# Patient Record
Sex: MALE | Race: WHITE | NOT HISPANIC OR LATINO | Employment: OTHER | URBAN - NONMETROPOLITAN AREA
[De-identification: names, ages, dates, MRNs, and addresses within clinical notes are randomized per-mention and may not be internally consistent; named-entity substitution may affect disease eponyms.]

---

## 2017-01-05 ENCOUNTER — OFFICE VISIT (OUTPATIENT)
Dept: CARDIOLOGY | Facility: CLINIC | Age: 72
End: 2017-01-05

## 2017-01-05 VITALS
DIASTOLIC BLOOD PRESSURE: 75 MMHG | BODY MASS INDEX: 27.85 KG/M2 | RESPIRATION RATE: 16 BRPM | WEIGHT: 188 LBS | SYSTOLIC BLOOD PRESSURE: 145 MMHG | HEIGHT: 69 IN | HEART RATE: 48 BPM

## 2017-01-05 DIAGNOSIS — I25.10 ASCVD (ARTERIOSCLEROTIC CARDIOVASCULAR DISEASE): Primary | ICD-10-CM

## 2017-01-05 DIAGNOSIS — Z95.1 S/P CABG X 5: ICD-10-CM

## 2017-01-05 DIAGNOSIS — I10 ESSENTIAL HYPERTENSION: ICD-10-CM

## 2017-01-05 DIAGNOSIS — I49.3 PVC'S (PREMATURE VENTRICULAR CONTRACTIONS): ICD-10-CM

## 2017-01-05 PROCEDURE — 99213 OFFICE O/P EST LOW 20 MIN: CPT | Performed by: INTERNAL MEDICINE

## 2017-01-05 PROCEDURE — 93000 ELECTROCARDIOGRAM COMPLETE: CPT | Performed by: INTERNAL MEDICINE

## 2017-01-05 RX ORDER — ATORVASTATIN CALCIUM 80 MG/1
80 TABLET, FILM COATED ORAL DAILY
Qty: 30 TABLET | Refills: 11 | Status: SHIPPED | OUTPATIENT
Start: 2017-01-05 | End: 2017-11-28 | Stop reason: SDUPTHER

## 2017-01-05 RX ORDER — LISINOPRIL 20 MG/1
20 TABLET ORAL DAILY
Qty: 30 TABLET | Refills: 11 | Status: SHIPPED | OUTPATIENT
Start: 2017-01-05 | End: 2017-11-28 | Stop reason: SDUPTHER

## 2017-01-05 NOTE — LETTER
January 5, 2017     ANEUDY Carrizales  [address]  [address]  [place] [address]    Patient: Shai Mata   YOB: 1945   Date of Visit: 1/5/2017       Dear ANEUDY Andres:    Thank you for referring Shai Mata to me for evaluation. Below are the relevant portions of my assessment and plan of care.    If you have questions, please do not hesitate to call me. I look forward to following Shai along with you.         Sincerely,        Dave Perla MD        CC: No Recipients  Dave Perla MD  1/5/2017  9:13 AM  Signed  ANEUDY Carrizales  Shai Mata  1945 01/05/2017    Patient Active Problem List   Diagnosis   • ASCVD (arteriosclerotic cardiovascular disease)   • S/P CABG x 5   • HTN (hypertension)   • PVC's (premature ventricular contractions)       Dear ANEUDY Carrizales:    Subjective     Shai Mata is a 71 y.o. male with the above medical problems who is here today for follow-up.  Return to the patient has been doing well since last visit.  He denies chest pain, shortness breath, palpitations, orthopnea, PND or lower extremity edema.  He was noted to be bradycardic into the 50s and upper 40s.  She is not having any dizziness or symptoms with this.  Since he has been bradycardic for long time and has had no problems with his we'll continue current regimen.  He is also noted to be mildly hypertensive.      Current Outpatient Prescriptions:   •  aspirin 81 MG EC tablet, Take 81 mg by mouth daily., Disp: , Rfl:   •  carvedilol (COREG) 3.125 MG tablet, Take 1 tablet by mouth 2 (Two) Times a Day With Meals., Disp: 60 tablet, Rfl: 5  •  furosemide (LASIX) 20 MG tablet, Take 1 tablet by mouth 2 (Two) Times a Day., Disp: 60 tablet, Rfl: 5  •  insulin NPH-insulin regular (Novolin 70/30) (70-30) 100 UNIT/ML injection, Inject 24 Units under the skin 2 (two) times a day with meals., Disp: , Rfl:   •  sotalol (BETAPACE) 80 MG tablet, Take 0.5 tablets by mouth 2 (Two) Times a  "Day., Disp: 60 tablet, Rfl: 5    The following portions of the patient's history were reviewed and updated as appropriate: allergies, current medications, past family history, past medical history, past social history, past surgical history and problem list.    Review of Systems   Constitution: Negative for chills, diaphoresis and fever.   HENT: Negative for congestion, headaches, nosebleeds and sore throat.    Eyes: Negative for blurred vision, pain and redness.   Cardiovascular: Negative for chest pain, leg swelling, orthopnea, palpitations, paroxysmal nocturnal dyspnea and syncope.   Respiratory: Negative for cough, hemoptysis, shortness of breath and wheezing.    Endocrine: Negative for cold intolerance and heat intolerance.   Hematologic/Lymphatic: Does not bruise/bleed easily.   Skin: Negative for rash.   Musculoskeletal: Negative for myalgias.   Gastrointestinal: Negative for abdominal pain, constipation, diarrhea, hematemesis, hematochezia, melena, nausea and vomiting.   Genitourinary: Negative for dysuria and hematuria.   Neurological: Negative for dizziness, focal weakness and numbness.   Psychiatric/Behavioral: Negative for depression. The patient is not nervous/anxious.        Objective   Blood pressure 145/75, pulse (!) 48, resp. rate 16, height 69\" (175.3 cm), weight 188 lb (85.3 kg).    Physical Exam   Constitutional: He is oriented to person, place, and time. He appears well-developed and well-nourished.   White male sitting comfortably in chair.   HENT:   Mouth/Throat: Oropharynx is clear and moist.   Eyes: EOM are normal. Pupils are equal, round, and reactive to light.   Neck: Neck supple. No JVD present. No tracheal deviation present. No thyromegaly present.   Cardiovascular: Normal rate, regular rhythm, S1 normal and S2 normal.  Exam reveals no gallop and no friction rub.    No murmur heard.  Pulmonary/Chest: Effort normal and breath sounds normal. No respiratory distress. He has no wheezes. He " has no rales.   Abdominal: Soft. Bowel sounds are normal. He exhibits no mass. There is no tenderness.   Musculoskeletal: Normal range of motion. He exhibits no edema.   Lymphadenopathy:     He has no cervical adenopathy.   Neurological: He is alert and oriented to person, place, and time.   Skin: Skin is warm and dry. No rash noted.   Psychiatric: He has a normal mood and affect.         ECG 12 Lead  Date/Time: 1/5/2017 8:03 AM  Performed by: ESTER MARQUEZ  Authorized by: ESTER MARQUEZ   Comparison: compared with previous ECG from 4/7/2017  Similar to previous ECG  Rhythm: sinus rhythm  Ectopy: unifocal PVCs  Rate: normal  BPM: 64  Conduction: complete RBBB  ST Segments: ST segments normal  T Waves: T waves normal  QRS axis: right  Other: no other findings  Clinical impression: non-specific ECG            Assessment/Plan     1.  ASCVD: Patient with history of ASCVD currently on aspirin, statin, beta blocker and ACE inhibitor.  He reports no chest pain or shortness of breath at this point.  He is LDL done in November of last year was noted to be elevated at 109.  At this point will increase atorvastatin to 80 mg by mouth daily and monitor for improvement.    2.  Hypertension: Patient with a history of hypertension who is mildly hypertensive on this visit.  At this point we'll increase lisinopril to 20 mg by mouth daily and monitor for improvement.    3.  PVCs: Patient with history of frequent PVCs started on sotalol for this.  He states he is currently doing well.  He remains mildly bradycardic although he is asymptomatic.  At this point we'll continue current regimen and monitor.  If he were to become symptomatically we'll discontinue current.     Diagnosis Plan   1. ASCVD (arteriosclerotic cardiovascular disease)  ECG 12 Lead   2. S/P CABG x 5     3. Essential hypertension     4. PVC's (premature ventricular contractions)              Return in about 6 months (around  7/5/2017).    I appreciate the opportunity to participate in this patient's cardiovascular care.    Best Regards    Dave Funes

## 2017-01-05 NOTE — PROGRESS NOTES
ANEUDY Carrizales  Shai Mata  1945 01/05/2017    Patient Active Problem List   Diagnosis   • ASCVD (arteriosclerotic cardiovascular disease)   • S/P CABG x 5   • HTN (hypertension)   • PVC's (premature ventricular contractions)       Dear ANEUDY Carrizales:    Subjective     Shai Mata is a 71 y.o. male with the above medical problems who is here today for follow-up.  Return to the patient has been doing well since last visit.  He denies chest pain, shortness breath, palpitations, orthopnea, PND or lower extremity edema.  He was noted to be bradycardic into the 50s and upper 40s.  She is not having any dizziness or symptoms with this.  Since he has been bradycardic for long time and has had no problems with his we'll continue current regimen.  He is also noted to be mildly hypertensive.      Current Outpatient Prescriptions:   •  aspirin 81 MG EC tablet, Take 81 mg by mouth daily., Disp: , Rfl:   •  carvedilol (COREG) 3.125 MG tablet, Take 1 tablet by mouth 2 (Two) Times a Day With Meals., Disp: 60 tablet, Rfl: 5  •  furosemide (LASIX) 20 MG tablet, Take 1 tablet by mouth 2 (Two) Times a Day., Disp: 60 tablet, Rfl: 5  •  insulin NPH-insulin regular (Novolin 70/30) (70-30) 100 UNIT/ML injection, Inject 24 Units under the skin 2 (two) times a day with meals., Disp: , Rfl:   •  sotalol (BETAPACE) 80 MG tablet, Take 0.5 tablets by mouth 2 (Two) Times a Day., Disp: 60 tablet, Rfl: 5    The following portions of the patient's history were reviewed and updated as appropriate: allergies, current medications, past family history, past medical history, past social history, past surgical history and problem list.    Review of Systems   Constitution: Negative for chills, diaphoresis and fever.   HENT: Negative for congestion, headaches, nosebleeds and sore throat.    Eyes: Negative for blurred vision, pain and redness.   Cardiovascular: Negative for chest pain, leg swelling, orthopnea, palpitations, paroxysmal nocturnal  "dyspnea and syncope.   Respiratory: Negative for cough, hemoptysis, shortness of breath and wheezing.    Endocrine: Negative for cold intolerance and heat intolerance.   Hematologic/Lymphatic: Does not bruise/bleed easily.   Skin: Negative for rash.   Musculoskeletal: Negative for myalgias.   Gastrointestinal: Negative for abdominal pain, constipation, diarrhea, hematemesis, hematochezia, melena, nausea and vomiting.   Genitourinary: Negative for dysuria and hematuria.   Neurological: Negative for dizziness, focal weakness and numbness.   Psychiatric/Behavioral: Negative for depression. The patient is not nervous/anxious.        Objective   Blood pressure 145/75, pulse (!) 48, resp. rate 16, height 69\" (175.3 cm), weight 188 lb (85.3 kg).    Physical Exam   Constitutional: He is oriented to person, place, and time. He appears well-developed and well-nourished.   White male sitting comfortably in chair.   HENT:   Mouth/Throat: Oropharynx is clear and moist.   Eyes: EOM are normal. Pupils are equal, round, and reactive to light.   Neck: Neck supple. No JVD present. No tracheal deviation present. No thyromegaly present.   Cardiovascular: Normal rate, regular rhythm, S1 normal and S2 normal.  Exam reveals no gallop and no friction rub.    No murmur heard.  Pulmonary/Chest: Effort normal and breath sounds normal. No respiratory distress. He has no wheezes. He has no rales.   Abdominal: Soft. Bowel sounds are normal. He exhibits no mass. There is no tenderness.   Musculoskeletal: Normal range of motion. He exhibits no edema.   Lymphadenopathy:     He has no cervical adenopathy.   Neurological: He is alert and oriented to person, place, and time.   Skin: Skin is warm and dry. No rash noted.   Psychiatric: He has a normal mood and affect.         ECG 12 Lead  Date/Time: 1/5/2017 8:03 AM  Performed by: ESTER MARQUEZ  Authorized by: ESTER MARQUEZ   Comparison: compared with previous ECG from " 4/7/2017  Similar to previous ECG  Rhythm: sinus rhythm  Ectopy: unifocal PVCs  Rate: normal  BPM: 64  Conduction: complete RBBB  ST Segments: ST segments normal  T Waves: T waves normal  QRS axis: right  Other: no other findings  Clinical impression: non-specific ECG            Assessment/Plan     1.  ASCVD: Patient with history of ASCVD currently on aspirin, statin, beta blocker and ACE inhibitor.  He reports no chest pain or shortness of breath at this point.  He is LDL done in November of last year was noted to be elevated at 109.  At this point will increase atorvastatin to 80 mg by mouth daily and monitor for improvement.    2.  Hypertension: Patient with a history of hypertension who is mildly hypertensive on this visit.  At this point we'll increase lisinopril to 20 mg by mouth daily and monitor for improvement.    3.  PVCs: Patient with history of frequent PVCs started on sotalol for this.  He states he is currently doing well.  He remains mildly bradycardic although he is asymptomatic.  At this point we'll continue current regimen and monitor.  If he were to become symptomatically we'll discontinue current.     Diagnosis Plan   1. ASCVD (arteriosclerotic cardiovascular disease)  ECG 12 Lead   2. S/P CABG x 5     3. Essential hypertension     4. PVC's (premature ventricular contractions)              Return in about 6 months (around 7/5/2017).    I appreciate the opportunity to participate in this patient's cardiovascular care.    Best Regards    Dave Funes

## 2017-01-05 NOTE — MR AVS SNAPSHOT
Shai Mata   1/5/2017 8:00 AM   Office Visit    Dept Phone:  [phone]   Encounter #:  [id number]    Provider:  Dave Perla MD   Department:  [facility] CARDIOLOGY                Your Full Care Plan              Today's Medication Changes          These changes are accurate as of: 1/5/17  9:15 AM.  If you have any questions, ask your nurse or doctor.               Medication(s)that have changed:     atorvastatin 80 MG tablet   Commonly known as:  LIPITOR   Take 1 tablet by mouth Daily.   What changed:    - medication strength  - how much to take   Changed by:  Dave Perla MD       lisinopril 20 MG tablet   Commonly known as:  PRINIVIL,ZESTRIL   Take 1 tablet by mouth Daily.   What changed:    - medication strength  - how much to take   Changed by:  Dave Perla MD            Where to Get Your Medications      These medications were sent to [address] [phone] [place] [id number] [place] [address]     Phone:  [phone]     atorvastatin 80 MG tablet    lisinopril 20 MG tablet                  Your Updated Medication List          This list is accurate as of: 1/5/17  9:15 AM.  Always use your most recent med list.                aspirin 81 MG EC tablet       atorvastatin 80 MG tablet   Commonly known as:  LIPITOR   Take 1 tablet by mouth Daily.       carvedilol 3.125 MG tablet   Commonly known as:  COREG   Take 1 tablet by mouth 2 (Two) Times a Day With Meals.       furosemide 20 MG tablet   Commonly known as:  LASIX   Take 1 tablet by mouth 2 (Two) Times a Day.       insulin NPH-insulin regular (70-30) 100 UNIT/ML injection   Commonly known as:  novoLIN 70/30       lisinopril 20 MG tablet   Commonly known as:  PRINIVIL,ZESTRIL   Take 1 tablet by mouth Daily.       sotalol 80 MG tablet   Commonly known as:  BETAPACE   Take 0.5 tablets by mouth 2  (Two) Times a Day.               We Performed the Following     ECG 12 Lead       You Were Diagnosed With        Codes Comments    ASCVD (arteriosclerotic cardiovascular disease)    -  Primary ICD-10-CM: I25.10  ICD-9-CM: 429.2, 440.9     S/P CABG x 5     ICD-10-CM: Z95.1  ICD-9-CM: V45.81     Essential hypertension     ICD-10-CM: I10  ICD-9-CM: 401.9     PVC's (premature ventricular contractions)     ICD-10-CM: I49.3  ICD-9-CM: 427.69       Instructions     None    Patient Instructions History      Upcoming Appointments     Visit Type Date Time Department    FOLLOW UP 2017  8:00 AM [facility]    FOLLOW UP 2017  9:20 AM [facility]      MyChart Signup     [facility] allows you to send messages to your doctor, view your test results, renew your prescriptions, schedule appointments, and more. To sign up, go to [employer] and click on the Sign Up Now link in the New User? box. Enter your [employer] Activation Code exactly as it appears below along with the last four digits of your Social Security Number and your Date of Birth () to complete the sign-up process. If you do not sign up before the expiration date, you must request a new code.    [employer] Activation Code: [email or web address]  Expires: 2017  5:36 AM    If you have questions, you can email [email or web address] or call [phone] to talk to our [employer] staff. Remember, [employer] is NOT to be used for urgent needs. For medical emergencies, dial 911.               Other Info from Your Visit           Your Appointments     2017  9:20 AM EST   Follow Up with ANEUDY Carrizales   [facility] (--)    [address]  [address]  [place] [id number] [phone]           Arrive 15 minutes prior to appointment.              Allergies     No Known Allergies      Reason for Visit     Follow-up           Vital Signs     Blood Pressure Pulse Respirations Height Weight Body Mass  "Index    145/75 (BP Location: Left arm, Patient Position: Sitting, Cuff Size: Adult) 48 16 69\" (175.3 cm) 188 lb (85.3 kg) 27.76 kg/m2    Smoking Status                   Former Smoker           Problems and Diagnoses Noted     ASCVD (arteriosclerotic cardiovascular disease)    High blood pressure    Irregular heartbeat    Status post five vessel coronary artery bypass      Results     ECG 12 Lead               "

## 2017-02-14 ENCOUNTER — OFFICE VISIT (OUTPATIENT)
Dept: FAMILY MEDICINE CLINIC | Facility: CLINIC | Age: 72
End: 2017-02-14

## 2017-02-14 VITALS
WEIGHT: 189 LBS | HEIGHT: 69 IN | HEART RATE: 52 BPM | BODY MASS INDEX: 27.99 KG/M2 | SYSTOLIC BLOOD PRESSURE: 144 MMHG | DIASTOLIC BLOOD PRESSURE: 75 MMHG | OXYGEN SATURATION: 92 %

## 2017-02-14 DIAGNOSIS — Z79.4 TYPE 2 DIABETES MELLITUS WITH HYPERGLYCEMIA, WITH LONG-TERM CURRENT USE OF INSULIN (HCC): Primary | ICD-10-CM

## 2017-02-14 DIAGNOSIS — I10 ESSENTIAL HYPERTENSION: ICD-10-CM

## 2017-02-14 DIAGNOSIS — E78.2 HYPERLIPEMIA, MIXED: ICD-10-CM

## 2017-02-14 DIAGNOSIS — E11.65 TYPE 2 DIABETES MELLITUS WITH HYPERGLYCEMIA, WITH LONG-TERM CURRENT USE OF INSULIN (HCC): Primary | ICD-10-CM

## 2017-02-14 LAB
ALBUMIN SERPL-MCNC: 4.3 G/DL (ref 3.4–4.8)
ALBUMIN/GLOB SERPL: 1.5 G/DL (ref 1.5–2.5)
ALP SERPL-CCNC: 67 U/L (ref 40–129)
ALT SERPL W P-5'-P-CCNC: 21 U/L (ref 10–44)
ANION GAP SERPL CALCULATED.3IONS-SCNC: 3.7 MMOL/L (ref 3.6–11.2)
AST SERPL-CCNC: 22 U/L (ref 10–34)
BILIRUB SERPL-MCNC: 0.8 MG/DL (ref 0.2–1.8)
BUN BLD-MCNC: 18 MG/DL (ref 7–21)
BUN/CREAT SERPL: 14.8 (ref 7–25)
CALCIUM SPEC-SCNC: 9.9 MG/DL (ref 7.7–10)
CHLORIDE SERPL-SCNC: 105 MMOL/L (ref 99–112)
CHOLEST SERPL-MCNC: 139 MG/DL (ref 0–200)
CO2 SERPL-SCNC: 30.3 MMOL/L (ref 24.3–31.9)
CREAT BLD-MCNC: 1.22 MG/DL (ref 0.43–1.29)
GFR SERPL CREATININE-BSD FRML MDRD: 58 ML/MIN/1.73
GLOBULIN UR ELPH-MCNC: 2.9 GM/DL
GLUCOSE BLD-MCNC: 175 MG/DL (ref 70–110)
HBA1C MFR BLD: 11.1 % (ref 4.5–5.7)
HDLC SERPL-MCNC: 41 MG/DL (ref 60–100)
LDLC SERPL CALC-MCNC: 87 MG/DL (ref 0–100)
LDLC/HDLC SERPL: 2.13 {RATIO}
OSMOLALITY SERPL CALC.SUM OF ELEC: 283.7 MOSM/KG (ref 273–305)
POTASSIUM BLD-SCNC: 4.2 MMOL/L (ref 3.5–5.3)
PROT SERPL-MCNC: 7.2 G/DL (ref 6–8)
SODIUM BLD-SCNC: 139 MMOL/L (ref 135–153)
TRIGL SERPL-MCNC: 53 MG/DL (ref 0–150)
VLDLC SERPL-MCNC: 10.6 MG/DL

## 2017-02-14 PROCEDURE — 99213 OFFICE O/P EST LOW 20 MIN: CPT | Performed by: NURSE PRACTITIONER

## 2017-02-14 PROCEDURE — 80053 COMPREHEN METABOLIC PANEL: CPT | Performed by: NURSE PRACTITIONER

## 2017-02-14 PROCEDURE — 80061 LIPID PANEL: CPT | Performed by: NURSE PRACTITIONER

## 2017-02-14 PROCEDURE — 83036 HEMOGLOBIN GLYCOSYLATED A1C: CPT | Performed by: NURSE PRACTITIONER

## 2017-02-14 PROCEDURE — 36415 COLL VENOUS BLD VENIPUNCTURE: CPT | Performed by: NURSE PRACTITIONER

## 2017-02-14 RX ORDER — SOTALOL HYDROCHLORIDE 80 MG/1
40 TABLET ORAL 2 TIMES DAILY
Qty: 60 TABLET | Refills: 5 | Status: SHIPPED | OUTPATIENT
Start: 2017-02-14 | End: 2017-10-06

## 2017-02-15 ENCOUNTER — TELEPHONE (OUTPATIENT)
Dept: FAMILY MEDICINE CLINIC | Facility: CLINIC | Age: 72
End: 2017-02-15

## 2017-02-15 NOTE — TELEPHONE ENCOUNTER
----- Message from ANEUDY Carrizales sent at 2/15/2017 11:44 AM EST -----  Can you call Mr Mata and ask how and when he is taking his insulin.  Sugar is up more than in th past we have to make some changes and before i make changes i want to be sure.      Left a message for him to return call.    Patient returned call reports he takes 23 units of 70/30 bid

## 2017-02-16 RX ORDER — INSULIN GLARGINE 100 [IU]/ML
20 INJECTION, SOLUTION SUBCUTANEOUS NIGHTLY
Qty: 10 ML | Refills: 5 | Status: SHIPPED | OUTPATIENT
Start: 2017-02-16 | End: 2017-02-21

## 2017-02-16 NOTE — TELEPHONE ENCOUNTER
I would like to add a nighttime dose of lantus 20 units at bedtime and follow him with   in the next 3-4 weeks or sooner if he has any problems or concerns.

## 2017-02-16 NOTE — PROGRESS NOTES
Subjective   Shai Mata is a 71 y.o. male.     HPI Comments: Returns today follow up states he is feeling pretty good without any new concerns.  Sugars are up and down admits he sometimes forgets his medications and forgets to eat.  Gets busy in his garage just piddling and trying to stay busy.      Diabetes   He presents for his follow-up diabetic visit. He has type 2 diabetes mellitus. His disease course has been fluctuating. There are no hypoglycemic associated symptoms. Pertinent negatives for hypoglycemia include no headaches or sweats. There are no diabetic associated symptoms. Pertinent negatives for diabetes include no blurred vision and no chest pain. There are no hypoglycemic complications. Symptoms are stable. There are no diabetic complications. Risk factors for coronary artery disease include sedentary lifestyle, male sex, hypertension, family history and dyslipidemia. Current diabetic treatment includes insulin injections. He is compliant with treatment most of the time. His weight is stable. He is following a generally healthy diet. Meal planning includes avoidance of concentrated sweets. He participates in exercise intermittently. His home blood glucose trend is fluctuating minimally. His breakfast blood glucose range is generally 140-180 mg/dl. His overall blood glucose range is >200 mg/dl. An ACE inhibitor/angiotensin II receptor blocker is being taken. He does not see a podiatrist.  Hypertension   This is a chronic problem. The current episode started more than 1 year ago. The problem is controlled. Pertinent negatives include no anxiety, blurred vision, chest pain, headaches, malaise/fatigue, neck pain, orthopnea, palpitations, peripheral edema, PND, shortness of breath or sweats. Risk factors for coronary artery disease include dyslipidemia, male gender and sedentary lifestyle. Past treatments include ACE inhibitors and beta blockers. The current treatment provides moderate improvement.  Compliance problems include diet and exercise.    Hyperlipidemia   This is a chronic problem. Recent lipid tests were reviewed and are variable. Exacerbating diseases include diabetes. He has no history of obesity. Pertinent negatives include no chest pain, myalgias or shortness of breath. Current antihyperlipidemic treatment includes statins. The current treatment provides moderate improvement of lipids. Compliance problems include adherence to exercise and adherence to diet.  Risk factors for coronary artery disease include hypertension, obesity and a sedentary lifestyle.      The following portions of the patient's history were reviewed and updated as appropriate: allergies, current medications, past family history, past medical history, past social history, past surgical history and problem list.      Review of Systems   Constitutional: Negative.  Negative for malaise/fatigue.   HENT: Negative.    Eyes: Negative for blurred vision.   Respiratory: Negative.  Negative for shortness of breath.    Cardiovascular: Negative.  Negative for chest pain, palpitations, orthopnea and PND.   Endocrine: Negative.    Musculoskeletal: Negative.  Negative for myalgias and neck pain.   Skin: Negative.    Neurological: Negative for headaches.   Psychiatric/Behavioral: Negative.    All other systems reviewed and are negative.      Procedures    Objective   Physical Exam   Constitutional: He is oriented to person, place, and time. He appears well-developed and well-nourished. No distress.   HENT:   Head: Normocephalic.   Eyes: Conjunctivae are normal. Right eye exhibits no discharge. Left eye exhibits no discharge.   Cardiovascular: Normal rate, regular rhythm, normal heart sounds and intact distal pulses.    No murmur heard.  Pulmonary/Chest: Effort normal and breath sounds normal. No respiratory distress.   Musculoskeletal: He exhibits no edema.   Neurological: He is alert and oriented to person, place, and time.   Skin: Skin is  warm and dry. He is not diaphoretic.   Psychiatric: He has a normal mood and affect. His behavior is normal.   Nursing note and vitals reviewed.      Assessment/Plan   Discussed with patient impression and plan, patient verbalizes understanding.  Continue with routine medications.  Discussed eating on a regular basis and monitoring his glucose more often and keeping record.   Shai was seen today for follow-up, diabetes, hypertension and hyperlipidemia.    Diagnoses and all orders for this visit:    Type 2 diabetes mellitus with hyperglycemia, with long-term current use of insulin  -     Comprehensive Metabolic Panel  -     Lipid Panel  -     Hemoglobin A1c  -     Osmolality, Calculated; Future  -     Osmolality, Calculated    Essential hypertension  -     Comprehensive Metabolic Panel  -     Lipid Panel  -     Hemoglobin A1c  -     Osmolality, Calculated; Future  -     Osmolality, Calculated    Hyperlipemia, mixed    Other orders  -     sotalol (BETAPACE) 80 MG tablet; Take 0.5 tablets by mouth 2 (Two) Times a Day.

## 2017-02-16 NOTE — TELEPHONE ENCOUNTER
I would like to add a nighttime dose of lantus 20 units at bedtime and follow him with   in the next 3-4 weeks or sooner if he has any problems or concerns.       Spoke with patient & he verbalized understanding.

## 2017-02-21 ENCOUNTER — TELEPHONE (OUTPATIENT)
Dept: FAMILY MEDICINE CLINIC | Facility: CLINIC | Age: 72
End: 2017-02-21

## 2017-02-21 NOTE — TELEPHONE ENCOUNTER
Patient called stated he went to  the Lantus you wanted him to start on but it was over $200.00 & he couldn't afford it wants to know if you could make other adjustments.

## 2017-02-21 NOTE — TELEPHONE ENCOUNTER
See if the levimir is any different price    Called the pharmacy pens=[phone],vials=[phone] for a months supply!!          Spoke with patient & informed him of Metformin reviewed dietary restrictions & importance of dietary changes & he verbalized understanding.

## 2017-03-16 ENCOUNTER — TELEPHONE (OUTPATIENT)
Dept: FAMILY MEDICINE CLINIC | Facility: CLINIC | Age: 72
End: 2017-03-16

## 2017-03-16 ENCOUNTER — OFFICE VISIT (OUTPATIENT)
Dept: FAMILY MEDICINE CLINIC | Facility: CLINIC | Age: 72
End: 2017-03-16

## 2017-03-16 VITALS
SYSTOLIC BLOOD PRESSURE: 154 MMHG | BODY MASS INDEX: 28.05 KG/M2 | WEIGHT: 189.4 LBS | OXYGEN SATURATION: 98 % | DIASTOLIC BLOOD PRESSURE: 77 MMHG | HEART RATE: 89 BPM | HEIGHT: 69 IN

## 2017-03-16 DIAGNOSIS — Z79.4 TYPE 2 DIABETES MELLITUS WITH HYPERGLYCEMIA, WITH LONG-TERM CURRENT USE OF INSULIN (HCC): Primary | ICD-10-CM

## 2017-03-16 DIAGNOSIS — E11.65 TYPE 2 DIABETES MELLITUS WITH HYPERGLYCEMIA, WITH LONG-TERM CURRENT USE OF INSULIN (HCC): Primary | ICD-10-CM

## 2017-03-16 LAB
ANION GAP SERPL CALCULATED.3IONS-SCNC: 4.9 MMOL/L (ref 3.6–11.2)
BUN BLD-MCNC: 18 MG/DL (ref 7–21)
BUN/CREAT SERPL: 16.7 (ref 7–25)
CALCIUM SPEC-SCNC: 9.6 MG/DL (ref 7.7–10)
CHLORIDE SERPL-SCNC: 105 MMOL/L (ref 99–112)
CO2 SERPL-SCNC: 30.1 MMOL/L (ref 24.3–31.9)
CREAT BLD-MCNC: 1.08 MG/DL (ref 0.43–1.29)
GFR SERPL CREATININE-BSD FRML MDRD: 67 ML/MIN/1.73
GLUCOSE BLD-MCNC: 165 MG/DL (ref 70–110)
HBA1C MFR BLD: 9.2 % (ref 4.5–5.7)
OSMOLALITY SERPL CALC.SUM OF ELEC: 285 MOSM/KG (ref 273–305)
POTASSIUM BLD-SCNC: 4.5 MMOL/L (ref 3.5–5.3)
SODIUM BLD-SCNC: 140 MMOL/L (ref 135–153)

## 2017-03-16 PROCEDURE — 80048 BASIC METABOLIC PNL TOTAL CA: CPT | Performed by: NURSE PRACTITIONER

## 2017-03-16 PROCEDURE — 99213 OFFICE O/P EST LOW 20 MIN: CPT | Performed by: NURSE PRACTITIONER

## 2017-03-16 PROCEDURE — 36415 COLL VENOUS BLD VENIPUNCTURE: CPT | Performed by: NURSE PRACTITIONER

## 2017-03-16 PROCEDURE — 83036 HEMOGLOBIN GLYCOSYLATED A1C: CPT | Performed by: NURSE PRACTITIONER

## 2017-03-16 NOTE — PROGRESS NOTES
Subjective   Shai Mata is a 71 y.o. male.     HPI Comments: Returns today follow up after starting new medication for his diabetes.  Over the past month numbers improving.  Tolerating medication without any side effects.  Feels he took this in the past and not sure why he was taken off or if he just quit taking the medication.  States he is feeling pretty good without any new concerns.      Diabetes   He presents for his follow-up diabetic visit. He has type 2 diabetes mellitus. His disease course has been improving. There are no hypoglycemic associated symptoms. Pertinent negatives for hypoglycemia include no confusion, dizziness, hunger, mood changes, nervousness/anxiousness, pallor, seizures, sleepiness, speech difficulty or tremors. There are no diabetic associated symptoms. Pertinent negatives for diabetes include no fatigue, no foot paresthesias, no foot ulcerations, no polydipsia, no polyphagia, no polyuria, no visual change, no weakness and no weight loss. There are no hypoglycemic complications. Symptoms are stable. There are no diabetic complications. Risk factors for coronary artery disease include sedentary lifestyle, male sex, hypertension, family history and dyslipidemia. Current diabetic treatment includes insulin injections. He is compliant with treatment most of the time. His weight is stable. He is following a generally healthy diet. Meal planning includes avoidance of concentrated sweets and carbohydrate counting. He participates in exercise intermittently. His home blood glucose trend is decreasing steadily. His breakfast blood glucose range is generally  mg/dl. His overall blood glucose range is 140-180 mg/dl. An ACE inhibitor/angiotensin II receptor blocker is being taken. He does not see a podiatrist.     The following portions of the patient's history were reviewed and updated as appropriate: allergies, current medications, past family history, past medical history, past social  history, past surgical history and problem list.      Review of Systems   Constitutional: Negative.  Negative for fatigue and weight loss.   HENT: Negative.    Respiratory: Negative.    Cardiovascular: Negative.    Endocrine: Negative.  Negative for polydipsia, polyphagia and polyuria.   Musculoskeletal: Negative.    Skin: Negative.  Negative for pallor.   Neurological: Negative for dizziness, tremors, seizures, speech difficulty and weakness.   Psychiatric/Behavioral: Negative.  Negative for confusion. The patient is not nervous/anxious.    All other systems reviewed and are negative.      Procedures    Objective   Physical Exam   Constitutional: He is oriented to person, place, and time. He appears well-developed and well-nourished. No distress.   HENT:   Head: Normocephalic.   Eyes: Conjunctivae are normal. Right eye exhibits no discharge. Left eye exhibits no discharge.   Cardiovascular: Normal rate, regular rhythm, normal heart sounds and intact distal pulses.    No murmur heard.  Pulmonary/Chest: Effort normal and breath sounds normal. No respiratory distress.   Musculoskeletal: He exhibits no edema.   Neurological: He is alert and oriented to person, place, and time.   Skin: Skin is warm and dry. He is not diaphoretic.   Psychiatric: He has a normal mood and affect. His behavior is normal. Judgment and thought content normal.   Nursing note and vitals reviewed.      Assessment/Plan   Discussed with patient impression and plan, patient verbalizes understanding.  Continue with routine medications.  Discussed eating on a regular basis and monitoring his glucose more often and keeping record.   Shai was seen today for diabetes and follow-up.    Diagnoses and all orders for this visit:    Type 2 diabetes mellitus with hyperglycemia, with long-term current use of insulin  -     Hemoglobin A1c  -     Basic Metabolic Panel

## 2017-03-16 NOTE — TELEPHONE ENCOUNTER
----- Message from ANEUDY Carrizales sent at 3/16/2017  4:04 PM EDT -----  Let Mr Mata know that controlling his sugar is working his HGB A1C is down to 9 much better than one month ago.  Keep up the hard work.  He will be able to exercise as the weather improves this will help also      Patient notified & verbalized understanding.

## 2017-05-23 ENCOUNTER — OFFICE VISIT (OUTPATIENT)
Dept: FAMILY MEDICINE CLINIC | Facility: CLINIC | Age: 72
End: 2017-05-23

## 2017-05-23 VITALS
BODY MASS INDEX: 26.98 KG/M2 | WEIGHT: 182.2 LBS | HEART RATE: 60 BPM | DIASTOLIC BLOOD PRESSURE: 69 MMHG | SYSTOLIC BLOOD PRESSURE: 129 MMHG | HEIGHT: 69 IN | OXYGEN SATURATION: 99 %

## 2017-05-23 DIAGNOSIS — E11.9 TYPE 2 DIABETES MELLITUS WITHOUT COMPLICATION, WITH LONG-TERM CURRENT USE OF INSULIN (HCC): ICD-10-CM

## 2017-05-23 DIAGNOSIS — Z79.4 TYPE 2 DIABETES MELLITUS WITHOUT COMPLICATION, WITH LONG-TERM CURRENT USE OF INSULIN (HCC): ICD-10-CM

## 2017-05-23 DIAGNOSIS — Z00.00 ENCOUNTER FOR MEDICARE ANNUAL WELLNESS EXAM: Primary | ICD-10-CM

## 2017-05-23 LAB
ALBUMIN SERPL-MCNC: 4.3 G/DL (ref 3.4–4.8)
ALBUMIN/GLOB SERPL: 1.5 G/DL (ref 1.5–2.5)
ALP SERPL-CCNC: 66 U/L (ref 40–129)
ALT SERPL W P-5'-P-CCNC: 20 U/L (ref 10–44)
ANION GAP SERPL CALCULATED.3IONS-SCNC: 5.4 MMOL/L (ref 3.6–11.2)
AST SERPL-CCNC: 21 U/L (ref 10–34)
BILIRUB SERPL-MCNC: 0.8 MG/DL (ref 0.2–1.8)
BUN BLD-MCNC: 16 MG/DL (ref 7–21)
BUN/CREAT SERPL: 16.5 (ref 7–25)
CALCIUM SPEC-SCNC: 9.8 MG/DL (ref 7.7–10)
CHLORIDE SERPL-SCNC: 106 MMOL/L (ref 99–112)
CO2 SERPL-SCNC: 32.6 MMOL/L (ref 24.3–31.9)
CREAT BLD-MCNC: 0.97 MG/DL (ref 0.43–1.29)
GFR SERPL CREATININE-BSD FRML MDRD: 76 ML/MIN/1.73
GLOBULIN UR ELPH-MCNC: 2.9 GM/DL
GLUCOSE BLD-MCNC: 128 MG/DL (ref 70–110)
HBA1C MFR BLD: 7.7 % (ref 4.5–5.7)
OSMOLALITY SERPL CALC.SUM OF ELEC: 289.7 MOSM/KG (ref 273–305)
POTASSIUM BLD-SCNC: 4.7 MMOL/L (ref 3.5–5.3)
PROT SERPL-MCNC: 7.2 G/DL (ref 6–8)
PSA SERPL-MCNC: 1.14 NG/ML (ref 0–4)
SODIUM BLD-SCNC: 144 MMOL/L (ref 135–153)

## 2017-05-23 PROCEDURE — 36415 COLL VENOUS BLD VENIPUNCTURE: CPT | Performed by: NURSE PRACTITIONER

## 2017-05-23 PROCEDURE — 80053 COMPREHEN METABOLIC PANEL: CPT | Performed by: NURSE PRACTITIONER

## 2017-05-23 PROCEDURE — 84153 ASSAY OF PSA TOTAL: CPT | Performed by: NURSE PRACTITIONER

## 2017-05-23 PROCEDURE — 80061 LIPID PANEL: CPT | Performed by: NURSE PRACTITIONER

## 2017-05-23 PROCEDURE — 83036 HEMOGLOBIN GLYCOSYLATED A1C: CPT | Performed by: NURSE PRACTITIONER

## 2017-05-23 PROCEDURE — 83704 LIPOPROTEIN BLD QUAN PART: CPT | Performed by: NURSE PRACTITIONER

## 2017-05-23 PROCEDURE — G0439 PPPS, SUBSEQ VISIT: HCPCS | Performed by: NURSE PRACTITIONER

## 2017-05-23 RX ORDER — FUROSEMIDE 20 MG/1
20 TABLET ORAL 2 TIMES DAILY
Qty: 60 TABLET | Refills: 5 | Status: SHIPPED | OUTPATIENT
Start: 2017-05-23 | End: 2017-11-19 | Stop reason: DRUGHIGH

## 2017-05-23 RX ORDER — CARVEDILOL 3.12 MG/1
3.12 TABLET ORAL 2 TIMES DAILY WITH MEALS
Qty: 60 TABLET | Refills: 5 | Status: SHIPPED | OUTPATIENT
Start: 2017-05-23 | End: 2017-11-21 | Stop reason: HOSPADM

## 2017-05-24 ENCOUNTER — TELEPHONE (OUTPATIENT)
Dept: FAMILY MEDICINE CLINIC | Facility: CLINIC | Age: 72
End: 2017-05-24

## 2017-05-24 LAB
CHOLEST SERPL-MCNC: 119 MG/DL (ref 100–199)
HDL SERPL-SCNC: 24 UMOL/L
HDLC SERPL-MCNC: 34 MG/DL
LDL-P: 1165 NMOL/L
LDLC REAL SIZE PAT SERPL: 20.2 NM
LDLC SERPL CALC-MCNC: 74 MG/DL (ref 0–99)
SMALL LDL-P: 695 NMOL/L
TRIGL SERPL-MCNC: 54 MG/DL (ref 0–149)

## 2017-08-10 ENCOUNTER — HOSPITAL ENCOUNTER (OUTPATIENT)
Dept: RESPIRATORY THERAPY | Facility: HOSPITAL | Age: 72
Discharge: HOME | End: 2017-08-10
Admitting: PHYSICIAN ASSISTANT

## 2017-08-10 ENCOUNTER — OFFICE VISIT (OUTPATIENT)
Dept: CARDIOLOGY | Facility: CLINIC | Age: 72
End: 2017-08-10

## 2017-08-10 VITALS
DIASTOLIC BLOOD PRESSURE: 55 MMHG | SYSTOLIC BLOOD PRESSURE: 124 MMHG | HEIGHT: 69 IN | BODY MASS INDEX: 26.51 KG/M2 | WEIGHT: 179 LBS | HEART RATE: 42 BPM

## 2017-08-10 DIAGNOSIS — I49.3 PVC'S (PREMATURE VENTRICULAR CONTRACTIONS): ICD-10-CM

## 2017-08-10 DIAGNOSIS — I10 ESSENTIAL HYPERTENSION: ICD-10-CM

## 2017-08-10 DIAGNOSIS — E78.5 DYSLIPIDEMIA: ICD-10-CM

## 2017-08-10 DIAGNOSIS — I25.10 ASCVD (ARTERIOSCLEROTIC CARDIOVASCULAR DISEASE): ICD-10-CM

## 2017-08-10 DIAGNOSIS — R00.1 SINUS BRADYCARDIA: ICD-10-CM

## 2017-08-10 DIAGNOSIS — R00.1 SINUS BRADYCARDIA: Primary | ICD-10-CM

## 2017-08-10 DIAGNOSIS — Z95.1 S/P CABG X 5: ICD-10-CM

## 2017-08-10 PROCEDURE — 93226 XTRNL ECG REC<48 HR SCAN A/R: CPT

## 2017-08-10 PROCEDURE — 99213 OFFICE O/P EST LOW 20 MIN: CPT | Performed by: PHYSICIAN ASSISTANT

## 2017-08-10 PROCEDURE — 93000 ELECTROCARDIOGRAM COMPLETE: CPT | Performed by: PHYSICIAN ASSISTANT

## 2017-08-10 PROCEDURE — 93225 XTRNL ECG REC<48 HRS REC: CPT

## 2017-08-10 PROCEDURE — 93227 XTRNL ECG REC<48 HR R&I: CPT | Performed by: INTERNAL MEDICINE

## 2017-08-10 NOTE — PROGRESS NOTES
Ekateirna Cox, ANEUDY  Shai Mata  1945  08/10/2017    Patient Active Problem List   Diagnosis   • ASCVD (arteriosclerotic cardiovascular disease)   • S/P CABG x 5   • HTN (hypertension)   • PVC's (premature ventricular contractions)   • Type 2 diabetes mellitus with hyperglycemia, with long-term current use of insulin   • Hyperlipemia, mixed       Dear Ekaterina Cox, ANEUDY:    Chief Complaint   Patient presents with   • ASCVD     follow up       Subjective     Shai Mata is a 72 y.o. male with a past medical history significant for Atherosclerotic cardiovascular disease status post coronary artery bypass grafting which was 5 vessel.  He also has a history of hypertension and frequent PVCs for which she was initiated on sotalol.  He presents the office today for follow-up visit. He has been doing well without any complaints of chest pains, shortness of breath, weight gain, edema of the lower extremities, dizziness, or near syncopal episodes.  He has been taking his medications regularly.      Current Outpatient Prescriptions:   •  aspirin 81 MG EC tablet, Take 81 mg by mouth daily., Disp: , Rfl:   •  atorvastatin (LIPITOR) 80 MG tablet, Take 1 tablet by mouth Daily., Disp: 30 tablet, Rfl: 11  •  carvedilol (COREG) 3.125 MG tablet, Take 1 tablet by mouth 2 (Two) Times a Day With Meals., Disp: 60 tablet, Rfl: 5  •  furosemide (LASIX) 20 MG tablet, Take 1 tablet by mouth 2 (Two) Times a Day., Disp: 60 tablet, Rfl: 5  •  insulin NPH-insulin regular (Novolin 70/30) (70-30) 100 UNIT/ML injection, Inject 24 Units under the skin 2 (two) times a day with meals., Disp: , Rfl:   •  lisinopril (PRINIVIL,ZESTRIL) 20 MG tablet, Take 1 tablet by mouth Daily., Disp: 30 tablet, Rfl: 11  •  metFORMIN (GLUCOPHAGE) 500 MG tablet, Take 1 tablet by mouth 2 (Two) Times a Day With Meals., Disp: 60 tablet, Rfl: 5  •  sotalol (BETAPACE) 80 MG tablet, Take 0.5 tablets by mouth 2 (Two) Times a Day., Disp: 60 tablet, Rfl: 5    The following portions  "of the patient's history were reviewed and updated as appropriate: allergies, current medications, past family history, past medical history, past social history, past surgical history and problem list.    Social History     Social History   • Marital status:      Spouse name: N/A   • Number of children: N/A   • Years of education: N/A     Occupational History   • Not on file.     Social History Main Topics   • Smoking status: Former Smoker     Packs/day: 2.00     Types: Cigarettes     Quit date: 1984   • Smokeless tobacco: Former User     Types: Chew     Quit date: 1990   • Alcohol use No   • Drug use: No   • Sexual activity: Defer     Other Topics Concern   • Not on file     Social History Narrative     Review of Systems   Constitution: Negative for weakness and malaise/fatigue.   Cardiovascular: Negative for chest pain, leg swelling and palpitations.   Respiratory: Negative for shortness of breath.    Neurological: Negative for dizziness and light-headedness.     Objective   Blood pressure 124/55, pulse (!) 42, height 69\" (175.3 cm), weight 179 lb (81.2 kg).    Body mass index is 26.43 kg/(m^2).    Physical Exam   Constitutional: He is oriented to person, place, and time. He appears well-developed and well-nourished. No distress.   HENT:   Head: Normocephalic and atraumatic.   Eyes: Conjunctivae are normal. Right eye exhibits no discharge. Left eye exhibits no discharge.   Neck: Normal range of motion. Neck supple. Carotid bruit is not present.   Cardiovascular: Regular rhythm and normal heart sounds.  Exam reveals no gallop and no friction rub.    No murmur heard.  Bradycardic   Pulmonary/Chest: Effort normal and breath sounds normal. No respiratory distress. He has no wheezes. He has no rales. He exhibits no tenderness.   Musculoskeletal: Normal range of motion. He exhibits no edema.   Neurological: He is alert and oriented to person, place, and time.   Skin: Skin is warm and dry. No rash noted. He is " not diaphoretic. No erythema. No pallor.   Psychiatric: He has a normal mood and affect. His behavior is normal.   Nursing note and vitals reviewed.      ECG 12 Lead  Date/Time: 8/10/2017 11:28 AM  Performed by: ALON PICKARD  Authorized by: ALON PICKARD   Comparison: compared with previous ECG   Similar to previous ECG  Rhythm: sinus bradycardia  Ectopy: PVCs and atrial premature contractions  Rate: bradycardic  BPM: 50  Conduction: right bundle branch block  ST Segments: ST segments normal  T Waves: T waves normal  Clinical impression: abnormal ECG  Comments: Sinus bradycardia at a rate of 50 bpm with 1 PAC, one PVC, right bundle branch block and a QTC of 471.          Transthoracic echocardiogram: 01/11/16    Assessment:          Diagnosis Plan   1. Sinus bradycardia  Holter Monitor - 24 Hour    Adult Transthoracic Echo Complete   2. ASCVD (arteriosclerotic cardiovascular disease)  Holter Monitor - 24 Hour    Adult Transthoracic Echo Complete   3. PVC's (premature ventricular contractions)  Holter Monitor - 24 Hour    Adult Transthoracic Echo Complete   4. S/P CABG x 5  Holter Monitor - 24 Hour    Adult Transthoracic Echo Complete   5. Essential hypertension     6. Dyslipidemia          Plan:       1. We will evaluate further with a 24-hour Holter monitor to rule out significant bradycardia as well as to see how the sotalol is controlling his PVCs.  We'll consider adjusting either his sotalol or carvedilol based on these results as he is currently on the lowest dose of both.  2. Reevaluate cardiac wall motion and left ventricular systolic function with a transthoracic echocardiogram and adjust therapy as needed.  3. I have discussed about low-salt diet which he states he follows already.  He is also been working on diet and has lost about 10 pounds since March.  I have encouraged him to keep this up.  4. Follow-up in 3-4 weeks or sooner if needed.    No Follow-up on file.    I appreciate the  opportunity to participate in this patient's cardiovascular care.    Best Regards,    Kenia Anton PA-C

## 2017-08-15 ENCOUNTER — TELEPHONE (OUTPATIENT)
Dept: CARDIOLOGY | Facility: CLINIC | Age: 72
End: 2017-08-15

## 2017-08-15 NOTE — TELEPHONE ENCOUNTER
----- Message from BRYON Nice sent at 8/14/2017  4:55 PM EDT -----  Discussed with [facility]. Discontinue sotatol due to continued PVCs despite sotalol therapy.      Called pt; left vm for him to return my call.  ROSIE SINGH      Pt advised per [place] to d/c Sotalol due to continued PVCs despite sotalol tx.  Pt was agreeable and expressed understanding.  ROSIE SINGH

## 2017-08-17 ENCOUNTER — HOSPITAL ENCOUNTER (OUTPATIENT)
Dept: CARDIOLOGY | Facility: HOSPITAL | Age: 72
Discharge: HOME | End: 2017-08-17
Admitting: PHYSICIAN ASSISTANT

## 2017-08-17 DIAGNOSIS — Z95.1 S/P CABG X 5: ICD-10-CM

## 2017-08-17 DIAGNOSIS — I25.10 ASCVD (ARTERIOSCLEROTIC CARDIOVASCULAR DISEASE): ICD-10-CM

## 2017-08-17 DIAGNOSIS — R00.1 SINUS BRADYCARDIA: ICD-10-CM

## 2017-08-17 DIAGNOSIS — I49.3 PVC'S (PREMATURE VENTRICULAR CONTRACTIONS): ICD-10-CM

## 2017-08-17 PROCEDURE — 93306 TTE W/DOPPLER COMPLETE: CPT

## 2017-08-17 PROCEDURE — 93306 TTE W/DOPPLER COMPLETE: CPT | Performed by: INTERNAL MEDICINE

## 2017-08-19 LAB
BH CV ECHO MEAS - ACS: 1.3 CM
BH CV ECHO MEAS - AI DEC SLOPE: 79.5 CM/SEC^2
BH CV ECHO MEAS - AI MAX PG: 42.1 MMHG
BH CV ECHO MEAS - AI MAX VEL: 324.6 CM/SEC
BH CV ECHO MEAS - AI P1/2T: 1195 MSEC
BH CV ECHO MEAS - AO MAX PG (FULL): 13 MMHG
BH CV ECHO MEAS - AO MAX PG: 14.3 MMHG
BH CV ECHO MEAS - AO MEAN PG (FULL): 6 MMHG
BH CV ECHO MEAS - AO MEAN PG: 6.8 MMHG
BH CV ECHO MEAS - AO ROOT AREA (BSA CORRECTED): 1.6
BH CV ECHO MEAS - AO ROOT AREA: 7.5 CM^2
BH CV ECHO MEAS - AO ROOT DIAM: 3.1 CM
BH CV ECHO MEAS - AO V2 MAX: 188.5 CM/SEC
BH CV ECHO MEAS - AO V2 MEAN: 122.6 CM/SEC
BH CV ECHO MEAS - AO V2 VTI: 40 CM
BH CV ECHO MEAS - AVA(I,A): 1.5 CM^2
BH CV ECHO MEAS - AVA(I,D): 1.5 CM^2
BH CV ECHO MEAS - AVA(V,A): 1.1 CM^2
BH CV ECHO MEAS - AVA(V,D): 1.1 CM^2
BH CV ECHO MEAS - BSA(HAYCOCK): 2 M^2
BH CV ECHO MEAS - BSA: 2 M^2
BH CV ECHO MEAS - BZI_BMI: 26.4 KILOGRAMS/M^2
BH CV ECHO MEAS - BZI_METRIC_HEIGHT: 175.3 CM
BH CV ECHO MEAS - BZI_METRIC_WEIGHT: 81.2 KG
BH CV ECHO MEAS - CONTRAST EF 4CH: 61.5 ML/M^2
BH CV ECHO MEAS - EDV(CUBED): 199.1 ML
BH CV ECHO MEAS - EDV(MOD-SP4): 143 ML
BH CV ECHO MEAS - EDV(TEICH): 169.2 ML
BH CV ECHO MEAS - EF(CUBED): 50.2 %
BH CV ECHO MEAS - EF(TEICH): 41.6 %
BH CV ECHO MEAS - ESV(CUBED): 99.1 ML
BH CV ECHO MEAS - ESV(MOD-SP4): 55 ML
BH CV ECHO MEAS - ESV(TEICH): 98.7 ML
BH CV ECHO MEAS - FS: 20.7 %
BH CV ECHO MEAS - IVS/LVPW: 1.8
BH CV ECHO MEAS - IVSD: 1.3 CM
BH CV ECHO MEAS - LA DIMENSION: 4.1 CM
BH CV ECHO MEAS - LA/AO: 1.3
BH CV ECHO MEAS - LV DIASTOLIC VOL/BSA (35-75): 72.6 ML/M^2
BH CV ECHO MEAS - LV MASS(C)D: 244 GRAMS
BH CV ECHO MEAS - LV MASS(C)DI: 123.8 GRAMS/M^2
BH CV ECHO MEAS - LV MAX PG: 1.3 MMHG
BH CV ECHO MEAS - LV MEAN PG: 0.76 MMHG
BH CV ECHO MEAS - LV SYSTOLIC VOL/BSA (12-30): 27.9 ML/M^2
BH CV ECHO MEAS - LV V1 MAX: 57.8 CM/SEC
BH CV ECHO MEAS - LV V1 MEAN: 41.6 CM/SEC
BH CV ECHO MEAS - LV V1 VTI: 16.1 CM
BH CV ECHO MEAS - LVIDD: 5.8 CM
BH CV ECHO MEAS - LVIDS: 4.6 CM
BH CV ECHO MEAS - LVLD AP4: 8.1 CM
BH CV ECHO MEAS - LVLS AP4: 6.5 CM
BH CV ECHO MEAS - LVOT AREA (M): 3.8 CM^2
BH CV ECHO MEAS - LVOT AREA: 3.6 CM^2
BH CV ECHO MEAS - LVOT DIAM: 2.2 CM
BH CV ECHO MEAS - LVPWD: 0.73 CM
BH CV ECHO MEAS - MR MAX PG: 89.7 MMHG
BH CV ECHO MEAS - MR MAX VEL: 473.6 CM/SEC
BH CV ECHO MEAS - MV A MAX VEL: 92.3 CM/SEC
BH CV ECHO MEAS - MV DEC TIME: 0.2 SEC
BH CV ECHO MEAS - MV E MAX VEL: 73.1 CM/SEC
BH CV ECHO MEAS - MV E/A: 0.79
BH CV ECHO MEAS - MV MAX PG: 3.9 MMHG
BH CV ECHO MEAS - MV MEAN PG: 0.87 MMHG
BH CV ECHO MEAS - MV V2 MAX: 98.7 CM/SEC
BH CV ECHO MEAS - MV V2 MEAN: 41.5 CM/SEC
BH CV ECHO MEAS - MV V2 VTI: 26 CM
BH CV ECHO MEAS - MVA(VTI): 2.3 CM^2
BH CV ECHO MEAS - PA MAX PG: 5.6 MMHG
BH CV ECHO MEAS - PA MEAN PG: 2.8 MMHG
BH CV ECHO MEAS - PA V2 MAX: 118.1 CM/SEC
BH CV ECHO MEAS - PA V2 MEAN: 78.7 CM/SEC
BH CV ECHO MEAS - PA V2 VTI: 26.1 CM
BH CV ECHO MEAS - PI END-D VEL: 133.7 CM/SEC
BH CV ECHO MEAS - RAP SYSTOLE: 10 MMHG
BH CV ECHO MEAS - RVDD: 3.4 CM
BH CV ECHO MEAS - RVSP: 26.6 MMHG
BH CV ECHO MEAS - SI(AO): 151.5 ML/M^2
BH CV ECHO MEAS - SI(CUBED): 50.7 ML/M^2
BH CV ECHO MEAS - SI(LVOT): 29.8 ML/M^2
BH CV ECHO MEAS - SI(MOD-SP4): 44.7 ML/M^2
BH CV ECHO MEAS - SI(TEICH): 35.7 ML/M^2
BH CV ECHO MEAS - SV(AO): 298.6 ML
BH CV ECHO MEAS - SV(CUBED): 100 ML
BH CV ECHO MEAS - SV(LVOT): 58.8 ML
BH CV ECHO MEAS - SV(MOD-SP4): 88 ML
BH CV ECHO MEAS - SV(TEICH): 70.4 ML
BH CV ECHO MEAS - TR MAX VEL: 203.6 CM/SEC

## 2017-08-23 ENCOUNTER — OFFICE VISIT (OUTPATIENT)
Dept: FAMILY MEDICINE CLINIC | Facility: CLINIC | Age: 72
End: 2017-08-23

## 2017-08-23 VITALS
HEART RATE: 56 BPM | BODY MASS INDEX: 25.95 KG/M2 | WEIGHT: 175.2 LBS | HEIGHT: 69 IN | SYSTOLIC BLOOD PRESSURE: 137 MMHG | DIASTOLIC BLOOD PRESSURE: 65 MMHG | OXYGEN SATURATION: 98 %

## 2017-08-23 DIAGNOSIS — E11.65 TYPE 2 DIABETES MELLITUS WITH HYPERGLYCEMIA, WITH LONG-TERM CURRENT USE OF INSULIN (HCC): Primary | ICD-10-CM

## 2017-08-23 DIAGNOSIS — Z79.4 TYPE 2 DIABETES MELLITUS WITH HYPERGLYCEMIA, WITH LONG-TERM CURRENT USE OF INSULIN (HCC): Primary | ICD-10-CM

## 2017-08-23 LAB
ALBUMIN SERPL-MCNC: 4.4 G/DL (ref 3.4–4.8)
ALBUMIN/GLOB SERPL: 1.7 G/DL (ref 1.5–2.5)
ALP SERPL-CCNC: 64 U/L (ref 40–129)
ALT SERPL W P-5'-P-CCNC: 19 U/L (ref 10–44)
ANION GAP SERPL CALCULATED.3IONS-SCNC: 6.4 MMOL/L (ref 3.6–11.2)
AST SERPL-CCNC: 19 U/L (ref 10–34)
BILIRUB SERPL-MCNC: 0.6 MG/DL (ref 0.2–1.8)
BUN BLD-MCNC: 18 MG/DL (ref 7–21)
BUN/CREAT SERPL: 17 (ref 7–25)
CALCIUM SPEC-SCNC: 9.7 MG/DL (ref 7.7–10)
CHLORIDE SERPL-SCNC: 107 MMOL/L (ref 99–112)
CHOLEST SERPL-MCNC: 120 MG/DL (ref 0–200)
CO2 SERPL-SCNC: 28.6 MMOL/L (ref 24.3–31.9)
CREAT BLD-MCNC: 1.06 MG/DL (ref 0.43–1.29)
GFR SERPL CREATININE-BSD FRML MDRD: 68 ML/MIN/1.73
GLOBULIN UR ELPH-MCNC: 2.6 GM/DL
GLUCOSE BLD-MCNC: 119 MG/DL (ref 70–110)
HBA1C MFR BLD: 7.3 % (ref 4.5–5.7)
HDLC SERPL-MCNC: 38 MG/DL (ref 60–100)
LDLC SERPL CALC-MCNC: 73 MG/DL (ref 0–100)
LDLC/HDLC SERPL: 1.92 {RATIO}
OSMOLALITY SERPL CALC.SUM OF ELEC: 286.2 MOSM/KG (ref 273–305)
POTASSIUM BLD-SCNC: 4.8 MMOL/L (ref 3.5–5.3)
PROT SERPL-MCNC: 7 G/DL (ref 6–8)
SODIUM BLD-SCNC: 142 MMOL/L (ref 135–153)
TRIGL SERPL-MCNC: 45 MG/DL (ref 0–150)
VLDLC SERPL-MCNC: 9 MG/DL

## 2017-08-23 PROCEDURE — 36415 COLL VENOUS BLD VENIPUNCTURE: CPT | Performed by: NURSE PRACTITIONER

## 2017-08-23 PROCEDURE — 83036 HEMOGLOBIN GLYCOSYLATED A1C: CPT | Performed by: NURSE PRACTITIONER

## 2017-08-23 PROCEDURE — 99213 OFFICE O/P EST LOW 20 MIN: CPT | Performed by: NURSE PRACTITIONER

## 2017-08-23 PROCEDURE — 80053 COMPREHEN METABOLIC PANEL: CPT | Performed by: NURSE PRACTITIONER

## 2017-08-23 PROCEDURE — 80061 LIPID PANEL: CPT | Performed by: NURSE PRACTITIONER

## 2017-08-23 NOTE — PROGRESS NOTES
Subjective   Shai Mata is a 72 y.o. male.     HPI Comments: Returns following on his diabetes states he is feeling better and more stable with his sugars. Denies any further episodes of low sugar.  States he is feeling pretty good without any new concerns.  Did have recent studies with cardiology follows up within the next two weeks    Diabetes   He presents for his follow-up diabetic visit. He has type 2 diabetes mellitus. His disease course has been improving. There are no hypoglycemic associated symptoms. Pertinent negatives for hypoglycemia include no confusion, dizziness, hunger, mood changes, nervousness/anxiousness, pallor, seizures, sleepiness, speech difficulty or tremors. There are no diabetic associated symptoms. Pertinent negatives for diabetes include no blurred vision, no chest pain, no fatigue, no foot paresthesias, no foot ulcerations, no polydipsia, no polyphagia, no polyuria, no visual change, no weakness and no weight loss. There are no hypoglycemic complications. Symptoms are stable. There are no diabetic complications. Risk factors for coronary artery disease include sedentary lifestyle, male sex, hypertension, family history and dyslipidemia. Current diabetic treatment includes insulin injections and oral agent (dual therapy). He is compliant with treatment all of the time. His weight is stable. He is following a generally healthy diet. Meal planning includes avoidance of concentrated sweets and carbohydrate counting. He participates in exercise intermittently. His home blood glucose trend is fluctuating minimally. His breakfast blood glucose range is generally 110-130 mg/dl. His overall blood glucose range is 140-180 mg/dl. An ACE inhibitor/angiotensin II receptor blocker is being taken. He does not see a podiatrist.     The following portions of the patient's history were reviewed and updated as appropriate: allergies, current medications, past family history, past medical history, past  social history, past surgical history and problem list.      Review of Systems   Constitutional: Negative.  Negative for fatigue and weight loss.   HENT: Negative.    Eyes: Negative for blurred vision.   Respiratory: Negative.    Cardiovascular: Negative.  Negative for chest pain.   Gastrointestinal: Negative.    Endocrine: Negative.  Negative for polydipsia, polyphagia and polyuria.   Musculoskeletal: Negative.    Skin: Negative.  Negative for pallor.   Neurological: Negative for dizziness, tremors, seizures, speech difficulty and weakness.   Psychiatric/Behavioral: Negative.  Negative for confusion. The patient is not nervous/anxious.    All other systems reviewed and are negative.      Procedures    Objective   Physical Exam   Constitutional: He is oriented to person, place, and time. He appears well-developed and well-nourished. No distress.   HENT:   Head: Normocephalic.   Eyes: Conjunctivae are normal. Right eye exhibits no discharge. Left eye exhibits no discharge.   Neck: Neck supple.   Cardiovascular: Normal rate, regular rhythm, normal heart sounds and intact distal pulses.    No murmur heard.  Pulmonary/Chest: Effort normal and breath sounds normal. No respiratory distress. He has no wheezes.   Musculoskeletal: He exhibits no edema.   Neurological: He is alert and oriented to person, place, and time.   Skin: Skin is warm and dry. He is not diaphoretic.   Psychiatric: He has a normal mood and affect. His behavior is normal.   Nursing note and vitals reviewed.      Assessment/Plan   Discussed with patient impression and plan, patient verbalizes understanding.  Continue with routine medications.  Continue with eating  Small frequent meals.  Fasting labs drawn today .   Shai was seen today for follow-up and diabetes.    Diagnoses and all orders for this visit:    Type 2 diabetes mellitus with hyperglycemia, with long-term current use of insulin  -     Comprehensive Metabolic Panel  -     Hemoglobin A1c  -      Lipid Panel

## 2017-09-01 ENCOUNTER — OFFICE VISIT (OUTPATIENT)
Dept: CARDIOLOGY | Facility: CLINIC | Age: 72
End: 2017-09-01

## 2017-09-01 VITALS
SYSTOLIC BLOOD PRESSURE: 124 MMHG | DIASTOLIC BLOOD PRESSURE: 64 MMHG | WEIGHT: 176 LBS | HEIGHT: 69 IN | HEART RATE: 62 BPM | BODY MASS INDEX: 26.07 KG/M2 | RESPIRATION RATE: 16 BRPM

## 2017-09-01 DIAGNOSIS — I25.10 ASCVD (ARTERIOSCLEROTIC CARDIOVASCULAR DISEASE): Primary | ICD-10-CM

## 2017-09-01 DIAGNOSIS — I10 ESSENTIAL HYPERTENSION: ICD-10-CM

## 2017-09-01 DIAGNOSIS — Z79.4 TYPE 2 DIABETES MELLITUS WITH HYPERGLYCEMIA, WITH LONG-TERM CURRENT USE OF INSULIN (HCC): ICD-10-CM

## 2017-09-01 DIAGNOSIS — I49.3 PVC'S (PREMATURE VENTRICULAR CONTRACTIONS): ICD-10-CM

## 2017-09-01 DIAGNOSIS — E78.2 HYPERLIPEMIA, MIXED: ICD-10-CM

## 2017-09-01 DIAGNOSIS — E11.65 TYPE 2 DIABETES MELLITUS WITH HYPERGLYCEMIA, WITH LONG-TERM CURRENT USE OF INSULIN (HCC): ICD-10-CM

## 2017-09-01 PROCEDURE — 99213 OFFICE O/P EST LOW 20 MIN: CPT | Performed by: INTERNAL MEDICINE

## 2017-09-01 NOTE — PROGRESS NOTES
Shai Mata  1945 09/01/2017   Ref:No referring provider defined for this encounter.  Pcp: Ekaterina Cox, APRN  [address]  [facility] [address]    Follow up for:  Chief Complaint   Patient presents with   • Follow-up     echo and holter findings   • meds     presented        Patient Active Problem List   Diagnosis   • ASCVD (arteriosclerotic cardiovascular disease)   • S/P CABG x 5   • HTN (hypertension)   • PVC's (premature ventricular contractions)   • Type 2 diabetes mellitus with hyperglycemia, with long-term current use of insulin   • Hyperlipemia, mixed       HPI     Shai Mata is a 71 yo male who presents to our office today for a follow-up of test results. Patient denied chest pain, shortness of breath, edema, palpitations, dizziness or syncope.     Review of Systems   Constitution: Negative for chills and fever.   HENT: Negative for headaches, nosebleeds and sore throat.    Cardiovascular: Negative for chest pain, leg swelling, palpitations and syncope.   Respiratory: Negative for cough, hemoptysis, shortness of breath and wheezing.    Gastrointestinal: Negative for abdominal pain, hematemesis, hematochezia, melena, nausea and vomiting.   Genitourinary: Negative for dysuria and hematuria.   Neurological: Negative for dizziness.         Current Outpatient Prescriptions:   •  aspirin 81 MG EC tablet, Take 81 mg by mouth daily., Disp: , Rfl:   •  atorvastatin (LIPITOR) 80 MG tablet, Take 1 tablet by mouth Daily., Disp: 30 tablet, Rfl: 11  •  carvedilol (COREG) 3.125 MG tablet, Take 1 tablet by mouth 2 (Two) Times a Day With Meals., Disp: 60 tablet, Rfl: 5  •  furosemide (LASIX) 20 MG tablet, Take 1 tablet by mouth 2 (Two) Times a Day., Disp: 60 tablet, Rfl: 5  •  insulin NPH-insulin regular (Novolin 70/30) (70-30) 100 UNIT/ML injection, Inject 24 Units under the skin 2 (two) times a day with meals., Disp: , Rfl:   •  lisinopril (PRINIVIL,ZESTRIL) 20 MG tablet, Take 1 tablet by mouth Daily., Disp:  "30 tablet, Rfl: 11  •  metFORMIN (GLUCOPHAGE) 500 MG tablet, TAKE ONE TABLET BY MOUTH TWICE DAILY WITH MEALS, Disp: 60 tablet, Rfl: 5  •  sotalol (BETAPACE) 80 MG tablet, Take 0.5 tablets by mouth 2 (Two) Times a Day., Disp: 60 tablet, Rfl: 5    No Known Allergies    /64  Pulse 62  Resp 16  Ht 69\" (175.3 cm)  Wt 176 lb (79.8 kg)  BMI 25.99 kg/m2       Physical Exam   Constitutional: He is oriented to person, place, and time. He appears well-developed and well-nourished. No distress.   Eyes: No scleral icterus.   Neck: Normal range of motion. Neck supple. No JVD present. No tracheal deviation present. No thyromegaly present.   Cardiovascular: Normal heart sounds and intact distal pulses.  Exam reveals no gallop and no friction rub.    No murmur heard.  Pulses:       Carotid pulses are 2+ on the right side, and 2+ on the left side.       Dorsalis pedis pulses are 2+ on the right side, and 2+ on the left side.        Posterior tibial pulses are 2+ on the right side, and 2+ on the left side.   Occasional extra beats   Pulmonary/Chest: Effort normal and breath sounds normal. No stridor. No respiratory distress. He has no wheezes. He has no rales. He exhibits no tenderness.   Abdominal: Soft. Bowel sounds are normal. He exhibits no distension and no mass. There is no tenderness. There is no rebound and no guarding.   Musculoskeletal: Normal range of motion. He exhibits no edema, tenderness or deformity.   Lymphadenopathy:     He has no cervical adenopathy.   Neurological: He is alert and oriented to person, place, and time. No cranial nerve deficit. He exhibits normal muscle tone. Coordination normal.   Skin: No rash noted. He is not diaphoretic. No erythema. No pallor.   Psychiatric: He has a normal mood and affect. His behavior is normal. Judgment and thought content normal.   :    Lab Review:    Procedures    Lab Results   Component Value Date     08/23/2017    K 4.8 08/23/2017     08/23/2017    " CO2 28.6 08/23/2017    BUN 18 08/23/2017    CREATININE 1.06 08/23/2017    GLUCOSE 119 (H) 08/23/2017    CALCIUM 9.7 08/23/2017    AST 19 08/23/2017    ALT 19 08/23/2017    ALKPHOS 64 08/23/2017    LABIL2 1.7 08/23/2017     Lab Results   Component Value Date    CKTOTAL 144 01/12/2016     Lab Results   Component Value Date    WBC 6.64 11/15/2016    HGB 14.8 11/15/2016    HCT 45.2 11/15/2016     11/15/2016     Lab Results   Component Value Date    INR 1.00 01/11/2016     Lab Results   Component Value Date    MG 2.3 01/16/2016     Lab Results   Component Value Date    TSH 4.530 (H) 06/15/2016    PSA 1.140 05/23/2017    CHLPL 119 05/23/2017    TRIG 45 08/23/2017    HDL 38 (L) 08/23/2017    LDL 92 08/25/2016      Lab Results   Component Value Date     (H) 01/16/2016       Chemistry        Component Value Date/Time     08/23/2017 0916    K 4.8 08/23/2017 0916     08/23/2017 0916    CO2 28.6 08/23/2017 0916    BUN 18 08/23/2017 0916    CREATININE 1.06 08/23/2017 0916     08/25/2016 1428        Component Value Date/Time    CALCIUM 9.7 08/23/2017 0916    ALKPHOS 64 08/23/2017 0916    AST 19 08/23/2017 0916    ALT 19 08/23/2017 0916    BILITOT 0.6 08/23/2017 0916          Echocardiogram Results 8/19/17:    · The left ventricular cavity is mildly dilated.  · Left ventricular systolic function is moderately decreased.  · Estimated EF appears to be in the range of 36 - 40%.  · The aortic valve is abnormal in structure. The valve exhibits sclerosis. There is mild calcification of the aortic valve mainly affecting the non and right coronary cusp(s).Mild aortic valve regurgitation is present. Mild aortic valve stenosis is present.  · The mitral valve is abnormal in structure. There are myxomatous changes of the mitral valve apparatus present. There is mild bileaflet thickening present. Mild-to-moderate mitral valve regurgitation is present. No significant mitral valve stenosis is present.  · The  tricuspid valve is grossly normal. Estimated right ventricular systolic pressure from tricuspid regurgitation is normal (<35 mmHg).  · There is no evidence of pericardial effusion.      Holter Monitor Results 8/13/17:    Study Description  Monitor hooked-up on 8/10/2017 at 14:03 EDT. The monitor was scanned on 8/11/2017. The patient was monitored for 23 hours and 59 minutes. Indications for this exam include ASCVD, S/P CABG, PVC'S. Sinus bradycardia. Total beats: 77811. Average HR: 62. Min HR: 42. Max HR: 83.   Study Findings  Patient diary was not submitted.No complications noted. The predominant rhythm noted during the testing period was sinus rhythm. Premature atrial contractions occured occasionally. There was no evidence of atrial arrhythmias. There were no episodes of supraventricular tachycardia. Premature ventricular contractions occured frequently. Ventricular bigeminy. There were no episodes of ventricular tachycardia. Sinoatrial node conduction was normal. No atrioventricular block noted.   Study Impressions  An abnormal monitor study.           Impression:   Diagnosis Plan   1. ASCVD (arteriosclerotic cardiovascular disease) s/p CABG X5V s/p MI with decreased EF from 45-50% to 36-40%     2. Essential hypertension     3. PVC's (premature ventricular contractions) frequent > 10,000 a day with 16 triplets  Ambulatory Referral to Cardiac Electrophysiology   4. Hyperlipemia, mixed     5. Type 2 diabetes mellitus with hyperglycemia, with long-term current use of insulin         Plan:  Orders Placed This Encounter   Procedures   • Ambulatory Referral to Cardiac Electrophysiology     Referral Priority:   Routine     Referral Type:   Consultation     Referral Reason:   Specialty Services Required     Referred to Provider:   Jose Maldonado MD     Requested Specialty:   Cardiac Electrophysiology     Number of Visits Requested:   1     1. Restart Sotalol 40 mg BID. He does not tolerate 80 mg BID.  2. Refer to  Electrophysiology for PVC's as there is suspicion that the frequent PVC's may be contributing to his drop in EF. May need to be considered for PVC ablation.  3. Also discussed with Dr. Villegas.    Return in about 6 weeks (around 10/13/2017) for or sooner if needed.. After EP consult.    This document signed by Ubaldo Amaya MD September 1, 2017 12:16 PM     I, Ubaldo Amaya MD, personally performed the services described in this documentation as scribed by the above named individual in my presence, and it is both accurate and complete.  9/1/2017  12:16 PM    Scribed for Ubaldo Amaya MD by Isabella Dennis CMA. 9/1/2017  12:16 PM

## 2017-10-06 ENCOUNTER — OFFICE VISIT (OUTPATIENT)
Dept: CARDIOLOGY | Facility: CLINIC | Age: 72
End: 2017-10-06

## 2017-10-06 VITALS
DIASTOLIC BLOOD PRESSURE: 60 MMHG | HEART RATE: 44 BPM | BODY MASS INDEX: 26.48 KG/M2 | WEIGHT: 178.8 LBS | HEIGHT: 69 IN | SYSTOLIC BLOOD PRESSURE: 151 MMHG

## 2017-10-06 DIAGNOSIS — I25.10 ASCVD (ARTERIOSCLEROTIC CARDIOVASCULAR DISEASE): ICD-10-CM

## 2017-10-06 DIAGNOSIS — I49.3 PVC'S (PREMATURE VENTRICULAR CONTRACTIONS): Primary | ICD-10-CM

## 2017-10-06 DIAGNOSIS — I25.5 ISCHEMIC CARDIOMYOPATHY: ICD-10-CM

## 2017-10-06 PROCEDURE — 93000 ELECTROCARDIOGRAM COMPLETE: CPT | Performed by: INTERNAL MEDICINE

## 2017-10-06 PROCEDURE — 99204 OFFICE O/P NEW MOD 45 MIN: CPT | Performed by: INTERNAL MEDICINE

## 2017-10-06 NOTE — PROGRESS NOTES
Electrophysiology Consult     Shai Mata  1945  [phone]      10/06/17    DATE OF ADMISSION: (Not on file)  [facility] CARDIOLOGY    Ekaterina Cox, APRN  [address] / [facility] [address]    Chief Complaint   Patient presents with   • Palpitations     Problem List:  1. PVCs   A. 24 Hour Holter 4/13/16: 47-99 bpm, Average 71 bpm, frequent PVCs 32%, several runs 4 beat runs NSVT   B. 24 Hour Holter 5/10/16: 48-81 bpm, Average 65 bpm, frequent PVCs 26% burden   B. 24 Hour Holter Monitor 8/10/17: HR 42-83, Average 62 bpm, frequent PVCs with bigeminy, couplets, and triplets (11.8% burden)    C. Echocardiogram 8/17/17: EF 36-40%, myxomatous changes of MV with mild to moderate MR  2. CAD   A. CABG x 5 2006   B. Echocardiogram 1/11/16: EF 45-50%, mild to moderate MR   C. Stress Test 1/13/16: small reversible defect inferolateral and inferior segments, EF 35%  3. HTN  4. DM2  5. HLP  6. Surgical History   A. CABG    B. cataracts    History of Present Illness:   72 year old WM referred by Dr. Amaya for frequent PVCs. He has a history of CAD with previous CABG in 2006. In April of 2016, he was hospitalized for CHF and was found to have frequent PVCs. Since then, he has worn 3 monitors showing frequent PVCs as high at 32% burden. He has been treated with Sotalol, but cannot tolerate higher doses due to bradycardia. He also has been found to have a reduced EF of 36-40%. He was sent here today for evaluation of PVC ablation. He cannot feel palpitations except at night when lying on his left side. He denies SOB, CP, syncope, dizziness. Overall, he is active. No tobacco. No ETOH. He drinks green tea about 3-4 cups per day. He had his thyroid checked last year and his TSH was only slightly elevated, no further work up was done.     No Known Allergies     Cannot display prior to admission medications because the patient has not been admitted in this contact.            Current  Outpatient Prescriptions:   •  aspirin 81 MG EC tablet, Take 81 mg by mouth daily., Disp: , Rfl:   •  atorvastatin (LIPITOR) 80 MG tablet, Take 1 tablet by mouth Daily., Disp: 30 tablet, Rfl: 11  •  carvedilol (COREG) 3.125 MG tablet, Take 1 tablet by mouth 2 (Two) Times a Day With Meals., Disp: 60 tablet, Rfl: 5  •  furosemide (LASIX) 20 MG tablet, Take 1 tablet by mouth 2 (Two) Times a Day., Disp: 60 tablet, Rfl: 5  •  insulin NPH-insulin regular (Novolin 70/30) (70-30) 100 UNIT/ML injection, Inject 24 Units under the skin 2 (two) times a day with meals., Disp: , Rfl:   •  lisinopril (PRINIVIL,ZESTRIL) 20 MG tablet, Take 1 tablet by mouth Daily., Disp: 30 tablet, Rfl: 11  •  metFORMIN (GLUCOPHAGE) 500 MG tablet, TAKE ONE TABLET BY MOUTH TWICE DAILY WITH MEALS, Disp: 60 tablet, Rfl: 5  •  sotalol (BETAPACE) 80 MG tablet, Take 0.5 tablets by mouth 2 (Two) Times a Day., Disp: 60 tablet, Rfl: 5    Social History     Social History   • Marital status:      Spouse name: N/A   • Number of children: N/A   • Years of education: N/A     Social History Main Topics   • Smoking status: Former Smoker     Packs/day: 2.00     Types: Cigarettes     Quit date: 1984   • Smokeless tobacco: Former User     Types: Chew     Quit date: 1990   • Alcohol use No   • Drug use: No   • Sexual activity: Defer     Other Topics Concern   • None     Social History Narrative       Family History   Problem Relation Age of Onset   • Heart disease Mother    • Heart disease Father    • Cancer Sister    • Cancer Sister    • Cancer Sister        REVIEW OF SYSTEMS:   CONST:  No weight loss, fever, chills, weakness or fatigue.   HEENT:  No visual loss, blurred vision, double vision, yellow sclerae.                   No hearing loss, congestion, sore throat.   SKIN:      No rashes, urticaria, ulcers, sores.     RESP:     No shortness of breath, hemoptysis, cough, sputum.   GI:           No anorexia, nausea, vomiting, diarrhea. No abdominal pain,  "melena.   :         No burning on urination, hematuria or increased frequency.  ENDO:    No diaphoresis, cold or heat intolerance. No polyuria or polydipsia.   NEURO:  No headache, dizziness, syncope, paralysis, ataxia, or parasthesias.                  No change in bowel or bladder control. No history of CVA/TIA  MUSC:    No muscle, back pain, joint pain or stiffness.   HEME:    No anemia, bleeding, bruising. No history of DVT/PE.  PSYCH:  No history of depression, anxiety    Vitals:    10/06/17 1155   BP: 151/60   BP Location: Right arm   Patient Position: Sitting   Pulse: (!) 44   Weight: 178 lb 12.8 oz (81.1 kg)   Height: 69\" (175.3 cm)                 Physical Exam:  GEN: Well nourished, Well- developed  No acute distress  HEENT: Normocephalic, Atraumatic, PERRLA, moist mucous membranes  NECK: supple, NO JVD, no thyromegaly, no lymphadenopathy  CARD: S1S2  RRR no murmur, gallop, rub  LUNGS: Clear to auscultataion, normal respiratory effort  ABDOMEN: Soft, nontender, normal bowel sounds  EXTREMITIES:No gross deformities,  No clubbing, cyanosis, or edema  SKIN: Warm, dry  NEURO: No focal deficits  PSYCHIATRIC: Normal affect and mood        ECG 12 Lead  Date/Time: 10/6/2017 12:51 PM  Performed by: WALTER TENA  Authorized by: WALTER TENA   Rhythm: sinus bradycardia  BPM: 42  Conduction: right bundle branch block              ICD-10-CM ICD-9-CM   1. PVC's (premature ventricular contractions) I49.3 427.69   2. Ischemic cardiomyopathy I25.5 414.8   3. ASCVD (arteriosclerotic cardiovascular disease) I25.10 429.2     440.9       Assessment and Plan:   1. Frequent PVCs - concerning situation as he has had decreased EF. He is unable to tolerate Sotalol, even at lowest dose, due to bradycardia. By looking at his EKGs and Holter monitors, he probably has several sources for his PVCs. He needs EPS +/- RFA of PVCs +/- ICD implant. The risks, benefits, and alternatives of the procedure have been reviewed and the " patient wishes to proceed. He will stop sotalol now   2. Cardiomyopathy - etiology unknown at this time of PVC induced vs ischemic from previous CAD  3. CAD - no anginal type symptoms at this time.         Scribed for Jose Maldonado MD by Demi Amaya PA-C. 10/6/2017  12:52 PM     IJose MD, personally performed the services described in this documentation as scribed by the above named individual in my presence, and it is both accurate and complete.  10/6/2017  12:52 PM

## 2017-10-12 ENCOUNTER — TELEPHONE (OUTPATIENT)
Dept: CARDIOLOGY | Facility: CLINIC | Age: 72
End: 2017-10-12

## 2017-11-15 ENCOUNTER — PREP FOR SURGERY (OUTPATIENT)
Dept: OTHER | Facility: HOSPITAL | Age: 72
End: 2017-11-15

## 2017-11-15 DIAGNOSIS — I49.3 PVCS (PREMATURE VENTRICULAR CONTRACTIONS): Primary | ICD-10-CM

## 2017-11-15 RX ORDER — SODIUM CHLORIDE 0.9 % (FLUSH) 0.9 %
1-10 SYRINGE (ML) INJECTION AS NEEDED
Status: CANCELLED | OUTPATIENT
Start: 2017-11-15

## 2017-11-15 RX ORDER — PROMETHAZINE HYDROCHLORIDE 25 MG/ML
12.5 INJECTION, SOLUTION INTRAMUSCULAR; INTRAVENOUS EVERY 4 HOURS PRN
Status: CANCELLED | OUTPATIENT
Start: 2017-11-15

## 2017-11-15 RX ORDER — CEFAZOLIN SODIUM 2 G/100ML
2 INJECTION, SOLUTION INTRAVENOUS
Status: CANCELLED | OUTPATIENT
Start: 2017-11-16 | End: 2017-11-17

## 2017-11-15 RX ORDER — ACETAMINOPHEN 325 MG/1
650 TABLET ORAL EVERY 4 HOURS PRN
Status: CANCELLED | OUTPATIENT
Start: 2017-11-15

## 2017-11-15 RX ORDER — NITROGLYCERIN 0.4 MG/1
0.4 TABLET SUBLINGUAL
Status: CANCELLED | OUTPATIENT
Start: 2017-11-15

## 2017-11-19 ENCOUNTER — APPOINTMENT (OUTPATIENT)
Dept: PREADMISSION TESTING | Facility: HOSPITAL | Age: 72
End: 2017-11-19

## 2017-11-19 DIAGNOSIS — I49.3 PVCS (PREMATURE VENTRICULAR CONTRACTIONS): ICD-10-CM

## 2017-11-19 LAB
ANION GAP SERPL CALCULATED.3IONS-SCNC: 9 MMOL/L (ref 3–11)
BUN BLD-MCNC: 19 MG/DL (ref 9–23)
BUN/CREAT SERPL: 19 (ref 7–25)
CALCIUM SPEC-SCNC: 9.7 MG/DL (ref 8.7–10.4)
CHLORIDE SERPL-SCNC: 103 MMOL/L (ref 99–109)
CO2 SERPL-SCNC: 29 MMOL/L (ref 20–31)
CREAT BLD-MCNC: 1 MG/DL (ref 0.6–1.3)
DEPRECATED RDW RBC AUTO: 43.2 FL (ref 37–54)
ERYTHROCYTE [DISTWIDTH] IN BLOOD BY AUTOMATED COUNT: 13.2 % (ref 11.3–14.5)
GFR SERPL CREATININE-BSD FRML MDRD: 73 ML/MIN/1.73
GLUCOSE BLD-MCNC: 137 MG/DL (ref 70–100)
HBA1C MFR BLD: 7.2 % (ref 4.8–5.6)
HCT VFR BLD AUTO: 41.8 % (ref 38.9–50.9)
HGB BLD-MCNC: 13.5 G/DL (ref 13.1–17.5)
INR PPP: 1.06
MCH RBC QN AUTO: 29.2 PG (ref 27–31)
MCHC RBC AUTO-ENTMCNC: 32.3 G/DL (ref 32–36)
MCV RBC AUTO: 90.3 FL (ref 80–99)
PLATELET # BLD AUTO: 236 10*3/MM3 (ref 150–450)
PMV BLD AUTO: 10.2 FL (ref 6–12)
POTASSIUM BLD-SCNC: 4.7 MMOL/L (ref 3.5–5.5)
PROTHROMBIN TIME: 11.6 SECONDS (ref 9.6–11.5)
RBC # BLD AUTO: 4.63 10*6/MM3 (ref 4.2–5.76)
SODIUM BLD-SCNC: 141 MMOL/L (ref 132–146)
WBC NRBC COR # BLD: 9.47 10*3/MM3 (ref 3.5–10.8)

## 2017-11-19 PROCEDURE — 85027 COMPLETE CBC AUTOMATED: CPT | Performed by: PHYSICIAN ASSISTANT

## 2017-11-19 PROCEDURE — 80048 BASIC METABOLIC PNL TOTAL CA: CPT | Performed by: PHYSICIAN ASSISTANT

## 2017-11-19 PROCEDURE — 85610 PROTHROMBIN TIME: CPT | Performed by: PHYSICIAN ASSISTANT

## 2017-11-19 PROCEDURE — 83036 HEMOGLOBIN GLYCOSYLATED A1C: CPT | Performed by: PHYSICIAN ASSISTANT

## 2017-11-19 PROCEDURE — 36415 COLL VENOUS BLD VENIPUNCTURE: CPT

## 2017-11-19 RX ORDER — FUROSEMIDE 40 MG/1
20 TABLET ORAL 2 TIMES DAILY
COMMUNITY
End: 2017-11-28 | Stop reason: SDUPTHER

## 2017-11-19 NOTE — DISCHARGE INSTRUCTIONS
The following instructions were given to the patient related to their angiogram in [place]:    Your scheduled procedure is performed as an outpatient, but plan to spend most of the day at the hospital.  Your procedure usually takes one hour and you will have a two to four hour recovery period before you are discharged.  Routinely, you will go home the same day, but if you have a balloon dilation of an artery or be stent placed, you will need to stay overnight in the hospital; therefore, you may want to make arrangements and prepare for an overnight stay.    Inform your physician if you are allergic to x-ray dye or if you had a previous reaction to dye.    Bring all recent x-rays and CDs that are pertinent to your procedure such as other Angiograms, CT scans, Carotid Doppler Ultrasound or MRI.    Obey arrangement to have a responsible adult drive you home.  You cannot drive yourself or go home by taxi after your procedure.  You will need to be able to stretch your right leg straight out for the ride home.    Drink plenty of liquids the day before your exam (4-5 glasses).  DO NOT eat any solid food after midnight before your procedure.  You may have clear liquids including coffee the morning of your procedure.    Medications:   Bring all of your current medications in their original bottles (DO NOT BRING A LIST)   If you are a diabetic and take insulin, bring your insulin with you.  If you take Glucophage, you may continue to take it, but do not take the day of your procedure.  You will be given special instructions when to resume taking Glucophage before you are discharged from the hospital.   If you take Coumadin (Warfarin) you must stop taking it 5 days before your procedure.   If you take aspirin, you may continue to take it unless otherwise instructed.   Take all your routine medications as usual on the day of your procedure unless otherwise instructed above or by your physician.    General Instructions:  Patient  given instructions for parking and instructed to report to registration on the morning of surgery.       -Please leave any valuables at home or with a family member.   -Bring a photo ID and insurance cards to registration.    Handout related to angiogram given to patient by Pre-Admission Testing Department it not given by physician's office.

## 2017-11-20 ENCOUNTER — APPOINTMENT (OUTPATIENT)
Dept: CARDIOLOGY | Facility: HOSPITAL | Age: 72
End: 2017-11-20
Attending: INTERNAL MEDICINE

## 2017-11-20 ENCOUNTER — HOSPITAL ENCOUNTER (OUTPATIENT)
Facility: HOSPITAL | Age: 72
Discharge: HOME | End: 2017-11-21
Attending: INTERNAL MEDICINE | Admitting: INTERNAL MEDICINE

## 2017-11-20 DIAGNOSIS — I49.3 PVC'S (PREMATURE VENTRICULAR CONTRACTIONS): ICD-10-CM

## 2017-11-20 DIAGNOSIS — I25.5 ISCHEMIC CARDIOMYOPATHY: ICD-10-CM

## 2017-11-20 LAB
ACT BLD: 103 SECONDS (ref 82–152)
ACT BLD: 164 SECONDS (ref 82–152)
ACT BLD: 257 SECONDS (ref 82–152)
ACT BLD: 301 SECONDS (ref 82–152)
ACT BLD: 351 SECONDS (ref 82–152)
BH CV ECHO MEAS - AO ROOT AREA: 6.2 CM^2
BH CV ECHO MEAS - AO ROOT DIAM: 2.8 CM
BH CV ECHO MEAS - EDV(CUBED): 182.3 ML
BH CV ECHO MEAS - EDV(MOD-SP4): 134 ML
BH CV ECHO MEAS - EDV(TEICH): 158.1 ML
BH CV ECHO MEAS - EF(CUBED): 35.1 %
BH CV ECHO MEAS - EF(TEICH): 28.3 %
BH CV ECHO MEAS - ESV(CUBED): 118.4 ML
BH CV ECHO MEAS - ESV(TEICH): 113.4 ML
BH CV ECHO MEAS - FS: 13.4 %
BH CV ECHO MEAS - IVS/LVPW: 1.2
BH CV ECHO MEAS - IVSD: 1.1 CM
BH CV ECHO MEAS - LA DIMENSION: 4.5 CM
BH CV ECHO MEAS - LA/AO: 1.6
BH CV ECHO MEAS - LV MASS(C)D: 242.7 GRAMS
BH CV ECHO MEAS - LVIDD: 5.7 CM
BH CV ECHO MEAS - LVIDS: 4.9 CM
BH CV ECHO MEAS - LVLD AP4: 8.9 CM
BH CV ECHO MEAS - LVPWD: 0.98 CM
BH CV ECHO MEAS - SV(CUBED): 63.9 ML
BH CV ECHO MEAS - SV(TEICH): 44.8 ML
GLUCOSE BLDC GLUCOMTR-MCNC: 139 MG/DL (ref 70–130)
GLUCOSE BLDC GLUCOMTR-MCNC: 160 MG/DL (ref 70–130)
GLUCOSE BLDC GLUCOMTR-MCNC: 190 MG/DL (ref 70–130)
LV EF 2D ECHO EST: 35 %
MAXIMAL PREDICTED HEART RATE: 147 BPM
STRESS TARGET HR: 125 BPM

## 2017-11-20 PROCEDURE — C1893 INTRO/SHEATH, FIXED,NON-PEEL: HCPCS

## 2017-11-20 PROCEDURE — G0378 HOSPITAL OBSERVATION PER HR: HCPCS

## 2017-11-20 PROCEDURE — 25010000002 MIDAZOLAM PER 1 MG: Performed by: INTERNAL MEDICINE

## 2017-11-20 PROCEDURE — C1900 LEAD, CORONARY VENOUS: HCPCS | Performed by: INTERNAL MEDICINE

## 2017-11-20 PROCEDURE — 93654 COMPRE EP EVAL TX VT: CPT | Performed by: INTERNAL MEDICINE

## 2017-11-20 PROCEDURE — C1730 CATH, EP, 19 OR FEW ELECT: HCPCS | Performed by: INTERNAL MEDICINE

## 2017-11-20 PROCEDURE — 63710000001 INSULIN LISPRO (HUMAN) PER 5 UNITS: Performed by: PHYSICIAN ASSISTANT

## 2017-11-20 PROCEDURE — C1893 INTRO/SHEATH, FIXED,NON-PEEL: HCPCS | Performed by: INTERNAL MEDICINE

## 2017-11-20 PROCEDURE — 33225 L VENTRIC PACING LEAD ADD-ON: CPT | Performed by: INTERNAL MEDICINE

## 2017-11-20 PROCEDURE — 33249 INSJ/RPLCMT DEFIB W/LEAD(S): CPT | Performed by: INTERNAL MEDICINE

## 2017-11-20 PROCEDURE — 25010000002 ONDANSETRON PER 1 MG: Performed by: INTERNAL MEDICINE

## 2017-11-20 PROCEDURE — 93308 TTE F-UP OR LMTD: CPT | Performed by: INTERNAL MEDICINE

## 2017-11-20 PROCEDURE — 85347 COAGULATION TIME ACTIVATED: CPT

## 2017-11-20 PROCEDURE — C1769 GUIDE WIRE: HCPCS | Performed by: INTERNAL MEDICINE

## 2017-11-20 PROCEDURE — 25010000003 CEFAZOLIN IN DEXTROSE 2-4 GM/100ML-% SOLUTION: Performed by: PHYSICIAN ASSISTANT

## 2017-11-20 PROCEDURE — 93308 TTE F-UP OR LMTD: CPT

## 2017-11-20 PROCEDURE — 93641 EP EVL 1/2CHMB PAC CVDFB TST: CPT | Performed by: INTERNAL MEDICINE

## 2017-11-20 PROCEDURE — 25010000002 FENTANYL CITRATE (PF) 100 MCG/2ML SOLUTION: Performed by: INTERNAL MEDICINE

## 2017-11-20 PROCEDURE — C1887 CATHETER, GUIDING: HCPCS | Performed by: INTERNAL MEDICINE

## 2017-11-20 PROCEDURE — C1892 INTRO/SHEATH,FIXED,PEEL-AWAY: HCPCS | Performed by: INTERNAL MEDICINE

## 2017-11-20 PROCEDURE — C1882 AICD, OTHER THAN SING/DUAL: HCPCS | Performed by: INTERNAL MEDICINE

## 2017-11-20 PROCEDURE — 93005 ELECTROCARDIOGRAM TRACING: CPT | Performed by: INTERNAL MEDICINE

## 2017-11-20 PROCEDURE — C1894 INTRO/SHEATH, NON-LASER: HCPCS | Performed by: INTERNAL MEDICINE

## 2017-11-20 PROCEDURE — 25010000002 PROTAMINE SULFATE PER 10 MG: Performed by: INTERNAL MEDICINE

## 2017-11-20 PROCEDURE — 25010000002 HEPARIN (PORCINE) PER 1000 UNITS: Performed by: INTERNAL MEDICINE

## 2017-11-20 PROCEDURE — C1777 LEAD, AICD, ENDO SINGLE COIL: HCPCS | Performed by: INTERNAL MEDICINE

## 2017-11-20 PROCEDURE — 82962 GLUCOSE BLOOD TEST: CPT

## 2017-11-20 PROCEDURE — 93462 L HRT CATH TRNSPTL PUNCTURE: CPT | Performed by: INTERNAL MEDICINE

## 2017-11-20 PROCEDURE — 93662 INTRACARDIAC ECG (ICE): CPT | Performed by: INTERNAL MEDICINE

## 2017-11-20 PROCEDURE — 99152 MOD SED SAME PHYS/QHP 5/>YRS: CPT | Performed by: INTERNAL MEDICINE

## 2017-11-20 PROCEDURE — C1732 CATH, EP, DIAG/ABL, 3D/VECT: HCPCS | Performed by: INTERNAL MEDICINE

## 2017-11-20 PROCEDURE — C1759 CATH, INTRA ECHOCARDIOGRAPHY: HCPCS | Performed by: INTERNAL MEDICINE

## 2017-11-20 PROCEDURE — C1898 LEAD, PMKR, OTHER THAN TRANS: HCPCS | Performed by: INTERNAL MEDICINE

## 2017-11-20 DEVICE — LD QUARTET LV S/CRV BIPOL 1458Q/86: Type: IMPLANTABLE DEVICE | Site: HEART | Status: FUNCTIONAL

## 2017-11-20 DEVICE — LD PM TENDRIL STS 6F52CM 2088TC52: Type: IMPLANTABLE DEVICE | Site: HEART | Status: FUNCTIONAL

## 2017-11-20 DEVICE — LD DEFIB DURATA SJ4 65CM 7122Q65: Type: IMPLANTABLE DEVICE | Site: HEART | Status: FUNCTIONAL

## 2017-11-20 DEVICE — ICD QUADRA ASSURA NXGN MP CRTD40 MERLIN: Type: IMPLANTABLE DEVICE | Status: FUNCTIONAL

## 2017-11-20 RX ORDER — PROMETHAZINE HYDROCHLORIDE 25 MG/ML
12.5 INJECTION, SOLUTION INTRAMUSCULAR; INTRAVENOUS EVERY 4 HOURS PRN
Status: DISCONTINUED | OUTPATIENT
Start: 2017-11-20 | End: 2017-11-20 | Stop reason: HOSPADM

## 2017-11-20 RX ORDER — ATORVASTATIN CALCIUM 40 MG/1
80 TABLET, FILM COATED ORAL DAILY
Status: DISCONTINUED | OUTPATIENT
Start: 2017-11-21 | End: 2017-11-21 | Stop reason: HOSPADM

## 2017-11-20 RX ORDER — MIDAZOLAM HYDROCHLORIDE 1 MG/ML
INJECTION INTRAMUSCULAR; INTRAVENOUS AS NEEDED
Status: DISCONTINUED | OUTPATIENT
Start: 2017-11-20 | End: 2017-11-20 | Stop reason: HOSPADM

## 2017-11-20 RX ORDER — PROTAMINE SULFATE 10 MG/ML
INJECTION, SOLUTION INTRAVENOUS AS NEEDED
Status: DISCONTINUED | OUTPATIENT
Start: 2017-11-20 | End: 2017-11-20 | Stop reason: HOSPADM

## 2017-11-20 RX ORDER — SODIUM CHLORIDE 0.9 % (FLUSH) 0.9 %
1-10 SYRINGE (ML) INJECTION AS NEEDED
Status: DISCONTINUED | OUTPATIENT
Start: 2017-11-20 | End: 2017-11-20 | Stop reason: HOSPADM

## 2017-11-20 RX ORDER — FUROSEMIDE 20 MG/1
20 TABLET ORAL 2 TIMES DAILY
Status: DISCONTINUED | OUTPATIENT
Start: 2017-11-20 | End: 2017-11-21 | Stop reason: HOSPADM

## 2017-11-20 RX ORDER — NICOTINE POLACRILEX 4 MG
15 LOZENGE BUCCAL
Status: DISCONTINUED | OUTPATIENT
Start: 2017-11-20 | End: 2017-11-21 | Stop reason: HOSPADM

## 2017-11-20 RX ORDER — ASPIRIN 81 MG/1
81 TABLET ORAL DAILY
Status: DISCONTINUED | OUTPATIENT
Start: 2017-11-21 | End: 2017-11-21 | Stop reason: HOSPADM

## 2017-11-20 RX ORDER — SODIUM CHLORIDE 9 MG/ML
INJECTION, SOLUTION INTRAVENOUS CONTINUOUS PRN
Status: DISCONTINUED | OUTPATIENT
Start: 2017-11-20 | End: 2017-11-20 | Stop reason: HOSPADM

## 2017-11-20 RX ORDER — AMIODARONE HYDROCHLORIDE 200 MG/1
200 TABLET ORAL
Status: DISCONTINUED | OUTPATIENT
Start: 2017-11-20 | End: 2017-11-21 | Stop reason: HOSPADM

## 2017-11-20 RX ORDER — BUPIVACAINE HYDROCHLORIDE 5 MG/ML
INJECTION, SOLUTION PERINEURAL AS NEEDED
Status: DISCONTINUED | OUTPATIENT
Start: 2017-11-20 | End: 2017-11-20 | Stop reason: HOSPADM

## 2017-11-20 RX ORDER — PHENYLEPHRINE HCL IN 0.9% NACL 0.5 MG/5ML
SYRINGE (ML) INTRAVENOUS AS NEEDED
Status: DISCONTINUED | OUTPATIENT
Start: 2017-11-20 | End: 2017-11-20 | Stop reason: HOSPADM

## 2017-11-20 RX ORDER — NITROGLYCERIN 0.4 MG/1
0.4 TABLET SUBLINGUAL
Status: DISCONTINUED | OUTPATIENT
Start: 2017-11-20 | End: 2017-11-20 | Stop reason: HOSPADM

## 2017-11-20 RX ORDER — LISINOPRIL 20 MG/1
20 TABLET ORAL DAILY
Status: DISCONTINUED | OUTPATIENT
Start: 2017-11-21 | End: 2017-11-21 | Stop reason: HOSPADM

## 2017-11-20 RX ORDER — ONDANSETRON 2 MG/ML
4 INJECTION INTRAMUSCULAR; INTRAVENOUS EVERY 6 HOURS PRN
Status: DISCONTINUED | OUTPATIENT
Start: 2017-11-20 | End: 2017-11-21 | Stop reason: HOSPADM

## 2017-11-20 RX ORDER — CEFAZOLIN SODIUM 2 G/100ML
2 INJECTION, SOLUTION INTRAVENOUS EVERY 8 HOURS
Status: COMPLETED | OUTPATIENT
Start: 2017-11-21 | End: 2017-11-21

## 2017-11-20 RX ORDER — HYDROCODONE BITARTRATE AND ACETAMINOPHEN 5; 325 MG/1; MG/1
1 TABLET ORAL EVERY 4 HOURS PRN
Status: DISCONTINUED | OUTPATIENT
Start: 2017-11-20 | End: 2017-11-21 | Stop reason: HOSPADM

## 2017-11-20 RX ORDER — ONDANSETRON 2 MG/ML
INJECTION INTRAMUSCULAR; INTRAVENOUS AS NEEDED
Status: DISCONTINUED | OUTPATIENT
Start: 2017-11-20 | End: 2017-11-20 | Stop reason: HOSPADM

## 2017-11-20 RX ORDER — HEPARIN SODIUM 1000 [USP'U]/ML
INJECTION, SOLUTION INTRAVENOUS; SUBCUTANEOUS AS NEEDED
Status: DISCONTINUED | OUTPATIENT
Start: 2017-11-20 | End: 2017-11-20 | Stop reason: HOSPADM

## 2017-11-20 RX ORDER — LIDOCAINE HYDROCHLORIDE 10 MG/ML
INJECTION, SOLUTION INFILTRATION; PERINEURAL AS NEEDED
Status: DISCONTINUED | OUTPATIENT
Start: 2017-11-20 | End: 2017-11-20 | Stop reason: HOSPADM

## 2017-11-20 RX ORDER — DEXTROSE MONOHYDRATE 25 G/50ML
25 INJECTION, SOLUTION INTRAVENOUS
Status: DISCONTINUED | OUTPATIENT
Start: 2017-11-20 | End: 2017-11-21 | Stop reason: HOSPADM

## 2017-11-20 RX ORDER — ONDANSETRON 2 MG/ML
4 INJECTION INTRAMUSCULAR; INTRAVENOUS EVERY 6 HOURS PRN
Status: DISCONTINUED | OUTPATIENT
Start: 2017-11-20 | End: 2017-11-20 | Stop reason: SDUPTHER

## 2017-11-20 RX ORDER — CARVEDILOL 6.25 MG/1
6.25 TABLET ORAL 2 TIMES DAILY WITH MEALS
Status: DISCONTINUED | OUTPATIENT
Start: 2017-11-20 | End: 2017-11-21 | Stop reason: HOSPADM

## 2017-11-20 RX ORDER — CARVEDILOL 3.12 MG/1
3.12 TABLET ORAL 2 TIMES DAILY WITH MEALS
Status: DISCONTINUED | OUTPATIENT
Start: 2017-11-20 | End: 2017-11-20

## 2017-11-20 RX ORDER — SODIUM CHLORIDE 0.9 % (FLUSH) 0.9 %
1-10 SYRINGE (ML) INJECTION AS NEEDED
Status: DISCONTINUED | OUTPATIENT
Start: 2017-11-20 | End: 2017-11-21 | Stop reason: HOSPADM

## 2017-11-20 RX ORDER — FENTANYL CITRATE 50 UG/ML
INJECTION, SOLUTION INTRAMUSCULAR; INTRAVENOUS AS NEEDED
Status: DISCONTINUED | OUTPATIENT
Start: 2017-11-20 | End: 2017-11-20 | Stop reason: HOSPADM

## 2017-11-20 RX ORDER — CEFAZOLIN SODIUM 2 G/100ML
2 INJECTION, SOLUTION INTRAVENOUS
Status: COMPLETED | OUTPATIENT
Start: 2017-11-20 | End: 2017-11-20

## 2017-11-20 RX ORDER — ACETAMINOPHEN 325 MG/1
650 TABLET ORAL EVERY 4 HOURS PRN
Status: DISCONTINUED | OUTPATIENT
Start: 2017-11-20 | End: 2017-11-20 | Stop reason: HOSPADM

## 2017-11-20 RX ADMIN — FUROSEMIDE 20 MG: 20 TABLET ORAL at 20:46

## 2017-11-20 RX ADMIN — CARVEDILOL 6.25 MG: 6.25 TABLET, FILM COATED ORAL at 20:46

## 2017-11-20 RX ADMIN — AMIODARONE HYDROCHLORIDE 200 MG: 200 TABLET ORAL at 20:46

## 2017-11-20 RX ADMIN — CEFAZOLIN SODIUM 2 G: 2 INJECTION, SOLUTION INTRAVENOUS at 16:16

## 2017-11-20 NOTE — H&P
Cardiology H&P     hSai Mata  1945  [phone]      11/20/17    DATE OF ADMISSION: 11/20/2017  [facility] CVCATE Cox, APRN  [id number] / [place] [address]    CC: PVCs     Problem List:  1. PVCs                        A. 24 Hour Holter 4/13/16: 47-99 bpm, Average 71 bpm, frequent PVCs 32%, several runs 4 beat runs NSVT                        B. 24 Hour Holter 5/10/16: 48-81 bpm, Average 65 bpm, frequent PVCs 26% burden                        B. 24 Hour Holter Monitor 8/10/17: HR 42-83, Average 62 bpm, frequent PVCs with bigeminy, couplets, and triplets (11.8% burden)                         C. Echocardiogram 8/17/17: EF 36-40%, myxomatous changes of MV with mild to moderate MR  2. CAD/ischemic CM                        A. CABG x 5 2006                        B. Echocardiogram 1/11/16: EF 45-50%, mild to moderate MR                        C. Stress Test 1/13/16: small reversible defect inferolateral and inferior segments, EF 35%  3. Class III CHF  4. HTN  5. DM2  6. HLP  7. Surgical History                        A. CABG                         B. cataracts          History of Present Illness:   72 year old WM referred by Dr. Amaya for frequent PVCs who presents today for PVC ablation and possible ICD implant. He has a history of CAD with previous CABG in 2006. In April of 2016, he was hospitalized for CHF and was found to have frequent PVCs. Since then, he has worn 3 monitors showing frequent PVCs as high at 32% burden. He has been treated with Sotalol, but cannot tolerate higher doses due to bradycardia. The Sotalol was stopped by Dr. Maldonado when he saw him in clinic on 10/6/17. Since then, he has felt about the same with some fatigue and mild SOB.     He also has been found to have a reduced EF of 36-40%. He was sent here today for evaluation of PVC ablation. He cannot feel palpitations except at night when lying on his left side. He denies SOB, CP, syncope,  dizziness. Overall, he is active. No tobacco. No ETOH. He drinks green tea about 3-4 cups per day. He had his thyroid checked last year and his TSH was only slightly elevated, no further work up was done.     No Known Allergies    Prior to Admission Medications     Prescriptions Last Dose Informant Patient Reported? Taking?    aspirin 81 MG EC tablet 11/20/2017 Medication Bottle Yes Yes    Take 81 mg by mouth daily.    atorvastatin (LIPITOR) 80 MG tablet 11/20/2017 Medication Bottle No Yes    Take 1 tablet by mouth Daily.    carvedilol (COREG) 3.125 MG tablet 11/20/2017 Medication Bottle No Yes    Take 1 tablet by mouth 2 (Two) Times a Day With Meals.    furosemide (LASIX) 40 MG tablet 11/20/2017 Medication Bottle Yes Yes    Take 20 mg by mouth 2 (Two) Times a Day.    insulin NPH-insulin regular (Novolin 70/30) (70-30) 100 UNIT/ML injection 11/19/2017 Medication Bottle Yes Yes    Inject 24 Units under the skin 2 (two) times a day with meals.    lisinopril (PRINIVIL,ZESTRIL) 20 MG tablet 11/20/2017 Medication Bottle No Yes    Take 1 tablet by mouth Daily.    metFORMIN (GLUCOPHAGE) 500 MG tablet 11/19/2017 Medication Bottle No Yes    TAKE ONE TABLET BY MOUTH TWICE DAILY WITH MEALS            Current Facility-Administered Medications:   •  acetaminophen (TYLENOL) tablet 650 mg, 650 mg, Oral, Q4H PRN, BRYON Felix  •  ceFAZolin in dextrose (ANCEF) IVPB solution 2 g, 2 g, Intravenous, On Call to OR, BRYON Felix  •  nitroglycerin (NITROSTAT) SL tablet 0.4 mg, 0.4 mg, Sublingual, Q5 Min PRN, BRYON Felix  •  promethazine (PHENERGAN) injection 12.5 mg, 12.5 mg, Intravenous, Q4H PRN, BRYON Felix  •  sodium chloride 0.9 % flush 1-10 mL, 1-10 mL, Intravenous, PRN, BRYON Felix    Social History     Social History   • Marital status:      Spouse name: N/A   • Number of children: N/A   • Years of education: N/A     Social History Main Topics   • Smoking status: Former Smoker     Packs/day:  "2.00     Types: Cigarettes     Quit date: 1984   • Smokeless tobacco: Former User     Types: Chew     Quit date: 1990   • Alcohol use No   • Drug use: No   • Sexual activity: Defer     Other Topics Concern   • None     Social History Narrative       Family History   Problem Relation Age of Onset   • Heart disease Mother    • Heart disease Father    • Cancer Sister    • Cancer Sister    • Cancer Sister        REVIEW OF SYSTEMS:   CONST:  No weight loss, fever, chills, weakness + fatigue.   HEENT:  No visual loss, blurred vision, double vision, yellow sclerae.                   No hearing loss, congestion, sore throat.   SKIN:      No rashes, urticaria, ulcers, sores.     RESP:     No shortness of breath, hemoptysis, cough, sputum.   GI:           No anorexia, nausea, vomiting, diarrhea. No abdominal pain, melena.   :         No burning on urination, hematuria or increased frequency.  ENDO:    No diaphoresis, cold or heat intolerance. No polyuria or polydipsia.   NEURO:  No headache, dizziness, syncope, paralysis, ataxia, or parasthesias.                  No change in bowel or bladder control. No history of CVA/TIA  MUSC:    No muscle, back pain, joint pain or stiffness.   HEME:    No anemia, bleeding, bruising. No history of DVT/PE.  PSYCH:  No history of depression, anxiety    Vitals:    11/20/17 0944 11/20/17 0947 11/20/17 1006   BP: 150/90 149/98    BP Location: Right arm Left arm    Patient Position: Lying Lying    Pulse:  78    Resp:  18    Temp:  98.9 °F (37.2 °C)    TempSrc:  Tympanic    SpO2:  99%    Weight:   176 lb 12.9 oz (80.2 kg)   Height:   69\" (175.3 cm)         Vital Sign Min/Max for last 24 hours  Temp  Min: 98.9 °F (37.2 °C)  Max: 98.9 °F (37.2 °C)   BP  Min: 149/98  Max: 150/90   Pulse  Min: 78  Max: 78   Resp  Min: 18  Max: 18   SpO2  Min: 99 %  Max: 99 %   No Data Recorded    No intake or output data in the 24 hours ending 11/20/17 1021          Physical Exam:  GEN: Well nourished, Well- " developed  No acute distress  HEENT: Normocephalic, Atraumatic, PERRLA, moist mucous membranes  NECK: supple, NO JVD, no thyromegaly, no lymphadenopathy  CARD: S1S2  RRR no murmur, gallop, rub  LUNGS: Clear to auscultataion, normal respiratory effort  ABDOMEN: Soft, nontender, normal bowel sounds  EXTREMITIES:No gross deformities,  No clubbing, cyanosis, or edema  SKIN: Warm, dry  NEURO: No focal deficits  PSYCHIATRIC: Normal affect and mood      Data:     Results from last 7 days  Lab Units 11/19/17  1513   WBC 10*3/mm3 9.47   HEMOGLOBIN g/dL 13.5   HEMATOCRIT % 41.8   PLATELETS 10*3/mm3 236       Results from last 7 days  Lab Units 11/19/17  1513   SODIUM mmol/L 141   POTASSIUM mmol/L 4.7   CHLORIDE mmol/L 103   CO2 mmol/L 29.0   BUN mg/dL 19   CREATININE mg/dL 1.00   GLUCOSE mg/dL 137*        Results from last 7 days  Lab Units 11/19/17  1514   HEMOGLOBIN A1C % 7.20*               Results from last 7 days  Lab Units 11/19/17  1513   PROTIME Seconds 11.6*   INR  1.06                   Telemetry: NSR with PVCs.       Assessment and Plan:   1. Frequent PVCs - concerning situation as he has had decreased EF. He is unable to tolerate Sotalol, even at lowest dose, due to bradycardia. By looking at his EKGs and Holter monitors, he probably has several sources for his PVCs. He needs EPS +/- RFA of PVCs +/- ICD implant. The risks, benefits, and alternatives of the procedure have been reviewed and the patient wishes to proceed. He will stop sotalol now   2. CHF/Cardiomyopathy - etiology unknown at this time of PVC induced vs ischemic from previous CAD; On ACEI  3. LBBB: Pt with LBBB today: Old EKG with RBBB: evaluate HV interval during EPS  4. Hx of Symptomatic Bradycardia with HR 40 bpm, unable to tolerate higher doses of BBL  5. CAD - no anginal type symptoms at this time      [facility]  11/20/2017   10:21 AM      Jose MARY MD, personally performed the services face  to face as described and documented by the above named individual. I have made any necessary edits and it is both accurate and complete 11/20/2017  5:51 PM

## 2017-11-21 ENCOUNTER — APPOINTMENT (OUTPATIENT)
Dept: GENERAL RADIOLOGY | Facility: HOSPITAL | Age: 72
End: 2017-11-21

## 2017-11-21 ENCOUNTER — TELEPHONE (OUTPATIENT)
Dept: FAMILY MEDICINE CLINIC | Facility: CLINIC | Age: 72
End: 2017-11-21

## 2017-11-21 ENCOUNTER — TRANSITIONAL CARE MANAGEMENT TELEPHONE ENCOUNTER (OUTPATIENT)
Dept: FAMILY MEDICINE CLINIC | Facility: CLINIC | Age: 72
End: 2017-11-21

## 2017-11-21 VITALS
SYSTOLIC BLOOD PRESSURE: 150 MMHG | RESPIRATION RATE: 18 BRPM | WEIGHT: 183.6 LBS | HEART RATE: 74 BPM | TEMPERATURE: 97.3 F | BODY MASS INDEX: 27.19 KG/M2 | DIASTOLIC BLOOD PRESSURE: 83 MMHG | HEIGHT: 69 IN | OXYGEN SATURATION: 99 %

## 2017-11-21 LAB — GLUCOSE BLDC GLUCOMTR-MCNC: 187 MG/DL (ref 70–130)

## 2017-11-21 PROCEDURE — 71020 HC CHEST PA AND LATERAL: CPT

## 2017-11-21 PROCEDURE — G0378 HOSPITAL OBSERVATION PER HR: HCPCS

## 2017-11-21 PROCEDURE — 82962 GLUCOSE BLOOD TEST: CPT

## 2017-11-21 PROCEDURE — 99024 POSTOP FOLLOW-UP VISIT: CPT | Performed by: INTERNAL MEDICINE

## 2017-11-21 PROCEDURE — 25010000003 CEFAZOLIN IN DEXTROSE 2-4 GM/100ML-% SOLUTION: Performed by: INTERNAL MEDICINE

## 2017-11-21 RX ORDER — AMIODARONE HYDROCHLORIDE 200 MG/1
200 TABLET ORAL 2 TIMES DAILY
Qty: 60 TABLET | Refills: 6 | Status: SHIPPED | OUTPATIENT
Start: 2017-11-21 | End: 2018-01-09 | Stop reason: SDUPTHER

## 2017-11-21 RX ORDER — CARVEDILOL 6.25 MG/1
6.25 TABLET ORAL 2 TIMES DAILY WITH MEALS
Qty: 60 TABLET | Refills: 6 | Status: SHIPPED | OUTPATIENT
Start: 2017-11-21 | End: 2018-05-29 | Stop reason: SDUPTHER

## 2017-11-21 RX ORDER — FUROSEMIDE 20 MG/1
20 TABLET ORAL 2 TIMES DAILY
Qty: 60 TABLET | Refills: 6 | Status: CANCELLED | OUTPATIENT
Start: 2017-11-21

## 2017-11-21 RX ADMIN — ASPIRIN 81 MG: 81 TABLET, COATED ORAL at 08:30

## 2017-11-21 RX ADMIN — AMIODARONE HYDROCHLORIDE 200 MG: 200 TABLET ORAL at 08:30

## 2017-11-21 RX ADMIN — FUROSEMIDE 20 MG: 20 TABLET ORAL at 08:30

## 2017-11-21 RX ADMIN — ATORVASTATIN CALCIUM 80 MG: 40 TABLET, FILM COATED ORAL at 08:29

## 2017-11-21 RX ADMIN — CEFAZOLIN SODIUM 2 G: 2 INJECTION, SOLUTION INTRAVENOUS at 08:28

## 2017-11-21 RX ADMIN — CEFAZOLIN SODIUM 2 G: 2 INJECTION, SOLUTION INTRAVENOUS at 01:53

## 2017-11-21 RX ADMIN — CARVEDILOL 6.25 MG: 6.25 TABLET, FILM COATED ORAL at 08:30

## 2017-11-21 RX ADMIN — INSULIN LISPRO 2 UNITS: 100 INJECTION, SOLUTION INTRAVENOUS; SUBCUTANEOUS at 08:28

## 2017-11-21 RX ADMIN — LISINOPRIL 20 MG: 20 TABLET ORAL at 08:29

## 2017-11-21 NOTE — PROGRESS NOTES
[facility] at   Progress Note     LOS: 0 days   Patient Care Team:  ANEUDY Carrizales as PCP - General  ANEUDY Carrizales as PCP - Family Medicine  Aleksey Marsh MD as PCP - Claims Attributed    Chief Complaint:  PVCs, CHF/cardiomyopathy    Subjective     Interval History:     Patient sitting up on side of bed eating breakfast.  Feels well with no complaints.  Has ambulated to and from bathroom without issues.  Denies any CP/SOB. Overall feels better    Review of Systems:   Pertinent positives in HPI, all others reviewed and negative.      Current Facility-Administered Medications:   •  amiodarone (PACERONE) tablet 200 mg, 200 mg, Oral, Q24H, Jose Maldonado MD, 200 mg at 11/20/17 2046  •  aspirin EC tablet 81 mg, 81 mg, Oral, Daily, BRYON Felix  •  atorvastatin (LIPITOR) tablet 80 mg, 80 mg, Oral, Daily, BRYON Felix  •  carvedilol (COREG) tablet 6.25 mg, 6.25 mg, Oral, BID With Meals, Jsoe Maldonado MD, 6.25 mg at 11/20/17 2046  •  ceFAZolin in dextrose (ANCEF) IVPB solution 2 g, 2 g, Intravenous, Q8H, Jose Maldonado MD, 2 g at 11/21/17 0153  •  dextrose (D50W) solution 25 g, 25 g, Intravenous, Q15 Min PRN, BRYON Felix  •  dextrose (GLUTOSE) oral gel 15 g, 15 g, Oral, Q15 Min PRN, BRYON Felix  •  furosemide (LASIX) tablet 20 mg, 20 mg, Oral, BID, BRYON Felix, 20 mg at 11/20/17 2046  •  glucagon (GLUCAGEN) injection 1 mg, 1 mg, Subcutaneous, Q15 Min PRN, BRYON Felix  •  HYDROcodone-acetaminophen (NORCO) 5-325 MG per tablet 1 tablet, 1 tablet, Oral, Q4H PRN, Jose Maldonado MD  •  insulin lispro (humaLOG) injection 0-7 Units, 0-7 Units, Subcutaneous, 4x Daily With Meals & Nightly, BRYON Felix, Stopped at 11/20/17 2027  •  lisinopril (PRINIVIL,ZESTRIL) tablet 20 mg, 20 mg, Oral, Daily, BRYON Felix  •  [place] patch  - ADS Override Pull, , , ,   •  ondansetron (ZOFRAN) injection 4 mg, 4 mg, Intravenous, Q6H PRN, Jose PERSAUD  "MD Erica  •  sodium chloride 0.9 % flush 1-10 mL, 1-10 mL, Intravenous, PRN, Jose F MD Erica      Objective     Vital Sign Min/Max for last 24 hours  Temp  Min: 97.4 °F (36.3 °C)  Max: 98.9 °F (37.2 °C)   BP  Min: 119/73  Max: 161/77   Pulse  Min: 72  Max: 94   Resp  Min: 9  Max: 18   SpO2  Min: 92 %  Max: 100 %   Flow (L/min)  Min: 2  Max: 2   Weight  Min: 176 lb 12.9 oz (80.2 kg)  Max: 183 lb 9.6 oz (83.3 kg)     Flowsheet Rows         First Filed Value    Admission Height  69\" (175.3 cm) Documented at 11/20/2017 1006    Admission Weight  176 lb 12.9 oz (80.2 kg) Documented at 11/20/2017 1006          Physical Exam:     General Appearance:    Alert, cooperative, in no acute distress   Lungs:     CTA     Heart:    RRR with extrasystoles   Chest Wall:    No abnormalities observed   Abdomen:     Normal bowel sounds, no masses,  soft non-tender, non-distended,    Extremities:   No gross deformities, no edema, no cyanosis,    Pulses:   Pulses palpable and equal bilaterally   Skin:   Implant site clean dry intact without signs of hematoma or infection.         Results Review:     Results from last 7 days  Lab Units 11/19/17  1513   WBC 10*3/mm3 9.47   HEMOGLOBIN g/dL 13.5   HEMATOCRIT % 41.8   PLATELETS 10*3/mm3 236       Results from last 7 days  Lab Units 11/19/17  1513   SODIUM mmol/L 141   POTASSIUM mmol/L 4.7   CHLORIDE mmol/L 103   CO2 mmol/L 29.0   BUN mg/dL 19   CREATININE mg/dL 1.00   GLUCOSE mg/dL 137*        Results from last 7 days  Lab Units 11/19/17  1514   HEMOGLOBIN A1C % 7.20*                   Results from last 7 days  Lab Units 11/19/17  1513   PROTIME Seconds 11.6*   INR  1.06         No intake or output data in the 24 hours ending 11/21/17 0828    I personally viewed and interpreted the patient's EKG/Telemetry data    Chest X-ray: no penumothorax; RA/RV /CS leads stable.     Telemetry:  AV paced, HR 72-94, PVC's      Present on Admission:  • PVC's (premature ventricular " contractions)    Assessment/Plan   1. Frequent PVCs:.  -Failed ablation of the most dominant PVC, most likely epicardial in nature  -Amiodarone started  2. CHF/Cardiomyopathy: due to ischemia  -s/p [employer] BiV ICD implant. Pt has done well overnight. The chest Xray and chest incision site are unremarkable. Wound care and post device care have been reviewed with the patient in detail. Pt will have a wound check in 7-10 days then a follow up with  in 3 months.  -increase coreg and continue ACE  3. LBBB:   -Pt with LBBB with a prolonged HV interval of 80ms during EPS, old EKG with RBBB  4. Hx of Symptomatic Bradycardia with HR 40 bpm, unable to tolerate higher doses of BBL  -now s/p [employer] BiV ICD, coreg dose increased to 6.25mg  5. CAD - no anginal type symptoms at this time         Plan for disposition:  The patient is stable and will be discharged to home today with plan for wound check in 7-10 days in [place] and follow up with Dr. Maldonado in 3 months in [place] or [place].     Vera Case, APRN  11/21/17  8:28 AM      I, Jose Maldonado MD, personally performed the services face to face as described and documented by the above named individual. I have made any necessary edits and it is both accurate and complete 11/21/2017  9:39 AM

## 2017-11-21 NOTE — OUTREACH NOTE
Called pt and spoke with pts daughter Daphne. (on hippa) TCM call completed and appt confirmed for 11/28/17 with Ekaterina.

## 2017-11-21 NOTE — PROGRESS NOTES
Discharge Planning Assessment  [facility]     Patient Name: Shai Mata  MRN: [id number]  Today's Date: 11/21/2017    Admit Date: 11/20/2017          Discharge Needs Assessment       11/21/17 1018    Living Environment    Lives With alone    Living Arrangements house    Home Accessibility no concerns    Type of Financial/Environmental Concern none    Transportation Available car;family or friend will provide    Living Environment    Provides Primary Care For no one    Quality Of Family Relationships supportive    Able to Return to Prior Living Arrangements yes    Discharge Needs Assessment    Concerns To Be Addressed no discharge needs identified    Anticipated Changes Related to Illness none    Equipment Currently Used at Home none    Equipment Needed After Discharge none            Discharge Plan       11/21/17 1019    Case Management/Social Work Plan    Plan home    Patient/Family In Agreement With Plan yes    Additional Comments Spoke with pt at bedside. Pt is independent in ADL's and IADL's. Pt has prescription coverage with his insurance. Pt had no current discharge needs or concerns at this time.     Final Note    Final Note SW is following        Discharge Placement     No information found        Expected Discharge Date and Time     Expected Discharge Date Expected Discharge Time    Nov 21, 2017               Demographic Summary       11/21/17 1018    Referral Information    Reason For Consult discharge planning    Primary Care Physician Information    Name Ekaterina Cox            Functional Status       11/21/17 1018    Functional Status Prior    Ambulation 0-->independent    Transferring 0-->independent    Toileting 0-->independent    Bathing 0-->independent    Dressing 0-->independent    Eating 0-->independent    Communication 0-->understands/communicates without difficulty    IADL    Medications independent    Meal Preparation independent    Housekeeping independent    Laundry independent    Shopping  independent    Oral Care independent            Psychosocial     None            Abuse/Neglect     None            Legal     None            Substance Abuse     None            Patient Forms     None          KYLE Rodriguez

## 2017-11-27 RX ORDER — CARVEDILOL 3.12 MG/1
TABLET ORAL
Qty: 60 TABLET | Refills: 5 | OUTPATIENT
Start: 2017-11-27

## 2017-11-27 RX ORDER — FUROSEMIDE 40 MG/1
TABLET ORAL
Qty: 30 TABLET | Refills: 2 | OUTPATIENT
Start: 2017-11-27

## 2017-11-28 ENCOUNTER — OFFICE VISIT (OUTPATIENT)
Dept: FAMILY MEDICINE CLINIC | Facility: CLINIC | Age: 72
End: 2017-11-28

## 2017-11-28 VITALS
HEIGHT: 69 IN | SYSTOLIC BLOOD PRESSURE: 127 MMHG | DIASTOLIC BLOOD PRESSURE: 71 MMHG | WEIGHT: 176.4 LBS | OXYGEN SATURATION: 98 % | HEART RATE: 75 BPM | BODY MASS INDEX: 26.13 KG/M2

## 2017-11-28 DIAGNOSIS — I25.5 ISCHEMIC CARDIOMYOPATHY: ICD-10-CM

## 2017-11-28 DIAGNOSIS — E11.65 TYPE 2 DIABETES MELLITUS WITH HYPERGLYCEMIA, WITH LONG-TERM CURRENT USE OF INSULIN (HCC): Primary | ICD-10-CM

## 2017-11-28 DIAGNOSIS — Z79.4 TYPE 2 DIABETES MELLITUS WITH HYPERGLYCEMIA, WITH LONG-TERM CURRENT USE OF INSULIN (HCC): Primary | ICD-10-CM

## 2017-11-28 DIAGNOSIS — I25.10 ASCVD (ARTERIOSCLEROTIC CARDIOVASCULAR DISEASE): ICD-10-CM

## 2017-11-28 LAB
ALBUMIN SERPL-MCNC: 4.3 G/DL (ref 3.4–4.8)
ALBUMIN/GLOB SERPL: 1.7 G/DL (ref 1.5–2.5)
ALP SERPL-CCNC: 70 U/L (ref 40–129)
ALT SERPL W P-5'-P-CCNC: 21 U/L (ref 10–44)
ANION GAP SERPL CALCULATED.3IONS-SCNC: 5.8 MMOL/L (ref 3.6–11.2)
AST SERPL-CCNC: 23 U/L (ref 10–34)
BILIRUB SERPL-MCNC: 0.8 MG/DL (ref 0.2–1.8)
BUN BLD-MCNC: 15 MG/DL (ref 7–21)
BUN/CREAT SERPL: 12.7 (ref 7–25)
CALCIUM SPEC-SCNC: 9.4 MG/DL (ref 7.7–10)
CHLORIDE SERPL-SCNC: 104 MMOL/L (ref 99–112)
CO2 SERPL-SCNC: 30.2 MMOL/L (ref 24.3–31.9)
CREAT BLD-MCNC: 1.18 MG/DL (ref 0.43–1.29)
GFR SERPL CREATININE-BSD FRML MDRD: 61 ML/MIN/1.73
GLOBULIN UR ELPH-MCNC: 2.5 GM/DL
GLUCOSE BLD-MCNC: 108 MG/DL (ref 70–110)
HBA1C MFR BLD: 7.4 % (ref 4.5–5.7)
OSMOLALITY SERPL CALC.SUM OF ELEC: 280.8 MOSM/KG (ref 273–305)
POTASSIUM BLD-SCNC: 4.4 MMOL/L (ref 3.5–5.3)
PROT SERPL-MCNC: 6.8 G/DL (ref 6–8)
SODIUM BLD-SCNC: 140 MMOL/L (ref 135–153)
TSH SERPL DL<=0.05 MIU/L-ACNC: 4.56 MIU/ML (ref 0.55–4.78)
VIT B12 BLD-MCNC: 690 PG/ML (ref 211–911)

## 2017-11-28 PROCEDURE — 84443 ASSAY THYROID STIM HORMONE: CPT | Performed by: NURSE PRACTITIONER

## 2017-11-28 PROCEDURE — 83036 HEMOGLOBIN GLYCOSYLATED A1C: CPT | Performed by: NURSE PRACTITIONER

## 2017-11-28 PROCEDURE — 83704 LIPOPROTEIN BLD QUAN PART: CPT | Performed by: NURSE PRACTITIONER

## 2017-11-28 PROCEDURE — 99496 TRANSJ CARE MGMT HIGH F2F 7D: CPT | Performed by: NURSE PRACTITIONER

## 2017-11-28 PROCEDURE — 80053 COMPREHEN METABOLIC PANEL: CPT | Performed by: NURSE PRACTITIONER

## 2017-11-28 PROCEDURE — 82607 VITAMIN B-12: CPT | Performed by: NURSE PRACTITIONER

## 2017-11-28 PROCEDURE — 80061 LIPID PANEL: CPT | Performed by: NURSE PRACTITIONER

## 2017-11-28 RX ORDER — LISINOPRIL 20 MG/1
20 TABLET ORAL DAILY
Qty: 30 TABLET | Refills: 11 | Status: SHIPPED | OUTPATIENT
Start: 2017-11-28 | End: 2018-01-09 | Stop reason: SDUPTHER

## 2017-11-28 RX ORDER — CARVEDILOL 3.12 MG/1
TABLET ORAL
Qty: 60 TABLET | Refills: 5 | OUTPATIENT
Start: 2017-11-28

## 2017-11-28 RX ORDER — FUROSEMIDE 40 MG/1
20 TABLET ORAL 2 TIMES DAILY
Qty: 30 TABLET | Refills: 5 | Status: SHIPPED | OUTPATIENT
Start: 2017-11-28 | End: 2018-01-09 | Stop reason: SDUPTHER

## 2017-11-28 RX ORDER — ATORVASTATIN CALCIUM 80 MG/1
80 TABLET, FILM COATED ORAL DAILY
Qty: 30 TABLET | Refills: 11 | Status: SHIPPED | OUTPATIENT
Start: 2017-11-28 | End: 2018-12-24 | Stop reason: SDUPTHER

## 2017-11-28 NOTE — PROGRESS NOTES
Transitional Care Follow Up Visit  Subjective     Shai Mata is a 72 y.o. male who presents for a transitional care management visit.    Within 48 business hours after discharge our office contacted him via telephone to coordinate his care and needs.      I reviewed and discussed the details of that call along with the discharge summary, hospital problems, inpatient lab results, inpatient diagnostic studies, and consultation reports with Shai.     Current outpatient and discharge medications have been reconciled for the patient.    Date of TCM Phone Call 11/21/2017   [facility]   Date of Admission 11/20/2017   Date of Discharge 11/21/2017   Discharge Disposition Home or Self Care     Risk for Readmission (LACE) Score: 3    History of Present Illness Returns today following admission for ablation and ICD implant.    Course During Hospital Stay:  Admitted for management of PVC's as he is unable to tolerate Sotalol due to bradycardia.   Failed ablation started on amiodarone.  Tolerating well Scheduled in the am for wound check post defibrilator implant.   States he is doing well without any known complication.  Arm is sore and improving daily.  Feeling well otherwise glucose generally 120 fasting and admits he doiesnt check frequent.          The following portions of the patient's history were reviewed and updated as appropriate: allergies, current medications, past family history, past medical history, past social history, past surgical history and problem list.    Review of Systems   Constitutional: Negative.    HENT: Negative.    Respiratory: Negative.    Cardiovascular: Negative.    Gastrointestinal: Negative.    Skin: Positive for wound (chest wall).   All other systems reviewed and are negative.      Objective   Physical Exam   Constitutional: He is oriented to person, place, and time. He appears well-developed and well-nourished. No distress.   HENT:   Head: Normocephalic.    Cardiovascular: Normal rate, regular rhythm, normal heart sounds and intact distal pulses.    No murmur heard.  Pulmonary/Chest: Effort normal and breath sounds normal.   Musculoskeletal: He exhibits no edema.   Neurological: He is alert and oriented to person, place, and time.   Skin: Skin is warm and dry. He is not diaphoretic.   Chest wall with dressing clean dry and intact     Psychiatric: He has a normal mood and affect. His behavior is normal.   Nursing note and vitals reviewed.      Assessment/Plan   Shai was seen today for follow-up.    Diagnoses and all orders for this visit:    Type 2 diabetes mellitus with hyperglycemia, with long-term current use of insulin  -     Comprehensive Metabolic Panel  -     NMR LipoProfile  -     TSH  -     Vitamin B12  -     Hemoglobin A1c  -     Osmolality, Calculated; Future  -     Osmolality, Calculated    ASCVD (arteriosclerotic cardiovascular disease)    Ischemic cardiomyopathy    Other orders  -     furosemide (LASIX) 40 MG tablet; Take 0.5 tablets by mouth 2 (Two) Times a Day.  -     lisinopril (PRINIVIL,ZESTRIL) 20 MG tablet; Take 1 tablet by mouth Daily.  -     atorvastatin (LIPITOR) 80 MG tablet; Take 1 tablet by mouth Daily.

## 2017-11-29 ENCOUNTER — OFFICE VISIT (OUTPATIENT)
Dept: CARDIOLOGY | Facility: CLINIC | Age: 72
End: 2017-11-29

## 2017-11-29 DIAGNOSIS — I25.5 ISCHEMIC CARDIOMYOPATHY: Primary | ICD-10-CM

## 2017-11-29 PROCEDURE — 99024 POSTOP FOLLOW-UP VISIT: CPT | Performed by: INTERNAL MEDICINE

## 2017-11-30 LAB
CHOLEST SERPL-MCNC: 133 MG/DL (ref 100–199)
HDL SERPL-SCNC: 25.1 UMOL/L
HDLC SERPL-MCNC: 40 MG/DL
LDL-P: 1128 NMOL/L
LDLC REAL SIZE PAT SERPL: 20.4 NM
LDLC SERPL CALC-MCNC: 83 MG/DL (ref 0–99)
SMALL LDL-P: 604 NMOL/L
TRIGL SERPL-MCNC: 50 MG/DL (ref 0–149)

## 2017-12-01 NOTE — PROGRESS NOTES
WOUND CHECK    2017    Shai Mata, : 1945    Shai Jensen MD    B/P: 116/60     Pulse: 68    Patient has fever: [] Temperature if indicated: 97.6    Wound Location:     Dressing Removed [x]        Old Dressing Appearance:  Clean, dry [x]                 Old, bloody drainage []                            Moist, serous drainage []                Moist, thick yellow/green drainage []       Wound Appearance: Redness []                  Drainage []                  Culture obtained []        Color:         Consistency:       Amount:      Location of Wound: none    Gloves used, wound cleansed with sterile 4x4 and peroxide []       MD notified [] MD orders:     Antibiotic started []  If checked, type  Other:    Appointment for follow-up scheduled for 3 months post procedure [x]    Future Appointments  Date Time Provider Department Center   2018 3:00 PM BRYON Nice [facility] None   2018 9:20 AM [facility] None   2018 2:00 PM BRYON [facility] None           Dyllan Buckley RN, 17      MD Signature:______________________________ Completed By/Date:

## 2017-12-07 LAB — ACT BLD: 114 SECONDS (ref 82–152)

## 2018-01-09 ENCOUNTER — OFFICE VISIT (OUTPATIENT)
Dept: CARDIOLOGY | Facility: CLINIC | Age: 73
End: 2018-01-09

## 2018-01-09 VITALS
WEIGHT: 177.2 LBS | BODY MASS INDEX: 26.25 KG/M2 | RESPIRATION RATE: 16 BRPM | HEIGHT: 69 IN | DIASTOLIC BLOOD PRESSURE: 65 MMHG | SYSTOLIC BLOOD PRESSURE: 121 MMHG | HEART RATE: 72 BPM

## 2018-01-09 DIAGNOSIS — E78.5 DYSLIPIDEMIA: ICD-10-CM

## 2018-01-09 DIAGNOSIS — Z95.1 S/P CABG X 5: ICD-10-CM

## 2018-01-09 DIAGNOSIS — I25.10 ASCVD (ARTERIOSCLEROTIC CARDIOVASCULAR DISEASE): ICD-10-CM

## 2018-01-09 DIAGNOSIS — I25.5 ISCHEMIC CARDIOMYOPATHY: Primary | ICD-10-CM

## 2018-01-09 DIAGNOSIS — I49.3 PVC'S (PREMATURE VENTRICULAR CONTRACTIONS): ICD-10-CM

## 2018-01-09 DIAGNOSIS — I10 ESSENTIAL HYPERTENSION: ICD-10-CM

## 2018-01-09 PROCEDURE — 99214 OFFICE O/P EST MOD 30 MIN: CPT | Performed by: PHYSICIAN ASSISTANT

## 2018-01-09 RX ORDER — FUROSEMIDE 20 MG/1
20 TABLET ORAL DAILY
Qty: 30 TABLET | Refills: 3 | Status: SHIPPED | OUTPATIENT
Start: 2018-01-09 | End: 2018-05-15 | Stop reason: SDUPTHER

## 2018-01-09 RX ORDER — SPIRONOLACTONE 25 MG/1
25 TABLET ORAL DAILY
Qty: 30 TABLET | Refills: 3 | Status: SHIPPED | OUTPATIENT
Start: 2018-01-09 | End: 2018-05-15 | Stop reason: SDUPTHER

## 2018-01-09 RX ORDER — LISINOPRIL 10 MG/1
10 TABLET ORAL
Start: 2018-01-09 | End: 2018-02-28 | Stop reason: SDUPTHER

## 2018-01-09 RX ORDER — AMIODARONE HYDROCHLORIDE 200 MG/1
200 TABLET ORAL DAILY
Start: 2018-01-09 | End: 2018-02-28 | Stop reason: SDUPTHER

## 2018-01-09 NOTE — PROGRESS NOTES
ANEUDY Carrizales  Shai Mata  1945 01/09/2018    Patient Active Problem List   Diagnosis   • ASCVD (arteriosclerotic cardiovascular disease)   • S/P CABG x 5   • HTN (hypertension)   • PVC's (premature ventricular contractions)   • Type 2 diabetes mellitus with hyperglycemia, with long-term current use of insulin   • Hyperlipemia, mixed   • Ischemic cardiomyopathy     Dear ANEUDY Carrizales:    Chief Complaint   Patient presents with   • ICD   • ASCVD   • Hypertension     Subjective     Shai Mata is a 72 y.o. male with a past medical history significant for CAD with previous CABG in 2006. In April of 2016, he was hospitalized for CHF and was found to have frequent PVCs. He was started on sotalol, however could not tolerate higher doses secondary to bradycardia.  He was also noted to have advanced ischemic versus PVC induced cardiomyopathy.  He was referred to electrophysiology and subsequently underwent implantation of a biventricular ICD.  He presents to the office today for follow-up visit. He has been doing very well since his defibrillator implantation.  He has not had any discharges from the device.  He denies any recent complaints of shortness of breath, chest pain, or pedal edema.  No complaints of palpitations or feeling of skipped beats.  He does state that he does have salt in his diet and eats canned soups as he does not have anyone to cook for him.      Current Outpatient Prescriptions:   •  amiodarone (PACERONE) 200 MG tablet, Take 1 tablet by mouth Daily., Disp: , Rfl:   •  aspirin 81 MG EC tablet, Take 81 mg by mouth daily., Disp: , Rfl:   •  atorvastatin (LIPITOR) 80 MG tablet, Take 1 tablet by mouth Daily., Disp: 30 tablet, Rfl: 11  •  carvedilol (COREG) 6.25 MG tablet, Take 1 tablet by mouth 2 (Two) Times a Day With Meals., Disp: 60 tablet, Rfl: 6  •  furosemide (LASIX) 20 MG tablet, Take 1 tablet by mouth Daily., Disp: 30 tablet, Rfl: 3  •  insulin NPH-insulin regular (Novolin 70/30)  "(70-30) 100 UNIT/ML injection, Inject 24 Units under the skin 2 (two) times a day with meals., Disp: , Rfl:   •  lisinopril (PRINIVIL,ZESTRIL) 10 MG tablet, Take 1 tablet by mouth 2 (Two) Times a Day., Disp: , Rfl:   •  metFORMIN (GLUCOPHAGE) 500 MG tablet, TAKE ONE TABLET BY MOUTH TWICE DAILY WITH MEALS, Disp: 60 tablet, Rfl: 5  •  spironolactone (ALDACTONE) 25 MG tablet, Take 1 tablet by mouth Daily., Disp: 30 tablet, Rfl: 3    The following portions of the patient's history were reviewed and updated as appropriate: allergies, current medications, past family history, past medical history, past social history, past surgical history and problem list.    Social History     Social History   • Marital status:      Spouse name: N/A   • Number of children: N/A   • Years of education: N/A     Occupational History   • Not on file.     Social History Main Topics   • Smoking status: Former Smoker     Packs/day: 2.00     Types: Cigarettes     Quit date: 1984   • Smokeless tobacco: Former User     Types: Chew     Quit date: 1990   • Alcohol use No   • Drug use: No   • Sexual activity: Defer     Other Topics Concern   • Not on file     Social History Narrative     Review of Systems   Constitution: Negative for chills and fever.   HENT: Negative for nosebleeds and sore throat.    Cardiovascular: Negative for chest pain, leg swelling, palpitations and syncope.   Respiratory: Negative for cough, hemoptysis, shortness of breath and wheezing.    Gastrointestinal: Negative for abdominal pain, hematemesis, hematochezia, melena, nausea and vomiting.   Genitourinary: Negative for dysuria and hematuria.   Neurological: Negative for dizziness and headaches.     Objective   Blood pressure 121/65, pulse 72, resp. rate 16, height 175.3 cm (69.02\"), weight 80.4 kg (177 lb 3.2 oz).    Physical Exam   Constitutional: He is oriented to person, place, and time. He appears well-developed and well-nourished. No distress.   HENT:   Head: " Normocephalic and atraumatic.   Eyes: Conjunctivae are normal. Right eye exhibits no discharge. Left eye exhibits no discharge.   Neck: Normal range of motion. Neck supple. Carotid bruit is not present.   Cardiovascular: Normal rate and regular rhythm.  Exam reveals no gallop and no friction rub.    No murmur heard.  Pulmonary/Chest: Effort normal and breath sounds normal. No respiratory distress. He has no wheezes. He has no rales. He exhibits no tenderness.   Musculoskeletal: Normal range of motion. He exhibits no edema.   Neurological: He is alert and oriented to person, place, and time.   Skin: Skin is warm and dry. No rash noted. He is not diaphoretic. No erythema. No pallor.   Psychiatric: He has a normal mood and affect. His behavior is normal.   Nursing note and vitals reviewed.    Procedures   FINAL PROGRAM PARAMETERS: The device was set at DDDR with a lower rate of 70 beats per minute and upper rate of 120 beats per minute with set A-V delays. The VT zone was set on therapy at 150 beats per minute to 220 beats per minute, and the VF zone was set at greater than 220 beats per minute. .      FINAL IMPRESSIONS:    1. Successful insertion of a biventricular pacemaker ICD.   2. Successful insertion of a coronary sinus pacing lead.  3. Defibrillation threshold testing of less than or equal to 17.5 joules using an RV to CAN lead configuration for ventricular fibrillation.      RECOMMENDATION(S):  1. The patient will be monitored on telemetry.  2. If stable, the patient will be discharged to home in the morning.      Jose Maldonado MD  11/20/17  5:53 PM    Transthoracic Echocardiogram 11/20/17  · Left ventricular systolic function is moderately decreased. Estimated EF = 35%.  · Large old inferior and inferolateral infarct  · Left ventricular wall thickness is consistent with mild septal asymmetric hypertrophy.  · The left ventricular cavity is mildly dilated.  · Left atrial cavity size is mildly  dilated.    Transthoracic Echocardiogram 08/17/17  · The left ventricular cavity is mildly dilated.  · Left ventricular systolic function is moderately decreased.  · Estimated EF appears to be in the range of 36 - 40%.  · The aortic valve is abnormal in structure. The valve exhibits sclerosis. There is mild calcification of the aortic valve mainly affecting the non and right coronary cusp(s).Mild aortic valve regurgitation is present. Mild aortic valve stenosis is present.  · The mitral valve is abnormal in structure. There are myxomatous changes of the mitral valve apparatus present. There is mild bileaflet thickening present. Mild-to-moderate mitral valve regurgitation is present. No significant mitral valve stenosis is present.  · The tricuspid valve is grossly normal. Estimated right ventricular systolic pressure from tricuspid regurgitation is normal (<35 mmHg).  · There is no evidence of pericardial effusion.      Assessment:          Diagnosis Plan   1. Ischemic cardiomyopathy  Comprehensive Metabolic Panel    With EF of 35% on most recent transthoracic echocardiogram. Appears compensated.    2. PVC's (premature ventricular contractions)  Comprehensive Metabolic Panel    Possibly causing his cardiomyopathy.  Now on amiodarone and carvedilol.   3. ASCVD (arteriosclerotic cardiovascular disease)  Comprehensive Metabolic Panel   4. S/P CABG x 5  Comprehensive Metabolic Panel   5. Essential hypertension  Comprehensive Metabolic Panel   6. Dyslipidemia  Comprehensive Metabolic Panel        Plan:       1. Split lisinopril to 10 mg twice a day.  2. Decrease furosemide to 20 mg once a day.  3. Start spironolactone 25 mg once a day and check a comprehensive metabolic panel on Monday.  4. Encouraged him on a low-sodium diet and explained the role sodium plays in fluid retention and congestive heart failure.  5. Explained the monitoring required with amiodarone therapy and he is agreeable when it is time to check labs,  CXR, and agrees to an annual eye exam.   6. If he is doing well at next visit, may consider checking on the cost of Entresto and changing from Lisinopril if it is found to be affordable.   7. Follow up in 2 months or sooner if needed.    No Follow-up on file.    I appreciate the opportunity to participate in this patient's cardiovascular care.    Best Regards,    Kenia Anton PA-C

## 2018-01-09 NOTE — PATIENT INSTRUCTIONS
"Low-Sodium Eating Plan  Sodium raises blood pressure and causes water to be held in the body. Getting less sodium from food will help lower your blood pressure, reduce any swelling, and protect your heart, liver, and kidneys. We get sodium by adding salt (sodium chloride) to food. Most of our sodium comes from canned, boxed, and frozen foods. Restaurant foods, fast foods, and pizza are also very high in sodium. Even if you take medicine to lower your blood pressure or to reduce fluid in your body, getting less sodium from your food is important.  WHAT IS MY PLAN?  Most people should limit their sodium intake to 2,300 mg a day. Your health care provider recommends that you limit your sodium intake to __________ a day.   WHAT DO I NEED TO KNOW ABOUT THIS EATING PLAN?  For the low-sodium eating plan, you will follow these general guidelines:  · Choose foods with a % Daily Value for sodium of less than 5% (as listed on the food label).    · Use salt-free seasonings or herbs instead of table salt or sea salt.    · Check with your health care provider or pharmacist before using salt substitutes.    · Eat fresh foods.  · Eat more vegetables and fruits.  · Limit canned vegetables. If you do use them, rinse them well to decrease the sodium.    · Limit cheese to 1 oz (28 g) per day.     · Eat lower-sodium products, often labeled as \"lower sodium\" or \"no salt added.\"  · Avoid foods that contain monosodium glutamate (MSG). MSG is sometimes added to Chinese food and some canned foods.    · Check food labels (Nutrition Facts labels) on foods to learn how much sodium is in one serving.  · Eat more home-cooked food and less restaurant, buffet, and fast food.   · When eating at a restaurant, ask that your food be prepared with less salt, or no salt if possible.    HOW DO I READ FOOD LABELS FOR SODIUM INFORMATION?  The Nutrition Facts label lists the amount of sodium in one serving of the food. If you eat more than one serving, you " must multiply the listed amount of sodium by the number of servings.  Food labels may also identify foods as:  · Sodium free--Less than 5 mg in a serving.  · Very low sodium--35 mg or less in a serving.  · Low sodium--140 mg or less in a serving.  · Light in sodium--50% less sodium in a serving. For example, if a food that usually has 300 mg of sodium is changed to become light in sodium, it will have 150 mg of sodium.  · Reduced sodium--25% less sodium in a serving. For example, if a food that usually has 400 mg of sodium is changed to reduced sodium, it will have 300 mg of sodium.  WHAT FOODS CAN I EAT?  Grains   Low-sodium cereals, including oats, puffed wheat and rice, and shredded wheat cereals. Low-sodium crackers. Unsalted rice and pasta. Lower-sodium bread.   Vegetables   Frozen or fresh vegetables. Low-sodium or reduced-sodium canned vegetables. Low-sodium or reduced-sodium tomato sauce and paste. Low-sodium or reduced-sodium tomato and vegetable juices.   Fruits   Fresh, frozen, and canned fruit. Fruit juice.   Meat and Other Protein Products   Low-sodium canned tuna and salmon. Fresh or frozen meat, poultry, seafood, and fish. Lamb. Unsalted nuts. Dried beans, peas, and lentils without added salt. Unsalted canned beans. Homemade soups without salt. Eggs.   Dairy   Milk. Soy milk. Ricotta cheese. Low-sodium or reduced-sodium cheeses. Yogurt.   Condiments   Fresh and dried herbs and spices. Salt-free seasonings. Onion and garlic powders. Low-sodium varieties of mustard and ketchup. Fresh or refrigerated horseradish. Lemon juice.   Fats and Oils    Reduced-sodium salad dressings. Unsalted butter.    Other   Unsalted popcorn and pretzels.   The items listed above may not be a complete list of recommended foods or beverages. Contact your dietitian for more options.  WHAT FOODS ARE NOT RECOMMENDED?  Grains   Instant hot cereals. Bread stuffing, pancake, and biscuit mixes. Croutons. Seasoned rice or pasta mixes.  Noodle soup cups. Boxed or frozen macaroni and cheese. Self-rising flour. Regular salted crackers.  Vegetables   Regular canned vegetables. Regular canned tomato sauce and paste. Regular tomato and vegetable juices. Frozen vegetables in sauces. Salted French fries. Olives. Pickles. Relishes. Sauerkraut. Salsa.  Meat and Other Protein Products   Salted, canned, smoked, spiced, or pickled meats, seafood, or fish. Woodard, ham, sausage, hot dogs, corned beef, chipped beef, and packaged luncheon meats. Salt pork. Jerky. Pickled herring. Anchovies, regular canned tuna, and sardines. Salted nuts.  Dairy   Processed cheese and cheese spreads. Cheese curds. Blue cheese and cottage cheese. Buttermilk.   Condiments   Onion and garlic salt, seasoned salt, table salt, and sea salt. Canned and packaged gravies. Worcestershire sauce. Tartar sauce. Barbecue sauce. Teriyaki sauce. Soy sauce, including reduced sodium. Steak sauce. Fish sauce. Oyster sauce. Cocktail sauce. Horseradish that you find on the shelf. Regular ketchup and mustard. Meat flavorings and tenderizers. Bouillon cubes. Hot sauce. Tabasco sauce. Marinades. Taco seasonings. Relishes.  Fats and Oils    Regular salad dressings. Salted butter. Margarine. Ghee. Woodard fat.   Other   Potato and tortilla chips. Corn chips and puffs. Salted popcorn and pretzels. Canned or dried soups. Pizza. Frozen entrees and pot pies.    The items listed above may not be a complete list of foods and beverages to avoid. Contact your dietitian for more information.     This information is not intended to replace advice given to you by your health care provider. Make sure you discuss any questions you have with your health care provider.     Document Released: 06/09/2003 Document Revised: 01/08/2016 Document Reviewed: 10/22/2014  [email or web address] Patient Education ©2017 [email or web address]

## 2018-01-10 ENCOUNTER — TELEPHONE (OUTPATIENT)
Dept: CARDIOLOGY | Facility: CLINIC | Age: 73
End: 2018-01-10

## 2018-01-10 NOTE — TELEPHONE ENCOUNTER
Pharmacy had called regarding the paper they had faxed over with the drug interaction with Lisinopril and Spironolactone. I advised that Kenia had stated we are aware of the interaction and monitoring for hyperkalemia with labs on Monday. She expressed understanding.

## 2018-01-15 ENCOUNTER — LAB (OUTPATIENT)
Dept: FAMILY MEDICINE CLINIC | Facility: CLINIC | Age: 73
End: 2018-01-15

## 2018-01-15 DIAGNOSIS — I25.5 ISCHEMIC CARDIOMYOPATHY: ICD-10-CM

## 2018-01-15 DIAGNOSIS — I49.3 PVC'S (PREMATURE VENTRICULAR CONTRACTIONS): ICD-10-CM

## 2018-01-15 DIAGNOSIS — E78.5 DYSLIPIDEMIA: ICD-10-CM

## 2018-01-15 DIAGNOSIS — I10 ESSENTIAL HYPERTENSION: ICD-10-CM

## 2018-01-15 DIAGNOSIS — I25.10 ASCVD (ARTERIOSCLEROTIC CARDIOVASCULAR DISEASE): ICD-10-CM

## 2018-01-15 DIAGNOSIS — Z95.1 S/P CABG X 5: ICD-10-CM

## 2018-01-15 LAB
ALBUMIN SERPL-MCNC: 4.4 G/DL (ref 3.4–4.8)
ALBUMIN/GLOB SERPL: 1.8 G/DL (ref 1.5–2.5)
ALP SERPL-CCNC: 54 U/L (ref 40–129)
ALT SERPL W P-5'-P-CCNC: 28 U/L (ref 10–44)
ANION GAP SERPL CALCULATED.3IONS-SCNC: 1.5 MMOL/L (ref 3.6–11.2)
AST SERPL-CCNC: 23 U/L (ref 10–34)
BILIRUB SERPL-MCNC: 0.5 MG/DL (ref 0.2–1.8)
BUN BLD-MCNC: 17 MG/DL (ref 7–21)
BUN/CREAT SERPL: 14.3 (ref 7–25)
CALCIUM SPEC-SCNC: 9.3 MG/DL (ref 7.7–10)
CHLORIDE SERPL-SCNC: 106 MMOL/L (ref 99–112)
CO2 SERPL-SCNC: 31.5 MMOL/L (ref 24.3–31.9)
CREAT BLD-MCNC: 1.19 MG/DL (ref 0.43–1.29)
GFR SERPL CREATININE-BSD FRML MDRD: 60 ML/MIN/1.73
GLOBULIN UR ELPH-MCNC: 2.4 GM/DL
GLUCOSE BLD-MCNC: 118 MG/DL (ref 70–110)
OSMOLALITY SERPL CALC.SUM OF ELEC: 280.2 MOSM/KG (ref 273–305)
POTASSIUM BLD-SCNC: 4.8 MMOL/L (ref 3.5–5.3)
PROT SERPL-MCNC: 6.8 G/DL (ref 6–8)
SODIUM BLD-SCNC: 139 MMOL/L (ref 135–153)

## 2018-01-15 PROCEDURE — 80053 COMPREHEN METABOLIC PANEL: CPT | Performed by: PHYSICIAN ASSISTANT

## 2018-02-02 ENCOUNTER — OFFICE VISIT (OUTPATIENT)
Dept: CARDIOLOGY | Facility: CLINIC | Age: 73
End: 2018-02-02

## 2018-02-02 VITALS
HEART RATE: 70 BPM | BODY MASS INDEX: 25.92 KG/M2 | DIASTOLIC BLOOD PRESSURE: 71 MMHG | WEIGHT: 175 LBS | SYSTOLIC BLOOD PRESSURE: 125 MMHG | HEIGHT: 69 IN

## 2018-02-02 DIAGNOSIS — I47.20 VT (VENTRICULAR TACHYCARDIA) (HCC): ICD-10-CM

## 2018-02-02 DIAGNOSIS — I10 ESSENTIAL HYPERTENSION: ICD-10-CM

## 2018-02-02 DIAGNOSIS — I44.7 LBBB (LEFT BUNDLE BRANCH BLOCK): ICD-10-CM

## 2018-02-02 DIAGNOSIS — I50.22 CHRONIC SYSTOLIC CONGESTIVE HEART FAILURE (HCC): Primary | ICD-10-CM

## 2018-02-02 PROCEDURE — 93284 PRGRMG EVAL IMPLANTABLE DFB: CPT | Performed by: INTERNAL MEDICINE

## 2018-02-02 PROCEDURE — 99024 POSTOP FOLLOW-UP VISIT: CPT | Performed by: INTERNAL MEDICINE

## 2018-02-02 NOTE — PROGRESS NOTES
Shai DYER Mata  1945  [phone]      02/02/2018    [facility] CARDIOLOGY     Ekaterina Cox, APRN  [address]  [facility] [address]    Chief Complaint   Patient presents with   • Cardiomyopathy   • Congestive Heart Failure   • Palpitations       Problem List:  1. PVCs                        A. 24 Hour Holter 4/13/16: 47-99 bpm, Average 71 bpm, frequent PVCs 32%, several runs 4 beat runs NSVT                        B. 24 Hour Holter 5/10/16: 48-81 bpm, Average 65 bpm, frequent PVCs 26% burden                        B. 24 Hour Holter Monitor 8/10/17: HR 42-83, Average 62 bpm, frequent PVCs with bigeminy, couplets, and triplets (11.8% burden)                         C. Echocardiogram 8/17/17: EF 36-40%, myxomatous changes of MV with mild to moderate MR             E. RFA of PVC 11/20/17  2. VT             A. Inducible VT by EPS 11/20/17             B. BiV ICD implant 11/20/17 SJM   3. CHF                       A. Echo 11/20/17 LVEF 35%  4. CAD                        A. CABG x 5 2006                        B. Echocardiogram 1/11/16: EF 45-50%, mild to moderate MR                        C. Stress Test 1/13/16: small reversible defect inferolateral and inferior segments, EF 35%  5. LBBB  6. HTN  7. DM2  8. HLP  9. Surgical History                        A. CABG                         B. Cataracts      Allergies  No Known Allergies    Current Medications    Current Outpatient Prescriptions:   •  amiodarone (PACERONE) 200 MG tablet, Take 1 tablet by mouth Daily., Disp: , Rfl:   •  aspirin 81 MG EC tablet, Take 81 mg by mouth daily., Disp: , Rfl:   •  atorvastatin (LIPITOR) 80 MG tablet, Take 1 tablet by mouth Daily., Disp: 30 tablet, Rfl: 11  •  carvedilol (COREG) 6.25 MG tablet, Take 1 tablet by mouth 2 (Two) Times a Day With Meals., Disp: 60 tablet, Rfl: 6  •  furosemide (LASIX) 20 MG tablet, Take 1 tablet by mouth Daily., Disp: 30 tablet, Rfl: 3  •  insulin NPH-insulin regular  "(Novolin 70/30) (70-30) 100 UNIT/ML injection, Inject 24 Units under the skin 2 (two) times a day with meals., Disp: , Rfl:   •  lisinopril (PRINIVIL,ZESTRIL) 10 MG tablet, Take 1 tablet by mouth 2 (Two) Times a Day., Disp: , Rfl:   •  metFORMIN (GLUCOPHAGE) 500 MG tablet, TAKE ONE TABLET BY MOUTH TWICE DAILY WITH MEALS, Disp: 60 tablet, Rfl: 5  •  spironolactone (ALDACTONE) 25 MG tablet, Take 1 tablet by mouth Daily., Disp: 30 tablet, Rfl: 3    History of Present Illness   HPI    Pt presents for follow up of PVC/DCM/CHF/VT. Since the pt has seen us, pt denies any palpitations, SOB, CP, LH, and dizziness. Denies any hospitalizations, ER visits, ICD shocks, or TIA/CVA symptoms. Overall feels well. No side effects to medications. Feels better since BiV ICD implant. More energy, less SOB. BP at home stable.    ROS:  General:  Denies fatigue, weight gain or loss  Cardiovascular:  Denies CP, PND, syncope, near syncope, edema or palpitations.  Pulmonary:  Denies ESCALERA, cough, or wheezing    Vitals:    02/02/18 1311   BP: 125/71   BP Location: Left arm   Patient Position: Sitting   Pulse: 70   Weight: 79.4 kg (175 lb)   Height: 175.3 cm (69\")       PE:  General: NAD  Neck: no JVD, no carotid bruits, no TM  Heart RRR, NL S1, S2, no rubs, murmurs  Lungs: CTA, no wheezes, rhonchi, or rales  Abd: soft, non-tender, NL BS  Ext: No musculoskeletal deformities, no edema, cyanosis, or clubbing  Psych: normal mood and affect    Diagnostic Data:  Procedures.    1. Chronic systolic congestive heart failure    2. VT (ventricular tachycardia)    3. LBBB (left bundle branch block)    4. Essential hypertension        ICD interrogation: NL fxn, NL battery fxn, No VT or AF, 95% BiV paced, 1.1 % PVC    Plan:  1) VT on amiodarone. No recurrence Needs LFT and LFT next visit  Continue present medications. ICD function normal.   2) CHF s/p BiV ICD: class CHF I on meds  3) Anticoagulation  Continue ASA  4) CAD/HTN: stable    F/up in 6 months       "

## 2018-02-28 ENCOUNTER — OFFICE VISIT (OUTPATIENT)
Dept: FAMILY MEDICINE CLINIC | Facility: CLINIC | Age: 73
End: 2018-02-28

## 2018-02-28 ENCOUNTER — TELEPHONE (OUTPATIENT)
Dept: FAMILY MEDICINE CLINIC | Facility: CLINIC | Age: 73
End: 2018-02-28

## 2018-02-28 VITALS
DIASTOLIC BLOOD PRESSURE: 67 MMHG | HEART RATE: 72 BPM | OXYGEN SATURATION: 98 % | WEIGHT: 177.4 LBS | SYSTOLIC BLOOD PRESSURE: 107 MMHG | BODY MASS INDEX: 26.28 KG/M2 | HEIGHT: 69 IN

## 2018-02-28 DIAGNOSIS — E78.2 HYPERLIPEMIA, MIXED: ICD-10-CM

## 2018-02-28 DIAGNOSIS — I10 ESSENTIAL HYPERTENSION: ICD-10-CM

## 2018-02-28 DIAGNOSIS — Z79.4 TYPE 2 DIABETES MELLITUS WITH HYPERGLYCEMIA, WITH LONG-TERM CURRENT USE OF INSULIN (HCC): Primary | ICD-10-CM

## 2018-02-28 DIAGNOSIS — E11.65 TYPE 2 DIABETES MELLITUS WITH HYPERGLYCEMIA, WITH LONG-TERM CURRENT USE OF INSULIN (HCC): Primary | ICD-10-CM

## 2018-02-28 DIAGNOSIS — I25.10 ASCVD (ARTERIOSCLEROTIC CARDIOVASCULAR DISEASE): ICD-10-CM

## 2018-02-28 LAB
ALBUMIN SERPL-MCNC: 4.4 G/DL (ref 3.4–4.8)
ALBUMIN/GLOB SERPL: 1.9 G/DL (ref 1.5–2.5)
ALP SERPL-CCNC: 56 U/L (ref 40–129)
ALT SERPL W P-5'-P-CCNC: 26 U/L (ref 10–44)
ANION GAP SERPL CALCULATED.3IONS-SCNC: 3.7 MMOL/L (ref 3.6–11.2)
AST SERPL-CCNC: 20 U/L (ref 10–34)
BILIRUB SERPL-MCNC: 0.5 MG/DL (ref 0.2–1.8)
BUN BLD-MCNC: 17 MG/DL (ref 7–21)
BUN/CREAT SERPL: 12.4 (ref 7–25)
CALCIUM SPEC-SCNC: 9.7 MG/DL (ref 7.7–10)
CHLORIDE SERPL-SCNC: 108 MMOL/L (ref 99–112)
CHOLEST SERPL-MCNC: 106 MG/DL (ref 0–200)
CO2 SERPL-SCNC: 29.3 MMOL/L (ref 24.3–31.9)
CREAT BLD-MCNC: 1.37 MG/DL (ref 0.43–1.29)
GFR SERPL CREATININE-BSD FRML MDRD: 51 ML/MIN/1.73
GLOBULIN UR ELPH-MCNC: 2.3 GM/DL
GLUCOSE BLD-MCNC: 45 MG/DL (ref 70–110)
HBA1C MFR BLD: 7.5 % (ref 4.5–5.7)
HDLC SERPL-MCNC: 38 MG/DL (ref 60–100)
LDLC SERPL CALC-MCNC: 58 MG/DL (ref 0–100)
LDLC/HDLC SERPL: 1.53 {RATIO}
OSMOLALITY SERPL CALC.SUM OF ELEC: 279.8 MOSM/KG (ref 273–305)
POTASSIUM BLD-SCNC: 4.6 MMOL/L (ref 3.5–5.3)
PROT SERPL-MCNC: 6.7 G/DL (ref 6–8)
SODIUM BLD-SCNC: 141 MMOL/L (ref 135–153)
TRIGL SERPL-MCNC: 50 MG/DL (ref 0–150)
VLDLC SERPL-MCNC: 10 MG/DL

## 2018-02-28 PROCEDURE — 83036 HEMOGLOBIN GLYCOSYLATED A1C: CPT | Performed by: NURSE PRACTITIONER

## 2018-02-28 PROCEDURE — 36415 COLL VENOUS BLD VENIPUNCTURE: CPT | Performed by: NURSE PRACTITIONER

## 2018-02-28 PROCEDURE — 80053 COMPREHEN METABOLIC PANEL: CPT | Performed by: NURSE PRACTITIONER

## 2018-02-28 PROCEDURE — 80061 LIPID PANEL: CPT | Performed by: NURSE PRACTITIONER

## 2018-02-28 PROCEDURE — 99213 OFFICE O/P EST LOW 20 MIN: CPT | Performed by: NURSE PRACTITIONER

## 2018-02-28 RX ORDER — LISINOPRIL 10 MG/1
10 TABLET ORAL 2 TIMES DAILY
Qty: 180 TABLET | Refills: 1 | Status: SHIPPED | OUTPATIENT
Start: 2018-02-28 | End: 2018-09-12 | Stop reason: SDUPTHER

## 2018-02-28 RX ORDER — AMIODARONE HYDROCHLORIDE 200 MG/1
200 TABLET ORAL DAILY
Qty: 90 TABLET | Refills: 1 | Status: SHIPPED | OUTPATIENT
Start: 2018-02-28 | End: 2018-08-28 | Stop reason: SDUPTHER

## 2018-02-28 RX ORDER — LISINOPRIL 10 MG/1
10 TABLET ORAL 2 TIMES DAILY
COMMUNITY
End: 2018-02-28 | Stop reason: SDUPTHER

## 2018-02-28 NOTE — PROGRESS NOTES
Subjective   Shai Mata is a 72 y.o. male.     HPI Comments: Returns following on his diabetes states he is feeling better and more stable with his sugars. Denies any further episodes of low sugar.  States he is feeling pretty good without any new concerns.  Did have recent studies with cardiology follows up within the next two weeks    Diabetes   He presents for his follow-up diabetic visit. He has type 2 diabetes mellitus. His disease course has been improving. There are no hypoglycemic associated symptoms. Pertinent negatives for hypoglycemia include no confusion, dizziness, headaches, hunger, mood changes, nervousness/anxiousness, pallor, seizures, sleepiness, speech difficulty, sweats or tremors. There are no diabetic associated symptoms. Pertinent negatives for diabetes include no blurred vision, no chest pain, no fatigue, no foot paresthesias, no foot ulcerations, no polydipsia, no polyphagia, no polyuria, no visual change, no weakness and no weight loss. There are no hypoglycemic complications. Symptoms are stable. There are no diabetic complications. Risk factors for coronary artery disease include sedentary lifestyle, male sex, hypertension, family history and dyslipidemia. Current diabetic treatment includes insulin injections and oral agent (dual therapy). He is compliant with treatment all of the time. His weight is stable. He is following a generally healthy diet. Meal planning includes avoidance of concentrated sweets and carbohydrate counting. He participates in exercise intermittently. His home blood glucose trend is fluctuating minimally. His breakfast blood glucose range is generally 110-130 mg/dl. His overall blood glucose range is 140-180 mg/dl. (Admits an ocassional low when he is really strict with his carb intake  ) An ACE inhibitor/angiotensin II receptor blocker is being taken. He does not see a podiatrist.  Hypertension   This is a chronic (With Known CAD post CABG x 5, cardiomyopathy,  and defirillator ) problem. The current episode started more than 1 year ago. Progression since onset: Denies any recent concerns.  Feels he is doing well. Pertinent negatives include no anxiety, blurred vision, chest pain, headaches, malaise/fatigue, neck pain, orthopnea, palpitations, peripheral edema, PND, shortness of breath or sweats. Risk factors for coronary artery disease include dyslipidemia, family history and sedentary lifestyle. Past treatments include ACE inhibitors, beta blockers and diuretics. The current treatment provides significant improvement. There are no compliance problems.  Hypertensive end-organ damage includes CAD/MI.   Hyperlipidemia   This is a chronic problem. The current episode started more than 1 year ago. Recent lipid tests were reviewed and are variable. Exacerbating diseases include diabetes. Pertinent negatives include no chest pain or shortness of breath. Current antihyperlipidemic treatment includes statins. The current treatment provides significant improvement of lipids. There are no compliance problems.  Risk factors for coronary artery disease include diabetes mellitus, family history, dyslipidemia, obesity and hypertension.      The following portions of the patient's history were reviewed and updated as appropriate: allergies, current medications, past family history, past medical history, past social history, past surgical history and problem list.      Review of Systems   Constitutional: Negative.  Negative for activity change, fatigue, malaise/fatigue and weight loss.   HENT: Negative.    Eyes: Negative for blurred vision.   Respiratory: Negative.  Negative for shortness of breath.    Cardiovascular: Negative.  Negative for chest pain, palpitations, orthopnea, leg swelling and PND.   Gastrointestinal: Negative.    Endocrine: Negative.  Negative for polydipsia, polyphagia and polyuria.   Musculoskeletal: Negative.  Negative for neck pain.   Skin: Negative.  Negative for  pallor.   Neurological: Negative for dizziness, tremors, seizures, speech difficulty, weakness and headaches.   Psychiatric/Behavioral: Negative.  Negative for confusion. The patient is not nervous/anxious.    All other systems reviewed and are negative.      Procedures    Objective   Physical Exam   Constitutional: He is oriented to person, place, and time. He appears well-developed and well-nourished. No distress.   HENT:   Head: Normocephalic.   Eyes: Conjunctivae are normal. Right eye exhibits no discharge. Left eye exhibits no discharge.   Neck: Neck supple.   Cardiovascular: Normal rate, regular rhythm, normal heart sounds and intact distal pulses.    No murmur heard.  Pulmonary/Chest: Effort normal and breath sounds normal. No respiratory distress. He has no wheezes.   Musculoskeletal: He exhibits no edema.   Neurological: He is alert and oriented to person, place, and time.   Skin: Skin is warm and dry. He is not diaphoretic.   Psychiatric: He has a normal mood and affect. His behavior is normal.   Nursing note and vitals reviewed.      Assessment/Plan   Discussed with patient impression and plan, patient verbalizes understanding.  Continue with routine medications.  Continue with eating  Small frequent meals.  Fasting labs drawn today.  Reviewed past labs  Continue with upcoming cardiology appt    Shai was seen today for diabetes and follow-up.    Diagnoses and all orders for this visit:    Type 2 diabetes mellitus with hyperglycemia, with long-term current use of insulin  -     Comprehensive Metabolic Panel  -     Hemoglobin A1c  -     Lipid Panel    Hyperlipemia, mixed    Essential hypertension    ASCVD (arteriosclerotic cardiovascular disease)    Other orders  -     lisinopril (PRINIVIL,ZESTRIL) 10 MG tablet; Take 1 tablet by mouth 2 (Two) Times a Day.  -     metFORMIN (GLUCOPHAGE) 500 MG tablet; Take 1 tablet by mouth 2 (Two) Times a Day With Meals.  -     amiodarone (PACERONE) 200 MG tablet; Take 1  tablet by mouth Daily.

## 2018-03-01 ENCOUNTER — TELEPHONE (OUTPATIENT)
Dept: FAMILY MEDICINE CLINIC | Facility: CLINIC | Age: 73
End: 2018-03-01

## 2018-03-01 NOTE — TELEPHONE ENCOUNTER
Patient returned call reports his sugar was 52 the night before he came down here at 10:30 pm he ate a snack but had not eaten anything after midnight that night until labs were drawn here,he is fine at present instructed in alwya eating a snack before bedtime & he verbalized understanding.

## 2018-03-26 ENCOUNTER — OFFICE VISIT (OUTPATIENT)
Dept: CARDIOLOGY | Facility: CLINIC | Age: 73
End: 2018-03-26

## 2018-03-26 VITALS
SYSTOLIC BLOOD PRESSURE: 109 MMHG | BODY MASS INDEX: 26.51 KG/M2 | RESPIRATION RATE: 16 BRPM | HEIGHT: 69 IN | DIASTOLIC BLOOD PRESSURE: 64 MMHG | HEART RATE: 70 BPM | WEIGHT: 179 LBS

## 2018-03-26 DIAGNOSIS — E78.5 DYSLIPIDEMIA: ICD-10-CM

## 2018-03-26 DIAGNOSIS — I44.7 LBBB (LEFT BUNDLE BRANCH BLOCK): ICD-10-CM

## 2018-03-26 DIAGNOSIS — I47.20 VT (VENTRICULAR TACHYCARDIA) (HCC): ICD-10-CM

## 2018-03-26 DIAGNOSIS — I50.22 CHRONIC SYSTOLIC CONGESTIVE HEART FAILURE (HCC): Primary | ICD-10-CM

## 2018-03-26 DIAGNOSIS — Z95.1 S/P CABG X 5: ICD-10-CM

## 2018-03-26 DIAGNOSIS — I25.10 ASCVD (ARTERIOSCLEROTIC CARDIOVASCULAR DISEASE): ICD-10-CM

## 2018-03-26 DIAGNOSIS — I10 ESSENTIAL HYPERTENSION: ICD-10-CM

## 2018-03-26 DIAGNOSIS — I25.5 ISCHEMIC CARDIOMYOPATHY: ICD-10-CM

## 2018-03-26 PROCEDURE — 93000 ELECTROCARDIOGRAM COMPLETE: CPT | Performed by: PHYSICIAN ASSISTANT

## 2018-03-26 PROCEDURE — 99213 OFFICE O/P EST LOW 20 MIN: CPT | Performed by: PHYSICIAN ASSISTANT
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
## 2018-03-26 NOTE — PATIENT INSTRUCTIONS
BMI for Adults  Body mass index (BMI) is a number that is calculated from a person's weight and height. In most adults, the number is used to find how much of an adult's weight is made up of fat. BMI is not as accurate as a direct measure of body fat.  HOW IS BMI CALCULATED?  BMI is calculated by dividing weight in kilograms by height in meters squared. It can also be calculated by dividing weight in pounds by height in inches squared, then multiplying the resulting number by 703. Charts are available to help you find your BMI quickly and easily without doing this calculation.   HOW IS BMI INTERPRETED?  Health care professionals use BMI charts to identify whether an adult is underweight, at a normal weight, or overweight based on the following guidelines:  · Underweight: BMI less than 18.5.  · Normal weight: BMI between 18.5 and 24.9.  · Overweight: BMI between 25 and 29.9.  · Obese: BMI of 30 and above.  BMI is usually interpreted the same for males and females.  Weight includes both fat and muscle, so someone with a muscular build, such as an athlete, may have a BMI that is higher than 24.9. In cases like these, BMI may not accurately depict body fat. To determine if excess body fat is the cause of a BMI of 25 or higher, further assessments may need to be done by a health care provider.  WHY IS BMI A USEFUL TOOL?  BMI is used to identify a possible weight problem that may be related to a medical problem or may increase the risk for medical problems. BMI can also be used to promote changes to reach a healthy weight.     This information is not intended to replace advice given to you by your health care provider. Make sure you discuss any questions you have with your health care provider.     Document Released: 08/29/2005 Document Revised: 01/08/2016 Document Reviewed: 05/15/2015  [employer] Interactive Patient Education ©2017 [employer]       Calorie Counting for Weight Loss  Calories are energy you get from the  things you eat and drink. Your body uses this energy to keep you going throughout the day. The number of calories you eat affects your weight. When you eat more calories than your body needs, your body stores the extra calories as fat. When you eat fewer calories than your body needs, your body burns fat to get the energy it needs.  Calorie counting means keeping track of how many calories you eat and drink each day. If you make sure to eat fewer calories than your body needs, you should lose weight. In order for calorie counting to work, you will need to eat the number of calories that are right for you in a day to lose a healthy amount of weight per week. A healthy amount of weight to lose per week is usually 1-2 lb (0.5-0.9 kg). A dietitian can determine how many calories you need in a day and give you suggestions on how to reach your calorie goal.   WHAT IS MY MY PLAN?  My goal is to have __________ calories per day.   If I have this many calories per day, I should lose around __________ pounds per week.  WHAT DO I NEED TO KNOW ABOUT CALORIE COUNTING?  In order to meet your daily calorie goal, you will need to:  · Find out how many calories are in each food you would like to eat. Try to do this before you eat.  · Decide how much of the food you can eat.  · Write down what you ate and how many calories it had. Doing this is called keeping a food log.  WHERE DO I FIND CALORIE INFORMATION?  The number of calories in a food can be found on a Nutrition Facts label. Note that all the information on a label is based on a specific serving of the food. If a food does not have a Nutrition Facts label, try to look up the calories online or ask your dietitian for help.  HOW DO I DECIDE HOW MUCH TO EAT?  To decide how much of the food you can eat, you will need to consider both the number of calories in one serving and the size of one serving. This information can be found on the Nutrition Facts label. If a food does not  have a Nutrition Facts label, look up the information online or ask your dietitian for help.  Remember that calories are listed per serving. If you choose to have more than one serving of a food, you will have to multiply the calories per serving by the amount of servings you plan to eat. For example, the label on a package of bread might say that a serving size is 1 slice and that there are 90 calories in a serving. If you eat 1 slice, you will have eaten 90 calories. If you eat 2 slices, you will have eaten 180 calories.  HOW DO I KEEP A FOOD LOG?  After each meal, record the following information in your food log:  · What you ate.  · How much of it you ate.  · How many calories it had.  · Then, add up your calories.  Keep your food log near you, such as in a small notebook in your pocket. Another option is to use a mobile citlalli or website. Some programs will calculate calories for you and show you how many calories you have left each time you add an item to the log.  WHAT ARE SOME CALORIE COUNTING TIPS?  · Use your calories on foods and drinks that will fill you up and not leave you hungry. Some examples of this include foods like nuts and nut butters, vegetables, lean proteins, and high-fiber foods (more than 5 g fiber per serving).  · Eat nutritious foods and avoid empty calories. Empty calories are calories you get from foods or beverages that do not have many nutrients, such as candy and soda. It is better to have a nutritious high-calorie food (such as an avocado) than a food with few nutrients (such as a bag of chips).  · Know how many calories are in the foods you eat most often. This way, you do not have to look up how many calories they have each time you eat them.  · Look out for foods that may seem like low-calorie foods but are really high-calorie foods, such as baked goods, soda, and fat-free candy.  · Pay attention to calories in drinks. Drinks such as sodas, specialty coffee drinks, alcohol, and  juices have a lot of calories yet do not fill you up. Choose low-calorie drinks like water and diet drinks.  · Focus your calorie counting efforts on higher calorie items. Logging the calories in a garden salad that contains only vegetables is less important than calculating the calories in a milk shake.  · Find a way of tracking calories that works for you. Get creative. Most people who are successful find ways to keep track of how much they eat in a day, even if they do not count every calorie.  WHAT ARE SOME PORTION CONTROL TIPS?  · Know how many calories are in a serving. This will help you know how many servings of a certain food you can have.  · Use a measuring cup to measure serving sizes. This is helpful when you start out. With time, you will be able to estimate serving sizes for some foods.  · Take some time to put servings of different foods on your favorite plates, bowls, and cups so you know what a serving looks like.  · Try not to eat straight from a bag or box. Doing this can lead to overeating. Put the amount you would like to eat in a cup or on a plate to make sure you are eating the right portion.  · Use smaller plates, glasses, and bowls to prevent overeating. This is a quick and easy way to practice portion control. If your plate is smaller, less food can fit on it.  · Try not to multitask while eating, such as watching TV or using your computer. If it is time to eat, sit down at a table and enjoy your food. Doing this will help you to start recognizing when you are full. It will also make you more aware of what and how much you are eating.  HOW CAN I CALORIE COUNT WHEN EATING OUT?  · Ask for smaller portion sizes or child-sized portions.  · Consider sharing an entree and sides instead of getting your own entree.  · If you get your own entree, eat only half. Ask for a box at the beginning of your meal and put the rest of your entree in it so you are not tempted to eat it.  · Look for the calories  "on the menu. If calories are listed, choose the lower calorie options.  · Choose dishes that include vegetables, fruits, whole grains, low-fat dairy products, and lean protein. Focusing on smart food choices from each of the 5 food groups can help you stay on track at restaurants.  · Choose items that are boiled, broiled, grilled, or steamed.  · Choose water, milk, unsweetened iced tea, or other drinks without added sugars. If you want an alcoholic beverage, choose a lower calorie option. For example, a regular presley can have up to 700 calories and a glass of wine has around 150.  · Stay away from items that are buttered, battered, fried, or served with cream sauce. Items labeled \"crispy\" are usually fried, unless stated otherwise.  · Ask for dressings, sauces, and syrups on the side. These are usually very high in calories, so do not eat much of them.  · Watch out for salads. Many people think salads are a healthy option, but this is often not the case. Many salads come with zee, fried chicken, lots of cheese, fried chips, and dressing. All of these items have a lot of calories. If you want a salad, choose a garden salad and ask for grilled meats or steak. Ask for the dressing on the side, or ask for olive oil and vinegar or lemon to use as dressing.  · Estimate how many servings of a food you are given. For example, a serving of cooked rice is ½ cup or about the size of half a tennis ball or one cupcake wrapper. Knowing serving sizes will help you be aware of how much food you are eating at restaurants. The list below tells you how big or small some common portion sizes are based on everyday objects.  ¨ 1 oz--4 stacked dice.  ¨ 3 oz--1 deck of cards.  ¨ 1 tsp--1 dice.  ¨ 1 Tbsp--½ a Ping-Pong ball.  ¨ 2 Tbsp--1 Ping-Pong ball.  ¨ ½ cup--1 tennis ball or 1 cupcake wrapper.  ¨ 1 cup--1 baseball.     This information is not intended to replace advice given to you by your health care provider. Make sure you " discuss any questions you have with your health care provider.     Document Released: 12/18/2006 Document Revised: 01/08/2016 Document Reviewed: 10/23/2014  [employer] Interactive Patient Education ©2017 [employer]

## 2018-04-23 ENCOUNTER — TELEPHONE (OUTPATIENT)
Dept: CARDIOLOGY | Facility: CLINIC | Age: 73
End: 2018-04-23

## 2018-04-23 NOTE — TELEPHONE ENCOUNTER
Done a PA for the Entresto 24-26 mg the insurance is covering it but it has a really high copay. $386.00.

## 2018-04-26 ENCOUNTER — APPOINTMENT (OUTPATIENT)
Dept: CT IMAGING | Facility: HOSPITAL | Age: 73
End: 2018-04-26

## 2018-04-26 ENCOUNTER — APPOINTMENT (OUTPATIENT)
Dept: GENERAL RADIOLOGY | Facility: HOSPITAL | Age: 73
End: 2018-04-26

## 2018-04-26 ENCOUNTER — APPOINTMENT (OUTPATIENT)
Dept: ULTRASOUND IMAGING | Facility: HOSPITAL | Age: 73
End: 2018-04-26

## 2018-04-26 ENCOUNTER — HOSPITAL ENCOUNTER (INPATIENT)
Facility: HOSPITAL | Age: 73
LOS: 4 days | Discharge: HOME | End: 2018-04-30
Attending: EMERGENCY MEDICINE | Admitting: SURGERY

## 2018-04-26 ENCOUNTER — APPOINTMENT (OUTPATIENT)
Dept: CARDIOLOGY | Facility: HOSPITAL | Age: 73
End: 2018-04-26
Attending: SURGERY

## 2018-04-26 DIAGNOSIS — K81.9 CHOLECYSTITIS: ICD-10-CM

## 2018-04-26 DIAGNOSIS — R10.10 PAIN OF UPPER ABDOMEN: Primary | ICD-10-CM

## 2018-04-26 LAB
ALBUMIN SERPL-MCNC: 4.2 G/DL (ref 3.4–4.8)
ALBUMIN/GLOB SERPL: 1.8 G/DL (ref 1.5–2.5)
ALP SERPL-CCNC: 121 U/L (ref 40–129)
ALT SERPL W P-5'-P-CCNC: 351 U/L (ref 10–44)
AMYLASE SERPL-CCNC: 46 U/L (ref 28–100)
ANION GAP SERPL CALCULATED.3IONS-SCNC: 6.7 MMOL/L (ref 3.6–11.2)
ANION GAP SERPL CALCULATED.3IONS-SCNC: 8.2 MMOL/L (ref 3.6–11.2)
APTT PPP: 24.7 SECONDS (ref 23.8–36.1)
AST SERPL-CCNC: 452 U/L (ref 10–34)
BASOPHILS # BLD AUTO: 0.04 10*3/MM3 (ref 0–0.3)
BASOPHILS NFR BLD AUTO: 0.3 % (ref 0–2)
BH CV ECHO MEAS - % IVS THICK: 76.9 %
BH CV ECHO MEAS - % LVPW THICK: 19.2 %
BH CV ECHO MEAS - ACS: 2 CM
BH CV ECHO MEAS - AO MAX PG (FULL): 13.9 MMHG
BH CV ECHO MEAS - AO MAX PG: 19.3 MMHG
BH CV ECHO MEAS - AO MEAN PG (FULL): 7.9 MMHG
BH CV ECHO MEAS - AO MEAN PG: 10.6 MMHG
BH CV ECHO MEAS - AO ROOT AREA (BSA CORRECTED): 1.9
BH CV ECHO MEAS - AO ROOT AREA: 10.9 CM^2
BH CV ECHO MEAS - AO ROOT DIAM: 3.7 CM
BH CV ECHO MEAS - AO V2 MAX: 219.9 CM/SEC
BH CV ECHO MEAS - AO V2 MEAN: 151.5 CM/SEC
BH CV ECHO MEAS - AO V2 VTI: 44.8 CM
BH CV ECHO MEAS - AVA(I,A): 2.1 CM^2
BH CV ECHO MEAS - AVA(I,D): 2.1 CM^2
BH CV ECHO MEAS - AVA(V,A): 2.1 CM^2
BH CV ECHO MEAS - AVA(V,D): 2.1 CM^2
BH CV ECHO MEAS - BSA(HAYCOCK): 2 M^2
BH CV ECHO MEAS - BSA: 2 M^2
BH CV ECHO MEAS - BZI_BMI: 25.8 KILOGRAMS/M^2
BH CV ECHO MEAS - BZI_METRIC_HEIGHT: 175.3 CM
BH CV ECHO MEAS - BZI_METRIC_WEIGHT: 79.4 KG
BH CV ECHO MEAS - EDV(CUBED): 210.4 ML
BH CV ECHO MEAS - EDV(TEICH): 176.4 ML
BH CV ECHO MEAS - EF(CUBED): 70.7 %
BH CV ECHO MEAS - EF(TEICH): 61.5 %
BH CV ECHO MEAS - ESV(CUBED): 61.7 ML
BH CV ECHO MEAS - ESV(TEICH): 68 ML
BH CV ECHO MEAS - FS: 33.6 %
BH CV ECHO MEAS - IVS/LVPW: 1
BH CV ECHO MEAS - IVSD: 1.1 CM
BH CV ECHO MEAS - IVSS: 2 CM
BH CV ECHO MEAS - LA DIMENSION: 5.1 CM
BH CV ECHO MEAS - LA/AO: 1.4
BH CV ECHO MEAS - LV MASS(C)D: 285.1 GRAMS
BH CV ECHO MEAS - LV MASS(C)DI: 146.1 GRAMS/M^2
BH CV ECHO MEAS - LV MASS(C)S: 272.1 GRAMS
BH CV ECHO MEAS - LV MASS(C)SI: 139.4 GRAMS/M^2
BH CV ECHO MEAS - LV MAX PG: 5.5 MMHG
BH CV ECHO MEAS - LV MEAN PG: 2.8 MMHG
BH CV ECHO MEAS - LV V1 MAX: 117 CM/SEC
BH CV ECHO MEAS - LV V1 MEAN: 74.8 CM/SEC
BH CV ECHO MEAS - LV V1 VTI: 24.2 CM
BH CV ECHO MEAS - LVIDD: 5.9 CM
BH CV ECHO MEAS - LVIDS: 4 CM
BH CV ECHO MEAS - LVOT AREA (M): 3.8 CM^2
BH CV ECHO MEAS - LVOT AREA: 3.9 CM^2
BH CV ECHO MEAS - LVOT DIAM: 2.2 CM
BH CV ECHO MEAS - LVPWD: 1.1 CM
BH CV ECHO MEAS - LVPWS: 1.3 CM
BH CV ECHO MEAS - MV A MAX VEL: 107.6 CM/SEC
BH CV ECHO MEAS - MV DEC SLOPE: 300.1 CM/SEC^2
BH CV ECHO MEAS - MV E MAX VEL: 85.4 CM/SEC
BH CV ECHO MEAS - MV E/A: 0.79
BH CV ECHO MEAS - MV P1/2T MAX VEL: 84.9 CM/SEC
BH CV ECHO MEAS - MV P1/2T: 82.8 MSEC
BH CV ECHO MEAS - MVA P1/2T LCG: 2.6 CM^2
BH CV ECHO MEAS - MVA(P1/2T): 2.7 CM^2
BH CV ECHO MEAS - PA ACC SLOPE: 1406 CM/SEC^2
BH CV ECHO MEAS - PA ACC TIME: 0.12 SEC
BH CV ECHO MEAS - PA PR(ACCEL): 25.1 MMHG
BH CV ECHO MEAS - RAP SYSTOLE: 10 MMHG
BH CV ECHO MEAS - RVDD: 3.6 CM
BH CV ECHO MEAS - RVSP: 38.9 MMHG
BH CV ECHO MEAS - SI(AO): 251.4 ML/M^2
BH CV ECHO MEAS - SI(CUBED): 76.2 ML/M^2
BH CV ECHO MEAS - SI(LVOT): 48.9 ML/M^2
BH CV ECHO MEAS - SI(TEICH): 55.6 ML/M^2
BH CV ECHO MEAS - SV(AO): 490.6 ML
BH CV ECHO MEAS - SV(CUBED): 148.7 ML
BH CV ECHO MEAS - SV(LVOT): 95.4 ML
BH CV ECHO MEAS - SV(TEICH): 108.5 ML
BH CV ECHO MEAS - TR MAX VEL: 268.8 CM/SEC
BILIRUB SERPL-MCNC: 1.4 MG/DL (ref 0.2–1.8)
BILIRUB UR QL STRIP: NEGATIVE
BNP SERPL-MCNC: 26 PG/ML (ref 0–100)
BUN BLD-MCNC: 28 MG/DL (ref 7–21)
BUN BLD-MCNC: 32 MG/DL (ref 7–21)
BUN/CREAT SERPL: 17.6 (ref 7–25)
BUN/CREAT SERPL: 22.7 (ref 7–25)
CALCIUM SPEC-SCNC: 8.4 MG/DL (ref 7.7–10)
CALCIUM SPEC-SCNC: 9.4 MG/DL (ref 7.7–10)
CHLORIDE SERPL-SCNC: 102 MMOL/L (ref 99–112)
CHLORIDE SERPL-SCNC: 105 MMOL/L (ref 99–112)
CK MB SERPL-CCNC: 3.06 NG/ML (ref 0–5)
CK MB SERPL-RTO: 3.7 % (ref 0–3)
CK SERPL-CCNC: 82 U/L (ref 24–204)
CLARITY UR: CLEAR
CO2 SERPL-SCNC: 26.8 MMOL/L (ref 24.3–31.9)
CO2 SERPL-SCNC: 27.3 MMOL/L (ref 24.3–31.9)
COLOR UR: YELLOW
CREAT BLD-MCNC: 1.41 MG/DL (ref 0.43–1.29)
CREAT BLD-MCNC: 1.59 MG/DL (ref 0.43–1.29)
CRP SERPL-MCNC: <0.5 MG/DL (ref 0–0.99)
D-LACTATE SERPL-SCNC: 1.3 MMOL/L (ref 0.5–2)
D-LACTATE SERPL-SCNC: 1.4 MMOL/L (ref 0.5–2)
D-LACTATE SERPL-SCNC: 2.3 MMOL/L (ref 0.5–2)
DEPRECATED RDW RBC AUTO: 43.9 FL (ref 37–54)
DIGOXIN SERPL-MCNC: <0.1 NG/ML (ref 0.8–2)
EOSINOPHIL # BLD AUTO: 0.09 10*3/MM3 (ref 0–0.7)
EOSINOPHIL NFR BLD AUTO: 0.7 % (ref 0–7)
ERYTHROCYTE [DISTWIDTH] IN BLOOD BY AUTOMATED COUNT: 13.5 % (ref 11.5–14.5)
ERYTHROCYTE [SEDIMENTATION RATE] IN BLOOD: 8 MM/HR (ref 0–20)
GFR SERPL CREATININE-BSD FRML MDRD: 43 ML/MIN/1.73
GFR SERPL CREATININE-BSD FRML MDRD: 49 ML/MIN/1.73
GLOBULIN UR ELPH-MCNC: 2.4 GM/DL
GLUCOSE BLD-MCNC: 168 MG/DL (ref 70–110)
GLUCOSE BLD-MCNC: 186 MG/DL (ref 70–110)
GLUCOSE BLDC GLUCOMTR-MCNC: 127 MG/DL (ref 70–130)
GLUCOSE BLDC GLUCOMTR-MCNC: 146 MG/DL (ref 70–130)
GLUCOSE BLDC GLUCOMTR-MCNC: 188 MG/DL (ref 70–130)
GLUCOSE UR STRIP-MCNC: NEGATIVE MG/DL
HAV IGM SERPL QL IA: NORMAL
HBV CORE IGM SERPL QL IA: NORMAL
HBV SURFACE AG SERPL QL IA: NORMAL
HCT VFR BLD AUTO: 36.9 % (ref 42–52)
HCV AB SER DONR QL: NORMAL
HGB BLD-MCNC: 12.2 G/DL (ref 14–18)
HGB UR QL STRIP.AUTO: NEGATIVE
HOLD SPECIMEN: NORMAL
IMM GRANULOCYTES # BLD: 0.04 10*3/MM3 (ref 0–0.03)
IMM GRANULOCYTES NFR BLD: 0.3 % (ref 0–0.5)
INR PPP: 1.11 (ref 0.9–1.1)
KETONES UR QL STRIP: ABNORMAL
LEUKOCYTE ESTERASE UR QL STRIP.AUTO: NEGATIVE
LIPASE SERPL-CCNC: 40 U/L (ref 13–60)
LV EF 2D ECHO EST: 45 %
LYMPHOCYTES # BLD AUTO: 0.99 10*3/MM3 (ref 1–3)
LYMPHOCYTES NFR BLD AUTO: 8 % (ref 16–46)
MAXIMAL PREDICTED HEART RATE: 146 BPM
MCH RBC QN AUTO: 29.7 PG (ref 27–33)
MCHC RBC AUTO-ENTMCNC: 33.1 G/DL (ref 33–37)
MCV RBC AUTO: 89.8 FL (ref 80–94)
MONOCYTES # BLD AUTO: 0.36 10*3/MM3 (ref 0.1–0.9)
MONOCYTES NFR BLD AUTO: 2.9 % (ref 0–12)
NEUTROPHILS # BLD AUTO: 10.93 10*3/MM3 (ref 1.4–6.5)
NEUTROPHILS NFR BLD AUTO: 87.8 % (ref 40–75)
NITRITE UR QL STRIP: NEGATIVE
OSMOLALITY SERPL CALC.SUM OF ELEC: 283.2 MOSM/KG (ref 273–305)
OSMOLALITY SERPL CALC.SUM OF ELEC: 289.3 MOSM/KG (ref 273–305)
PH UR STRIP.AUTO: 7.5 [PH] (ref 5–8)
PLATELET # BLD AUTO: 185 10*3/MM3 (ref 130–400)
PMV BLD AUTO: 10.5 FL (ref 6–10)
POTASSIUM BLD-SCNC: 4.3 MMOL/L (ref 3.5–5.3)
POTASSIUM BLD-SCNC: 4.5 MMOL/L (ref 3.5–5.3)
PROT SERPL-MCNC: 6.6 G/DL (ref 6–8)
PROT UR QL STRIP: NEGATIVE
PROTHROMBIN TIME: 14.4 SECONDS (ref 11–15.4)
RBC # BLD AUTO: 4.11 10*6/MM3 (ref 4.7–6.1)
SODIUM BLD-SCNC: 137 MMOL/L (ref 135–153)
SODIUM BLD-SCNC: 139 MMOL/L (ref 135–153)
SP GR UR STRIP: 1.01 (ref 1–1.03)
STRESS TARGET HR: 124 BPM
TROPONIN I SERPL-MCNC: 0.01 NG/ML
TROPONIN I SERPL-MCNC: 0.02 NG/ML
UROBILINOGEN UR QL STRIP: ABNORMAL
WBC NRBC COR # BLD: 12.45 10*3/MM3 (ref 4.5–12.5)

## 2018-04-26 PROCEDURE — 99223 1ST HOSP IP/OBS HIGH 75: CPT | Performed by: SURGERY

## 2018-04-26 PROCEDURE — 82553 CREATINE MB FRACTION: CPT | Performed by: EMERGENCY MEDICINE

## 2018-04-26 PROCEDURE — 87150 DNA/RNA AMPLIFIED PROBE: CPT | Performed by: EMERGENCY MEDICINE

## 2018-04-26 PROCEDURE — 85652 RBC SED RATE AUTOMATED: CPT | Performed by: EMERGENCY MEDICINE

## 2018-04-26 PROCEDURE — 87040 BLOOD CULTURE FOR BACTERIA: CPT | Performed by: EMERGENCY MEDICINE

## 2018-04-26 PROCEDURE — 80074 ACUTE HEPATITIS PANEL: CPT | Performed by: EMERGENCY MEDICINE

## 2018-04-26 PROCEDURE — 76705 ECHO EXAM OF ABDOMEN: CPT

## 2018-04-26 PROCEDURE — 82150 ASSAY OF AMYLASE: CPT | Performed by: EMERGENCY MEDICINE

## 2018-04-26 PROCEDURE — 82550 ASSAY OF CK (CPK): CPT | Performed by: EMERGENCY MEDICINE

## 2018-04-26 PROCEDURE — 84484 ASSAY OF TROPONIN QUANT: CPT | Performed by: EMERGENCY MEDICINE

## 2018-04-26 PROCEDURE — 80053 COMPREHEN METABOLIC PANEL: CPT | Performed by: EMERGENCY MEDICINE

## 2018-04-26 PROCEDURE — 85610 PROTHROMBIN TIME: CPT | Performed by: EMERGENCY MEDICINE

## 2018-04-26 PROCEDURE — 87186 SC STD MICRODIL/AGAR DIL: CPT | Performed by: EMERGENCY MEDICINE

## 2018-04-26 PROCEDURE — 25010000002 PIPERACILLIN-TAZOBACTAM: Performed by: EMERGENCY MEDICINE

## 2018-04-26 PROCEDURE — 63710000001 INSULIN ASPART PER 5 UNITS: Performed by: SURGERY

## 2018-04-26 PROCEDURE — 25010000002 MORPHINE PER 10 MG: Performed by: EMERGENCY MEDICINE

## 2018-04-26 PROCEDURE — 86140 C-REACTIVE PROTEIN: CPT | Performed by: EMERGENCY MEDICINE

## 2018-04-26 PROCEDURE — 93306 TTE W/DOPPLER COMPLETE: CPT | Performed by: INTERNAL MEDICINE

## 2018-04-26 PROCEDURE — 83690 ASSAY OF LIPASE: CPT | Performed by: EMERGENCY MEDICINE

## 2018-04-26 PROCEDURE — 83605 ASSAY OF LACTIC ACID: CPT | Performed by: EMERGENCY MEDICINE

## 2018-04-26 PROCEDURE — 81003 URINALYSIS AUTO W/O SCOPE: CPT | Performed by: EMERGENCY MEDICINE

## 2018-04-26 PROCEDURE — 93306 TTE W/DOPPLER COMPLETE: CPT

## 2018-04-26 PROCEDURE — 36415 COLL VENOUS BLD VENIPUNCTURE: CPT

## 2018-04-26 PROCEDURE — 99222 1ST HOSP IP/OBS MODERATE 55: CPT | Performed by: INTERNAL MEDICINE

## 2018-04-26 PROCEDURE — 85025 COMPLETE CBC W/AUTO DIFF WBC: CPT | Performed by: EMERGENCY MEDICINE

## 2018-04-26 PROCEDURE — 71045 X-RAY EXAM CHEST 1 VIEW: CPT

## 2018-04-26 PROCEDURE — 25010000002 PIPERACILLIN-TAZOBACTAM: Performed by: SURGERY

## 2018-04-26 PROCEDURE — 83880 ASSAY OF NATRIURETIC PEPTIDE: CPT | Performed by: EMERGENCY MEDICINE

## 2018-04-26 PROCEDURE — 85730 THROMBOPLASTIN TIME PARTIAL: CPT | Performed by: EMERGENCY MEDICINE

## 2018-04-26 PROCEDURE — 71045 X-RAY EXAM CHEST 1 VIEW: CPT | Performed by: RADIOLOGY

## 2018-04-26 PROCEDURE — 82962 GLUCOSE BLOOD TEST: CPT

## 2018-04-26 PROCEDURE — 93005 ELECTROCARDIOGRAM TRACING: CPT | Performed by: EMERGENCY MEDICINE

## 2018-04-26 PROCEDURE — 74176 CT ABD & PELVIS W/O CONTRAST: CPT

## 2018-04-26 PROCEDURE — 87077 CULTURE AEROBIC IDENTIFY: CPT | Performed by: EMERGENCY MEDICINE

## 2018-04-26 PROCEDURE — 74176 CT ABD & PELVIS W/O CONTRAST: CPT | Performed by: RADIOLOGY

## 2018-04-26 PROCEDURE — 80162 ASSAY OF DIGOXIN TOTAL: CPT | Performed by: EMERGENCY MEDICINE

## 2018-04-26 PROCEDURE — 99285 EMERGENCY DEPT VISIT HI MDM: CPT

## 2018-04-26 PROCEDURE — 25010000002 PROMETHAZINE PER 50 MG: Performed by: EMERGENCY MEDICINE

## 2018-04-26 PROCEDURE — 76705 ECHO EXAM OF ABDOMEN: CPT | Performed by: RADIOLOGY

## 2018-04-26 RX ORDER — ASPIRIN 81 MG/1
81 TABLET ORAL 2 TIMES DAILY
Status: CANCELLED | OUTPATIENT
Start: 2018-04-26

## 2018-04-26 RX ORDER — SODIUM CHLORIDE 9 MG/ML
INJECTION, SOLUTION INTRAVENOUS
Status: DISPENSED
Start: 2018-04-26 | End: 2018-04-26

## 2018-04-26 RX ORDER — FUROSEMIDE 20 MG/1
20 TABLET ORAL DAILY
Status: CANCELLED | OUTPATIENT
Start: 2018-04-26

## 2018-04-26 RX ORDER — SODIUM CHLORIDE 9 MG/ML
125 INJECTION, SOLUTION INTRAVENOUS CONTINUOUS
Status: DISCONTINUED | OUTPATIENT
Start: 2018-04-26 | End: 2018-04-28

## 2018-04-26 RX ORDER — CARVEDILOL 6.25 MG/1
6.25 TABLET ORAL 2 TIMES DAILY WITH MEALS
Status: DISCONTINUED | OUTPATIENT
Start: 2018-04-26 | End: 2018-04-30 | Stop reason: HOSPADM

## 2018-04-26 RX ORDER — MORPHINE SULFATE 2 MG/ML
2 INJECTION, SOLUTION INTRAMUSCULAR; INTRAVENOUS
Status: DISCONTINUED | OUTPATIENT
Start: 2018-04-26 | End: 2018-04-30 | Stop reason: HOSPADM

## 2018-04-26 RX ORDER — ATORVASTATIN CALCIUM 40 MG/1
80 TABLET, FILM COATED ORAL DAILY
Status: DISCONTINUED | OUTPATIENT
Start: 2018-04-26 | End: 2018-04-27

## 2018-04-26 RX ORDER — AMIODARONE HYDROCHLORIDE 200 MG/1
200 TABLET ORAL DAILY
Status: DISCONTINUED | OUTPATIENT
Start: 2018-04-26 | End: 2018-04-27

## 2018-04-26 RX ORDER — SODIUM CHLORIDE 9 MG/ML
75 INJECTION, SOLUTION INTRAVENOUS CONTINUOUS
Status: DISCONTINUED | OUTPATIENT
Start: 2018-04-26 | End: 2018-04-28

## 2018-04-26 RX ORDER — NALOXONE HCL 0.4 MG/ML
0.4 VIAL (ML) INJECTION
Status: DISCONTINUED | OUTPATIENT
Start: 2018-04-26 | End: 2018-04-30 | Stop reason: HOSPADM

## 2018-04-26 RX ORDER — NICOTINE POLACRILEX 4 MG
15 LOZENGE BUCCAL
Status: DISCONTINUED | OUTPATIENT
Start: 2018-04-26 | End: 2018-04-30 | Stop reason: HOSPADM

## 2018-04-26 RX ORDER — SODIUM CHLORIDE 0.9 % (FLUSH) 0.9 %
10 SYRINGE (ML) INJECTION AS NEEDED
Status: DISCONTINUED | OUTPATIENT
Start: 2018-04-26 | End: 2018-04-30 | Stop reason: HOSPADM

## 2018-04-26 RX ORDER — INSULIN ASPART 100 [IU]/ML
24 INJECTION, SUSPENSION SUBCUTANEOUS 2 TIMES DAILY WITH MEALS
Status: CANCELLED | OUTPATIENT
Start: 2018-04-26

## 2018-04-26 RX ORDER — PROMETHAZINE HYDROCHLORIDE 25 MG/ML
12.5 INJECTION, SOLUTION INTRAMUSCULAR; INTRAVENOUS ONCE
Status: COMPLETED | OUTPATIENT
Start: 2018-04-26 | End: 2018-04-26

## 2018-04-26 RX ORDER — DEXTROSE MONOHYDRATE 25 G/50ML
25 INJECTION, SOLUTION INTRAVENOUS
Status: DISCONTINUED | OUTPATIENT
Start: 2018-04-26 | End: 2018-04-30 | Stop reason: HOSPADM

## 2018-04-26 RX ORDER — LISINOPRIL 10 MG/1
10 TABLET ORAL 2 TIMES DAILY
Status: DISCONTINUED | OUTPATIENT
Start: 2018-04-26 | End: 2018-04-30 | Stop reason: HOSPADM

## 2018-04-26 RX ORDER — SPIRONOLACTONE 25 MG/1
25 TABLET ORAL DAILY
Status: CANCELLED | OUTPATIENT
Start: 2018-04-26

## 2018-04-26 RX ORDER — SODIUM CHLORIDE 9 MG/ML
INJECTION, SOLUTION INTRAVENOUS
Status: COMPLETED
Start: 2018-04-26 | End: 2018-04-26

## 2018-04-26 RX ORDER — ONDANSETRON 2 MG/ML
4 INJECTION INTRAMUSCULAR; INTRAVENOUS ONCE
Status: DISCONTINUED | OUTPATIENT
Start: 2018-04-26 | End: 2018-04-26

## 2018-04-26 RX ADMIN — PIPERACILLIN SODIUM,TAZOBACTAM SODIUM 3.38 G: 3; .375 INJECTION, POWDER, FOR SOLUTION INTRAVENOUS at 11:45

## 2018-04-26 RX ADMIN — AMIODARONE HYDROCHLORIDE 200 MG: 200 TABLET ORAL at 17:09

## 2018-04-26 RX ADMIN — SODIUM CHLORIDE 1000 ML: 9 INJECTION, SOLUTION INTRAVENOUS at 09:36

## 2018-04-26 RX ADMIN — MORPHINE SULFATE 4 MG: 4 INJECTION, SOLUTION INTRAMUSCULAR; INTRAVENOUS at 06:08

## 2018-04-26 RX ADMIN — PROMETHAZINE HYDROCHLORIDE 12.5 MG: 25 INJECTION INTRAMUSCULAR; INTRAVENOUS at 06:18

## 2018-04-26 RX ADMIN — CARVEDILOL 6.25 MG: 6.25 TABLET, FILM COATED ORAL at 17:09

## 2018-04-26 RX ADMIN — SODIUM CHLORIDE 125 ML/HR: 9 INJECTION, SOLUTION INTRAVENOUS at 05:59

## 2018-04-26 RX ADMIN — SODIUM CHLORIDE 125 ML/HR: 9 INJECTION, SOLUTION INTRAVENOUS at 19:13

## 2018-04-26 RX ADMIN — PIPERACILLIN SODIUM,TAZOBACTAM SODIUM 3.38 G: 3; .375 INJECTION, POWDER, FOR SOLUTION INTRAVENOUS at 21:24

## 2018-04-26 RX ADMIN — ATORVASTATIN CALCIUM 80 MG: 40 TABLET, FILM COATED ORAL at 17:08

## 2018-04-26 RX ADMIN — SODIUM CHLORIDE 50 ML: 9 INJECTION, SOLUTION INTRAVENOUS at 06:18

## 2018-04-26 RX ADMIN — INSULIN ASPART 2 UNITS: 100 INJECTION, SOLUTION INTRAVENOUS; SUBCUTANEOUS at 17:09

## 2018-04-27 ENCOUNTER — ANESTHESIA (OUTPATIENT)
Dept: PERIOP | Facility: HOSPITAL | Age: 73
End: 2018-04-27

## 2018-04-27 ENCOUNTER — ANESTHESIA EVENT (OUTPATIENT)
Dept: PERIOP | Facility: HOSPITAL | Age: 73
End: 2018-04-27

## 2018-04-27 PROBLEM — K81.9 CHOLECYSTITIS: Status: ACTIVE | Noted: 2018-04-26

## 2018-04-27 LAB
ALBUMIN SERPL-MCNC: 3.3 G/DL (ref 3.4–4.8)
ALBUMIN/GLOB SERPL: 1.6 G/DL (ref 1.5–2.5)
ALP SERPL-CCNC: 148 U/L (ref 40–129)
ALT SERPL W P-5'-P-CCNC: 1448 U/L (ref 10–44)
ANION GAP SERPL CALCULATED.3IONS-SCNC: 5.9 MMOL/L (ref 3.6–11.2)
AST SERPL-CCNC: 736 U/L (ref 10–34)
BACTERIA BLD CULT: ABNORMAL
BILIRUB SERPL-MCNC: 2.1 MG/DL (ref 0.2–1.8)
BUN BLD-MCNC: 25 MG/DL (ref 7–21)
BUN/CREAT SERPL: 20.7 (ref 7–25)
CALCIUM SPEC-SCNC: 8 MG/DL (ref 7.7–10)
CHLORIDE SERPL-SCNC: 107 MMOL/L (ref 99–112)
CO2 SERPL-SCNC: 24.1 MMOL/L (ref 24.3–31.9)
CREAT BLD-MCNC: 1.21 MG/DL (ref 0.43–1.29)
GFR SERPL CREATININE-BSD FRML MDRD: 59 ML/MIN/1.73
GLOBULIN UR ELPH-MCNC: 2.1 GM/DL
GLUCOSE BLD-MCNC: 173 MG/DL (ref 70–110)
GLUCOSE BLDC GLUCOMTR-MCNC: 156 MG/DL (ref 70–130)
GLUCOSE BLDC GLUCOMTR-MCNC: 177 MG/DL (ref 70–130)
GLUCOSE BLDC GLUCOMTR-MCNC: 300 MG/DL (ref 70–130)
GLUCOSE BLDC GLUCOMTR-MCNC: 337 MG/DL (ref 70–130)
OSMOLALITY SERPL CALC.SUM OF ELEC: 282.4 MOSM/KG (ref 273–305)
POTASSIUM BLD-SCNC: 4.3 MMOL/L (ref 3.5–5.3)
PROT SERPL-MCNC: 5.4 G/DL (ref 6–8)
SODIUM BLD-SCNC: 137 MMOL/L (ref 135–153)

## 2018-04-27 PROCEDURE — 25010000002 PIPERACILLIN-TAZOBACTAM: Performed by: SURGERY

## 2018-04-27 PROCEDURE — 25010000002 PHENYLEPHRINE PER 1 ML: Performed by: NURSE ANESTHETIST, CERTIFIED REGISTERED

## 2018-04-27 PROCEDURE — 25010000002 NEOSTIGMINE 10 MG/10ML SOLUTION: Performed by: NURSE ANESTHETIST, CERTIFIED REGISTERED

## 2018-04-27 PROCEDURE — 25010000002 CEFEPIME

## 2018-04-27 PROCEDURE — 82962 GLUCOSE BLOOD TEST: CPT

## 2018-04-27 PROCEDURE — 47562 LAPAROSCOPIC CHOLECYSTECTOMY: CPT | Performed by: SURGERY

## 2018-04-27 PROCEDURE — 99232 SBSQ HOSP IP/OBS MODERATE 35: CPT | Performed by: INTERNAL MEDICINE

## 2018-04-27 PROCEDURE — 25010000002 FENTANYL CITRATE (PF) 100 MCG/2ML SOLUTION: Performed by: NURSE ANESTHETIST, CERTIFIED REGISTERED

## 2018-04-27 PROCEDURE — 80053 COMPREHEN METABOLIC PANEL: CPT | Performed by: SURGERY

## 2018-04-27 PROCEDURE — 88304 TISSUE EXAM BY PATHOLOGIST: CPT | Performed by: SURGERY

## 2018-04-27 PROCEDURE — 63710000001 INSULIN ASPART PER 5 UNITS: Performed by: SURGERY

## 2018-04-27 PROCEDURE — 94799 UNLISTED PULMONARY SVC/PX: CPT

## 2018-04-27 PROCEDURE — 0FT44ZZ RESECTION OF GALLBLADDER, PERCUTANEOUS ENDOSCOPIC APPROACH: ICD-10-PCS | Performed by: SURGERY

## 2018-04-27 PROCEDURE — 25010000002 PROPOFOL 10 MG/ML EMULSION: Performed by: NURSE ANESTHETIST, CERTIFIED REGISTERED

## 2018-04-27 PROCEDURE — 25010000002 ONDANSETRON PER 1 MG: Performed by: NURSE ANESTHETIST, CERTIFIED REGISTERED

## 2018-04-27 PROCEDURE — 25010000002 MORPHINE PER 10 MG: Performed by: SURGERY

## 2018-04-27 RX ORDER — SODIUM CHLORIDE 0.9 % (FLUSH) 0.9 %
1-10 SYRINGE (ML) INJECTION AS NEEDED
Status: DISCONTINUED | OUTPATIENT
Start: 2018-04-27 | End: 2018-04-27 | Stop reason: HOSPADM

## 2018-04-27 RX ORDER — BUPIVACAINE HYDROCHLORIDE AND EPINEPHRINE 5; 5 MG/ML; UG/ML
INJECTION, SOLUTION PERINEURAL AS NEEDED
Status: DISCONTINUED | OUTPATIENT
Start: 2018-04-27 | End: 2018-04-27 | Stop reason: HOSPADM

## 2018-04-27 RX ORDER — ONDANSETRON 2 MG/ML
INJECTION INTRAMUSCULAR; INTRAVENOUS AS NEEDED
Status: DISCONTINUED | OUTPATIENT
Start: 2018-04-27 | End: 2018-04-27 | Stop reason: SURG

## 2018-04-27 RX ORDER — GLYCOPYRROLATE 0.2 MG/ML
INJECTION INTRAMUSCULAR; INTRAVENOUS AS NEEDED
Status: DISCONTINUED | OUTPATIENT
Start: 2018-04-27 | End: 2018-04-27 | Stop reason: SURG

## 2018-04-27 RX ORDER — IPRATROPIUM BROMIDE AND ALBUTEROL SULFATE 2.5; .5 MG/3ML; MG/3ML
3 SOLUTION RESPIRATORY (INHALATION) ONCE AS NEEDED
Status: DISCONTINUED | OUTPATIENT
Start: 2018-04-27 | End: 2018-04-27 | Stop reason: HOSPADM

## 2018-04-27 RX ORDER — SODIUM CHLORIDE, SODIUM LACTATE, POTASSIUM CHLORIDE, CALCIUM CHLORIDE 600; 310; 30; 20 MG/100ML; MG/100ML; MG/100ML; MG/100ML
125 INJECTION, SOLUTION INTRAVENOUS CONTINUOUS
Status: DISCONTINUED | OUTPATIENT
Start: 2018-04-27 | End: 2018-04-27

## 2018-04-27 RX ORDER — LIDOCAINE HYDROCHLORIDE 20 MG/ML
INJECTION, SOLUTION INFILTRATION; PERINEURAL AS NEEDED
Status: DISCONTINUED | OUTPATIENT
Start: 2018-04-27 | End: 2018-04-27 | Stop reason: SURG

## 2018-04-27 RX ORDER — FENTANYL CITRATE 50 UG/ML
INJECTION, SOLUTION INTRAMUSCULAR; INTRAVENOUS AS NEEDED
Status: DISCONTINUED | OUTPATIENT
Start: 2018-04-27 | End: 2018-04-27 | Stop reason: SURG

## 2018-04-27 RX ORDER — OXYCODONE HYDROCHLORIDE AND ACETAMINOPHEN 5; 325 MG/1; MG/1
1 TABLET ORAL ONCE AS NEEDED
Status: DISCONTINUED | OUTPATIENT
Start: 2018-04-27 | End: 2018-04-27 | Stop reason: HOSPADM

## 2018-04-27 RX ORDER — FAMOTIDINE 10 MG/ML
INJECTION, SOLUTION INTRAVENOUS AS NEEDED
Status: DISCONTINUED | OUTPATIENT
Start: 2018-04-27 | End: 2018-04-27 | Stop reason: SURG

## 2018-04-27 RX ORDER — MAGNESIUM HYDROXIDE 1200 MG/15ML
LIQUID ORAL AS NEEDED
Status: DISCONTINUED | OUTPATIENT
Start: 2018-04-27 | End: 2018-04-27 | Stop reason: HOSPADM

## 2018-04-27 RX ORDER — SODIUM CHLORIDE 9 MG/ML
INJECTION, SOLUTION INTRAVENOUS CONTINUOUS PRN
Status: DISCONTINUED | OUTPATIENT
Start: 2018-04-27 | End: 2018-04-27 | Stop reason: SURG

## 2018-04-27 RX ORDER — ONDANSETRON 2 MG/ML
4 INJECTION INTRAMUSCULAR; INTRAVENOUS ONCE AS NEEDED
Status: DISCONTINUED | OUTPATIENT
Start: 2018-04-27 | End: 2018-04-27 | Stop reason: HOSPADM

## 2018-04-27 RX ORDER — NEOSTIGMINE METHYLSULFATE 1 MG/ML
INJECTION, SOLUTION INTRAVENOUS AS NEEDED
Status: DISCONTINUED | OUTPATIENT
Start: 2018-04-27 | End: 2018-04-27 | Stop reason: SURG

## 2018-04-27 RX ORDER — FENTANYL CITRATE 50 UG/ML
50 INJECTION, SOLUTION INTRAMUSCULAR; INTRAVENOUS
Status: DISCONTINUED | OUTPATIENT
Start: 2018-04-27 | End: 2018-04-27 | Stop reason: HOSPADM

## 2018-04-27 RX ORDER — MIDAZOLAM HYDROCHLORIDE 1 MG/ML
2 INJECTION INTRAMUSCULAR; INTRAVENOUS
Status: DISCONTINUED | OUTPATIENT
Start: 2018-04-27 | End: 2018-04-27 | Stop reason: HOSPADM

## 2018-04-27 RX ORDER — MIDAZOLAM HYDROCHLORIDE 1 MG/ML
1 INJECTION INTRAMUSCULAR; INTRAVENOUS
Status: DISCONTINUED | OUTPATIENT
Start: 2018-04-27 | End: 2018-04-27 | Stop reason: HOSPADM

## 2018-04-27 RX ORDER — MEPERIDINE HYDROCHLORIDE 50 MG/ML
12.5 INJECTION INTRAMUSCULAR; INTRAVENOUS; SUBCUTANEOUS
Status: DISCONTINUED | OUTPATIENT
Start: 2018-04-27 | End: 2018-04-27 | Stop reason: HOSPADM

## 2018-04-27 RX ORDER — PROPOFOL 10 MG/ML
VIAL (ML) INTRAVENOUS AS NEEDED
Status: DISCONTINUED | OUTPATIENT
Start: 2018-04-27 | End: 2018-04-27 | Stop reason: SURG

## 2018-04-27 RX ORDER — SODIUM CHLORIDE 9 MG/ML
INJECTION, SOLUTION INTRAVENOUS AS NEEDED
Status: DISCONTINUED | OUTPATIENT
Start: 2018-04-27 | End: 2018-04-27 | Stop reason: HOSPADM

## 2018-04-27 RX ADMIN — FENTANYL CITRATE 50 MCG: 50 INJECTION INTRAMUSCULAR; INTRAVENOUS at 11:06

## 2018-04-27 RX ADMIN — ONDANSETRON 4 MG: 2 INJECTION, SOLUTION INTRAMUSCULAR; INTRAVENOUS at 10:38

## 2018-04-27 RX ADMIN — PHENYLEPHRINE HYDROCHLORIDE 100 MCG: 10 INJECTION, SOLUTION INTRAMUSCULAR; INTRAVENOUS; SUBCUTANEOUS at 10:43

## 2018-04-27 RX ADMIN — FENTANYL CITRATE 100 MCG: 50 INJECTION INTRAMUSCULAR; INTRAVENOUS at 10:33

## 2018-04-27 RX ADMIN — CEFEPIME 2 G: 2 INJECTION, POWDER, FOR SOLUTION INTRAVENOUS at 10:33

## 2018-04-27 RX ADMIN — FENTANYL CITRATE 50 MCG: 50 INJECTION INTRAMUSCULAR; INTRAVENOUS at 11:15

## 2018-04-27 RX ADMIN — INSULIN ASPART 5 UNITS: 100 INJECTION, SOLUTION INTRAVENOUS; SUBCUTANEOUS at 20:18

## 2018-04-27 RX ADMIN — LISINOPRIL 10 MG: 10 TABLET ORAL at 08:44

## 2018-04-27 RX ADMIN — CARVEDILOL 6.25 MG: 6.25 TABLET, FILM COATED ORAL at 08:44

## 2018-04-27 RX ADMIN — PHENYLEPHRINE HYDROCHLORIDE 100 MCG: 10 INJECTION, SOLUTION INTRAMUSCULAR; INTRAVENOUS; SUBCUTANEOUS at 10:45

## 2018-04-27 RX ADMIN — CARVEDILOL 6.25 MG: 6.25 TABLET, FILM COATED ORAL at 17:12

## 2018-04-27 RX ADMIN — PHENYLEPHRINE HYDROCHLORIDE 100 MCG: 10 INJECTION, SOLUTION INTRAMUSCULAR; INTRAVENOUS; SUBCUTANEOUS at 11:16

## 2018-04-27 RX ADMIN — INSULIN ASPART 2 UNITS: 100 INJECTION, SOLUTION INTRAVENOUS; SUBCUTANEOUS at 13:29

## 2018-04-27 RX ADMIN — METRONIDAZOLE 500 MG: 500 INJECTION, SOLUTION INTRAVENOUS at 18:43

## 2018-04-27 RX ADMIN — AMIODARONE HYDROCHLORIDE 200 MG: 200 TABLET ORAL at 08:45

## 2018-04-27 RX ADMIN — SODIUM CHLORIDE: 9 INJECTION, SOLUTION INTRAVENOUS at 10:33

## 2018-04-27 RX ADMIN — PHENYLEPHRINE HYDROCHLORIDE 100 MCG: 10 INJECTION, SOLUTION INTRAMUSCULAR; INTRAVENOUS; SUBCUTANEOUS at 11:19

## 2018-04-27 RX ADMIN — LISINOPRIL 10 MG: 10 TABLET ORAL at 20:18

## 2018-04-27 RX ADMIN — CEFEPIME 2 G: 2 INJECTION, POWDER, FOR SOLUTION INTRAVENOUS at 17:12

## 2018-04-27 RX ADMIN — PROPOFOL 120 MG: 10 INJECTION, EMULSION INTRAVENOUS at 10:38

## 2018-04-27 RX ADMIN — GLYCOPYRROLATE 0.6 MG: 0.2 INJECTION, SOLUTION INTRAMUSCULAR; INTRAVENOUS at 11:06

## 2018-04-27 RX ADMIN — FENTANYL CITRATE 50 MCG: 50 INJECTION INTRAMUSCULAR; INTRAVENOUS at 11:12

## 2018-04-27 RX ADMIN — PIPERACILLIN SODIUM,TAZOBACTAM SODIUM 3.38 G: 3; .375 INJECTION, POWDER, FOR SOLUTION INTRAVENOUS at 04:08

## 2018-04-27 RX ADMIN — FENTANYL CITRATE 50 MCG: 50 INJECTION INTRAMUSCULAR; INTRAVENOUS at 11:09

## 2018-04-27 RX ADMIN — SODIUM CHLORIDE 125 ML/HR: 9 INJECTION, SOLUTION INTRAVENOUS at 13:29

## 2018-04-27 RX ADMIN — SODIUM CHLORIDE 125 ML/HR: 9 INJECTION, SOLUTION INTRAVENOUS at 04:08

## 2018-04-27 RX ADMIN — MORPHINE SULFATE 2 MG: 2 INJECTION, SOLUTION INTRAMUSCULAR; INTRAVENOUS at 13:28

## 2018-04-27 RX ADMIN — FAMOTIDINE 20 MG: 10 INJECTION, SOLUTION INTRAVENOUS at 10:33

## 2018-04-27 RX ADMIN — INSULIN ASPART 5 UNITS: 100 INJECTION, SOLUTION INTRAVENOUS; SUBCUTANEOUS at 17:12

## 2018-04-27 RX ADMIN — LIDOCAINE HYDROCHLORIDE 60 MG: 20 INJECTION, SOLUTION INFILTRATION; PERINEURAL at 10:38

## 2018-04-27 RX ADMIN — NEOSTIGMINE METHYLSULFATE 3 MG: 1 INJECTION, SOLUTION INTRAVENOUS at 11:06

## 2018-04-27 RX ADMIN — PHENYLEPHRINE HYDROCHLORIDE 100 MCG: 10 INJECTION, SOLUTION INTRAMUSCULAR; INTRAVENOUS; SUBCUTANEOUS at 11:21

## 2018-04-27 RX ADMIN — CEFEPIME 2 G: 2 INJECTION, POWDER, FOR SOLUTION INTRAVENOUS at 09:09

## 2018-04-27 NOTE — ANESTHESIA PROCEDURE NOTES
Airway  Urgency: elective    Date/Time: 4/27/2018 10:38 AM  Airway not difficult    General Information and Staff    Patient location during procedure: OR  Anesthesiologist: CHARMAINE HUBBARD  CRNA: PETE LOPEZ    Indications and Patient Condition  Indications for airway management: airway protection    Preoxygenated: yes  MILS maintained throughout  Mask difficulty assessment: 2 - vent by mask + OA or adjuvant +/- NMBA    Final Airway Details  Final airway type: endotracheal airway      Successful airway: ETT  Cuffed: yes   Successful intubation technique: direct laryngoscopy  Facilitating devices/methods: intubating stylet  Endotracheal tube insertion site: oral  Blade: García  Blade size: #3  ETT size: 7.5 mm  Cormack-Lehane Classification: grade IIa - partial view of glottis  Placement verified by: chest auscultation, capnometry and palpation of cuff   Cuff volume (mL): 6  Measured from: lips  ETT to lips (cm): 22  Number of attempts at approach: 1    Additional Comments  Dentition as preop. No complications noted. Patient tolerated well.  ETT secured.

## 2018-04-27 NOTE — ANESTHESIA POSTPROCEDURE EVALUATION
Patient: Shai Mata    Procedure Summary     Date:  04/27/18 Room / Location:  [facility] OR 02 /  [facility] OR    Anesthesia Start:  1033 Anesthesia Stop:  1124    Procedure:  CHOLECYSTECTOMY LAPAROSCOPIC (N/A Abdomen) Diagnosis:       Cholecystitis      (Cholecystitis [K81.9])    Surgeon:  Eber Hummel MD Provider:  Eric Fitzpatrick MD    Anesthesia Type:  general ASA Status:  3          Anesthesia Type: general  Last vitals  BP   120/52 (04/27/18 1155)   Temp   97.4 °F (36.3 °C) (04/27/18 1155)   Pulse   74 (04/27/18 1155)   Resp   18 (04/27/18 1155)     SpO2   96 % (04/27/18 1155)     Post Anesthesia Care and Evaluation    Patient location during evaluation: PHASE II  Patient participation: complete - patient participated  Level of consciousness: awake and alert  Pain score: 0  Pain management: adequate  Airway patency: patent  Anesthetic complications: No anesthetic complications    Cardiovascular status: acceptable  Respiratory status: acceptable  Hydration status: acceptable

## 2018-04-27 NOTE — ANESTHESIA POSTPROCEDURE EVALUATION
Patient: Shai Mata    Procedure Summary     Date:  04/27/18 Room / Location:  [facility] OR 02 /  [facility] OR    Anesthesia Start:  1033 Anesthesia Stop:  1124    Procedure:  CHOLECYSTECTOMY LAPAROSCOPIC (N/A Abdomen) Diagnosis:       Cholecystitis      (Cholecystitis [K81.9])    Surgeon:  Eber Hummel MD Provider:  Eric Fitzpatrick MD    Anesthesia Type:  general ASA Status:  3          Anesthesia Type: general  Last vitals  BP   120/52 (04/27/18 1155)   Temp   97.4 °F (36.3 °C) (04/27/18 1155)   Pulse   74 (04/27/18 1155)   Resp   18 (04/27/18 1155)     SpO2   96 % (04/27/18 1155)     Post Anesthesia Care and Evaluation    Patient location during evaluation: PACU  Patient participation: complete - patient participated  Level of consciousness: awake and alert  Pain score: 0  Pain management: adequate  Airway patency: patent  Anesthetic complications: No anesthetic complications    Cardiovascular status: acceptable  Respiratory status: acceptable  Hydration status: acceptable

## 2018-04-27 NOTE — ANESTHESIA PREPROCEDURE EVALUATION
Anesthesia Evaluation     no history of anesthetic complications:  NPO Solid Status: > 8 hours  NPO Liquid Status: > 8 hours           Airway   Mallampati: II  TM distance: >3 FB  Neck ROM: full  No difficulty expected  Dental    (+) edentulous    Pulmonary - normal exam   (+) shortness of breath,   Cardiovascular - normal exam    (+) pacemaker ICD, hypertension, valvular problems/murmurs, past MI  >12 months, CABG, CHF, hyperlipidemia,       Neuro/Psych  GI/Hepatic/Renal/Endo    (+)   diabetes mellitus,     Musculoskeletal     Abdominal  - normal exam   Substance History      OB/GYN          Other                        Anesthesia Plan    ASA 3     general     intravenous induction   Anesthetic plan and risks discussed with patient.

## 2018-04-28 LAB
ALBUMIN SERPL-MCNC: 3 G/DL (ref 3.4–4.8)
ALBUMIN/GLOB SERPL: 1.4 G/DL (ref 1.5–2.5)
ALP SERPL-CCNC: 132 U/L (ref 40–129)
ALT SERPL W P-5'-P-CCNC: 879 U/L (ref 10–44)
ANION GAP SERPL CALCULATED.3IONS-SCNC: 6.9 MMOL/L (ref 3.6–11.2)
AST SERPL-CCNC: 270 U/L (ref 10–34)
BILIRUB SERPL-MCNC: 1.4 MG/DL (ref 0.2–1.8)
BUN BLD-MCNC: 25 MG/DL (ref 7–21)
BUN/CREAT SERPL: 19.2 (ref 7–25)
CALCIUM SPEC-SCNC: 7.7 MG/DL (ref 7.7–10)
CHLORIDE SERPL-SCNC: 106 MMOL/L (ref 99–112)
CO2 SERPL-SCNC: 19.1 MMOL/L (ref 24.3–31.9)
CREAT BLD-MCNC: 1.3 MG/DL (ref 0.43–1.29)
DEPRECATED RDW RBC AUTO: 46.1 FL (ref 37–54)
ERYTHROCYTE [DISTWIDTH] IN BLOOD BY AUTOMATED COUNT: 14.2 % (ref 11.5–14.5)
GFR SERPL CREATININE-BSD FRML MDRD: 54 ML/MIN/1.73
GLOBULIN UR ELPH-MCNC: 2.1 GM/DL
GLUCOSE BLD-MCNC: 246 MG/DL (ref 70–110)
GLUCOSE BLDC GLUCOMTR-MCNC: 210 MG/DL (ref 70–130)
GLUCOSE BLDC GLUCOMTR-MCNC: 242 MG/DL (ref 70–130)
GLUCOSE BLDC GLUCOMTR-MCNC: 274 MG/DL (ref 70–130)
GLUCOSE BLDC GLUCOMTR-MCNC: 281 MG/DL (ref 70–130)
HCT VFR BLD AUTO: 32.1 % (ref 42–52)
HGB BLD-MCNC: 10.5 G/DL (ref 14–18)
MCH RBC QN AUTO: 30.1 PG (ref 27–33)
MCHC RBC AUTO-ENTMCNC: 32.7 G/DL (ref 33–37)
MCV RBC AUTO: 92 FL (ref 80–94)
OSMOLALITY SERPL CALC.SUM OF ELEC: 277.1 MOSM/KG (ref 273–305)
PLATELET # BLD AUTO: 129 10*3/MM3 (ref 130–400)
PMV BLD AUTO: 10.5 FL (ref 6–10)
POTASSIUM BLD-SCNC: 4.2 MMOL/L (ref 3.5–5.3)
PROT SERPL-MCNC: 5.1 G/DL (ref 6–8)
RBC # BLD AUTO: 3.49 10*6/MM3 (ref 4.7–6.1)
SODIUM BLD-SCNC: 132 MMOL/L (ref 135–153)
WBC NRBC COR # BLD: 8.12 10*3/MM3 (ref 4.5–12.5)

## 2018-04-28 PROCEDURE — 25010000002 CEFEPIME

## 2018-04-28 PROCEDURE — 94799 UNLISTED PULMONARY SVC/PX: CPT

## 2018-04-28 PROCEDURE — 80053 COMPREHEN METABOLIC PANEL: CPT | Performed by: SURGERY

## 2018-04-28 PROCEDURE — 63710000001 INSULIN ASPART PER 5 UNITS: Performed by: SURGERY

## 2018-04-28 PROCEDURE — 85027 COMPLETE CBC AUTOMATED: CPT | Performed by: SURGERY

## 2018-04-28 PROCEDURE — 82962 GLUCOSE BLOOD TEST: CPT

## 2018-04-28 PROCEDURE — 99024 POSTOP FOLLOW-UP VISIT: CPT | Performed by: SURGERY

## 2018-04-28 RX ORDER — PANTOPRAZOLE SODIUM 40 MG/1
40 TABLET, DELAYED RELEASE ORAL
Status: DISCONTINUED | OUTPATIENT
Start: 2018-04-29 | End: 2018-04-30 | Stop reason: HOSPADM

## 2018-04-28 RX ORDER — HYDROCODONE BITARTRATE AND ACETAMINOPHEN 7.5; 325 MG/1; MG/1
1 TABLET ORAL EVERY 4 HOURS PRN
Status: DISCONTINUED | OUTPATIENT
Start: 2018-04-28 | End: 2018-04-30 | Stop reason: HOSPADM

## 2018-04-28 RX ORDER — ONDANSETRON 2 MG/ML
4 INJECTION INTRAMUSCULAR; INTRAVENOUS EVERY 4 HOURS PRN
Status: DISCONTINUED | OUTPATIENT
Start: 2018-04-28 | End: 2018-04-30 | Stop reason: HOSPADM

## 2018-04-28 RX ORDER — POLYETHYLENE GLYCOL 3350 17 G/17G
17 POWDER, FOR SOLUTION ORAL 2 TIMES DAILY
Status: DISCONTINUED | OUTPATIENT
Start: 2018-04-28 | End: 2018-04-30 | Stop reason: HOSPADM

## 2018-04-28 RX ORDER — DOCUSATE SODIUM 100 MG/1
100 CAPSULE, LIQUID FILLED ORAL 2 TIMES DAILY
Status: DISCONTINUED | OUTPATIENT
Start: 2018-04-28 | End: 2018-04-30 | Stop reason: HOSPADM

## 2018-04-28 RX ORDER — TAMSULOSIN HYDROCHLORIDE 0.4 MG/1
0.4 CAPSULE ORAL DAILY
Status: DISCONTINUED | OUTPATIENT
Start: 2018-04-28 | End: 2018-04-30 | Stop reason: HOSPADM

## 2018-04-28 RX ADMIN — LISINOPRIL 10 MG: 10 TABLET ORAL at 20:08

## 2018-04-28 RX ADMIN — SODIUM CHLORIDE 125 ML/HR: 9 INJECTION, SOLUTION INTRAVENOUS at 01:07

## 2018-04-28 RX ADMIN — CEFEPIME 2 G: 2 INJECTION, POWDER, FOR SOLUTION INTRAVENOUS at 01:07

## 2018-04-28 RX ADMIN — CARVEDILOL 6.25 MG: 6.25 TABLET, FILM COATED ORAL at 17:41

## 2018-04-28 RX ADMIN — INSULIN ASPART 4 UNITS: 100 INJECTION, SOLUTION INTRAVENOUS; SUBCUTANEOUS at 11:50

## 2018-04-28 RX ADMIN — TAMSULOSIN HYDROCHLORIDE 0.4 MG: 0.4 CAPSULE ORAL at 17:41

## 2018-04-28 RX ADMIN — CEFEPIME 2 G: 2 INJECTION, POWDER, FOR SOLUTION INTRAVENOUS at 17:42

## 2018-04-28 RX ADMIN — METRONIDAZOLE 500 MG: 500 INJECTION, SOLUTION INTRAVENOUS at 03:11

## 2018-04-28 RX ADMIN — METRONIDAZOLE 500 MG: 500 INJECTION, SOLUTION INTRAVENOUS at 11:54

## 2018-04-28 RX ADMIN — CEFEPIME 2 G: 2 INJECTION, POWDER, FOR SOLUTION INTRAVENOUS at 09:16

## 2018-04-28 RX ADMIN — INSULIN ASPART 4 UNITS: 100 INJECTION, SOLUTION INTRAVENOUS; SUBCUTANEOUS at 20:08

## 2018-04-28 RX ADMIN — DOCUSATE SODIUM 100 MG: 100 CAPSULE, LIQUID FILLED ORAL at 20:08

## 2018-04-28 RX ADMIN — HYDROCODONE BITARTRATE AND ACETAMINOPHEN 1 TABLET: 7.5; 325 TABLET ORAL at 23:49

## 2018-04-28 RX ADMIN — METRONIDAZOLE 500 MG: 500 INJECTION, SOLUTION INTRAVENOUS at 18:13

## 2018-04-28 RX ADMIN — INSULIN ASPART 3 UNITS: 100 INJECTION, SOLUTION INTRAVENOUS; SUBCUTANEOUS at 09:14

## 2018-04-28 RX ADMIN — INSULIN ASPART 3 UNITS: 100 INJECTION, SOLUTION INTRAVENOUS; SUBCUTANEOUS at 17:41

## 2018-04-28 RX ADMIN — POLYETHYLENE GLYCOL (3350) 17 G: 17 POWDER, FOR SOLUTION ORAL at 20:08

## 2018-04-29 LAB
ALBUMIN SERPL-MCNC: 3.2 G/DL (ref 3.4–4.8)
ALBUMIN/GLOB SERPL: 1.5 G/DL (ref 1.5–2.5)
ALP SERPL-CCNC: 138 U/L (ref 40–129)
ALT SERPL W P-5'-P-CCNC: 574 U/L (ref 10–44)
ANION GAP SERPL CALCULATED.3IONS-SCNC: 6.1 MMOL/L (ref 3.6–11.2)
AST SERPL-CCNC: 99 U/L (ref 10–34)
BACTERIA SPEC AEROBE CULT: ABNORMAL
BACTERIA SPEC AEROBE CULT: ABNORMAL
BILIRUB SERPL-MCNC: 1.1 MG/DL (ref 0.2–1.8)
BUN BLD-MCNC: 19 MG/DL (ref 7–21)
BUN/CREAT SERPL: 17.8 (ref 7–25)
CALCIUM SPEC-SCNC: 8 MG/DL (ref 7.7–10)
CHLORIDE SERPL-SCNC: 106 MMOL/L (ref 99–112)
CO2 SERPL-SCNC: 20.9 MMOL/L (ref 24.3–31.9)
CREAT BLD-MCNC: 1.07 MG/DL (ref 0.43–1.29)
CRP SERPL-MCNC: 10.92 MG/DL (ref 0–0.99)
DEPRECATED RDW RBC AUTO: 44 FL (ref 37–54)
ERYTHROCYTE [DISTWIDTH] IN BLOOD BY AUTOMATED COUNT: 13.8 % (ref 11.5–14.5)
GFR SERPL CREATININE-BSD FRML MDRD: 68 ML/MIN/1.73
GLOBULIN UR ELPH-MCNC: 2.2 GM/DL
GLUCOSE BLD-MCNC: 210 MG/DL (ref 70–110)
GLUCOSE BLDC GLUCOMTR-MCNC: 182 MG/DL (ref 70–130)
GLUCOSE BLDC GLUCOMTR-MCNC: 223 MG/DL (ref 70–130)
GLUCOSE BLDC GLUCOMTR-MCNC: 239 MG/DL (ref 70–130)
GLUCOSE BLDC GLUCOMTR-MCNC: 250 MG/DL (ref 70–130)
GRAM STN SPEC: ABNORMAL
HCT VFR BLD AUTO: 29.8 % (ref 42–52)
HGB BLD-MCNC: 9.8 G/DL (ref 14–18)
ISOLATED FROM: ABNORMAL
MCH RBC QN AUTO: 29.7 PG (ref 27–33)
MCHC RBC AUTO-ENTMCNC: 32.9 G/DL (ref 33–37)
MCV RBC AUTO: 90.3 FL (ref 80–94)
OSMOLALITY SERPL CALC.SUM OF ELEC: 274.8 MOSM/KG (ref 273–305)
PLATELET # BLD AUTO: 125 10*3/MM3 (ref 130–400)
PMV BLD AUTO: 11.1 FL (ref 6–10)
POTASSIUM BLD-SCNC: 4 MMOL/L (ref 3.5–5.3)
PROT SERPL-MCNC: 5.4 G/DL (ref 6–8)
RBC # BLD AUTO: 3.3 10*6/MM3 (ref 4.7–6.1)
SODIUM BLD-SCNC: 133 MMOL/L (ref 135–153)
WBC NRBC COR # BLD: 8.51 10*3/MM3 (ref 4.5–12.5)

## 2018-04-29 PROCEDURE — 63710000001 INSULIN ASPART PER 5 UNITS: Performed by: SURGERY

## 2018-04-29 PROCEDURE — 80053 COMPREHEN METABOLIC PANEL: CPT | Performed by: SURGERY

## 2018-04-29 PROCEDURE — 94799 UNLISTED PULMONARY SVC/PX: CPT

## 2018-04-29 PROCEDURE — 82962 GLUCOSE BLOOD TEST: CPT

## 2018-04-29 PROCEDURE — 99024 POSTOP FOLLOW-UP VISIT: CPT | Performed by: SURGERY

## 2018-04-29 PROCEDURE — 87040 BLOOD CULTURE FOR BACTERIA: CPT | Performed by: NURSE PRACTITIONER

## 2018-04-29 PROCEDURE — 86140 C-REACTIVE PROTEIN: CPT | Performed by: NURSE PRACTITIONER

## 2018-04-29 PROCEDURE — 85027 COMPLETE CBC AUTOMATED: CPT | Performed by: SURGERY

## 2018-04-29 PROCEDURE — 25010000002 CEFEPIME

## 2018-04-29 RX ADMIN — INSULIN ASPART 2 UNITS: 100 INJECTION, SOLUTION INTRAVENOUS; SUBCUTANEOUS at 09:13

## 2018-04-29 RX ADMIN — TAMSULOSIN HYDROCHLORIDE 0.4 MG: 0.4 CAPSULE ORAL at 09:12

## 2018-04-29 RX ADMIN — PANTOPRAZOLE SODIUM 40 MG: 40 TABLET, DELAYED RELEASE ORAL at 06:10

## 2018-04-29 RX ADMIN — INSULIN ASPART 3 UNITS: 100 INJECTION, SOLUTION INTRAVENOUS; SUBCUTANEOUS at 17:22

## 2018-04-29 RX ADMIN — METRONIDAZOLE 500 MG: 500 INJECTION, SOLUTION INTRAVENOUS at 11:09

## 2018-04-29 RX ADMIN — CARVEDILOL 6.25 MG: 6.25 TABLET, FILM COATED ORAL at 09:12

## 2018-04-29 RX ADMIN — METRONIDAZOLE 500 MG: 500 INJECTION, SOLUTION INTRAVENOUS at 03:19

## 2018-04-29 RX ADMIN — DOCUSATE SODIUM 100 MG: 100 CAPSULE, LIQUID FILLED ORAL at 20:11

## 2018-04-29 RX ADMIN — LISINOPRIL 10 MG: 10 TABLET ORAL at 09:12

## 2018-04-29 RX ADMIN — CEFEPIME 2 G: 2 INJECTION, POWDER, FOR SOLUTION INTRAVENOUS at 17:22

## 2018-04-29 RX ADMIN — LISINOPRIL 10 MG: 10 TABLET ORAL at 20:11

## 2018-04-29 RX ADMIN — INSULIN ASPART 4 UNITS: 100 INJECTION, SOLUTION INTRAVENOUS; SUBCUTANEOUS at 20:11

## 2018-04-29 RX ADMIN — METRONIDAZOLE 500 MG: 500 INJECTION, SOLUTION INTRAVENOUS at 18:27

## 2018-04-29 RX ADMIN — CEFEPIME 2 G: 2 INJECTION, POWDER, FOR SOLUTION INTRAVENOUS at 01:00

## 2018-04-29 RX ADMIN — CEFEPIME 2 G: 2 INJECTION, POWDER, FOR SOLUTION INTRAVENOUS at 09:13

## 2018-04-29 RX ADMIN — INSULIN ASPART 2 UNITS: 100 INJECTION, SOLUTION INTRAVENOUS; SUBCUTANEOUS at 11:09

## 2018-04-30 VITALS
RESPIRATION RATE: 18 BRPM | OXYGEN SATURATION: 95 % | WEIGHT: 180.3 LBS | HEART RATE: 71 BPM | SYSTOLIC BLOOD PRESSURE: 143 MMHG | BODY MASS INDEX: 26.7 KG/M2 | DIASTOLIC BLOOD PRESSURE: 65 MMHG | HEIGHT: 69 IN | TEMPERATURE: 98.4 F

## 2018-04-30 LAB
CRP SERPL-MCNC: 7.68 MG/DL (ref 0–0.99)
GLUCOSE BLDC GLUCOMTR-MCNC: 182 MG/DL (ref 70–130)

## 2018-04-30 PROCEDURE — 63710000001 INSULIN ASPART PER 5 UNITS: Performed by: SURGERY

## 2018-04-30 PROCEDURE — 99024 POSTOP FOLLOW-UP VISIT: CPT | Performed by: SURGERY

## 2018-04-30 PROCEDURE — 94799 UNLISTED PULMONARY SVC/PX: CPT

## 2018-04-30 PROCEDURE — 82962 GLUCOSE BLOOD TEST: CPT

## 2018-04-30 PROCEDURE — 86140 C-REACTIVE PROTEIN: CPT | Performed by: NURSE PRACTITIONER

## 2018-04-30 PROCEDURE — 25010000002 CEFEPIME

## 2018-04-30 RX ORDER — CEFDINIR 300 MG/1
300 CAPSULE ORAL EVERY 12 HOURS SCHEDULED
Status: DISCONTINUED | OUTPATIENT
Start: 2018-04-30 | End: 2018-04-30 | Stop reason: HOSPADM

## 2018-04-30 RX ORDER — HYDROCODONE BITARTRATE AND ACETAMINOPHEN 7.5; 325 MG/1; MG/1
1 TABLET ORAL EVERY 4 HOURS PRN
Qty: 20 TABLET | Refills: 0 | Status: SHIPPED | OUTPATIENT
Start: 2018-04-30 | End: 2018-05-08

## 2018-04-30 RX ORDER — TAMSULOSIN HYDROCHLORIDE 0.4 MG/1
0.4 CAPSULE ORAL DAILY
Qty: 30 CAPSULE | Refills: 1 | Status: SHIPPED | OUTPATIENT
Start: 2018-05-01 | End: 2018-08-03 | Stop reason: ALTCHOICE

## 2018-04-30 RX ORDER — CEFDINIR 300 MG/1
300 CAPSULE ORAL EVERY 12 HOURS SCHEDULED
Qty: 18 CAPSULE | Refills: 0 | Status: SHIPPED | OUTPATIENT
Start: 2018-04-30 | End: 2018-05-09

## 2018-04-30 RX ADMIN — CEFEPIME 2 G: 2 INJECTION, POWDER, FOR SOLUTION INTRAVENOUS at 01:54

## 2018-04-30 RX ADMIN — INSULIN ASPART 2 UNITS: 100 INJECTION, SOLUTION INTRAVENOUS; SUBCUTANEOUS at 08:11

## 2018-04-30 RX ADMIN — CARVEDILOL 6.25 MG: 6.25 TABLET, FILM COATED ORAL at 08:12

## 2018-04-30 RX ADMIN — TAMSULOSIN HYDROCHLORIDE 0.4 MG: 0.4 CAPSULE ORAL at 08:12

## 2018-04-30 RX ADMIN — DOCUSATE SODIUM 100 MG: 100 CAPSULE, LIQUID FILLED ORAL at 08:12

## 2018-04-30 RX ADMIN — PANTOPRAZOLE SODIUM 40 MG: 40 TABLET, DELAYED RELEASE ORAL at 06:00

## 2018-04-30 RX ADMIN — POLYETHYLENE GLYCOL (3350) 17 G: 17 POWDER, FOR SOLUTION ORAL at 08:11

## 2018-04-30 RX ADMIN — LISINOPRIL 10 MG: 10 TABLET ORAL at 08:12

## 2018-04-30 RX ADMIN — CEFDINIR 300 MG: 300 CAPSULE ORAL at 09:13

## 2018-04-30 RX ADMIN — METRONIDAZOLE 500 MG: 500 INJECTION, SOLUTION INTRAVENOUS at 03:51

## 2018-05-01 ENCOUNTER — EPISODE CHANGES (OUTPATIENT)
Dept: CASE MANAGEMENT | Facility: OTHER | Age: 73
End: 2018-05-01

## 2018-05-01 LAB — BACTERIA SPEC AEROBE CULT: NORMAL

## 2018-05-01 NOTE — DISCHARGE SUMMARY
Date of Discharge:  4/30/2018    Discharge Diagnosis: Acute cholecystitis, acute kidney injury, sepsis    Presenting Problem/History of Present Illness  Pain of upper abdomen [R10.10]  Pain of upper abdomen [R10.10]  Pain of upper abdomen [R10.10]       Hospital Course  Patient is a 73 y.o. male presented with sepsis secondary to acute cholecystitis with Klebsiella bacteremia.  He has a significant heart history and was showing evidence of acute kidney injury.  He was admitted for IV fluid resuscitation to which his kidney injury responded.  Cardiology evaluated the patient stated he was clear for surgical intervention.  He was taken to the operating room for  scopic cholecystectomy which he tolerated well.  His liver function tests were normalizing on postoperative day one but due to his Klebsiella bacteremia infectious disease was consult that to guide antibiotic therapy.  He developed urinary retention requiring Griggs catheter placement.  Once the Griggs was removed and he was able to void on his own and was started on Flomax.  The patient continued to do well and was tolerating regular diet.  He was afebrile and cultures revealed speciation he was converted to oral antibiotic therapy and ready for discharge home.    Procedures Performed  Procedure(s):  CHOLECYSTECTOMY LAPAROSCOPIC       Consults:   Consults     Date and Time Order Name Status Description    4/27/2018 0128 Inpatient Infectious Diseases Consult Completed             Condition on Discharge:  Afebrile tolerating regular diet and ambulating.  Standard laparoscopic appendectomy incisions.      Discharge Disposition  Home or Self Care    Discharge Medications   Shai Mata   Home Medication Instructions DANA:[id number]    Printed on:05/01/18 0901   Medication Information                      amiodarone (PACERONE) 200 MG tablet  Take 1 tablet by mouth Daily.             aspirin 81 MG EC tablet  Take 81 mg by mouth 2 (Two) Times a Day.              atorvastatin (LIPITOR) 80 MG tablet  Take 1 tablet by mouth Daily.             carvedilol (COREG) 6.25 MG tablet  Take 1 tablet by mouth 2 (Two) Times a Day With Meals.             cefdinir (OMNICEF) 300 MG capsule  Take 1 capsule by mouth Every 12 (Twelve) Hours for 18 doses.             furosemide (LASIX) 20 MG tablet  Take 1 tablet by mouth Daily.             HYDROcodone-acetaminophen (NORCO) 7.5-325 MG per tablet  Take 1 tablet by mouth Every 4 (Four) Hours As Needed for Moderate Pain  for up to 8 days.             insulin NPH-insulin regular (Novolin 70/30) (70-30) 100 UNIT/ML injection  Inject 24 Units under the skin 2 (two) times a day with meals.             lisinopril (PRINIVIL,ZESTRIL) 10 MG tablet  Take 1 tablet by mouth 2 (Two) Times a Day.             metFORMIN (GLUCOPHAGE) 500 MG tablet  Take 1 tablet by mouth 2 (Two) Times a Day With Meals.             spironolactone (ALDACTONE) 25 MG tablet  Take 1 tablet by mouth Daily.             tamsulosin (FLOMAX) 0.4 MG capsule 24 hr capsule  Take 1 capsule by mouth Daily.                 Discharge Diet:   Diet Instructions     Advance Diet as Tolerated             Activity at Discharge:   Activity Instructions     Discharge activity       No lifting over 10 lbs for 4 weeks  No driving while on narcotics      Other restrictions (specify):       DISCHARGE ONCE CULTURES CLEAR FROM INFECTIOUS DISEASE PERSPECTIVE: DISCUSS WITH RAJESH OR HIS APRN          Follow-up Appointments  Future Appointments  Date Time Provider Department Center   5/14/2018 10:10 AM Eber Hummel MD [facility] None   5/29/2018 10:15 AM ANEUDY Carrizales [facility] None   8/3/2018 10:30 AM Jose Maldonado MD [facility] None   8/27/2018 8:20 AM BRYON Nice [facility] None     Additional Instructions for the Follow-ups that You Need to Schedule     Discharge Follow-up with Specialty: 2 Weeks    As directed      Follow Up:  2 Weeks               Test Results  Pending at Discharge   Order Current Status    Tissue Pathology Exam In process    Blood Culture - Blood, Preliminary result    Blood Culture - Blood, Preliminary result    Blood Culture - Blood, Preliminary result           Eber Hummel MD  05/01/18  9:01 AM

## 2018-05-02 LAB
LAB AP CASE REPORT: NORMAL
Lab: NORMAL
PATH REPORT.FINAL DX SPEC: NORMAL

## 2018-05-04 LAB
BACTERIA SPEC AEROBE CULT: NORMAL
BACTERIA SPEC AEROBE CULT: NORMAL

## 2018-05-15 ENCOUNTER — TELEPHONE (OUTPATIENT)
Dept: CARDIOLOGY | Facility: CLINIC | Age: 73
End: 2018-05-15

## 2018-05-15 ENCOUNTER — OFFICE VISIT (OUTPATIENT)
Dept: SURGERY | Facility: CLINIC | Age: 73
End: 2018-05-15

## 2018-05-15 VITALS
HEART RATE: 69 BPM | BODY MASS INDEX: 26.66 KG/M2 | DIASTOLIC BLOOD PRESSURE: 58 MMHG | HEIGHT: 69 IN | SYSTOLIC BLOOD PRESSURE: 120 MMHG | WEIGHT: 180 LBS

## 2018-05-15 DIAGNOSIS — K81.9 CHOLECYSTITIS: Primary | ICD-10-CM

## 2018-05-15 PROCEDURE — 99024 POSTOP FOLLOW-UP VISIT: CPT | Performed by: SURGERY

## 2018-05-15 RX ORDER — SPIRONOLACTONE 25 MG/1
25 TABLET ORAL DAILY
Qty: 30 TABLET | Refills: 3 | Status: SHIPPED | OUTPATIENT
Start: 2018-05-15 | End: 2018-08-28 | Stop reason: SDUPTHER

## 2018-05-15 RX ORDER — FUROSEMIDE 20 MG/1
20 TABLET ORAL DAILY
Qty: 30 TABLET | Refills: 3 | Status: SHIPPED | OUTPATIENT
Start: 2018-05-15 | End: 2018-06-01 | Stop reason: SDUPTHER

## 2018-05-15 NOTE — PROGRESS NOTES
Subjective   Shai Mata is a 73 y.o. male  is being seen for follow-up    Shai Mata is a 73 y.o. male here for followup after cholecystectomy.  The patient is doing well and tolerating diet.  his incisions are healing well.  No complaints reported.        Pathology consistent with acute on chronic cholecystitis    Physical Exam:  NAD, AOx3  RRR  CTAB  S/appropriately tender/incisions clean dry and intact          Shai was seen today for s/p lap epifanio.    Diagnoses and all orders for this visit:    Cholecystitis        Assessment     73 y.o. male s/p cholecystectomy secondary to acute cholecystitis.  Final pathology consistent with acute on chronic cholecystitis.  His incisions are healing well and he will follow-up as needed.

## 2018-05-23 ENCOUNTER — CLINICAL SUPPORT NO REQUIREMENTS (OUTPATIENT)
Dept: CARDIOLOGY | Facility: CLINIC | Age: 73
End: 2018-05-23

## 2018-05-23 DIAGNOSIS — I25.5 ISCHEMIC CARDIOMYOPATHY: ICD-10-CM

## 2018-05-23 PROCEDURE — 93296 REM INTERROG EVL PM/IDS: CPT | Performed by: INTERNAL MEDICINE

## 2018-05-23 PROCEDURE — 93295 DEV INTERROG REMOTE 1/2/MLT: CPT | Performed by: INTERNAL MEDICINE

## 2018-05-29 ENCOUNTER — TELEPHONE (OUTPATIENT)
Dept: FAMILY MEDICINE CLINIC | Facility: CLINIC | Age: 73
End: 2018-05-29

## 2018-05-29 ENCOUNTER — OFFICE VISIT (OUTPATIENT)
Dept: FAMILY MEDICINE CLINIC | Facility: CLINIC | Age: 73
End: 2018-05-29

## 2018-05-29 VITALS
HEIGHT: 69 IN | OXYGEN SATURATION: 99 % | SYSTOLIC BLOOD PRESSURE: 105 MMHG | BODY MASS INDEX: 24.85 KG/M2 | DIASTOLIC BLOOD PRESSURE: 66 MMHG | HEART RATE: 74 BPM | WEIGHT: 167.8 LBS

## 2018-05-29 DIAGNOSIS — E11.65 TYPE 2 DIABETES MELLITUS WITH HYPERGLYCEMIA, WITH LONG-TERM CURRENT USE OF INSULIN (HCC): Primary | ICD-10-CM

## 2018-05-29 DIAGNOSIS — Z79.4 TYPE 2 DIABETES MELLITUS WITH HYPERGLYCEMIA, WITH LONG-TERM CURRENT USE OF INSULIN (HCC): Primary | ICD-10-CM

## 2018-05-29 LAB
ALBUMIN SERPL-MCNC: 4.2 G/DL (ref 3.4–4.8)
ALBUMIN/GLOB SERPL: 1.6 G/DL (ref 1.5–2.5)
ALP SERPL-CCNC: 84 U/L (ref 40–129)
ALT SERPL W P-5'-P-CCNC: 25 U/L (ref 10–44)
ANION GAP SERPL CALCULATED.3IONS-SCNC: 1 MMOL/L (ref 3.6–11.2)
AST SERPL-CCNC: 21 U/L (ref 10–34)
BILIRUB SERPL-MCNC: 0.5 MG/DL (ref 0.2–1.8)
BUN BLD-MCNC: 15 MG/DL (ref 7–21)
BUN/CREAT SERPL: 10.4 (ref 7–25)
CALCIUM SPEC-SCNC: 9 MG/DL (ref 7.7–10)
CHLORIDE SERPL-SCNC: 107 MMOL/L (ref 99–112)
CHOLEST SERPL-MCNC: 113 MG/DL (ref 0–200)
CO2 SERPL-SCNC: 28 MMOL/L (ref 24.3–31.9)
CREAT BLD-MCNC: 1.44 MG/DL (ref 0.43–1.29)
GFR SERPL CREATININE-BSD FRML MDRD: 48 ML/MIN/1.73
GLOBULIN UR ELPH-MCNC: 2.7 GM/DL
GLUCOSE BLD-MCNC: 117 MG/DL (ref 70–110)
HBA1C MFR BLD: 7.4 % (ref 4.5–5.7)
HDLC SERPL-MCNC: 41 MG/DL (ref 60–100)
LDLC SERPL CALC-MCNC: 62 MG/DL (ref 0–100)
LDLC/HDLC SERPL: 1.5 {RATIO}
OSMOLALITY SERPL CALC.SUM OF ELEC: 273.8 MOSM/KG (ref 273–305)
POTASSIUM BLD-SCNC: 5.8 MMOL/L (ref 3.5–5.3)
PROT SERPL-MCNC: 6.9 G/DL (ref 6–8)
SODIUM BLD-SCNC: 136 MMOL/L (ref 135–153)
TRIGL SERPL-MCNC: 52 MG/DL (ref 0–150)
VLDLC SERPL-MCNC: 10.4 MG/DL

## 2018-05-29 PROCEDURE — 80061 LIPID PANEL: CPT | Performed by: NURSE PRACTITIONER

## 2018-05-29 PROCEDURE — 36415 COLL VENOUS BLD VENIPUNCTURE: CPT | Performed by: NURSE PRACTITIONER

## 2018-05-29 PROCEDURE — 80053 COMPREHEN METABOLIC PANEL: CPT | Performed by: NURSE PRACTITIONER

## 2018-05-29 PROCEDURE — 83036 HEMOGLOBIN GLYCOSYLATED A1C: CPT | Performed by: NURSE PRACTITIONER

## 2018-05-29 PROCEDURE — 99214 OFFICE O/P EST MOD 30 MIN: CPT | Performed by: NURSE PRACTITIONER

## 2018-05-29 RX ORDER — CARVEDILOL 6.25 MG/1
6.25 TABLET ORAL 2 TIMES DAILY WITH MEALS
Qty: 180 TABLET | Refills: 1 | Status: SHIPPED | OUTPATIENT
Start: 2018-05-29 | End: 2018-12-31 | Stop reason: SDUPTHER

## 2018-05-29 RX ORDER — ALBUTEROL SULFATE 90 UG/1
2 AEROSOL, METERED RESPIRATORY (INHALATION) EVERY 4 HOURS
Qty: 1 INHALER | Refills: 1 | Status: SHIPPED | OUTPATIENT
Start: 2018-05-29 | End: 2018-08-03 | Stop reason: ALTCHOICE

## 2018-05-29 NOTE — TELEPHONE ENCOUNTER
Patient denies any chest pain and/or discomfort.  He will  the inhaler and return on Thursday for repeat labs.

## 2018-05-29 NOTE — TELEPHONE ENCOUNTER
Let him know his potassium is a little high   Ask him if he has any problems any chest pain today.? He didn't have any compliants this am'  I will send in albuterol inhaler I want him to use this 2 puffs every 4 hours and come in Thursday for a repeat potassium

## 2018-05-29 NOTE — PROGRESS NOTES
Subjective   Shai Mata is a 73 y.o. male.     Since here last underwent lap cholecystectomy.  Did well and now back to usual activity          Diabetes   He presents for his follow-up diabetic visit. He has type 2 diabetes mellitus. His disease course has been improving. There are no hypoglycemic associated symptoms. Pertinent negatives for hypoglycemia include no confusion, dizziness, hunger, mood changes, nervousness/anxiousness, pallor, seizures, sleepiness, speech difficulty or tremors. There are no diabetic associated symptoms. Pertinent negatives for diabetes include no fatigue, no foot paresthesias, no foot ulcerations, no polydipsia, no polyphagia, no polyuria, no visual change, no weakness and no weight loss. Hypoglycemia complications include nocturnal hypoglycemia. Symptoms are stable. There are no diabetic complications. Risk factors for coronary artery disease include sedentary lifestyle, male sex, hypertension, family history and dyslipidemia. Current diabetic treatment includes insulin injections and oral agent (dual therapy). He is compliant with treatment all of the time. His weight is stable. He is following a generally healthy diet. Meal planning includes avoidance of concentrated sweets and carbohydrate counting. He participates in exercise intermittently. His home blood glucose trend is fluctuating minimally. His breakfast blood glucose range is generally 110-130 mg/dl. His overall blood glucose range is 140-180 mg/dl. (Admits an ocassional low when he is really strict with his carb intake  Has now reduced his metformin to once daily and feels this has helped with hypoglycemia    ) An ACE inhibitor/angiotensin II receptor blocker is being taken. He does not see a podiatrist.     The following portions of the patient's history were reviewed and updated as appropriate: allergies, current medications, past family history, past medical history, past social history, past surgical history and  problem list.      Review of Systems   Constitutional: Negative.  Negative for activity change, fatigue and weight loss.   HENT: Negative.    Respiratory: Negative.    Cardiovascular: Negative.  Negative for leg swelling.   Gastrointestinal: Negative.    Endocrine: Negative.  Negative for polydipsia, polyphagia and polyuria.   Musculoskeletal: Negative.    Skin: Negative.  Negative for pallor.   Neurological: Negative for dizziness, tremors, seizures, speech difficulty and weakness.   Psychiatric/Behavioral: Negative.  Negative for confusion. The patient is not nervous/anxious.    All other systems reviewed and are negative.      Procedures    Objective   Physical Exam   Constitutional: He is oriented to person, place, and time. He appears well-developed and well-nourished. No distress.   HENT:   Head: Normocephalic.   Eyes: Conjunctivae are normal. Right eye exhibits no discharge. Left eye exhibits no discharge.   Neck: Neck supple.   Cardiovascular: Normal rate, regular rhythm, normal heart sounds and intact distal pulses.    No murmur heard.  Pulmonary/Chest: Effort normal and breath sounds normal. No respiratory distress. He has no wheezes.   Musculoskeletal: He exhibits no edema.   Neurological: He is alert and oriented to person, place, and time.   Skin: Skin is warm and dry. He is not diaphoretic.   Psychiatric: He has a normal mood and affect. His behavior is normal.   Nursing note and vitals reviewed.      Assessment/Plan   Discussed with patient impression and plan, patient verbalizes understanding.  Continue with routine medications.  Continue with eating small frequent meals.   Continue with reduction of metformin  Reduce insulin to 20 units bid and continue to monitor sugar.  If continues with lows will follow sooner  Continue with upcoming cardiology appt    Shai was seen today for diabetes and follow-up.    Diagnoses and all orders for this visit:    Type 2 diabetes mellitus with hyperglycemia, with  long-term current use of insulin  -     Lipid Panel  -     Comprehensive Metabolic Panel  -     Hemoglobin A1c    Other orders  -     carvedilol (COREG) 6.25 MG tablet; Take 1 tablet by mouth 2 (Two) Times a Day With Meals.

## 2018-05-30 ENCOUNTER — TELEPHONE (OUTPATIENT)
Dept: FAMILY MEDICINE CLINIC | Facility: CLINIC | Age: 73
End: 2018-05-30

## 2018-05-30 DIAGNOSIS — N28.9 RENAL INSUFFICIENCY: ICD-10-CM

## 2018-05-30 DIAGNOSIS — E87.5 HYPERKALEMIA: Primary | ICD-10-CM

## 2018-05-30 NOTE — TELEPHONE ENCOUNTER
----- Message from ANEUDY Carrizales sent at 5/30/2018  9:27 AM EDT -----  Remind Shai to return on Thursday for BMP   Ask him how he is feeling today   Any swelling   Any concerns    Left a message to return call.      Patient returned call & verbalized understanding,he denies any swelling & reports he feels fine.

## 2018-05-31 ENCOUNTER — LAB (OUTPATIENT)
Dept: FAMILY MEDICINE CLINIC | Facility: CLINIC | Age: 73
End: 2018-05-31

## 2018-05-31 DIAGNOSIS — E87.5 HYPERKALEMIA: ICD-10-CM

## 2018-05-31 DIAGNOSIS — N28.9 RENAL INSUFFICIENCY: ICD-10-CM

## 2018-05-31 LAB
ANION GAP SERPL CALCULATED.3IONS-SCNC: 4.8 MMOL/L (ref 3.6–11.2)
BUN BLD-MCNC: 18 MG/DL (ref 7–21)
BUN/CREAT SERPL: 14 (ref 7–25)
CALCIUM SPEC-SCNC: 9.2 MG/DL (ref 7.7–10)
CHLORIDE SERPL-SCNC: 107 MMOL/L (ref 99–112)
CO2 SERPL-SCNC: 27.2 MMOL/L (ref 24.3–31.9)
CREAT BLD-MCNC: 1.29 MG/DL (ref 0.43–1.29)
GFR SERPL CREATININE-BSD FRML MDRD: 54 ML/MIN/1.73
GLUCOSE BLD-MCNC: 95 MG/DL (ref 70–110)
OSMOLALITY SERPL CALC.SUM OF ELEC: 279.2 MOSM/KG (ref 273–305)
POTASSIUM BLD-SCNC: 5 MMOL/L (ref 3.5–5.3)
SODIUM BLD-SCNC: 139 MMOL/L (ref 135–153)

## 2018-05-31 PROCEDURE — 80048 BASIC METABOLIC PNL TOTAL CA: CPT | Performed by: NURSE PRACTITIONER

## 2018-05-31 PROCEDURE — 36415 COLL VENOUS BLD VENIPUNCTURE: CPT

## 2018-06-01 ENCOUNTER — TELEPHONE (OUTPATIENT)
Dept: FAMILY MEDICINE CLINIC | Facility: CLINIC | Age: 73
End: 2018-06-01

## 2018-06-01 ENCOUNTER — EPISODE CHANGES (OUTPATIENT)
Dept: CASE MANAGEMENT | Facility: OTHER | Age: 73
End: 2018-06-01

## 2018-06-01 DIAGNOSIS — E87.5 HYPERKALEMIA: Primary | ICD-10-CM

## 2018-06-01 RX ORDER — FUROSEMIDE 20 MG/1
10 TABLET ORAL DAILY
Qty: 30 TABLET | Refills: 3
Start: 2018-06-01 | End: 2018-08-03 | Stop reason: ALTCHOICE

## 2018-06-01 NOTE — TELEPHONE ENCOUNTER
----- Message from ANEUDY Carrizales sent at 6/1/2018  9:29 AM EDT -----  Let patient know his labs look much better.  He can stop the inhaler and if he can restart the lasix at 10 mg daily.  Stay off the metformin completely.  Ask him to RTC in two weeks for repeat BMP or RTC sooner with any concerns whatsoever    Left a message to return call.    Patient returned call & verbalized understanding.

## 2018-06-14 ENCOUNTER — TELEPHONE (OUTPATIENT)
Dept: FAMILY MEDICINE CLINIC | Facility: CLINIC | Age: 73
End: 2018-06-14

## 2018-06-14 ENCOUNTER — LAB (OUTPATIENT)
Dept: FAMILY MEDICINE CLINIC | Facility: CLINIC | Age: 73
End: 2018-06-14

## 2018-06-14 DIAGNOSIS — E87.5 HYPERKALEMIA: ICD-10-CM

## 2018-06-14 LAB
ANION GAP SERPL CALCULATED.3IONS-SCNC: 2.2 MMOL/L (ref 3.6–11.2)
BUN BLD-MCNC: 28 MG/DL (ref 7–21)
BUN/CREAT SERPL: 17.4 (ref 7–25)
CALCIUM SPEC-SCNC: 8.9 MG/DL (ref 7.7–10)
CHLORIDE SERPL-SCNC: 111 MMOL/L (ref 99–112)
CO2 SERPL-SCNC: 25.8 MMOL/L (ref 24.3–31.9)
CREAT BLD-MCNC: 1.61 MG/DL (ref 0.43–1.29)
GFR SERPL CREATININE-BSD FRML MDRD: 42 ML/MIN/1.73
GLUCOSE BLD-MCNC: 48 MG/DL (ref 70–110)
OSMOLALITY SERPL CALC.SUM OF ELEC: 280.2 MOSM/KG (ref 273–305)
POTASSIUM BLD-SCNC: 4.6 MMOL/L (ref 3.5–5.3)
SODIUM BLD-SCNC: 139 MMOL/L (ref 135–153)

## 2018-06-14 PROCEDURE — 36415 COLL VENOUS BLD VENIPUNCTURE: CPT

## 2018-06-14 PROCEDURE — 80048 BASIC METABOLIC PNL TOTAL CA: CPT | Performed by: NURSE PRACTITIONER

## 2018-06-15 NOTE — TELEPHONE ENCOUNTER
Spoke with Shai this morning.  He feels fine and had not had anything to eat yesterday when he came in for blood work.  I told him his sugar was low, to monitor it closely and to call if he needed anything.

## 2018-06-19 DIAGNOSIS — N19 RENAL FAILURE, UNSPECIFIED CHRONICITY: Primary | ICD-10-CM

## 2018-06-19 NOTE — PROGRESS NOTES
Shai has not been taking his Metformin and has been taking only half the lasix.  Per Ekaterina's instructions, he will d/c the lasix.

## 2018-08-02 NOTE — PROGRESS NOTES
Shai Mata  1945  PCP: Ekaterina Cox APRN    SUBJECTIVE:   Shai Mata is a 73 y.o. male seen for a follow up visit regarding the following:     Chief Complaint: Follow up for V. Tach    HPI:    Since last visit the patient's status has been cardiac stable. No CP or SOB. No Syncope. Class II CHF symptoms    History:       Cardiac PMH: (Old records have been reviewed and summarized below)  1. PVCs                        A. 24 Hour Holter 4/13/16: 47-99 bpm, Average 71 bpm, frequent PVCs 32%, several runs 4 beat runs NSVT                        B. 24 Hour Holter 5/10/16: 48-81 bpm, Average 65 bpm, frequent PVCs 26% burden                        B. 24 Hour Holter Monitor 8/10/17: HR 42-83, Average 62 bpm, frequent PVCs with bigeminy, couplets, and triplets (11.8% burden)                         C. Echocardiogram 8/17/17: EF 36-40%, myxomatous changes of MV with mild to moderate MR                        E. RFA of PVC 11/20/17  2. VT                        A. Inducible VT by EPS 11/20/17                        B. BiV ICD implant 11/20/17 [facility]   3. CHF                       A. Echo 11/20/17 LVEF 35%            B. Echo  04/26/18 LVEF 45%  4. CAD                        A. CABG x 5 2006                        B. Echocardiogram 1/11/16: EF 45-50%, mild to moderate MR                        C. Stress Test 1/13/16: small reversible defect inferolateral and inferior segments, EF 35%  5. LBBB  6. HTN  7. DM2  8. HLP  9. Surgical History                        A. CABG                         B. Cataracts    Current Outpatient Prescriptions:   •  amiodarone (PACERONE) 200 MG tablet, Take 1 tablet by mouth Daily., Disp: 90 tablet, Rfl: 1  •  aspirin 81 MG EC tablet, Take 81 mg by mouth 2 (Two) Times a Day., Disp: , Rfl:   •  atorvastatin (LIPITOR) 80 MG tablet, Take 1 tablet by mouth Daily., Disp: 30 tablet, Rfl: 11  •  carvedilol (COREG) 6.25 MG tablet, Take 1 tablet by mouth 2 (Two) Times a Day With Meals., Disp: 180  tablet, Rfl: 1  •  insulin NPH-insulin regular (Novolin 70/30) (70-30) 100 UNIT/ML injection, Inject 21 Units under the skin into the appropriate area as directed 2 (Two) Times a Day With Meals., Disp: , Rfl:   •  lisinopril (PRINIVIL,ZESTRIL) 10 MG tablet, Take 1 tablet by mouth 2 (Two) Times a Day., Disp: 180 tablet, Rfl: 1  •  spironolactone (ALDACTONE) 25 MG tablet, Take 1 tablet by mouth Daily., Disp: 30 tablet, Rfl: 3    Past Medical History, Past Surgical History, Family history, Social History, and Medications were all reviewed with the patient today and updated as necessary.       Patient Active Problem List   Diagnosis   • ASCVD (arteriosclerotic cardiovascular disease)   • S/P CABG x 5   • HTN (hypertension)   • PVC's (premature ventricular contractions)   • Type 2 diabetes mellitus with hyperglycemia, with long-term current use of insulin (CMS/HCC)   • Hyperlipemia, mixed   • Ischemic cardiomyopathy   • Pain of upper abdomen   • Cholecystitis   • Chronic systolic congestive heart failure (CMS/HCC)   • Ventricular tachycardia (paroxysmal) (CMS/HCC)   • Long term current use of antiarrhythmic medical therapy     No Known Allergies  Past Medical History:   Diagnosis Date   • Abnormal heart rhythm    • ASCVD (arteriosclerotic cardiovascular disease)     s/p CABG in 2006 (5) [facility]   • CHF (congestive heart failure) (CMS/HCC)    • Chicken pox    • Congestive heart failure (CMS/HCC)    • Coronary artery disease    • Diverticulitis    • DMII (diabetes mellitus, type 2) (CMS/HCC)     type 2, checks fsbg as needed    • Dyslipidemia    • HTN (hypertension)    • Hx of myocardial infarction, greater than 8 weeks    • Hyperlipidemia    • Measles    • Mitral regurgitation     MILD TO MODERATE   • Mumps    • PVC (premature ventricular contraction)    • SOB (shortness of breath)    • Vitamin D deficiency    • Wears reading eyeglasses      Past Surgical History:   Procedure Laterality Date   •  "CARDIAC DEFIBRILLATOR PLACEMENT     • CARDIAC ELECTROPHYSIOLOGY PROCEDURE N/A 11/20/2017    Procedure: EP/Ablation PVC +/- ICD Implant;  Surgeon: Jose Maldonado MD;  Location:  [facility];  Service:    • CARDIAC ELECTROPHYSIOLOGY PROCEDURE N/A 11/20/2017    Procedure: Biventricular Device Insertion;  Surgeon: Jose Maldonado MD;  Location:  [facility];  Service:    • CATARACT EXTRACTION Bilateral    • CORONARY ANGIOPLASTY     • CORONARY ARTERY BYPASS GRAFT  2006    X 5.   [facility]   • PACEMAKER IMPLANTATION     • OK LAP,CHOLECYSTECTOMY N/A 4/27/2018    Procedure: CHOLECYSTECTOMY LAPAROSCOPIC;  Surgeon: Eber Hummel MD;  Location:  [facility] OR;  Service: General     Family History   Problem Relation Age of Onset   • Heart disease Mother    • Heart disease Father    • Cancer Sister    • Cancer Sister    • Cancer Sister      Social History   Substance Use Topics   • Smoking status: Former Smoker     Packs/day: 2.00     Types: Cigarettes     Quit date: 1984   • Smokeless tobacco: Former User     Types: Chew     Quit date: 1990   • Alcohol use No         PHYSICAL EXAM:    /70 (BP Location: Right arm, Patient Position: Sitting)   Pulse 73   Ht 176.5 cm (69.5\")   Wt 79.1 kg (174 lb 6.4 oz)   BMI 25.39 kg/m²        Wt Readings from Last 5 Encounters:   08/03/18 79.1 kg (174 lb 6.4 oz)   05/29/18 76.1 kg (167 lb 12.8 oz)   05/15/18 81.6 kg (180 lb)   04/28/18 81.8 kg (180 lb 4.8 oz)   03/26/18 81.2 kg (179 lb)       BP Readings from Last 5 Encounters:   08/03/18 128/70   05/29/18 105/66   05/15/18 120/58   04/30/18 143/65   03/26/18 109/64       General-Well Nourished, Well developed  Eyes - PERRLA  Neck- supple, No mass  CV- regular rate and rhythm, no MRG, No edema  Lung- clear bilaterally  Abd- soft, +BS  Musc/skel - Norm strength and range of motion  Skin- warm and dry  Neuro - Alert & Oriented x 3, appropriate mood.        Medical problems and test " results were reviewed with the patient today.     No results found for this or any previous visit (from the past 672 hour(s)).      EKG: (EKG has been independently visualized by me and summarized below)    Procedures     ASSESSMENT and PLAN  1) VT - on amiodarone. No recurrence. Decrease amio to 100mg qam  2) CHF s/p BiV ICD: class CHF II on meds. EF has improved to 45%  3) Anticoagulation - Continue ASA  4) CAD/HTN: stable  5) Use of Amio - Stable CMP in May 2018. Continue present medications. Will need TSH/fT4 with next lab draw        Return in about 6 months (around 2/3/2019) for office visit and device check with [facility].        Sherman Crowder M.D., F.A.C.C, F.H.R.S.  Cardiology/Electrophysiology  8/3/2018  10:25 AM

## 2018-08-03 ENCOUNTER — OFFICE VISIT (OUTPATIENT)
Dept: CARDIOLOGY | Facility: CLINIC | Age: 73
End: 2018-08-03

## 2018-08-03 VITALS
WEIGHT: 174.4 LBS | DIASTOLIC BLOOD PRESSURE: 70 MMHG | BODY MASS INDEX: 24.97 KG/M2 | HEIGHT: 70 IN | HEART RATE: 73 BPM | SYSTOLIC BLOOD PRESSURE: 128 MMHG

## 2018-08-03 DIAGNOSIS — I47.29 VENTRICULAR TACHYCARDIA (PAROXYSMAL) (HCC): ICD-10-CM

## 2018-08-03 DIAGNOSIS — I50.22 CHRONIC SYSTOLIC CONGESTIVE HEART FAILURE (HCC): Primary | ICD-10-CM

## 2018-08-03 DIAGNOSIS — Z79.899 LONG TERM CURRENT USE OF ANTIARRHYTHMIC MEDICAL THERAPY: ICD-10-CM

## 2018-08-03 PROBLEM — I47.20 VENTRICULAR TACHYCARDIA (PAROXYSMAL): Status: ACTIVE | Noted: 2018-08-03

## 2018-08-03 PROCEDURE — 99214 OFFICE O/P EST MOD 30 MIN: CPT | Performed by: INTERNAL MEDICINE

## 2018-08-03 PROCEDURE — 93284 PRGRMG EVAL IMPLANTABLE DFB: CPT | Performed by: INTERNAL MEDICINE

## 2018-08-08 ENCOUNTER — TRANSCRIBE ORDERS (OUTPATIENT)
Dept: ADMINISTRATIVE | Facility: HOSPITAL | Age: 73
End: 2018-08-08

## 2018-08-08 DIAGNOSIS — N20.0 NEPHROLITHIASIS: Primary | ICD-10-CM

## 2018-08-14 ENCOUNTER — HOSPITAL ENCOUNTER (OUTPATIENT)
Dept: CT IMAGING | Facility: HOSPITAL | Age: 73
Discharge: HOME | End: 2018-08-14
Attending: INTERNAL MEDICINE | Admitting: INTERNAL MEDICINE

## 2018-08-14 DIAGNOSIS — N20.0 NEPHROLITHIASIS: ICD-10-CM

## 2018-08-14 PROCEDURE — 74176 CT ABD & PELVIS W/O CONTRAST: CPT | Performed by: RADIOLOGY

## 2018-08-14 PROCEDURE — 74176 CT ABD & PELVIS W/O CONTRAST: CPT

## 2018-08-23 ENCOUNTER — EPISODE CHANGES (OUTPATIENT)
Dept: CASE MANAGEMENT | Facility: OTHER | Age: 73
End: 2018-08-23

## 2018-08-27 ENCOUNTER — OFFICE VISIT (OUTPATIENT)
Dept: CARDIOLOGY | Facility: CLINIC | Age: 73
End: 2018-08-27

## 2018-08-27 VITALS
RESPIRATION RATE: 16 BRPM | OXYGEN SATURATION: 99 % | HEIGHT: 70 IN | HEART RATE: 69 BPM | DIASTOLIC BLOOD PRESSURE: 73 MMHG | BODY MASS INDEX: 25.28 KG/M2 | WEIGHT: 176.6 LBS | SYSTOLIC BLOOD PRESSURE: 156 MMHG

## 2018-08-27 DIAGNOSIS — Z79.4 TYPE 2 DIABETES MELLITUS WITH HYPERGLYCEMIA, WITH LONG-TERM CURRENT USE OF INSULIN (HCC): ICD-10-CM

## 2018-08-27 DIAGNOSIS — I47.29 VENTRICULAR TACHYCARDIA (PAROXYSMAL) (HCC): ICD-10-CM

## 2018-08-27 DIAGNOSIS — I25.10 ASCVD (ARTERIOSCLEROTIC CARDIOVASCULAR DISEASE): Primary | ICD-10-CM

## 2018-08-27 DIAGNOSIS — I49.3 PVC'S (PREMATURE VENTRICULAR CONTRACTIONS): ICD-10-CM

## 2018-08-27 DIAGNOSIS — E78.2 HYPERLIPEMIA, MIXED: ICD-10-CM

## 2018-08-27 DIAGNOSIS — Z95.1 S/P CABG X 5: ICD-10-CM

## 2018-08-27 DIAGNOSIS — I10 ESSENTIAL HYPERTENSION: ICD-10-CM

## 2018-08-27 DIAGNOSIS — E11.65 TYPE 2 DIABETES MELLITUS WITH HYPERGLYCEMIA, WITH LONG-TERM CURRENT USE OF INSULIN (HCC): ICD-10-CM

## 2018-08-27 DIAGNOSIS — I50.22 CHRONIC SYSTOLIC CONGESTIVE HEART FAILURE (HCC): ICD-10-CM

## 2018-08-27 DIAGNOSIS — I25.5 ISCHEMIC CARDIOMYOPATHY: ICD-10-CM

## 2018-08-27 PROCEDURE — 99213 OFFICE O/P EST LOW 20 MIN: CPT | Performed by: NURSE PRACTITIONER

## 2018-08-27 NOTE — PROGRESS NOTES
Ekaterina Cox, ANEUDY  Shai Mata  1945 08/27/2018    Patient Active Problem List   Diagnosis   • ASCVD (arteriosclerotic cardiovascular disease)   • S/P CABG x 5   • HTN (hypertension)   • PVC's (premature ventricular contractions)   • Type 2 diabetes mellitus with hyperglycemia, with long-term current use of insulin (CMS/[facility])   • Hyperlipemia, mixed   • Ischemic cardiomyopathy   • Pain of upper abdomen   • Cholecystitis   • Chronic systolic congestive heart failure (CMS/HCC)   • Ventricular tachycardia (paroxysmal) (CMS/[facility])   • Long term current use of antiarrhythmic medical therapy       Dear Ekaterina Cox, ANEUDY:    Subjective     Chief Complaint   Patient presents with   • Coronary Artery Disease   • PVC's           History of Present Illness:    Shai Mata is a 73 y.o. male presents today for routine cardiology follow-up.  He has a past medical history significant for ischemic cardiomyopathy and chronic systolic congestive heart failure, history of ventricular tachycardia status post BIVI-ICD implantation, following with Dr. Crowder.  He has a history of coronary artery disease status post 5 vessel CABG in 2006, diabetes mellitus, hypertension, and dyslipidemia.  He reports he has been doing very well.  He denies chest pain, shortness of breath, dizziness, and near syncope.  Denies pedal edema.  Has not had any ICD discharges.  He has had recent visit with electrophysiology. His amiodarone dosage was decreased. He did have an eye exam last week and chest XR in April of this year. He is no longer taking furosemide due to worsening renal function. It was recommended he try Entresto. However, the cost was too high for him.      No Known Allergies:      Current Outpatient Prescriptions:   •  amiodarone (PACERONE) 200 MG tablet, Take 1 tablet by mouth Daily. (Patient taking differently: Take 100 mg by mouth Daily.), Disp: 90 tablet, Rfl: 1  •  aspirin 81 MG EC tablet, Take 81 mg by mouth 2 (Two) Times a Day.,  "Disp: , Rfl:   •  atorvastatin (LIPITOR) 80 MG tablet, Take 1 tablet by mouth Daily., Disp: 30 tablet, Rfl: 11  •  carvedilol (COREG) 6.25 MG tablet, Take 1 tablet by mouth 2 (Two) Times a Day With Meals., Disp: 180 tablet, Rfl: 1  •  insulin NPH-insulin regular (Novolin 70/30) (70-30) 100 UNIT/ML injection, Inject 21 Units under the skin into the appropriate area as directed 2 (Two) Times a Day With Meals., Disp: , Rfl:   •  lisinopril (PRINIVIL,ZESTRIL) 10 MG tablet, Take 1 tablet by mouth 2 (Two) Times a Day., Disp: 180 tablet, Rfl: 1  •  spironolactone (ALDACTONE) 25 MG tablet, Take 1 tablet by mouth Daily., Disp: 30 tablet, Rfl: 3      The following portions of the patient's history were reviewed and updated as appropriate: allergies, current medications, past family history, past medical history, past social history, past surgical history and problem list.    Social History   Substance Use Topics   • Smoking status: Former Smoker     Packs/day: 2.00     Types: Cigarettes     Quit date: 1984   • Smokeless tobacco: Former User     Types: Chew     Quit date: 1990   • Alcohol use No       Review of Systems   Constitution: Negative for chills and fever.   HENT: Negative for nosebleeds and sore throat.    Cardiovascular: Negative for chest pain, claudication, dyspnea on exertion, irregular heartbeat, leg swelling, near-syncope, orthopnea, palpitations and syncope.   Respiratory: Negative for cough, hemoptysis and wheezing.    Gastrointestinal: Negative for abdominal pain, hematemesis, hematochezia, melena, nausea and vomiting.   Genitourinary: Negative for dysuria and hematuria.   Neurological: Negative for dizziness and headaches.       Objective   Vitals:    08/27/18 0839   BP: 156/73   BP Location: Right arm   Patient Position: Sitting   Cuff Size: Adult   Pulse: 69   Resp: 16   SpO2: 99%   Weight: 80.1 kg (176 lb 9.6 oz)   Height: 176.5 cm (69.5\")     Body mass index is 25.71 kg/m².        Physical Exam "   Constitutional: He is oriented to person, place, and time. He appears well-developed and well-nourished.   HENT:   Head: Normocephalic and atraumatic.   Cardiovascular: Normal rate, regular rhythm and normal heart sounds.  Exam reveals no S3 and no S4.    No murmur heard.  Pulmonary/Chest: Effort normal and breath sounds normal.   Abdominal: Soft. Bowel sounds are normal.   Musculoskeletal: He exhibits no edema.   Neurological: He is alert and oriented to person, place, and time.   Skin: Skin is warm and dry.   Psychiatric: He has a normal mood and affect. His behavior is normal.       Lab Results   Component Value Date     06/14/2018    K 4.6 06/14/2018     06/14/2018    CO2 25.8 06/14/2018    BUN 28 (H) 06/14/2018    CREATININE 1.61 (H) 06/14/2018    GLUCOSE 48 (C) 06/14/2018    CALCIUM 8.9 06/14/2018    AST 21 05/29/2018    ALT 25 05/29/2018    ALKPHOS 84 05/29/2018    LABIL2 1.6 08/25/2016     Lab Results   Component Value Date    CKTOTAL 82 04/26/2018     Lab Results   Component Value Date    WBC 8.51 04/29/2018    HGB 9.8 (L) 04/29/2018    HCT 29.8 (L) 04/29/2018     (L) 04/29/2018     Lab Results   Component Value Date    INR 1.11 (H) 04/26/2018    INR 1.06 11/19/2017    INR 1.00 01/11/2016     Lab Results   Component Value Date    MG 2.3 01/16/2016     Lab Results   Component Value Date    TSH 4.557 11/28/2017    PSA 1.140 05/23/2017    CHLPL 133 11/28/2017    TRIG 52 05/29/2018    HDL 41 (L) 05/29/2018    LDL 62 05/29/2018      Lab Results   Component Value Date    BNP 26.0 04/26/2018           Procedures      Assessment/Plan    Diagnosis Plan   1. ASCVD (arteriosclerotic cardiovascular disease)     2. Essential hypertension     3. PVC's (premature ventricular contractions)     4. Ischemic cardiomyopathy     5. Hyperlipemia, mixed     6. Chronic systolic congestive heart failure (CMS/HCC)     7. Ventricular tachycardia (paroxysmal) (CMS/HCC)     8. Type 2 diabetes mellitus with  hyperglycemia, with long-term current use of insulin (CMS/[facility])     9. S/P CABG x 5                  Recommendations:    1.  Continue aspirin, atorvastatin, amiodarone, carvedilol, sperm lactone, and lisinopril.    2.  Will hold off on Entresto at this time due to abnormal renal function and pending nephrology workup.    3.  Monitor blood pressure at home.  Contact us with consistent elevation in parameters discussed.    4. Follow up in 6 months and PRN.          Patient's Body mass index is 25.71 kg/m². BMI is above normal parameters. Recommendations include: educational material.         Return in about 6 months (around 2/27/2019) for Recheck.    As always, I appreciate very much the opportunity to participate in the cardiovascular care of your patients.      With Best Regards,    ANEUDY Gonzalez

## 2018-08-28 ENCOUNTER — OFFICE VISIT (OUTPATIENT)
Dept: FAMILY MEDICINE CLINIC | Facility: CLINIC | Age: 73
End: 2018-08-28

## 2018-08-28 VITALS
HEIGHT: 70 IN | DIASTOLIC BLOOD PRESSURE: 74 MMHG | WEIGHT: 178.2 LBS | SYSTOLIC BLOOD PRESSURE: 122 MMHG | OXYGEN SATURATION: 99 % | HEART RATE: 91 BPM | BODY MASS INDEX: 25.51 KG/M2

## 2018-08-28 DIAGNOSIS — Z79.4 TYPE 2 DIABETES MELLITUS WITH HYPERGLYCEMIA, WITH LONG-TERM CURRENT USE OF INSULIN (HCC): Primary | ICD-10-CM

## 2018-08-28 DIAGNOSIS — E11.65 TYPE 2 DIABETES MELLITUS WITH HYPERGLYCEMIA, WITH LONG-TERM CURRENT USE OF INSULIN (HCC): Primary | ICD-10-CM

## 2018-08-28 PROCEDURE — 99214 OFFICE O/P EST MOD 30 MIN: CPT | Performed by: NURSE PRACTITIONER

## 2018-08-28 RX ORDER — AMIODARONE HYDROCHLORIDE 200 MG/1
100 TABLET ORAL DAILY
Qty: 45 TABLET | Refills: 1 | Status: SHIPPED | OUTPATIENT
Start: 2018-08-28 | End: 2018-10-16 | Stop reason: SDUPTHER

## 2018-08-28 RX ORDER — SPIRONOLACTONE 25 MG/1
25 TABLET ORAL DAILY
Qty: 90 TABLET | Refills: 1 | Status: SHIPPED | OUTPATIENT
Start: 2018-08-28 | End: 2019-03-18 | Stop reason: SDUPTHER

## 2018-08-28 NOTE — PROGRESS NOTES
Subjective   Shai Mata is a 73 y.o. male.     Returns following on his diabetes states he is feeling better and more stable with his sugars. Denies any further episodes of low sugar.  States he is feeling pretty good without any new concerns.        Diabetes   He presents for his follow-up diabetic visit. He has type 2 diabetes mellitus. His disease course has been stable. There are no hypoglycemic associated symptoms. Pertinent negatives for hypoglycemia include no confusion, dizziness, headaches, hunger, mood changes, nervousness/anxiousness, pallor, seizures, sleepiness, speech difficulty, sweats or tremors. There are no diabetic associated symptoms. Pertinent negatives for diabetes include no blurred vision, no chest pain, no fatigue, no foot paresthesias, no foot ulcerations, no polydipsia, no polyphagia, no polyuria, no visual change, no weakness and no weight loss. There are no hypoglycemic complications. Symptoms are stable. There are no diabetic complications. Risk factors for coronary artery disease include sedentary lifestyle, male sex, hypertension, family history and dyslipidemia. Current diabetic treatment includes insulin injections and oral agent (dual therapy). He is compliant with treatment all of the time. His weight is stable. He is following a generally healthy diet. Meal planning includes avoidance of concentrated sweets and carbohydrate counting. He participates in exercise intermittently. His home blood glucose trend is fluctuating minimally. His breakfast blood glucose range is generally 110-130 mg/dl. His overall blood glucose range is 140-180 mg/dl. (Admits an ocassional low when he is really strict with his carb intake  ) An ACE inhibitor/angiotensin II receptor blocker is being taken. He does not see a podiatrist.  Hypertension   This is a chronic (With Known CAD post CABG x 5, cardiomyopathy, and defirillator. Following closely with cardiology) problem. The current episode started  more than 1 year ago. Progression since onset: Denies any recent concerns.  Feels he is doing well. Pertinent negatives include no anxiety, blurred vision, chest pain, headaches, malaise/fatigue, neck pain, orthopnea, palpitations, peripheral edema, PND, shortness of breath or sweats. Risk factors for coronary artery disease include dyslipidemia, family history and sedentary lifestyle. Current antihypertension treatment includes ACE inhibitors, beta blockers and diuretics. The current treatment provides significant improvement. There are no compliance problems.  Hypertensive end-organ damage includes CAD/MI.   Hyperlipidemia   This is a chronic problem. The current episode started more than 1 year ago. Recent lipid tests were reviewed and are variable. Exacerbating diseases include diabetes. Pertinent negatives include no chest pain or shortness of breath. Current antihyperlipidemic treatment includes statins. The current treatment provides significant improvement of lipids. There are no compliance problems.  Risk factors for coronary artery disease include diabetes mellitus, family history, dyslipidemia, obesity and hypertension.      The following portions of the patient's history were reviewed and updated as appropriate: allergies, current medications, past family history, past medical history, past social history, past surgical history and problem list.      Review of Systems   Constitutional: Negative.  Negative for activity change, fatigue, malaise/fatigue and weight loss.        States overall he is feeling fairly well     HENT: Negative.    Eyes: Negative for blurred vision.   Respiratory: Negative.  Negative for shortness of breath.    Cardiovascular: Negative.  Negative for chest pain, palpitations, orthopnea, leg swelling and PND.   Gastrointestinal: Negative.    Endocrine: Negative.  Negative for polydipsia, polyphagia and polyuria.   Musculoskeletal: Negative.  Negative for neck pain.   Skin: Negative.   Negative for pallor.   Neurological: Negative for dizziness, tremors, seizures, speech difficulty, weakness and headaches.   Psychiatric/Behavioral: Negative.  Negative for confusion. The patient is not nervous/anxious.    All other systems reviewed and are negative.      Procedures    Objective   Physical Exam   Constitutional: He is oriented to person, place, and time. He appears well-developed and well-nourished. No distress.   HENT:   Head: Normocephalic.   Eyes: Conjunctivae are normal. Right eye exhibits no discharge. Left eye exhibits no discharge.   Neck: Neck supple.   Cardiovascular: Normal rate, regular rhythm, normal heart sounds and intact distal pulses.    No murmur heard.  Pulmonary/Chest: Effort normal and breath sounds normal. No respiratory distress. He has no wheezes.   Musculoskeletal: He exhibits no edema.   Neurological: He is alert and oriented to person, place, and time.   Skin: Skin is warm and dry. He is not diaphoretic.   Psychiatric: He has a normal mood and affect. His behavior is normal.   Nursing note and vitals reviewed.      Assessment/Plan   Discussed with patient impression and plan, patient verbalizes understanding.  Continue with routine medications.    Continue with eating small frequent meals.    Will return for fasting labs as he needs labs from nephrology as well.     Continue with upcoming cardiology appt    Patient is a non smoker  Patient's Body mass index is 25.94 kg/m². BMI is within normal parameters. No follow-up required.      Shai was seen today for diabetes and follow-up.    Diagnoses and all orders for this visit:    Type 2 diabetes mellitus with hyperglycemia, with long-term current use of insulin (CMS/[facility])  -     Comprehensive Metabolic Panel  -     Hemoglobin A1c  -     Lipid Panel  -     TSH    Other orders  -     spironolactone (ALDACTONE) 25 MG tablet; Take 1 tablet by mouth Daily.  -     amiodarone (PACERONE) 200 MG tablet; Take 0.5 tablets by mouth  Daily.

## 2018-09-12 LAB
ALBUMIN SERPL-MCNC: 4.1 G/DL (ref 3.4–4.8)
ALBUMIN/GLOB SERPL: 1.6 G/DL (ref 1.5–2.5)
ALP SERPL-CCNC: 71 U/L (ref 40–129)
ALT SERPL W P-5'-P-CCNC: 25 U/L (ref 10–44)
ANION GAP SERPL CALCULATED.3IONS-SCNC: 3.5 MMOL/L (ref 3.6–11.2)
AST SERPL-CCNC: 21 U/L (ref 10–34)
BILIRUB SERPL-MCNC: 0.5 MG/DL (ref 0.2–1.8)
BUN BLD-MCNC: 18 MG/DL (ref 7–21)
BUN/CREAT SERPL: 14.6 (ref 7–25)
CALCIUM SPEC-SCNC: 9.5 MG/DL (ref 7.7–10)
CHLORIDE SERPL-SCNC: 109 MMOL/L (ref 99–112)
CHOLEST SERPL-MCNC: 122 MG/DL (ref 0–200)
CO2 SERPL-SCNC: 27.5 MMOL/L (ref 24.3–31.9)
CREAT BLD-MCNC: 1.23 MG/DL (ref 0.43–1.29)
GFR SERPL CREATININE-BSD FRML MDRD: 58 ML/MIN/1.73
GLOBULIN UR ELPH-MCNC: 2.6 GM/DL
GLUCOSE BLD-MCNC: 87 MG/DL (ref 70–110)
HBA1C MFR BLD: 8.2 % (ref 4.5–5.7)
HDLC SERPL-MCNC: 46 MG/DL (ref 60–100)
LDLC SERPL CALC-MCNC: 68 MG/DL (ref 0–100)
LDLC/HDLC SERPL: 1.47 {RATIO}
OSMOLALITY SERPL CALC.SUM OF ELEC: 280.7 MOSM/KG (ref 273–305)
POTASSIUM BLD-SCNC: 4.6 MMOL/L (ref 3.5–5.3)
PROT SERPL-MCNC: 6.7 G/DL (ref 6–8)
SODIUM BLD-SCNC: 140 MMOL/L (ref 135–153)
TRIGL SERPL-MCNC: 42 MG/DL (ref 0–150)
TSH SERPL DL<=0.05 MIU/L-ACNC: 12.57 MIU/ML (ref 0.55–4.78)
VLDLC SERPL-MCNC: 8.4 MG/DL

## 2018-09-12 PROCEDURE — 84443 ASSAY THYROID STIM HORMONE: CPT | Performed by: NURSE PRACTITIONER

## 2018-09-12 PROCEDURE — 83970 ASSAY OF PARATHORMONE: CPT | Performed by: INTERNAL MEDICINE

## 2018-09-12 PROCEDURE — 36415 COLL VENOUS BLD VENIPUNCTURE: CPT | Performed by: NURSE PRACTITIONER

## 2018-09-12 PROCEDURE — 84156 ASSAY OF PROTEIN URINE: CPT | Performed by: INTERNAL MEDICINE

## 2018-09-12 PROCEDURE — 84100 ASSAY OF PHOSPHORUS: CPT | Performed by: NURSE PRACTITIONER

## 2018-09-12 PROCEDURE — 80061 LIPID PANEL: CPT | Performed by: NURSE PRACTITIONER

## 2018-09-12 PROCEDURE — 81015 MICROSCOPIC EXAM OF URINE: CPT | Performed by: INTERNAL MEDICINE

## 2018-09-12 PROCEDURE — 82570 ASSAY OF URINE CREATININE: CPT | Performed by: INTERNAL MEDICINE

## 2018-09-12 PROCEDURE — 85025 COMPLETE CBC W/AUTO DIFF WBC: CPT | Performed by: INTERNAL MEDICINE

## 2018-09-12 PROCEDURE — 83036 HEMOGLOBIN GLYCOSYLATED A1C: CPT | Performed by: NURSE PRACTITIONER

## 2018-09-12 PROCEDURE — 82306 VITAMIN D 25 HYDROXY: CPT | Performed by: NURSE PRACTITIONER

## 2018-09-12 PROCEDURE — 80053 COMPREHEN METABOLIC PANEL: CPT | Performed by: NURSE PRACTITIONER

## 2018-09-12 PROCEDURE — 84550 ASSAY OF BLOOD/URIC ACID: CPT | Performed by: NURSE PRACTITIONER

## 2018-09-12 RX ORDER — LISINOPRIL 10 MG/1
10 TABLET ORAL 2 TIMES DAILY
Qty: 180 TABLET | Refills: 1 | Status: SHIPPED | OUTPATIENT
Start: 2018-09-12 | End: 2019-03-18 | Stop reason: SDUPTHER

## 2018-09-18 ENCOUNTER — TELEPHONE (OUTPATIENT)
Dept: FAMILY MEDICINE CLINIC | Facility: CLINIC | Age: 73
End: 2018-09-18

## 2018-09-18 DIAGNOSIS — E03.9 HYPOTHYROIDISM, UNSPECIFIED TYPE: Primary | ICD-10-CM

## 2018-09-18 RX ORDER — LEVOTHYROXINE SODIUM 0.03 MG/1
25 TABLET ORAL DAILY
Qty: 30 TABLET | Refills: 3 | Status: SHIPPED | OUTPATIENT
Start: 2018-09-18 | End: 2018-11-12

## 2018-09-18 NOTE — TELEPHONE ENCOUNTER
----- Message from ANEUDY Carrizales sent at 9/18/2018  1:45 PM EDT -----  Needs to start synthroid 25 mcg daily He has developed hypothyroidism  Instruct to take on an empty stomach 30 min before a meal each morning  Will need a repeat TSH in 4-6 weeks.    Sugar is a little higher overall watch diet as he can and continue with current  Medications otherwise      Left a message to return call.      Patient returned call & verbalized understanding.

## 2018-10-03 ENCOUNTER — CLINICAL SUPPORT NO REQUIREMENTS (OUTPATIENT)
Dept: CARDIOLOGY | Facility: CLINIC | Age: 73
End: 2018-10-03

## 2018-10-03 DIAGNOSIS — I25.5 ISCHEMIC CARDIOMYOPATHY: ICD-10-CM

## 2018-10-03 PROCEDURE — 93296 REM INTERROG EVL PM/IDS: CPT | Performed by: INTERNAL MEDICINE

## 2018-10-03 PROCEDURE — 93295 DEV INTERROG REMOTE 1/2/MLT: CPT | Performed by: INTERNAL MEDICINE

## 2018-10-16 RX ORDER — AMIODARONE HYDROCHLORIDE 100 MG/1
100 TABLET ORAL DAILY
Qty: 30 TABLET | Refills: 2 | Status: SHIPPED | OUTPATIENT
Start: 2018-10-16 | End: 2018-12-17

## 2018-10-16 NOTE — TELEPHONE ENCOUNTER
Patient came in requesting Amiodarone be changed to the 100mg tabs due to difficulty with breaking the 200mg in half,sent to pharmacy per orders.

## 2018-11-12 ENCOUNTER — TELEPHONE (OUTPATIENT)
Dept: FAMILY MEDICINE CLINIC | Facility: CLINIC | Age: 73
End: 2018-11-12

## 2018-11-12 ENCOUNTER — LAB (OUTPATIENT)
Dept: FAMILY MEDICINE CLINIC | Facility: CLINIC | Age: 73
End: 2018-11-12

## 2018-11-12 DIAGNOSIS — E03.9 HYPOTHYROIDISM, UNSPECIFIED TYPE: ICD-10-CM

## 2018-11-12 DIAGNOSIS — E03.8 OTHER SPECIFIED HYPOTHYROIDISM: Primary | ICD-10-CM

## 2018-11-12 LAB — TSH SERPL DL<=0.05 MIU/L-ACNC: 8.54 MIU/ML (ref 0.55–4.78)

## 2018-11-12 PROCEDURE — 84443 ASSAY THYROID STIM HORMONE: CPT | Performed by: NURSE PRACTITIONER

## 2018-11-12 PROCEDURE — 36415 COLL VENOUS BLD VENIPUNCTURE: CPT

## 2018-11-12 RX ORDER — LEVOTHYROXINE SODIUM 0.05 MG/1
50 TABLET ORAL DAILY
Qty: 30 TABLET | Refills: 5 | Status: SHIPPED | OUTPATIENT
Start: 2018-11-12 | End: 2019-02-20 | Stop reason: SDUPTHER

## 2018-11-12 NOTE — TELEPHONE ENCOUNTER
----- Message from ANEUDY Carrizales sent at 11/12/2018  2:06 PM EST -----  Sent New dose Synthroid 50 mcg to pharmacy  Return 6 weeks TSH      Left a message to return call.      Patient returned call & verbalized understanding.

## 2018-12-17 ENCOUNTER — OFFICE VISIT (OUTPATIENT)
Dept: FAMILY MEDICINE CLINIC | Facility: CLINIC | Age: 73
End: 2018-12-17

## 2018-12-17 VITALS
OXYGEN SATURATION: 99 % | BODY MASS INDEX: 27.11 KG/M2 | WEIGHT: 183 LBS | TEMPERATURE: 98.9 F | SYSTOLIC BLOOD PRESSURE: 126 MMHG | DIASTOLIC BLOOD PRESSURE: 76 MMHG | HEIGHT: 69 IN | HEART RATE: 91 BPM

## 2018-12-17 DIAGNOSIS — Z79.4 TYPE 2 DIABETES MELLITUS WITH HYPERGLYCEMIA, WITH LONG-TERM CURRENT USE OF INSULIN (HCC): Primary | ICD-10-CM

## 2018-12-17 DIAGNOSIS — E03.8 OTHER SPECIFIED HYPOTHYROIDISM: ICD-10-CM

## 2018-12-17 DIAGNOSIS — I25.10 ASCVD (ARTERIOSCLEROTIC CARDIOVASCULAR DISEASE): ICD-10-CM

## 2018-12-17 DIAGNOSIS — E78.2 HYPERLIPEMIA, MIXED: ICD-10-CM

## 2018-12-17 DIAGNOSIS — I10 ESSENTIAL HYPERTENSION: ICD-10-CM

## 2018-12-17 DIAGNOSIS — E11.65 TYPE 2 DIABETES MELLITUS WITH HYPERGLYCEMIA, WITH LONG-TERM CURRENT USE OF INSULIN (HCC): Primary | ICD-10-CM

## 2018-12-17 LAB
ALBUMIN SERPL-MCNC: 4.2 G/DL (ref 3.4–4.8)
ALBUMIN/GLOB SERPL: 1.8 G/DL (ref 1.5–2.5)
ALP SERPL-CCNC: 62 U/L (ref 40–129)
ALT SERPL W P-5'-P-CCNC: 26 U/L (ref 10–44)
ANION GAP SERPL CALCULATED.3IONS-SCNC: 4.8 MMOL/L (ref 3.6–11.2)
AST SERPL-CCNC: 18 U/L (ref 10–34)
BILIRUB SERPL-MCNC: 0.7 MG/DL (ref 0.2–1.8)
BUN BLD-MCNC: 17 MG/DL (ref 7–21)
BUN/CREAT SERPL: 13.5 (ref 7–25)
CALCIUM SPEC-SCNC: 9.3 MG/DL (ref 7.7–10)
CHLORIDE SERPL-SCNC: 105 MMOL/L (ref 99–112)
CHOLEST SERPL-MCNC: 121 MG/DL (ref 0–200)
CO2 SERPL-SCNC: 26.2 MMOL/L (ref 24.3–31.9)
CREAT BLD-MCNC: 1.26 MG/DL (ref 0.43–1.29)
GFR SERPL CREATININE-BSD FRML MDRD: 56 ML/MIN/1.73
GLOBULIN UR ELPH-MCNC: 2.3 GM/DL
GLUCOSE BLD-MCNC: 242 MG/DL (ref 70–110)
HBA1C MFR BLD: 11.6 % (ref 4.5–5.7)
HDLC SERPL-MCNC: 36 MG/DL (ref 60–100)
LDLC SERPL CALC-MCNC: 76 MG/DL (ref 0–100)
LDLC/HDLC SERPL: 2.11 {RATIO}
MAGNESIUM SERPL-MCNC: 1.9 MG/DL (ref 1.7–2.6)
OSMOLALITY SERPL CALC.SUM OF ELEC: 281.5 MOSM/KG (ref 273–305)
POTASSIUM BLD-SCNC: 4.7 MMOL/L (ref 3.5–5.3)
PROT SERPL-MCNC: 6.5 G/DL (ref 6–8)
SODIUM BLD-SCNC: 136 MMOL/L (ref 135–153)
TRIGL SERPL-MCNC: 45 MG/DL (ref 0–150)
TSH SERPL DL<=0.05 MIU/L-ACNC: 4.68 MIU/ML (ref 0.55–4.78)
VIT B12 BLD-MCNC: 876 PG/ML (ref 211–911)
VLDLC SERPL-MCNC: 9 MG/DL

## 2018-12-17 PROCEDURE — 84443 ASSAY THYROID STIM HORMONE: CPT | Performed by: NURSE PRACTITIONER

## 2018-12-17 PROCEDURE — 80061 LIPID PANEL: CPT | Performed by: NURSE PRACTITIONER

## 2018-12-17 PROCEDURE — 99214 OFFICE O/P EST MOD 30 MIN: CPT | Performed by: NURSE PRACTITIONER

## 2018-12-17 PROCEDURE — 83735 ASSAY OF MAGNESIUM: CPT | Performed by: NURSE PRACTITIONER

## 2018-12-17 PROCEDURE — 80053 COMPREHEN METABOLIC PANEL: CPT | Performed by: NURSE PRACTITIONER

## 2018-12-17 PROCEDURE — 83036 HEMOGLOBIN GLYCOSYLATED A1C: CPT | Performed by: NURSE PRACTITIONER

## 2018-12-17 PROCEDURE — 82607 VITAMIN B-12: CPT | Performed by: NURSE PRACTITIONER

## 2018-12-17 RX ORDER — AMIODARONE HYDROCHLORIDE 200 MG/1
100 TABLET ORAL DAILY
Qty: 45 TABLET | Refills: 1 | Status: SHIPPED | OUTPATIENT
Start: 2018-12-17 | End: 2019-06-18 | Stop reason: SDUPTHER

## 2018-12-17 NOTE — PROGRESS NOTES
Subjective   Shai Mata is a 73 y.o. male.     Diabetes   He presents for his follow-up diabetic visit. He has type 2 diabetes mellitus. His disease course has been stable. There are no hypoglycemic associated symptoms. Pertinent negatives for hypoglycemia include no confusion, dizziness, headaches, hunger, mood changes, nervousness/anxiousness, pallor, seizures, sleepiness, speech difficulty, sweats or tremors. There are no diabetic associated symptoms. Pertinent negatives for diabetes include no blurred vision, no chest pain, no fatigue, no foot paresthesias, no foot ulcerations, no polydipsia, no polyphagia, no polyuria, no visual change, no weakness and no weight loss. There are no hypoglycemic complications. Symptoms are stable. There are no diabetic complications. Risk factors for coronary artery disease include sedentary lifestyle, male sex, hypertension, family history and dyslipidemia. Current diabetic treatment includes insulin injections and oral agent (dual therapy). He is compliant with treatment all of the time. His weight is stable. He is following a generally healthy diet. Meal planning includes avoidance of concentrated sweets and carbohydrate counting. He participates in exercise intermittently. His home blood glucose trend is fluctuating minimally. His breakfast blood glucose range is generally 130-140 mg/dl. His overall blood glucose range is 140-180 mg/dl. (Admits an ocassional low when he is really strict with his carb intake  ) An ACE inhibitor/angiotensin II receptor blocker is being taken. He does not see a podiatrist.  Hypertension   This is a chronic (With Known CAD post CABG x 5, cardiomyopathy, and defirillator. Following closely with cardiology) problem. The current episode started more than 1 year ago. Progression since onset: Denies any recent concerns.  Feels he is doing well. The problem is controlled. Pertinent negatives include no anxiety, blurred vision, chest pain, headaches,  malaise/fatigue, neck pain, orthopnea, palpitations, peripheral edema, PND, shortness of breath or sweats. Risk factors for coronary artery disease include dyslipidemia, family history and sedentary lifestyle. Current antihypertension treatment includes ACE inhibitors, beta blockers and diuretics. The current treatment provides significant improvement. There are no compliance problems.  Hypertensive end-organ damage includes CAD/MI. Identifiable causes of hypertension include a thyroid problem.   Hyperlipidemia   This is a chronic problem. The current episode started more than 1 year ago. Recent lipid tests were reviewed and are variable. Exacerbating diseases include diabetes. Pertinent negatives include no chest pain or shortness of breath. Current antihyperlipidemic treatment includes statins. The current treatment provides significant improvement of lipids. There are no compliance problems.  Risk factors for coronary artery disease include diabetes mellitus, family history, dyslipidemia, obesity and hypertension.   Thyroid Problem   Presents for follow-up (New onset Hypothyroidism) visit. Patient reports no anxiety, fatigue, palpitations, tremors, visual change or weight loss. The symptoms have been stable (tolerating synthroid). His past medical history is significant for diabetes and hyperlipidemia.      The following portions of the patient's history were reviewed and updated as appropriate: allergies, current medications, past family history, past medical history, past social history, past surgical history and problem list.      Review of Systems   Constitutional: Negative.  Negative for activity change, fatigue, malaise/fatigue and weight loss.        States overall he is feeling fairly well     HENT: Negative.    Eyes: Negative for blurred vision.   Respiratory: Negative.  Negative for shortness of breath.    Cardiovascular: Negative.  Negative for chest pain, palpitations, orthopnea, leg swelling and PND.    Gastrointestinal: Negative.    Endocrine: Negative.  Negative for polydipsia, polyphagia and polyuria.   Musculoskeletal: Negative.  Negative for neck pain.   Skin: Negative.  Negative for pallor.   Neurological: Negative for dizziness, tremors, seizures, speech difficulty, weakness and headaches.   Psychiatric/Behavioral: Negative.  Negative for confusion. The patient is not nervous/anxious.    All other systems reviewed and are negative.      Procedures    Objective   Physical Exam   Constitutional: He is oriented to person, place, and time. He appears well-developed and well-nourished. No distress.   HENT:   Head: Normocephalic.   Eyes: Conjunctivae are normal. Right eye exhibits no discharge. Left eye exhibits no discharge.   Neck: Neck supple.   Cardiovascular: Normal rate, regular rhythm, normal heart sounds and intact distal pulses.   No murmur heard.  Pulmonary/Chest: Effort normal and breath sounds normal. No respiratory distress. He has no wheezes.   Musculoskeletal: He exhibits no edema.    Shai had a diabetic foot exam performed today.   During the foot exam he had a monofilament test performed.    Neurological Sensory Findings - Unaltered hot/cold right ankle/foot discrimination and unaltered hot/cold left ankle/foot discrimination. Unaltered sharp/dull right ankle/foot discrimination and unaltered sharp/dull left ankle/foot discrimination. No right ankle/foot altered proprioception and no left ankle/foot altered proprioception  Vascular Status -  His right foot exhibits normal foot vasculature  and no edema. His left foot exhibits normal foot vasculature  and no edema.  Skin Integrity  -  His right foot skin is intact.His left foot skin is intact..  Neurological: He is alert and oriented to person, place, and time.   Skin: Skin is warm and dry. He is not diaphoretic.   Psychiatric: He has a normal mood and affect. His behavior is normal.   Nursing note and vitals reviewed.      Assessment/Plan    Discussed with patient impression and plan, patient verbalizes understanding.  Continue with routine medications.    Continue with eating small frequent meals.    Fasting labs today     Continue with upcoming cardiology appt    Patient is a non smoker  Patient's Body mass index is 26.65 kg/m². BMI is within normal parameters. No follow-up required.      Shai was seen today for follow-up and med refill.    Diagnoses and all orders for this visit:    Type 2 diabetes mellitus with hyperglycemia, with long-term current use of insulin (CMS/[facility])  -     Comprehensive Metabolic Panel  -     Lipid Panel  -     Magnesium  -     TSH  -     Vitamin B12  -     Hemoglobin A1c    Other specified hypothyroidism  -     TSH    ASCVD (arteriosclerotic cardiovascular disease)    Essential hypertension    Hyperlipemia, mixed    Other orders  -     amiodarone (PACERONE) 200 MG tablet; Take 0.5 tablets by mouth Daily.

## 2018-12-18 ENCOUNTER — TELEPHONE (OUTPATIENT)
Dept: FAMILY MEDICINE CLINIC | Facility: CLINIC | Age: 73
End: 2018-12-18

## 2018-12-18 RX ORDER — PEN NEEDLE, DIABETIC 31 G X1/4"
10 NEEDLE, DISPOSABLE MISCELLANEOUS NIGHTLY
Qty: 50 EACH | Refills: 2 | Status: SHIPPED | OUTPATIENT
Start: 2018-12-18 | End: 2019-06-18 | Stop reason: SDUPTHER

## 2018-12-18 NOTE — TELEPHONE ENCOUNTER
----- Message from ANEUDY Carrizales sent at 12/17/2018  7:05 PM EST -----  Ask Mr Mata to start monitoring his sugar.  He also needs to restart Lantus at least 10 unit nightly in addition to his current regimen    Left a message to return call.    Patient returned call,reports he is taking his insulin as ordered & reports he has been watching his diet,not eating sweets & has even been watching his starches so he is at a loss to know what is going on?? We have no Lantus  Samples,please advise.

## 2018-12-18 NOTE — TELEPHONE ENCOUNTER
Needs to restart the lantus at 10 units at bedtime      Patient notified & verbalized understanding,sent to pharmacy as ordered.

## 2018-12-20 ENCOUNTER — TELEPHONE (OUTPATIENT)
Dept: FAMILY MEDICINE CLINIC | Facility: CLINIC | Age: 73
End: 2018-12-20

## 2018-12-20 NOTE — TELEPHONE ENCOUNTER
Patient called reports the Lantus is too expensive & he cannot afford it,i had called him for Ekaterina on 12/18/18 to add Lantus to his regimen,he wants to know if we could change it?

## 2018-12-21 NOTE — TELEPHONE ENCOUNTER
Can you call the pharmacy and ask if this is too expensive because Lantus is not covered or if he is in the donut hole or whatever they are calling it nowadays? If Lantus is not covered, would toujeo or basaglar or levemir be covered? 10 units at night, #3 ml, with 5 refills.     Please also let him know to swing by for some samples to tide him over until we can figure this out. Thanks.

## 2018-12-21 NOTE — TELEPHONE ENCOUNTER
Can you call the pharmacy and ask if this is too expensive because Lantus is not covered or if he is in the donut hole or whatever they are calling it nowadays? If Lantus is not covered, would toujeo or basaglar or levemir be covered? 10 units at night, #3 ml, with 5 refills.     Please also let him know to swing by for some samples to tide him over until we can figure this out. Thanks.     Called the pharmacy he is in the donut hole. Left a message for him to call back.    Patient returned call & was notified.

## 2018-12-26 RX ORDER — ATORVASTATIN CALCIUM 80 MG/1
TABLET, FILM COATED ORAL
Qty: 30 TABLET | Refills: 3 | Status: SHIPPED | OUTPATIENT
Start: 2018-12-26 | End: 2019-05-07 | Stop reason: SDUPTHER

## 2018-12-31 RX ORDER — CARVEDILOL 6.25 MG/1
TABLET ORAL
Qty: 180 TABLET | Refills: 0 | OUTPATIENT
Start: 2018-12-31 | End: 2019-03-01 | Stop reason: DRUGHIGH

## 2019-01-02 ENCOUNTER — CLINICAL SUPPORT NO REQUIREMENTS (OUTPATIENT)
Dept: CARDIOLOGY | Facility: CLINIC | Age: 74
End: 2019-01-02

## 2019-01-02 DIAGNOSIS — I25.5 ISCHEMIC CARDIOMYOPATHY: ICD-10-CM

## 2019-01-04 PROCEDURE — 93295 DEV INTERROG REMOTE 1/2/MLT: CPT | Performed by: INTERNAL MEDICINE

## 2019-01-04 PROCEDURE — 93296 REM INTERROG EVL PM/IDS: CPT | Performed by: INTERNAL MEDICINE

## 2019-02-20 ENCOUNTER — TELEPHONE (OUTPATIENT)
Dept: FAMILY MEDICINE CLINIC | Facility: CLINIC | Age: 74
End: 2019-02-20

## 2019-02-20 DIAGNOSIS — Z79.4 TYPE 2 DIABETES MELLITUS WITH HYPERGLYCEMIA, WITH LONG-TERM CURRENT USE OF INSULIN (HCC): Primary | ICD-10-CM

## 2019-02-20 DIAGNOSIS — E11.65 TYPE 2 DIABETES MELLITUS WITH HYPERGLYCEMIA, WITH LONG-TERM CURRENT USE OF INSULIN (HCC): Primary | ICD-10-CM

## 2019-02-20 LAB
ALBUMIN SERPL-MCNC: 4.2 G/DL (ref 3.4–4.8)
ALBUMIN/GLOB SERPL: 1.8 G/DL (ref 1.5–2.5)
ALP SERPL-CCNC: 54 U/L (ref 40–129)
ALT SERPL W P-5'-P-CCNC: 23 U/L (ref 10–44)
ANION GAP SERPL CALCULATED.3IONS-SCNC: 3.7 MMOL/L (ref 3.6–11.2)
AST SERPL-CCNC: 20 U/L (ref 10–34)
BILIRUB SERPL-MCNC: 0.5 MG/DL (ref 0.2–1.8)
BUN BLD-MCNC: 18 MG/DL (ref 7–21)
BUN/CREAT SERPL: 15.1 (ref 7–25)
CALCIUM SPEC-SCNC: 9.1 MG/DL (ref 7.7–10)
CHLORIDE SERPL-SCNC: 110 MMOL/L (ref 99–112)
CHOLEST SERPL-MCNC: 132 MG/DL (ref 0–200)
CO2 SERPL-SCNC: 28.3 MMOL/L (ref 24.3–31.9)
CREAT BLD-MCNC: 1.19 MG/DL (ref 0.43–1.29)
GFR SERPL CREATININE-BSD FRML MDRD: 60 ML/MIN/1.73
GLOBULIN UR ELPH-MCNC: 2.4 GM/DL
GLUCOSE BLD-MCNC: 42 MG/DL (ref 70–110)
HBA1C MFR BLD: 8.4 % (ref 4.5–5.7)
HDLC SERPL-MCNC: 48 MG/DL (ref 60–100)
LDLC SERPL CALC-MCNC: 78 MG/DL (ref 0–100)
LDLC/HDLC SERPL: 1.63 {RATIO}
OSMOLALITY SERPL CALC.SUM OF ELEC: 281.9 MOSM/KG (ref 273–305)
POTASSIUM BLD-SCNC: 4.4 MMOL/L (ref 3.5–5.3)
PROT SERPL-MCNC: 6.6 G/DL (ref 6–8)
SODIUM BLD-SCNC: 142 MMOL/L (ref 135–153)
TRIGL SERPL-MCNC: 29 MG/DL (ref 0–150)
TSH SERPL DL<=0.05 MIU/L-ACNC: 6.85 MIU/ML (ref 0.55–4.78)
VIT B12 BLD-MCNC: 865 PG/ML (ref 211–911)
VLDLC SERPL-MCNC: 5.8 MG/DL

## 2019-02-20 PROCEDURE — 80053 COMPREHEN METABOLIC PANEL: CPT | Performed by: NURSE PRACTITIONER

## 2019-02-20 PROCEDURE — 83036 HEMOGLOBIN GLYCOSYLATED A1C: CPT | Performed by: NURSE PRACTITIONER

## 2019-02-20 PROCEDURE — 84443 ASSAY THYROID STIM HORMONE: CPT | Performed by: NURSE PRACTITIONER

## 2019-02-20 PROCEDURE — 80061 LIPID PANEL: CPT | Performed by: NURSE PRACTITIONER

## 2019-02-20 PROCEDURE — 82607 VITAMIN B-12: CPT | Performed by: NURSE PRACTITIONER

## 2019-02-20 RX ORDER — LEVOTHYROXINE SODIUM 0.07 MG/1
75 TABLET ORAL DAILY
Qty: 30 TABLET | Refills: 5 | Status: SHIPPED | OUTPATIENT
Start: 2019-02-20 | End: 2019-06-18 | Stop reason: SDUPTHER

## 2019-02-20 NOTE — TELEPHONE ENCOUNTER
Does he take the synthroid on a empty stomach without any other medication? This may be the concern

## 2019-02-20 NOTE — TELEPHONE ENCOUNTER
Does he take the synthroid on a empty stomach without any other medication? This may be the concern      He reports he does take it first thing of the morning on an empty stomach by itself .Please advise.    Instructed to take a whole tab of the 50 mcg . Agree?

## 2019-02-20 NOTE — TELEPHONE ENCOUNTER
Follow up with patient and make sure he is ok       Spoke with patient he stopped & ate when he left here this morning & he is fine.

## 2019-02-20 NOTE — TELEPHONE ENCOUNTER
----- Message from ANEUDY Carrizales sent at 2/20/2019  3:07 PM EST -----  This is the patient that always brings us donuts tell him thank you he enjoys we get to eat them cause he cant.  Tell him labs look better sugar much improved  Thyroid medication needs increased and sent new RX to his pharmacy will follow on this change at the next visit    Spoke with patient he reports he has been cutting his Levothyroxine in half because it messes with his sugars?? Reports he knows that is what it is because it only started after he started taking this,so he has only been taking 1/2 of a 50mcg tab.

## 2019-02-20 NOTE — TELEPHONE ENCOUNTER
----- Message from ANEUDY Carrizales sent at 2/20/2019  3:21 PM EST -----  Call patient and ask him if he eaten now and checked his sugar.  He is very sensitive with fasting     Addressed.

## 2019-02-25 ENCOUNTER — OFFICE VISIT (OUTPATIENT)
Dept: CARDIOLOGY | Facility: CLINIC | Age: 74
End: 2019-02-25

## 2019-02-25 VITALS
SYSTOLIC BLOOD PRESSURE: 141 MMHG | DIASTOLIC BLOOD PRESSURE: 73 MMHG | RESPIRATION RATE: 16 BRPM | BODY MASS INDEX: 28.02 KG/M2 | WEIGHT: 189.2 LBS | HEART RATE: 69 BPM | HEIGHT: 69 IN

## 2019-02-25 DIAGNOSIS — I10 ESSENTIAL HYPERTENSION: ICD-10-CM

## 2019-02-25 DIAGNOSIS — I47.29 VENTRICULAR TACHYCARDIA (PAROXYSMAL) (HCC): ICD-10-CM

## 2019-02-25 DIAGNOSIS — I50.22 CHRONIC SYSTOLIC CONGESTIVE HEART FAILURE (HCC): ICD-10-CM

## 2019-02-25 DIAGNOSIS — E11.65 TYPE 2 DIABETES MELLITUS WITH HYPERGLYCEMIA, WITH LONG-TERM CURRENT USE OF INSULIN (HCC): ICD-10-CM

## 2019-02-25 DIAGNOSIS — I25.5 ISCHEMIC CARDIOMYOPATHY: ICD-10-CM

## 2019-02-25 DIAGNOSIS — E78.2 HYPERLIPEMIA, MIXED: ICD-10-CM

## 2019-02-25 DIAGNOSIS — Z79.4 TYPE 2 DIABETES MELLITUS WITH HYPERGLYCEMIA, WITH LONG-TERM CURRENT USE OF INSULIN (HCC): ICD-10-CM

## 2019-02-25 DIAGNOSIS — I25.10 ASCVD (ARTERIOSCLEROTIC CARDIOVASCULAR DISEASE): Primary | ICD-10-CM

## 2019-02-25 PROCEDURE — 99213 OFFICE O/P EST LOW 20 MIN: CPT | Performed by: NURSE PRACTITIONER

## 2019-02-25 PROCEDURE — 93000 ELECTROCARDIOGRAM COMPLETE: CPT | Performed by: NURSE PRACTITIONER

## 2019-02-25 NOTE — PROGRESS NOTES
Ekaterina Cox, ANEUDY  Shai Mata  1945 02/25/2019    Patient Active Problem List   Diagnosis   • ASCVD (arteriosclerotic cardiovascular disease)   • S/P CABG x 5   • HTN (hypertension)   • PVC's (premature ventricular contractions)   • Type 2 diabetes mellitus with hyperglycemia, with long-term current use of insulin (CMS/HCC)   • Hyperlipemia, mixed   • Ischemic cardiomyopathy   • Pain of upper abdomen   • Cholecystitis   • Chronic systolic congestive heart failure (CMS/HCC)   • Ventricular tachycardia (paroxysmal) (CMS/[facility])   • Long term current use of antiarrhythmic medical therapy       Dear Ekaterina Cox, ANEUDY:    Subjective     Chief Complaint   Patient presents with   • ASCVD   • Chronic systolic congestive heart failure           History of Present Illness:    Shai Mata is a 73 y.o. male with a history of ischemic cardiomyopathy with most recent EF 45%, chronic systolic congestive heart failure, history of ventricular tachycardia status post BIVI-ICD implantation on amiodarone, following with Dr. Crowder. He also has a history of ASCVD status post 5 vessel CABG in 2006, diabetes mellitus, hypertension, and dyslipidemia. He reports he has been doing well. The patient denies chest pain, shortness of breath, palpitations, dizziness, lightheadedness, near syncope, and syncope. He denies edema. He has been following with nephrology for abnormal renal function. Most recent creatinine was normal. His blood pressure is mildly elevated in the office today, but reports he has had sausage this morning for breakfast. Other recent readings are much lower. He has had TSH and CMP recently. Last chest x-ray was 4/18.      No Known Allergies:      Current Outpatient Medications:   •  amiodarone (PACERONE) 200 MG tablet, Take 0.5 tablets by mouth Daily., Disp: 45 tablet, Rfl: 1  •  aspirin 81 MG EC tablet, Take 81 mg by mouth 2 (Two) Times a Day., Disp: , Rfl:   •  atorvastatin (LIPITOR) 80 MG tablet, TAKE ONE TABLET BY  MOUTH ONCE DAILY, Disp: 30 tablet, Rfl: 3  •  carvedilol (COREG) 6.25 MG tablet, TAKE 1 TABLET BY MOUTH TWICE DAILY WITH MEALS, Disp: 180 tablet, Rfl: 0  •  Insulin Glargine (LANTUS SOLOSTAR) 100 UNIT/ML injection pen, Inject 10 Units under the skin into the appropriate area as directed Every Night., Disp: 6 mL, Rfl: 2  •  insulin NPH-insulin regular (Novolin 70/30) (70-30) 100 UNIT/ML injection, Inject 21 Units under the skin into the appropriate area as directed 2 (Two) Times a Day With Meals., Disp: , Rfl:   •  Insulin Pen Needle (PEN NEEDLES) 31G X 6 MM misc, 10 Units Every Night., Disp: 50 each, Rfl: 2  •  levothyroxine (SYNTHROID, LEVOTHROID) 75 MCG tablet, Take 1 tablet by mouth Daily., Disp: 30 tablet, Rfl: 5  •  lisinopril (PRINIVIL,ZESTRIL) 10 MG tablet, Take 1 tablet by mouth 2 (Two) Times a Day., Disp: 180 tablet, Rfl: 1  •  spironolactone (ALDACTONE) 25 MG tablet, Take 1 tablet by mouth Daily., Disp: 90 tablet, Rfl: 1      The following portions of the patient's history were reviewed and updated as appropriate: allergies, current medications, past family history, past medical history, past social history, past surgical history and problem list.    Social History     Tobacco Use   • Smoking status: Former Smoker     Packs/day: 2.00     Types: Cigarettes     Last attempt to quit:      Years since quittin.1   • Smokeless tobacco: Former User     Types: Chew     Quit date:    Substance Use Topics   • Alcohol use: No   • Drug use: No       Review of Systems   Constitution: Negative for weakness and malaise/fatigue.   Cardiovascular: Negative for chest pain, dyspnea on exertion, leg swelling, palpitations and syncope.   Respiratory: Negative for cough, shortness of breath and wheezing.    Neurological: Negative for dizziness and light-headedness.       Objective   Vitals:    19 0907   BP: 141/73   BP Location: Right arm   Pulse: 69   Resp: 16   Weight: 85.8 kg (189 lb 3.2 oz)   Height:  "176.5 cm (69.49\")     Body mass index is 27.55 kg/m².        Physical Exam   Constitutional: He is oriented to person, place, and time. He appears well-developed and well-nourished.   HENT:   Head: Normocephalic and atraumatic.   Cardiovascular: Normal rate, regular rhythm and normal heart sounds. Exam reveals no S3 and no S4.   No murmur heard.  Pulmonary/Chest: Effort normal and breath sounds normal. He has no wheezes. He has no rales.   Abdominal: Soft. Bowel sounds are normal.   Musculoskeletal: He exhibits no edema.   Neurological: He is alert and oriented to person, place, and time.   Skin: Skin is warm and dry.   Psychiatric: He has a normal mood and affect. His behavior is normal.       Lab Results   Component Value Date     02/20/2019    K 4.4 02/20/2019     02/20/2019    CO2 28.3 02/20/2019    BUN 18 02/20/2019    CREATININE 1.19 02/20/2019    GLUCOSE 42 (C) 02/20/2019    CALCIUM 9.1 02/20/2019    AST 20 02/20/2019    ALT 23 02/20/2019    ALKPHOS 54 02/20/2019    LABIL2 1.6 08/25/2016     Lab Results   Component Value Date    CKTOTAL 82 04/26/2018     Lab Results   Component Value Date    WBC 5.67 09/12/2018    HGB 12.7 (L) 09/12/2018    HCT 39.8 (L) 09/12/2018     09/12/2018     Lab Results   Component Value Date    INR 1.11 (H) 04/26/2018    INR 1.06 11/19/2017    INR 1.00 01/11/2016     Lab Results   Component Value Date    MG 1.9 12/17/2018     Lab Results   Component Value Date    TSH 6.850 (H) 02/20/2019    PSA 1.140 05/23/2017    CHLPL 133 11/28/2017    TRIG 29 02/20/2019    HDL 48 (L) 02/20/2019    LDL 78 02/20/2019      Lab Results   Component Value Date    BNP 26.0 04/26/2018             ECG 12 Lead  Date/Time: 2/25/2019 9:08 AM  Performed by: Natasha Guzman APRN  Authorized by: Guzman, Natasha, APRN   Comparison: compared with previous ECG   Similar to previous ECG  Comments: Atrial paced rhythm  LBBB              Assessment/Plan    Diagnosis Plan   1. ASCVD " (arteriosclerotic cardiovascular disease)     2. Essential hypertension     3. Hyperlipemia, mixed     4. Ischemic cardiomyopathy     5. Chronic systolic congestive heart failure (CMS/HCC)     6. Ventricular tachycardia (paroxysmal) (CMS/HCC)     7. Type 2 diabetes mellitus with hyperglycemia, with long-term current use of insulin (CMS/HCC)                  Recommendations:    1. Continue with low dose aspirin, atorvastatin, carvedilol, lisinopril, and spironolactone.     2. Continue follow up with EP as scheduled    3. I have advised him to avoid excess sodium in his diet. We have discussed food choices today.    4. Follow up in 6 months and PRN.       Return in about 6 months (around 8/25/2019) for Recheck.    As always, I appreciate very much the opportunity to participate in the cardiovascular care of your patients.      With Best Regards,    ANEUDY Gonzalez

## 2019-03-01 ENCOUNTER — OFFICE VISIT (OUTPATIENT)
Dept: CARDIOLOGY | Facility: CLINIC | Age: 74
End: 2019-03-01

## 2019-03-01 VITALS
BODY MASS INDEX: 28.29 KG/M2 | WEIGHT: 191 LBS | DIASTOLIC BLOOD PRESSURE: 71 MMHG | HEIGHT: 69 IN | HEART RATE: 70 BPM | SYSTOLIC BLOOD PRESSURE: 157 MMHG

## 2019-03-01 DIAGNOSIS — I25.5 ISCHEMIC CARDIOMYOPATHY: ICD-10-CM

## 2019-03-01 PROCEDURE — 99214 OFFICE O/P EST MOD 30 MIN: CPT | Performed by: PHYSICIAN ASSISTANT

## 2019-03-01 PROCEDURE — 93289 INTERROG DEVICE EVAL HEART: CPT | Performed by: PHYSICIAN ASSISTANT

## 2019-03-01 RX ORDER — CARVEDILOL 12.5 MG/1
12.5 TABLET ORAL 2 TIMES DAILY
Qty: 60 TABLET | Refills: 11 | Status: SHIPPED | OUTPATIENT
Start: 2019-03-01 | End: 2020-01-14 | Stop reason: SDUPTHER

## 2019-03-01 NOTE — PROGRESS NOTES
Shai Mata  1945  PCP: Ekaterina Cox APRN    SUBJECTIVE:   Shai Mata is a 73 y.o. male seen for a follow up visit regarding the following:     Chief Complaint: Follow up for VT, CHF, PVCs     HPI:    Since last visit the patient's status has been stable. He reports no heart failure or anginal symptoms. Overall feels very well and has remained active.       Cardiac PMH: (Old records have been reviewed and summarized below)    1. PVCs                        A. 24 Hour Holter 4/13/16: 47-99 bpm, Average 71 bpm, frequent PVCs 32%, several runs 4 beat runs NSVT                        B. 24 Hour Holter 5/10/16: 48-81 bpm, Average 65 bpm, frequent PVCs 26% burden                        B. 24 Hour Holter Monitor 8/10/17: HR 42-83, Average 62 bpm, frequent PVCs with bigeminy, couplets, and triplets (11.8% burden)                         C. Echocardiogram 8/17/17: EF 36-40%, myxomatous changes of MV with mild to moderate MR                        E. RFA of PVC 11/20/17  2. VT                        A. Inducible VT by EPS 11/20/17                        B. BiV ICD implant 11/20/17 [facility]   3. CHF                       A. Echo 11/20/17 LVEF 35%                       B. Echo  04/26/18 LVEF 45%  4. CAD                        A. CABG x 5 2006                        B. Echocardiogram 1/11/16: EF 45-50%, mild to moderate MR                        C. Stress Test 1/13/16: small reversible defect inferolateral and inferior segments, EF 35%  5. LBBB  6. HTN  7. DM2  8. HLP  9. Surgical History                        A. CABG                         B. Cataracts            Current Outpatient Medications:   •  amiodarone (PACERONE) 200 MG tablet, Take 0.5 tablets by mouth Daily., Disp: 45 tablet, Rfl: 1  •  aspirin 81 MG EC tablet, Take 81 mg by mouth 2 (Two) Times a Day., Disp: , Rfl:   •  atorvastatin (LIPITOR) 80 MG tablet, TAKE ONE TABLET BY MOUTH ONCE DAILY, Disp: 30 tablet, Rfl: 3  •  insulin NPH-insulin regular (Novolin  70/30) (70-30) 100 UNIT/ML injection, Inject 26 Units under the skin into the appropriate area as directed 2 (Two) Times a Day With Meals., Disp: , Rfl:   •  Insulin Pen Needle (PEN NEEDLES) 31G X 6 MM misc, 10 Units Every Night., Disp: 50 each, Rfl: 2  •  levothyroxine (SYNTHROID, LEVOTHROID) 75 MCG tablet, Take 1 tablet by mouth Daily. (Patient taking differently: Take 50 mcg by mouth Daily.), Disp: 30 tablet, Rfl: 5  •  lisinopril (PRINIVIL,ZESTRIL) 10 MG tablet, Take 1 tablet by mouth 2 (Two) Times a Day., Disp: 180 tablet, Rfl: 1  •  spironolactone (ALDACTONE) 25 MG tablet, Take 1 tablet by mouth Daily., Disp: 90 tablet, Rfl: 1  •  carvedilol (COREG) 12.5 MG tablet, Take 1 tablet by mouth 2 (Two) Times a Day., Disp: 60 tablet, Rfl: 11    Past Medical History, Past Surgical History, Family history, Social History, and Medications were all reviewed with the patient today and updated as necessary.       Patient Active Problem List   Diagnosis   • ASCVD (arteriosclerotic cardiovascular disease)   • S/P CABG x 5   • HTN (hypertension)   • PVC's (premature ventricular contractions)   • Type 2 diabetes mellitus with hyperglycemia, with long-term current use of insulin (CMS/HCC)   • Hyperlipemia, mixed   • Ischemic cardiomyopathy   • Pain of upper abdomen   • Cholecystitis   • Chronic systolic congestive heart failure (CMS/HCC)   • Ventricular tachycardia (paroxysmal) (CMS/HCC)   • Long term current use of antiarrhythmic medical therapy     No Known Allergies  Past Medical History:   Diagnosis Date   • Abnormal heart rhythm    • ASCVD (arteriosclerotic cardiovascular disease)     s/p CABG in 2006 (5) [facility]   • CHF (congestive heart failure) (CMS/HCC)    • Chicken pox    • Congestive heart failure (CMS/HCC)    • Coronary artery disease    • Diverticulitis    • DMII (diabetes mellitus, type 2) (CMS/HCC)     type 2, checks fsbg as needed    • Dyslipidemia    • HTN (hypertension)    • Hx of myocardial  "infarction, greater than 8 weeks    • Hyperlipidemia    • Measles    • Mitral regurgitation     MILD TO MODERATE   • Mumps    • PVC (premature ventricular contraction)    • SOB (shortness of breath)    • Vitamin D deficiency    • Wears reading eyeglasses      Past Surgical History:   Procedure Laterality Date   • CARDIAC DEFIBRILLATOR PLACEMENT     • CARDIAC ELECTROPHYSIOLOGY PROCEDURE N/A 2017    Procedure: EP/Ablation PVC +/- ICD Implant;  Surgeon: Jose Maldonado MD;  Location:  [facility];  Service:    • CARDIAC ELECTROPHYSIOLOGY PROCEDURE N/A 2017    Procedure: Biventricular Device Insertion;  Surgeon: Jose Maldonado MD;  Location:  [facility];  Service:    • CATARACT EXTRACTION Bilateral    • CORONARY ANGIOPLASTY     • CORONARY ARTERY BYPASS GRAFT  2006    X 5.   [facility]   • PACEMAKER IMPLANTATION     • NV LAP,CHOLECYSTECTOMY N/A 2018    Procedure: CHOLECYSTECTOMY LAPAROSCOPIC;  Surgeon: Eber Hummle MD;  Location:  [facility] OR;  Service: General     Family History   Problem Relation Age of Onset   • Heart disease Mother    • Heart disease Father    • Cancer Sister    • Cancer Sister    • Cancer Sister      Social History     Tobacco Use   • Smoking status: Former Smoker     Packs/day: 2.00     Types: Cigarettes     Last attempt to quit:      Years since quittin.1   • Smokeless tobacco: Former User     Types: Chew     Quit date:    Substance Use Topics   • Alcohol use: No         PHYSICAL EXAM:    /71 (BP Location: Right arm, Patient Position: Sitting)   Pulse 70   Ht 175.3 cm (69\")   Wt 86.6 kg (191 lb)   BMI 28.21 kg/m²        Wt Readings from Last 5 Encounters:   19 86.6 kg (191 lb)   19 85.8 kg (189 lb 3.2 oz)   18 83 kg (183 lb)   18 80.8 kg (178 lb 3.2 oz)   18 80.1 kg (176 lb 9.6 oz)       BP Readings from Last 5 Encounters:   19 157/71   19 141/73   18 " 126/76   08/28/18 122/74   08/27/18 156/73       General-Well Nourished, Well developed  Eyes - PERRLA  Neck- supple, No mass  CV- regular rate and rhythm, no MRG, No edema  Lung- clear bilaterally  Abd- soft, +BS  Musc/skel - Norm strength and range of motion  Skin- warm and dry  Neuro - Alert & Oriented x 3, appropriate mood.        Medical problems and test results were reviewed with the patient today.     Recent Results (from the past 672 hour(s))   Comprehensive Metabolic Panel    Collection Time: 02/20/19  8:32 AM   Result Value Ref Range    Glucose 42 (C) 70 - 110 mg/dL    BUN 18 7 - 21 mg/dL    Creatinine 1.19 0.43 - 1.29 mg/dL    Sodium 142 135 - 153 mmol/L    Potassium 4.4 3.5 - 5.3 mmol/L    Chloride 110 99 - 112 mmol/L    CO2 28.3 24.3 - 31.9 mmol/L    Calcium 9.1 7.7 - 10.0 mg/dL    Total Protein 6.6 6.0 - 8.0 g/dL    Albumin 4.20 3.40 - 4.80 g/dL    ALT (SGPT) 23 10 - 44 U/L    AST (SGOT) 20 10 - 34 U/L    Alkaline Phosphatase 54 40 - 129 U/L    Total Bilirubin 0.5 0.2 - 1.8 mg/dL    eGFR Non African Amer 60 (L) >60 mL/min/1.73    Globulin 2.4 gm/dL    A/G Ratio 1.8 1.5 - 2.5 g/dL    BUN/Creatinine Ratio 15.1 7.0 - 25.0    Anion Gap 3.7 3.6 - 11.2 mmol/L   Hemoglobin A1c    Collection Time: 02/20/19  8:32 AM   Result Value Ref Range    Hemoglobin A1C 8.40 (H) 4.50 - 5.70 %   Lipid Panel    Collection Time: 02/20/19  8:32 AM   Result Value Ref Range    Total Cholesterol 132 0 - 200 mg/dL    Triglycerides 29 0 - 150 mg/dL    HDL Cholesterol 48 (L) 60 - 100 mg/dL    LDL Cholesterol  78 0 - 100 mg/dL    VLDL Cholesterol 5.8 mg/dL    LDL/HDL Ratio 1.63    TSH    Collection Time: 02/20/19  8:32 AM   Result Value Ref Range    TSH 6.850 (H) 0.550 - 4.780 mIU/mL   Vitamin B12    Collection Time: 02/20/19  8:32 AM   Result Value Ref Range    Vitamin B-12 865 211 - 911 pg/mL   Osmolality, Calculated    Collection Time: 02/20/19  8:32 AM   Result Value Ref Range    Osmolality Calc 281.9 273.0 - 305.0 mOsm/kg          EKG: (EKG has been independently visualized by me and summarized below)    Procedures     Device: SJM BIV ICD BIV paced 91%; stable threshold and impedences. No events. 3% pVCs. Battery 78%     ASSESSMENT and PLAN    1) VT - on amiodarone. No recurrence. Continue Amio 100mg daily.   2) CHF s/p BiV ICD: class CHF II on meds. EF has improved to 45%. Continue optimal rx. Normal device interrogation.   3) HTN- elevated. Increase Coreg to 12.5mg BID   4) CAD-stable w/ no anginal symptoms. Continue current regimen   5) Use of Amio - CMP stable 2/20/19; TSH elevated- synthroid dose increased. Followed by PCP.           Return in about 6 months (around 9/1/2019).        Myrna Aj PA-C  Cardiology/Electrophysiology  3/1/2019  1:47 PM

## 2019-03-18 ENCOUNTER — RESULTS ENCOUNTER (OUTPATIENT)
Dept: FAMILY MEDICINE CLINIC | Facility: CLINIC | Age: 74
End: 2019-03-18

## 2019-03-18 ENCOUNTER — OFFICE VISIT (OUTPATIENT)
Dept: FAMILY MEDICINE CLINIC | Facility: CLINIC | Age: 74
End: 2019-03-18

## 2019-03-18 VITALS
HEART RATE: 72 BPM | OXYGEN SATURATION: 98 % | HEIGHT: 69 IN | SYSTOLIC BLOOD PRESSURE: 110 MMHG | TEMPERATURE: 98.3 F | WEIGHT: 188.6 LBS | DIASTOLIC BLOOD PRESSURE: 65 MMHG | BODY MASS INDEX: 27.93 KG/M2

## 2019-03-18 DIAGNOSIS — Z00.00 MEDICARE ANNUAL WELLNESS VISIT, SUBSEQUENT: Primary | ICD-10-CM

## 2019-03-18 DIAGNOSIS — I10 ESSENTIAL HYPERTENSION: ICD-10-CM

## 2019-03-18 DIAGNOSIS — Z12.11 SCREENING FOR COLON CANCER: ICD-10-CM

## 2019-03-18 DIAGNOSIS — Z00.00 MEDICARE ANNUAL WELLNESS VISIT, SUBSEQUENT: ICD-10-CM

## 2019-03-18 PROCEDURE — G0439 PPPS, SUBSEQ VISIT: HCPCS | Performed by: NURSE PRACTITIONER

## 2019-03-18 RX ORDER — SPIRONOLACTONE 25 MG/1
25 TABLET ORAL DAILY
Qty: 90 TABLET | Refills: 1 | Status: SHIPPED | OUTPATIENT
Start: 2019-03-18 | End: 2019-09-26 | Stop reason: SDUPTHER

## 2019-03-18 RX ORDER — LISINOPRIL 10 MG/1
10 TABLET ORAL 2 TIMES DAILY
Qty: 180 TABLET | Refills: 1 | Status: SHIPPED | OUTPATIENT
Start: 2019-03-18 | End: 2019-09-26 | Stop reason: SDUPTHER

## 2019-03-18 NOTE — PROGRESS NOTES
QUICK REFERENCE INFORMATION:  The ABCs of the Annual Wellness Visit    Subsequent Medicare Wellness Visit    HEALTH RISK ASSESSMENT    1945    Recent Hospitalizations:  No hospitalization(s) within the last year..        Current Medical Providers:  Patient Care Team:  Ekaterina Cox APRN as PCP - General  Ekaterina Cox APRN as PCP - Family Medicine  Aleksey Marsh MD as PCP - Claims Attributed        Smoking Status:  Social History     Tobacco Use   Smoking Status Former Smoker   • Packs/day: 2.00   • Types: Cigarettes   • Last attempt to quit:    • Years since quittin.2   Smokeless Tobacco Former User   • Types: Chew   • Quit date:        Alcohol Consumption:  Social History     Substance and Sexual Activity   Alcohol Use No       Depression Screen:   PHQ-2/PHQ-9 Depression Screening 3/18/2019   Little interest or pleasure in doing things 0   Feeling down, depressed, or hopeless 0   Total Score 0       Health Habits and Functional and Cognitive Screening:  Functional & Cognitive Status 3/18/2019   Do you have difficulty preparing food and eating? No   Do you have difficulty bathing yourself, getting dressed or grooming yourself? No   Do you have difficulty using the toilet? No   Do you have difficulty moving around from place to place? No   Do you have trouble with steps or getting out of a bed or a chair? No   In the past year have you fallen or experienced a near fall? No   Current Diet Well Balanced Diet   Dental Exam Up to date   Eye Exam Up to date   Exercise (times per week) 7 times per week   Current Exercise Activities Include Housecleaning   Do you need help using the phone?  No   Are you deaf or do you have serious difficulty hearing?  No   Do you need help with transportation? No   Do you need help shopping? No   Do you need help preparing meals?  No   Do you need help with housework?  No   Do you need help with laundry? No   Do you need help taking your medications? No   Do you need help  managing money? No   Do you ever drive or ride in a car without wearing a seat belt? No   Have you felt unusual stress, anger or loneliness in the last month? No   Who do you live with? Alone   If you need help, do you have trouble finding someone available to you? No   Have you been bothered in the last four weeks by sexual problems? No   Do you have difficulty concentrating, remembering or making decisions? No           Does the patient have evidence of cognitive impairment? No    Aspirin use counseling: Taking ASA appropriately as indicated      Recent Lab Results:  CMP:  Lab Results   Component Value Date     08/25/2016    BUN 18 02/20/2019    CREATININE 1.19 02/20/2019    EGFRIFNONA 60 (L) 02/20/2019    EGFRIFAFRI 74 08/25/2016    BCR 15.1 02/20/2019     02/20/2019    K 4.4 02/20/2019    CO2 28.3 02/20/2019    CALCIUM 9.1 02/20/2019    PROTENTOTREF 6.7 08/25/2016    ALBUMIN 4.20 02/20/2019    LABGLOBREF 2.6 08/25/2016    LABIL2 1.6 08/25/2016    BILITOT 0.5 02/20/2019    ALKPHOS 54 02/20/2019    AST 20 02/20/2019    ALT 23 02/20/2019     Lipid Panel:  Lab Results   Component Value Date    CHOL 132 02/20/2019    TRIG 29 02/20/2019    HDL 48 (L) 02/20/2019    VLDL 5.8 02/20/2019    LDLHDL 1.63 02/20/2019     HbA1c:  Lab Results   Component Value Date    HGBA1C 8.40 (H) 02/20/2019       Visual Acuity:  Vision Screening Comments: Deferred; recent eye exam      Age-appropriate Screening Schedule:  Refer to the list below for future screening recommendations based on patient's age, sex and/or medical conditions. Orders for these recommended tests are listed in the plan section. The patient has been provided with a written plan.    Health Maintenance   Topic Date Due   • ZOSTER VACCINE (2 of 2) 07/18/2017   • PNEUMOCOCCAL VACCINES (65+ LOW/MEDIUM RISK) (2 of 2 - PPSV23) 03/18/2019 (Originally 5/23/2018)   • HEMOGLOBIN A1C  08/20/2019   • URINE MICROALBUMIN  09/12/2019   • DIABETIC EYE EXAM  12/12/2019   •  DIABETIC FOOT EXAM  12/17/2019   • LIPID PANEL  02/20/2020   • COLONOSCOPY  11/15/2026   • TDAP/TD VACCINES (2 - Td) 05/23/2027   • INFLUENZA VACCINE  Addressed        Subjective   History of Present Illness    Shai Mata is a 73 y.o. male who presents for an Subsequent Wellness Visit.    The following portions of the patient's history were reviewed and updated as appropriate: allergies, current medications, past family history, past medical history, past social history, past surgical history and problem list.    Outpatient Medications Prior to Visit   Medication Sig Dispense Refill   • amiodarone (PACERONE) 200 MG tablet Take 0.5 tablets by mouth Daily. 45 tablet 1   • aspirin 81 MG EC tablet Take 81 mg by mouth 2 (Two) Times a Day.     • atorvastatin (LIPITOR) 80 MG tablet TAKE ONE TABLET BY MOUTH ONCE DAILY 30 tablet 3   • carvedilol (COREG) 12.5 MG tablet Take 1 tablet by mouth 2 (Two) Times a Day. 60 tablet 11   • insulin NPH-insulin regular (Novolin 70/30) (70-30) 100 UNIT/ML injection Inject 26 Units under the skin into the appropriate area as directed 2 (Two) Times a Day With Meals.     • levothyroxine (SYNTHROID, LEVOTHROID) 75 MCG tablet Take 1 tablet by mouth Daily. (Patient taking differently: Take 50 mcg by mouth Daily.) 30 tablet 5   • lisinopril (PRINIVIL,ZESTRIL) 10 MG tablet Take 1 tablet by mouth 2 (Two) Times a Day. 180 tablet 1   • spironolactone (ALDACTONE) 25 MG tablet Take 1 tablet by mouth Daily. 90 tablet 1   • Insulin Pen Needle (PEN NEEDLES) 31G X 6 MM misc 10 Units Every Night. 50 each 2     No facility-administered medications prior to visit.        Patient Active Problem List   Diagnosis   • ASCVD (arteriosclerotic cardiovascular disease)   • S/P CABG x 5   • HTN (hypertension)   • PVC's (premature ventricular contractions)   • Type 2 diabetes mellitus with hyperglycemia, with long-term current use of insulin (CMS/HCC)   • Hyperlipemia, mixed   • Ischemic cardiomyopathy   • Pain of  upper abdomen   • Cholecystitis   • Chronic systolic congestive heart failure (CMS/HCC)   • Ventricular tachycardia (paroxysmal) (CMS/HCC)   • Long term current use of antiarrhythmic medical therapy       Advance Care Planning:  has NO advance directive - not interested in additional information    Identification of Risk Factors:  Risk factors include: weight , unhealthy diet, cardiovascular risk, increased fall risk, inadequate social support, isolation, vision limitations and hearing limitations.    Review of Systems   Constitutional: Negative.    HENT: Negative.    Respiratory: Negative.    Cardiovascular: Negative.    Gastrointestinal: Negative.    Musculoskeletal: Negative.    Skin: Negative.    Psychiatric/Behavioral: Negative.    All other systems reviewed and are negative.      Compared to one year ago, the patient feels his physical health is better.  Compared to one year ago, the patient feels his mental health is the same.    Objective     Physical Exam   Constitutional: He is oriented to person, place, and time. He appears well-developed and well-nourished. No distress.   HENT:   Head: Normocephalic.   Eyes: Conjunctivae are normal. Right eye exhibits no discharge. Left eye exhibits no discharge.   Neck: Neck supple.   Cardiovascular: Normal rate, regular rhythm and normal heart sounds.   No murmur heard.  Pulmonary/Chest: Effort normal and breath sounds normal. No respiratory distress.   Abdominal: Soft. Bowel sounds are normal.    Shai had a diabetic foot exam performed today.   During the foot exam he had a monofilament test performed.    Neurological Sensory Findings - Unaltered hot/cold right ankle/foot discrimination and unaltered hot/cold left ankle/foot discrimination. Unaltered sharp/dull right ankle/foot discrimination and unaltered sharp/dull left ankle/foot discrimination. No right ankle/foot altered proprioception and no left ankle/foot altered proprioception  Vascular Status -  His right  "foot exhibits normal foot vasculature  and no edema. His left foot exhibits normal foot vasculature  and no edema.  Skin Integrity  -  His right foot skin is intact.His left foot skin is intact..  Neurological: He is alert and oriented to person, place, and time.   Skin: Skin is warm and dry. He is not diaphoretic.   Psychiatric: He has a normal mood and affect. His behavior is normal.   Nursing note and vitals reviewed.      Vitals:    03/18/19 1016   BP: 110/65   Pulse: 72   Temp: 98.3 °F (36.8 °C)   TempSrc: Oral   SpO2: 98%   Weight: 85.5 kg (188 lb 9.6 oz)   Height: 175.3 cm (69\")       Patient's Body mass index is 27.85 kg/m². BMI is above normal parameters. Recommendations include: educational material, exercise counseling and nutrition counseling.      Assessment/Plan   Patient Self-Management and Personalized Health Advice  The patient has been provided with information about: diet, exercise, weight management, prevention of cardiac or vascular disease, fall prevention and designing advance directives and preventive services including:   · Colorectal cancer screening, cologuard test ordered, Counseling for cardiovascular disease risk reduction, Exercise counseling provided, Fall Risk assessment done, Nutrition counseling provided.    Visit Diagnoses:    ICD-10-CM ICD-9-CM   1. Medicare annual wellness visit, subsequent Z00.00 V70.0   2. Essential hypertension I10 401.9   3. Screening for colon cancer Z12.11 V76.51       Orders Placed This Encounter   Procedures   • Cologuard - Stool, Per Rectum     Standing Status:   Future     Standing Expiration Date:   3/18/2020       Outpatient Encounter Medications as of 3/18/2019   Medication Sig Dispense Refill   • amiodarone (PACERONE) 200 MG tablet Take 0.5 tablets by mouth Daily. 45 tablet 1   • aspirin 81 MG EC tablet Take 81 mg by mouth 2 (Two) Times a Day.     • atorvastatin (LIPITOR) 80 MG tablet TAKE ONE TABLET BY MOUTH ONCE DAILY 30 tablet 3   • carvedilol " (COREG) 12.5 MG tablet Take 1 tablet by mouth 2 (Two) Times a Day. 60 tablet 11   • insulin NPH-insulin regular (Novolin 70/30) (70-30) 100 UNIT/ML injection Inject 26 Units under the skin into the appropriate area as directed 2 (Two) Times a Day With Meals.     • levothyroxine (SYNTHROID, LEVOTHROID) 75 MCG tablet Take 1 tablet by mouth Daily. (Patient taking differently: Take 50 mcg by mouth Daily.) 30 tablet 5   • lisinopril (PRINIVIL,ZESTRIL) 10 MG tablet Take 1 tablet by mouth 2 (Two) Times a Day. 180 tablet 1   • spironolactone (ALDACTONE) 25 MG tablet Take 1 tablet by mouth Daily. 90 tablet 1   • [DISCONTINUED] lisinopril (PRINIVIL,ZESTRIL) 10 MG tablet Take 1 tablet by mouth 2 (Two) Times a Day. 180 tablet 1   • [DISCONTINUED] spironolactone (ALDACTONE) 25 MG tablet Take 1 tablet by mouth Daily. 90 tablet 1   • Insulin Pen Needle (PEN NEEDLES) 31G X 6 MM misc 10 Units Every Night. 50 each 2     No facility-administered encounter medications on file as of 3/18/2019.        Reviewed use of high risk medication in the elderly: yes  Reviewed for potential of harmful drug interactions in the elderly: yes    Follow Up:  Return in about 3 months (around 6/18/2019).     An After Visit Summary and PPPS with all of these plans were given to the patient.

## 2019-04-19 ENCOUNTER — TELEPHONE (OUTPATIENT)
Dept: CARDIOLOGY | Facility: CLINIC | Age: 74
End: 2019-04-19

## 2019-04-20 ENCOUNTER — CLINICAL SUPPORT NO REQUIREMENTS (OUTPATIENT)
Dept: CARDIOLOGY | Facility: CLINIC | Age: 74
End: 2019-04-20

## 2019-04-20 DIAGNOSIS — I50.9 CHRONIC CONGESTIVE HEART FAILURE, UNSPECIFIED HEART FAILURE TYPE (HCC): ICD-10-CM

## 2019-04-20 DIAGNOSIS — I25.5 ISCHEMIC CARDIOMYOPATHY: Primary | ICD-10-CM

## 2019-04-20 DIAGNOSIS — I47.29 VENTRICULAR TACHYCARDIA (PAROXYSMAL) (HCC): ICD-10-CM

## 2019-04-20 PROCEDURE — 93296 REM INTERROG EVL PM/IDS: CPT | Performed by: INTERNAL MEDICINE

## 2019-04-20 PROCEDURE — 93295 DEV INTERROG REMOTE 1/2/MLT: CPT | Performed by: INTERNAL MEDICINE

## 2019-05-07 RX ORDER — ATORVASTATIN CALCIUM 80 MG/1
TABLET, FILM COATED ORAL
Qty: 30 TABLET | Refills: 3 | Status: SHIPPED | OUTPATIENT
Start: 2019-05-07 | End: 2019-06-18 | Stop reason: SDUPTHER

## 2019-05-17 NOTE — PAT
Patient to apply Chlorhexadine wipes  to surgical area (as instructed) the night before procedure and the AM of procedure. Wipes provided.     [Dear  ___] : Dear ~VARUN, [Please see my note below.] : Please see my note below. [Consult Letter:] : I had the pleasure of evaluating your patient, [unfilled]. [Sincerely,] : Sincerely, [Consult Closing:] : Thank you very much for allowing me to participate in the care of this patient.  If you have any questions, please do not hesitate to contact me. [FreeTextEntry3] : Anai [FreeTextEntry2] : Vipin Agrawal MD\[place] [address]. [address]\[place] [address]

## 2019-06-18 ENCOUNTER — OFFICE VISIT (OUTPATIENT)
Dept: FAMILY MEDICINE CLINIC | Facility: CLINIC | Age: 74
End: 2019-06-18

## 2019-06-18 VITALS
HEIGHT: 69 IN | TEMPERATURE: 98.6 F | WEIGHT: 180.2 LBS | SYSTOLIC BLOOD PRESSURE: 130 MMHG | HEART RATE: 72 BPM | BODY MASS INDEX: 26.69 KG/M2 | OXYGEN SATURATION: 99 % | DIASTOLIC BLOOD PRESSURE: 68 MMHG

## 2019-06-18 DIAGNOSIS — Z79.4 TYPE 2 DIABETES MELLITUS WITH HYPERGLYCEMIA, WITH LONG-TERM CURRENT USE OF INSULIN (HCC): Primary | ICD-10-CM

## 2019-06-18 DIAGNOSIS — E78.2 HYPERLIPEMIA, MIXED: ICD-10-CM

## 2019-06-18 DIAGNOSIS — E55.9 VITAMIN D DEFICIENCY: ICD-10-CM

## 2019-06-18 DIAGNOSIS — I10 ESSENTIAL HYPERTENSION: ICD-10-CM

## 2019-06-18 DIAGNOSIS — E03.8 OTHER SPECIFIED HYPOTHYROIDISM: ICD-10-CM

## 2019-06-18 DIAGNOSIS — E11.65 TYPE 2 DIABETES MELLITUS WITH HYPERGLYCEMIA, WITH LONG-TERM CURRENT USE OF INSULIN (HCC): Primary | ICD-10-CM

## 2019-06-18 LAB
25(OH)D3 SERPL-MCNC: 19.7 NG/ML (ref 30–100)
ALBUMIN SERPL-MCNC: 4.1 G/DL (ref 3.5–5.2)
ALBUMIN/GLOB SERPL: 1.7 G/DL
ALP SERPL-CCNC: 56 U/L (ref 39–117)
ALT SERPL W P-5'-P-CCNC: 18 U/L (ref 1–41)
ANION GAP SERPL CALCULATED.3IONS-SCNC: 10.6 MMOL/L
AST SERPL-CCNC: 20 U/L (ref 1–40)
BILIRUB SERPL-MCNC: 0.6 MG/DL (ref 0.2–1.2)
BUN BLD-MCNC: 14 MG/DL (ref 8–23)
BUN/CREAT SERPL: 12.6 (ref 7–25)
CALCIUM SPEC-SCNC: 9.2 MG/DL (ref 8.6–10.5)
CHLORIDE SERPL-SCNC: 105 MMOL/L (ref 98–107)
CHOLEST SERPL-MCNC: 124 MG/DL (ref 0–200)
CO2 SERPL-SCNC: 25.4 MMOL/L (ref 22–29)
CREAT BLD-MCNC: 1.11 MG/DL (ref 0.76–1.27)
GFR SERPL CREATININE-BSD FRML MDRD: 65 ML/MIN/1.73
GLOBULIN UR ELPH-MCNC: 2.4 GM/DL
GLUCOSE BLD-MCNC: 113 MG/DL (ref 65–99)
HBA1C MFR BLD: 7.77 % (ref 4.8–5.6)
HDLC SERPL-MCNC: 48 MG/DL (ref 40–60)
LDLC SERPL CALC-MCNC: 68 MG/DL (ref 0–100)
LDLC/HDLC SERPL: 1.41 {RATIO}
MAGNESIUM SERPL-MCNC: 1.9 MG/DL (ref 1.6–2.4)
POTASSIUM BLD-SCNC: 4.7 MMOL/L (ref 3.5–5.2)
PROT SERPL-MCNC: 6.5 G/DL (ref 6–8.5)
SODIUM BLD-SCNC: 141 MMOL/L (ref 136–145)
TRIGL SERPL-MCNC: 41 MG/DL (ref 0–150)
TSH SERPL DL<=0.05 MIU/L-ACNC: 3.11 MIU/ML (ref 0.27–4.2)
VIT B12 BLD-MCNC: 995 PG/ML (ref 211–946)
VLDLC SERPL-MCNC: 8.2 MG/DL (ref 5–40)

## 2019-06-18 PROCEDURE — 82607 VITAMIN B-12: CPT | Performed by: NURSE PRACTITIONER

## 2019-06-18 PROCEDURE — 83735 ASSAY OF MAGNESIUM: CPT | Performed by: NURSE PRACTITIONER

## 2019-06-18 PROCEDURE — 80053 COMPREHEN METABOLIC PANEL: CPT | Performed by: NURSE PRACTITIONER

## 2019-06-18 PROCEDURE — 84443 ASSAY THYROID STIM HORMONE: CPT | Performed by: NURSE PRACTITIONER

## 2019-06-18 PROCEDURE — 83036 HEMOGLOBIN GLYCOSYLATED A1C: CPT | Performed by: NURSE PRACTITIONER

## 2019-06-18 PROCEDURE — 82306 VITAMIN D 25 HYDROXY: CPT | Performed by: NURSE PRACTITIONER

## 2019-06-18 PROCEDURE — 80061 LIPID PANEL: CPT | Performed by: NURSE PRACTITIONER

## 2019-06-18 PROCEDURE — 99214 OFFICE O/P EST MOD 30 MIN: CPT | Performed by: NURSE PRACTITIONER

## 2019-06-18 RX ORDER — ATORVASTATIN CALCIUM 80 MG/1
80 TABLET, FILM COATED ORAL DAILY
Qty: 90 TABLET | Refills: 3 | Status: SHIPPED | OUTPATIENT
Start: 2019-06-18 | End: 2020-09-11 | Stop reason: SDUPTHER

## 2019-06-18 RX ORDER — AMIODARONE HYDROCHLORIDE 200 MG/1
100 TABLET ORAL DAILY
Qty: 45 TABLET | Refills: 1 | Status: SHIPPED | OUTPATIENT
Start: 2019-06-18 | End: 2020-02-17 | Stop reason: SDUPTHER

## 2019-06-18 RX ORDER — LEVOTHYROXINE SODIUM 0.05 MG/1
50 TABLET ORAL DAILY
Qty: 90 TABLET | Refills: 1 | Status: SHIPPED | OUTPATIENT
Start: 2019-06-18 | End: 2019-12-30

## 2019-06-18 RX ORDER — LEVOTHYROXINE SODIUM 0.07 MG/1
75 TABLET ORAL DAILY
Qty: 90 TABLET | Refills: 3 | Status: SHIPPED | OUTPATIENT
Start: 2019-06-18 | End: 2019-06-18 | Stop reason: DRUGHIGH

## 2019-06-18 RX ORDER — PEN NEEDLE, DIABETIC 31 G X1/4"
10 NEEDLE, DISPOSABLE MISCELLANEOUS NIGHTLY
Qty: 50 EACH | Refills: 2 | Status: SHIPPED | OUTPATIENT
Start: 2019-06-18 | End: 2020-01-14 | Stop reason: SDUPTHER

## 2019-06-18 NOTE — PROGRESS NOTES
Subjective   Shai Mata is a 74 y.o. male.     Diabetes   He presents for his follow-up diabetic visit. He has type 2 diabetes mellitus. His disease course has been stable. There are no hypoglycemic associated symptoms. Pertinent negatives for hypoglycemia include no confusion, dizziness, headaches, hunger, mood changes, nervousness/anxiousness, pallor, seizures, sleepiness, speech difficulty, sweats or tremors. There are no diabetic associated symptoms. Pertinent negatives for diabetes include no blurred vision, no chest pain, no fatigue, no foot paresthesias, no foot ulcerations, no polydipsia, no polyphagia, no polyuria, no visual change, no weakness and no weight loss. There are no hypoglycemic complications. Symptoms are stable. There are no diabetic complications. Risk factors for coronary artery disease include sedentary lifestyle, male sex, hypertension, family history and dyslipidemia. Current diabetic treatment includes insulin injections and oral agent (dual therapy). He is compliant with treatment all of the time. His weight is stable. He is following a generally healthy diet. Meal planning includes avoidance of concentrated sweets and carbohydrate counting. He participates in exercise intermittently. His home blood glucose trend is fluctuating minimally. His breakfast blood glucose range is generally  mg/dl. His overall blood glucose range is 140-180 mg/dl. An ACE inhibitor/angiotensin II receptor blocker is being taken. He does not see a podiatrist.  Hypertension   This is a chronic (With Known CAD post CABG x 5, cardiomyopathy, and defibrillator. Following closely with cardiology) problem. The current episode started more than 1 year ago. Progression since onset: Denies any recent concerns.  Feels he is doing well. The problem is controlled. Pertinent negatives include no anxiety, blurred vision, chest pain, headaches, malaise/fatigue, neck pain, orthopnea, palpitations, peripheral edema,  PND, shortness of breath or sweats. Risk factors for coronary artery disease include dyslipidemia, family history and sedentary lifestyle. Current antihypertension treatment includes ACE inhibitors, beta blockers and diuretics. The current treatment provides significant improvement. There are no compliance problems.  Hypertensive end-organ damage includes CAD/MI. Identifiable causes of hypertension include a thyroid problem.   Hyperlipidemia   This is a chronic problem. The current episode started more than 1 year ago. Recent lipid tests were reviewed and are variable. Exacerbating diseases include diabetes. Pertinent negatives include no chest pain, focal sensory loss, focal weakness, leg pain, myalgias or shortness of breath. Current antihyperlipidemic treatment includes statins. The current treatment provides significant improvement of lipids. There are no compliance problems.  Risk factors for coronary artery disease include diabetes mellitus, family history, dyslipidemia, obesity and hypertension.   Thyroid Problem   Presents for follow-up (Known hypothyroidism) visit. Patient reports no anxiety, fatigue, palpitations, tremors, visual change or weight loss. The symptoms have been stable (tolerating synthroid). His past medical history is significant for diabetes and hyperlipidemia.      The following portions of the patient's history were reviewed and updated as appropriate: allergies, current medications, past family history, past medical history, past social history, past surgical history and problem list.      Review of Systems   Constitutional: Negative.  Negative for activity change, fatigue, malaise/fatigue and weight loss.        States overall he is feeling fairly well     HENT: Negative.    Eyes: Negative for blurred vision.   Respiratory: Negative.  Negative for shortness of breath.    Cardiovascular: Negative.  Negative for chest pain, palpitations, orthopnea, leg swelling and PND.    Gastrointestinal: Negative.    Endocrine: Negative.  Negative for polydipsia, polyphagia and polyuria.   Musculoskeletal: Negative.  Negative for myalgias and neck pain.   Skin: Negative.  Negative for pallor.   Neurological: Negative.  Negative for dizziness, tremors, focal weakness, seizures, speech difficulty, weakness and headaches.   Psychiatric/Behavioral: Negative.  Negative for confusion. The patient is not nervous/anxious.    All other systems reviewed and are negative.      Procedures    Objective   Physical Exam   Constitutional: He is oriented to person, place, and time. He appears well-developed and well-nourished. No distress.   HENT:   Head: Normocephalic.   Eyes: Conjunctivae are normal. Right eye exhibits no discharge. Left eye exhibits no discharge.   Neck: Neck supple.   Cardiovascular: Normal rate, regular rhythm, normal heart sounds and intact distal pulses.   No murmur heard.  Pulmonary/Chest: Effort normal and breath sounds normal. No respiratory distress. He has no wheezes.   Musculoskeletal: He exhibits no edema.    Shai had a diabetic foot exam performed today.   During the foot exam he had a monofilament test performed.    Neurological Sensory Findings - Unaltered hot/cold right ankle/foot discrimination and unaltered hot/cold left ankle/foot discrimination. Unaltered sharp/dull right ankle/foot discrimination and unaltered sharp/dull left ankle/foot discrimination. No right ankle/foot altered proprioception and no left ankle/foot altered proprioception  Vascular Status -  His right foot exhibits normal foot vasculature  and no edema. His left foot exhibits normal foot vasculature  and no edema.  Skin Integrity  -  His right foot skin is intact.His left foot skin is intact..  Neurological: He is alert and oriented to person, place, and time.   Skin: Skin is warm and dry. He is not diaphoretic.   Psychiatric: He has a normal mood and affect. His behavior is normal.   Nursing note and  vitals reviewed.      Assessment/Plan   Discussed with patient impression and plan, patient verbalizes understanding.  Continue with routine medications.       Fasting labs today       Continue with upcoming cardiology appt    Patient is a non smoker    Patient's Body mass index is 26.61 kg/m². BMI is within normal parameters. No follow-up required.      Shai was seen today for diabetes and follow-up.    Diagnoses and all orders for this visit:    Type 2 diabetes mellitus with hyperglycemia, with long-term current use of insulin (CMS/[facility])  -     Comprehensive Metabolic Panel; Future  -     Hemoglobin A1c; Future  -     Lipid Panel; Future  -     Magnesium; Future  -     TSH; Future  -     Vitamin B12; Future  -     Comprehensive Metabolic Panel  -     Hemoglobin A1c  -     Lipid Panel  -     Magnesium  -     TSH  -     Vitamin B12  -     insulin NPH-insulin regular (humuLIN 70/30,novoLIN 70/30) (70-30) 100 UNIT/ML injection; Inject 26 Units under the skin into the appropriate area as directed 2 (Two) Times a Day With Meals.  -     Insulin Pen Needle (PEN NEEDLES) 31G X 6 MM misc; 10 Units Every Night.    Essential hypertension  -     Comprehensive Metabolic Panel; Future  -     Lipid Panel; Future  -     TSH; Future  -     Comprehensive Metabolic Panel  -     Lipid Panel  -     TSH    Vitamin D deficiency  -     Vitamin D 25 Hydroxy; Future  -     Vitamin D 25 Hydroxy    Hyperlipemia, mixed  -     atorvastatin (LIPITOR) 80 MG tablet; Take 1 tablet by mouth Daily.    Other specified hypothyroidism  -     levothyroxine (SYNTHROID, LEVOTHROID) 75 MCG tablet; Take 1 tablet by mouth Daily.    Other orders  -     amiodarone (PACERONE) 200 MG tablet; Take 0.5 tablets by mouth Daily.

## 2019-06-19 ENCOUNTER — TELEPHONE (OUTPATIENT)
Dept: FAMILY MEDICINE CLINIC | Facility: CLINIC | Age: 74
End: 2019-06-19

## 2019-06-19 RX ORDER — ERGOCALCIFEROL 1.25 MG/1
50000 CAPSULE ORAL WEEKLY
Qty: 5 CAPSULE | Refills: 2 | Status: SHIPPED | OUTPATIENT
Start: 2019-06-19 | End: 2019-10-03 | Stop reason: SDUPTHER

## 2019-06-19 NOTE — TELEPHONE ENCOUNTER
----- Message from ANEUDY Carrizales sent at 6/19/2019 12:40 PM EDT -----  Labs look good except needs VIT D replacement  Sent to the pharmacy          Left message for patient to call back.

## 2019-06-20 RX ORDER — INSULIN ASPART 100 [IU]/ML
26 INJECTION, SUSPENSION SUBCUTANEOUS 2 TIMES DAILY WITH MEALS
Qty: 30 ML | Refills: 1 | Status: SHIPPED | OUTPATIENT
Start: 2019-06-20 | End: 2020-01-14 | Stop reason: SDUPTHER

## 2019-07-24 ENCOUNTER — LAB (OUTPATIENT)
Dept: FAMILY MEDICINE CLINIC | Facility: CLINIC | Age: 74
End: 2019-07-24

## 2019-07-24 DIAGNOSIS — N18.30 CKD (CHRONIC KIDNEY DISEASE), STAGE III (HCC): ICD-10-CM

## 2019-07-24 DIAGNOSIS — N18.30 CKD (CHRONIC KIDNEY DISEASE), STAGE III (HCC): Primary | ICD-10-CM

## 2019-07-24 PROCEDURE — 81003 URINALYSIS AUTO W/O SCOPE: CPT | Performed by: INTERNAL MEDICINE

## 2019-07-24 PROCEDURE — 80053 COMPREHEN METABOLIC PANEL: CPT | Performed by: NURSE PRACTITIONER

## 2019-07-24 PROCEDURE — 84156 ASSAY OF PROTEIN URINE: CPT | Performed by: INTERNAL MEDICINE

## 2019-07-24 PROCEDURE — 82570 ASSAY OF URINE CREATININE: CPT | Performed by: INTERNAL MEDICINE

## 2019-07-25 ENCOUNTER — TELEPHONE (OUTPATIENT)
Dept: FAMILY MEDICINE CLINIC | Facility: CLINIC | Age: 74
End: 2019-07-25

## 2019-07-25 LAB
ALBUMIN SERPL-MCNC: 4.2 G/DL (ref 3.5–5.2)
ALBUMIN/GLOB SERPL: 1.7 G/DL
ALP SERPL-CCNC: 57 U/L (ref 39–117)
ALT SERPL W P-5'-P-CCNC: 21 U/L (ref 1–41)
ANION GAP SERPL CALCULATED.3IONS-SCNC: 8 MMOL/L (ref 5–15)
AST SERPL-CCNC: 18 U/L (ref 1–40)
BILIRUB SERPL-MCNC: 0.5 MG/DL (ref 0.2–1.2)
BILIRUB UR QL STRIP: NEGATIVE
BUN BLD-MCNC: 15 MG/DL (ref 8–23)
BUN/CREAT SERPL: 11.5 (ref 7–25)
CALCIUM SPEC-SCNC: 9.6 MG/DL (ref 8.6–10.5)
CHLORIDE SERPL-SCNC: 105 MMOL/L (ref 98–107)
CLARITY UR: CLEAR
CO2 SERPL-SCNC: 27 MMOL/L (ref 22–29)
COLOR UR: YELLOW
CREAT BLD-MCNC: 1.3 MG/DL (ref 0.76–1.27)
CREAT UR-MCNC: 41.9 MG/DL
GFR SERPL CREATININE-BSD FRML MDRD: 54 ML/MIN/1.73
GLOBULIN UR ELPH-MCNC: 2.5 GM/DL
GLUCOSE BLD-MCNC: 33 MG/DL (ref 65–99)
GLUCOSE UR STRIP-MCNC: NEGATIVE MG/DL
HGB UR QL STRIP.AUTO: NEGATIVE
KETONES UR QL STRIP: NEGATIVE
LEUKOCYTE ESTERASE UR QL STRIP.AUTO: NEGATIVE
NITRITE UR QL STRIP: NEGATIVE
PH UR STRIP.AUTO: 6 [PH] (ref 5–8)
POTASSIUM BLD-SCNC: 4.5 MMOL/L (ref 3.5–5.2)
PROT SERPL-MCNC: 6.7 G/DL (ref 6–8.5)
PROT UR QL STRIP: NEGATIVE
PROT UR-MCNC: 7 MG/DL
PROT/CREAT UR: 167.1 MG/G CREA (ref 0–200)
SODIUM BLD-SCNC: 140 MMOL/L (ref 136–145)
SP GR UR STRIP: 1.01 (ref 1–1.03)
UROBILINOGEN UR QL STRIP: NORMAL

## 2019-07-25 NOTE — TELEPHONE ENCOUNTER
Lab called a Glucose of 33 from yesterday.    I called patient he reported when he left here he started feeling shaky,stopped & ate & was fine,had him check his sugar this am it was 156,instructed in regular meals,reports he had taken his insulin before he came & had not eaten.

## 2019-08-19 ENCOUNTER — TELEPHONE (OUTPATIENT)
Dept: CARDIOLOGY | Facility: CLINIC | Age: 74
End: 2019-08-19

## 2019-08-19 NOTE — TELEPHONE ENCOUNTER
Called pt due to Merlin home monitor isn't connecting.  Left message for pt and for pt to return my call.

## 2019-08-20 ENCOUNTER — CLINICAL SUPPORT NO REQUIREMENTS (OUTPATIENT)
Dept: CARDIOLOGY | Facility: CLINIC | Age: 74
End: 2019-08-20

## 2019-08-20 DIAGNOSIS — I47.29 VENTRICULAR TACHYCARDIA (PAROXYSMAL) (HCC): ICD-10-CM

## 2019-08-20 DIAGNOSIS — I50.22 CHRONIC SYSTOLIC CONGESTIVE HEART FAILURE (HCC): ICD-10-CM

## 2019-08-20 PROCEDURE — 93295 DEV INTERROG REMOTE 1/2/MLT: CPT | Performed by: INTERNAL MEDICINE

## 2019-08-20 PROCEDURE — 93296 REM INTERROG EVL PM/IDS: CPT | Performed by: INTERNAL MEDICINE

## 2019-08-26 ENCOUNTER — OFFICE VISIT (OUTPATIENT)
Dept: CARDIOLOGY | Facility: CLINIC | Age: 74
End: 2019-08-26

## 2019-08-26 VITALS
WEIGHT: 181.4 LBS | BODY MASS INDEX: 26.87 KG/M2 | SYSTOLIC BLOOD PRESSURE: 151 MMHG | HEIGHT: 69 IN | RESPIRATION RATE: 18 BRPM | HEART RATE: 90 BPM | DIASTOLIC BLOOD PRESSURE: 75 MMHG | OXYGEN SATURATION: 98 %

## 2019-08-26 DIAGNOSIS — I25.5 ISCHEMIC CARDIOMYOPATHY: Primary | ICD-10-CM

## 2019-08-26 DIAGNOSIS — E78.2 HYPERLIPEMIA, MIXED: ICD-10-CM

## 2019-08-26 DIAGNOSIS — E11.65 TYPE 2 DIABETES MELLITUS WITH HYPERGLYCEMIA, WITH LONG-TERM CURRENT USE OF INSULIN (HCC): ICD-10-CM

## 2019-08-26 DIAGNOSIS — Z79.4 TYPE 2 DIABETES MELLITUS WITH HYPERGLYCEMIA, WITH LONG-TERM CURRENT USE OF INSULIN (HCC): ICD-10-CM

## 2019-08-26 DIAGNOSIS — I25.10 ASCVD (ARTERIOSCLEROTIC CARDIOVASCULAR DISEASE): ICD-10-CM

## 2019-08-26 DIAGNOSIS — I10 ESSENTIAL HYPERTENSION: ICD-10-CM

## 2019-08-26 PROCEDURE — 93000 ELECTROCARDIOGRAM COMPLETE: CPT | Performed by: PHYSICIAN ASSISTANT

## 2019-08-26 PROCEDURE — 99213 OFFICE O/P EST LOW 20 MIN: CPT | Performed by: PHYSICIAN ASSISTANT

## 2019-08-26 NOTE — PROGRESS NOTES
Ekaterina Cox, ANEUDY  Shai Mata  1945 08/26/2019    Patient Active Problem List   Diagnosis   • ASCVD (arteriosclerotic cardiovascular disease)   • S/P CABG x 5   • HTN (hypertension)   • PVC's (premature ventricular contractions)   • Type 2 diabetes mellitus with hyperglycemia, with long-term current use of insulin (CMS/HCC)   • Hyperlipemia, mixed   • Ischemic cardiomyopathy   • Pain of upper abdomen   • Cholecystitis   • Chronic systolic congestive heart failure (CMS/HCC)   • Ventricular tachycardia (paroxysmal) (CMS/HCC)   • Long term current use of antiarrhythmic medical therapy       Dear Ekaterina Cox APRN:    Subjective     History of Present Illness:    Chief Complaint   Patient presents with   • ASCVD     6 MON F/U       Shai Mata is a pleasant 74 y.o. male with a past medical history significant for ischemic cardiomyopathy with most recent EF 45%, chronic systolic congestive heart failure, history of ventricular tachycardia status post BIVI-ICD implantation on amiodarone, following with Dr. Crowder. He also has a history of ASCVD status post 5 vessel CABG in 2006, diabetes mellitus, hypertension, and dyslipidemia. He comes in today for routine cardiology follow up.     Patient reports that he has been doing well. Patient denies any chest pain, shortness of breath, palpitations, dizziness, syncope or near syncope. His blood pressure is slightly elevated today, he reports he just saw Dr. Funez and the patient states that Dr. Funez believes that he has white coat hypertension and asked him to keep a blood pressure log at home. He states that he just purchased a blood pressure machine a couple days ago and will be starting a log today.     No Known Allergies:      Current Outpatient Medications:   •  amiodarone (PACERONE) 200 MG tablet, Take 0.5 tablets by mouth Daily., Disp: 45 tablet, Rfl: 1  •  aspirin 81 MG EC tablet, Take 81 mg by mouth 2 (Two) Times a Day., Disp: , Rfl:   •  atorvastatin (LIPITOR)  80 MG tablet, Take 1 tablet by mouth Daily., Disp: 90 tablet, Rfl: 3  •  carvedilol (COREG) 12.5 MG tablet, Take 1 tablet by mouth 2 (Two) Times a Day., Disp: 60 tablet, Rfl: 11  •  insulin aspart prot-insulin aspart (NOVOLOG MIX 70/30) (70-30) 100 UNIT/ML injection, Inject 26 Units under the skin into the appropriate area as directed 2 (Two) Times a Day With Meals., Disp: 30 mL, Rfl: 1  •  Insulin Pen Needle (PEN NEEDLES) 31G X 6 MM misc, 10 Units Every Night., Disp: 50 each, Rfl: 2  •  levothyroxine (SYNTHROID) 50 MCG tablet, Take 1 tablet by mouth Daily., Disp: 90 tablet, Rfl: 1  •  lisinopril (PRINIVIL,ZESTRIL) 10 MG tablet, Take 1 tablet by mouth 2 (Two) Times a Day., Disp: 180 tablet, Rfl: 1  •  spironolactone (ALDACTONE) 25 MG tablet, Take 1 tablet by mouth Daily., Disp: 90 tablet, Rfl: 1  •  vitamin D (ERGOCALCIFEROL) 17914 units capsule capsule, Take 1 capsule by mouth 1 (One) Time Per Week., Disp: 5 capsule, Rfl: 2    The following portions of the patient's history were reviewed and updated as appropriate: allergies, current medications, past family history, past medical history, past social history, past surgical history and problem list.    Social History     Tobacco Use   • Smoking status: Former Smoker     Packs/day: 2.00     Types: Cigarettes     Last attempt to quit:      Years since quittin.6   • Smokeless tobacco: Former User     Types: Chew     Quit date:    Substance Use Topics   • Alcohol use: No   • Drug use: No       Review of Systems   Constitution: Negative for weakness and malaise/fatigue.   Cardiovascular: Negative for chest pain, dyspnea on exertion and irregular heartbeat.   Respiratory: Negative for cough and shortness of breath.    Hematologic/Lymphatic: Negative for bleeding problem. Does not bruise/bleed easily.   Gastrointestinal: Negative for nausea and vomiting.       Objective   Vitals:    19 1049   BP: 151/75   BP Location: Right arm   Patient Position:  "Sitting   Cuff Size: Adult   Pulse: 90   Resp: 18   SpO2: 98%   Weight: 82.3 kg (181 lb 6.4 oz)   Height: 175.3 cm (69\")     Body mass index is 26.79 kg/m².    Physical Exam   Constitutional: He is oriented to person, place, and time. He appears well-developed and well-nourished. No distress.   HENT:   Head: Normocephalic and atraumatic.   Cardiovascular: Normal rate, regular rhythm and normal heart sounds.   Pulmonary/Chest: Effort normal and breath sounds normal. No respiratory distress.   Musculoskeletal: He exhibits no edema.   Neurological: He is alert and oriented to person, place, and time.   Skin: He is not diaphoretic.       Lab Results   Component Value Date     07/24/2019    K 4.5 07/24/2019     07/24/2019    CO2 27.0 07/24/2019    BUN 15 07/24/2019    CREATININE 1.30 (H) 07/24/2019    GLUCOSE 33 (C) 07/24/2019    CALCIUM 9.6 07/24/2019    AST 18 07/24/2019    ALT 21 07/24/2019    ALKPHOS 57 07/24/2019    LABIL2 1.6 08/25/2016     Lab Results   Component Value Date    CKTOTAL 82 04/26/2018     Lab Results   Component Value Date    WBC 5.67 09/12/2018    HGB 12.7 (L) 09/12/2018    HCT 39.8 (L) 09/12/2018     09/12/2018     Lab Results   Component Value Date    INR 1.11 (H) 04/26/2018    INR 1.06 11/19/2017    INR 1.00 01/11/2016     Lab Results   Component Value Date    MG 1.9 06/18/2019     Lab Results   Component Value Date    TSH 3.110 06/18/2019    PSA 1.140 05/23/2017    CHLPL 133 11/28/2017    TRIG 41 06/18/2019    HDL 48 06/18/2019    LDL 68 06/18/2019      Lab Results   Component Value Date    BNP 26.0 04/26/2018       During this visit the following were done:  Labs Reviewed [x]    Labs Ordered []    Radiology Reports Reviewed [x]    Radiology Ordered []    PCP Records Reviewed []    Referring Provider Records Reviewed []    ER Records Reviewed []    Hospital Records Reviewed []    History Obtained From Family []    Radiology Images Reviewed []    Other Reviewed []    Records " Requested []         ECG 12 Lead  Date/Time: 8/26/2019 10:49 AM  Performed by: Erik Livingston PA-C  Authorized by: Erik Livingston PA-C   Comparison: compared with previous ECG   Similar to previous ECG  Rhythm: sinus rhythm  Q waves: I, aVL, V4, V5 and V6    Pacing: atrial sensed rhythm, ventricular paced rhythm and dual chamber pacing  Clinical impression: non-specific ECG  Comments: appropriate AV sensing            Assessment/Plan    Diagnosis Plan   1. Ischemic cardiomyopathy     2. Hyperlipemia, mixed     3. ASCVD (arteriosclerotic cardiovascular disease)     4. Essential hypertension     5. Type 2 diabetes mellitus with hyperglycemia, with long-term current use of insulin (CMS/[facility])          Recommendations:  1. Patient is doing well from cardiac standpoint. He is chest pain free and denies any palpitations or episodes of rapid heart rate. He will be seeing Dr. Crowder soon regarding his ICD/pacemaker and history of ventricular tachycardia.   2. Regarding blood pressure, since he just purchased a blood machine I asked him to continue a log for the next couple of weeks, if elevated >150/80 to call our office. He expressed understanding and reported that he will.   3. Continue spironolactone, lisinopril, carvedilol, atorvastatin, aspirin, amiodarone      Return in about 6 months (around 2/26/2020).    As always, I appreciate very much the opportunity to participate in the cardiovascular care of your patients.      With Best Regards,    Erik Livingston PA-C

## 2019-09-13 ENCOUNTER — OFFICE VISIT (OUTPATIENT)
Dept: CARDIOLOGY | Facility: CLINIC | Age: 74
End: 2019-09-13

## 2019-09-13 VITALS
SYSTOLIC BLOOD PRESSURE: 116 MMHG | HEART RATE: 70 BPM | BODY MASS INDEX: 26.54 KG/M2 | WEIGHT: 179.2 LBS | DIASTOLIC BLOOD PRESSURE: 78 MMHG | HEIGHT: 69 IN

## 2019-09-13 DIAGNOSIS — I49.3 PVC'S (PREMATURE VENTRICULAR CONTRACTIONS): ICD-10-CM

## 2019-09-13 DIAGNOSIS — I50.22 CHRONIC SYSTOLIC CONGESTIVE HEART FAILURE (HCC): Primary | ICD-10-CM

## 2019-09-13 DIAGNOSIS — I25.5 ISCHEMIC CARDIOMYOPATHY: ICD-10-CM

## 2019-09-13 PROCEDURE — 99213 OFFICE O/P EST LOW 20 MIN: CPT | Performed by: PHYSICIAN ASSISTANT

## 2019-09-13 PROCEDURE — 93284 PRGRMG EVAL IMPLANTABLE DFB: CPT | Performed by: PHYSICIAN ASSISTANT

## 2019-09-13 NOTE — PROGRESS NOTES
"[facility] at [facility]   OFFICE NOTE      Shai Mata  1945  PCP: Ekaterina Cox APRN    SUBJECTIVE:   Shai Mata is a 74 y.o. male seen for a follow up visit regarding the following:     CC:PVC\"s    HPI:   Pleasant 74-year-old gentleman today follow-up regarding history of VT, congestive heart failure, hypertension and coronary disease.  He denies any angina symptoms.  Denies any ICD shocks or palpitations.  He denies any worsening heart failure symptoms such as orthopnea PND peripheral edema.  Reports blood pressures well controlled.  He states he has routine lab work with his primary care provider Dr. Villegas on a regular basis.    Cardiac PMH: (Old records have been reviewed and summarized below)  1. PVCs                        A. 24 Hour Holter 4/13/16: 47-99 bpm, Average 71 bpm, frequent PVCs 32%, several   runs 4 beat runs NSVT                        B. 24 Hour Holter 5/10/16: 48-81 bpm, Average 65 bpm, frequent PVCs 26% burden                        B. 24 Hour Holter Monitor 8/10/17: HR 42-83, Average 62 bpm, frequent PVCs with   bigeminy, couplets, and triplets (11.8% burden)                         C. Echocardiogram 8/17/17: EF 36-40%, myxomatous changes of MV with mild to   moderate MR                        E. RFA of PVC 11/20/17  2. VT                        A. Inducible VT by EPS 11/20/17                        B. BiV ICD implant 11/20/17 [facility]   3. CHF                       A. Echo 11/20/17 LVEF 35%                       B. Echo  04/26/18 LVEF 45%  4. CAD                        A. CABG x 5 2006                        B. Echocardiogram 1/11/16: EF 45-50%, mild to moderate MR                        C. Stress Test 1/13/16: small reversible defect inferolateral and inferior segments, EF   35%  5. LBBB  6. HTN  7. DM2  8. HLP    Past Medical History, Past Surgical History, Family history, Social History, and Medications were all reviewed with the patient today and updated as " necessary.       Current Outpatient Medications:   •  amiodarone (PACERONE) 200 MG tablet, Take 0.5 tablets by mouth Daily., Disp: 45 tablet, Rfl: 1  •  aspirin 81 MG EC tablet, Take 81 mg by mouth 2 (Two) Times a Day., Disp: , Rfl:   •  atorvastatin (LIPITOR) 80 MG tablet, Take 1 tablet by mouth Daily., Disp: 90 tablet, Rfl: 3  •  carvedilol (COREG) 12.5 MG tablet, Take 1 tablet by mouth 2 (Two) Times a Day., Disp: 60 tablet, Rfl: 11  •  insulin aspart prot-insulin aspart (NOVOLOG MIX 70/30) (70-30) 100 UNIT/ML injection, Inject 26 Units under the skin into the appropriate area as directed 2 (Two) Times a Day With Meals., Disp: 30 mL, Rfl: 1  •  Insulin Pen Needle (PEN NEEDLES) 31G X 6 MM misc, 10 Units Every Night., Disp: 50 each, Rfl: 2  •  levothyroxine (SYNTHROID) 50 MCG tablet, Take 1 tablet by mouth Daily., Disp: 90 tablet, Rfl: 1  •  lisinopril (PRINIVIL,ZESTRIL) 10 MG tablet, Take 1 tablet by mouth 2 (Two) Times a Day., Disp: 180 tablet, Rfl: 1  •  spironolactone (ALDACTONE) 25 MG tablet, Take 1 tablet by mouth Daily., Disp: 90 tablet, Rfl: 1  •  vitamin D (ERGOCALCIFEROL) 26295 units capsule capsule, Take 1 capsule by mouth 1 (One) Time Per Week., Disp: 5 capsule, Rfl: 2      No Known Allergies  Patient Active Problem List   Diagnosis   • ASCVD (arteriosclerotic cardiovascular disease)   • S/P CABG x 5   • HTN (hypertension)   • PVC's (premature ventricular contractions)   • Type 2 diabetes mellitus with hyperglycemia, with long-term current use of insulin (CMS/HCC)   • Hyperlipemia, mixed   • Ischemic cardiomyopathy   • Pain of upper abdomen   • Cholecystitis   • Chronic systolic congestive heart failure (CMS/HCC)   • Ventricular tachycardia (paroxysmal) (CMS/HCC)   • Long term current use of antiarrhythmic medical therapy     Past Medical History:   Diagnosis Date   • Abnormal heart rhythm    • ASCVD (arteriosclerotic cardiovascular disease)     s/p CABG in 2006 (5) [facility]   • CHF  (congestive heart failure) (CMS/HCC)    • Chicken pox    • Congestive heart failure (CMS/HCC)    • Coronary artery disease    • Diverticulitis    • DMII (diabetes mellitus, type 2) (CMS/HCC)     type 2, checks fsbg as needed    • Dyslipidemia    • HTN (hypertension)    • Hx of myocardial infarction, greater than 8 weeks    • Hyperlipidemia    • Measles    • Mitral regurgitation     MILD TO MODERATE   • Mumps    • PVC (premature ventricular contraction)    • SOB (shortness of breath)    • Vitamin D deficiency    • Wears reading eyeglasses      Past Surgical History:   Procedure Laterality Date   • CARDIAC DEFIBRILLATOR PLACEMENT     • CARDIAC ELECTROPHYSIOLOGY PROCEDURE N/A 2017    Procedure: EP/Ablation PVC +/- ICD Implant;  Surgeon: Jose Maldonado MD;  [facility];  Service:    • CARDIAC ELECTROPHYSIOLOGY PROCEDURE N/A 2017    Procedure: Biventricular Device Insertion;  Surgeon: Jose Maldonado MD;  Location:  [facility] EP INVASIVE LOCATION;  Service:    • CATARACT EXTRACTION Bilateral    • CORONARY ANGIOPLASTY     • CORONARY ARTERY BYPASS GRAFT  2006    X 5.   [facility]   • PACEMAKER IMPLANTATION     • NH LAP,CHOLECYSTECTOMY N/A 2018    Procedure: CHOLECYSTECTOMY LAPAROSCOPIC;  Surgeon: Eber Hummel MD;  Location: [facility] OR;  Service: General     Family History   Problem Relation Age of Onset   • Heart disease Mother    • Heart disease Father    • Cancer Sister    • Cancer Sister    • Cancer Sister      Social History     Tobacco Use   • Smoking status: Former Smoker     Packs/day: 2.00     Types: Cigarettes     Last attempt to quit:      Years since quittin.7   • Smokeless tobacco: Former User     Types: Chew     Quit date:    Substance Use Topics   • Alcohol use: No       ROS:  Review of Symptoms:  General: no recent weight loss/gain, weakness or fatigue  Skin: no rashes, lumps, or other skin changes  HEENT: no dizziness,  "lightheadedness, or vision changes  Respiratory: no cough or hemoptysis  Cardiovascular: no palpitations, and tachycardia  Gastrointestinal: no black/tarry stools or diarrhea  Urinary: no change in frequency or urgency  Peripheral Vascular: no claudication or leg cramps  Musculoskeletal: no muscle or joint pain/stiffness  Psychiatric: no depression or excessive stress  Neurological: no sensory or motor loss, no syncope  Hematologic: no anemia, easy bruising or bleeding  Endocrine: no thyroid problems, nor heat or cold intolerance    PHYSICAL EXAM:    /78 (BP Location: Left arm, Patient Position: Sitting)   Pulse 70   Ht 175.3 cm (69\")   Wt 81.3 kg (179 lb 3.2 oz)   BMI 26.46 kg/m²        Wt Readings from Last 5 Encounters:   09/13/19 81.3 kg (179 lb 3.2 oz)   08/26/19 82.3 kg (181 lb 6.4 oz)   06/18/19 81.7 kg (180 lb 3.2 oz)   03/18/19 85.5 kg (188 lb 9.6 oz)   03/01/19 86.6 kg (191 lb)       BP Readings from Last 5 Encounters:   09/13/19 116/78   08/26/19 151/75   06/18/19 130/68   03/18/19 110/65   03/01/19 157/71       General appearance - Alert, well appearing, and in no distress   Mental status - Affect appropriate to mood.  Eyes - Sclerae anicteric,  ENMT - Hearing grossly normal bilaterally, Dental hygiene good.  Neck - Carotids upstroke normal bilaterally, no bruits, no JVD.  Resp - Clear to auscultation, no wheezes, rales or rhonchi, symmetric air entry.  Heart - Normal rate, regular rhythm, normal S1, S2, no murmurs, rubs, clicks or gallops.  GI - Soft, nontender, nondistended, no masses or organomegaly.  Neurological - Grossly intact - normal speech, no focal findings  Musculoskeletal - No joint tenderness, deformity or swelling, no muscular tenderness noted.  Extremities - Peripheral pulses normal, no pedal edema, no clubbing or cyanosis.  Skin - Normal coloration and turgor.  Psych -  oriented to person, place, and time.    Medical problems and test results were reviewed with the patient " today.       Device Interrogation:  Please see device interrogation form which has been signed and updated for full details.  [employer] biventricular ICD.  Settings are DDDR 70.  RV and LV paced 94%.  Acceptable sensitivity thresholds impedances.  Battery voltage 71% 4.6 years remaining.  Charge time 8.9 seconds.  No VT.    ASSESSMENT   1. VT: Amiodarone, No recurrent events.   2. SHF:  BIVICD, Normal function.  EF 45%. Monitor daily weights, reduce sodium intake   3. HTN:  Controlled on Coreg and Zestril.   4. Amiodarone. Continue current dose monitor for toxicity.  Normal TSH and CMP 6/19.     PLAN  · Continue current medical therapy follow-up on CMP and TSH level  · Return to follow-up in 6 months or sooner as needed.      9/13/2019  1:41 PM    Will Flakito STEVENSON

## 2019-09-17 ENCOUNTER — OFFICE VISIT (OUTPATIENT)
Dept: FAMILY MEDICINE CLINIC | Facility: CLINIC | Age: 74
End: 2019-09-17

## 2019-09-17 VITALS
DIASTOLIC BLOOD PRESSURE: 68 MMHG | SYSTOLIC BLOOD PRESSURE: 132 MMHG | BODY MASS INDEX: 26.96 KG/M2 | HEART RATE: 86 BPM | TEMPERATURE: 98.8 F | HEIGHT: 69 IN | OXYGEN SATURATION: 98 % | WEIGHT: 182 LBS

## 2019-09-17 DIAGNOSIS — E78.2 HYPERLIPEMIA, MIXED: ICD-10-CM

## 2019-09-17 DIAGNOSIS — I25.10 ASCVD (ARTERIOSCLEROTIC CARDIOVASCULAR DISEASE): ICD-10-CM

## 2019-09-17 DIAGNOSIS — Z79.4 TYPE 2 DIABETES MELLITUS WITH HYPERGLYCEMIA, WITH LONG-TERM CURRENT USE OF INSULIN (HCC): Primary | ICD-10-CM

## 2019-09-17 DIAGNOSIS — E11.65 TYPE 2 DIABETES MELLITUS WITH HYPERGLYCEMIA, WITH LONG-TERM CURRENT USE OF INSULIN (HCC): Primary | ICD-10-CM

## 2019-09-17 DIAGNOSIS — I25.5 ISCHEMIC CARDIOMYOPATHY: ICD-10-CM

## 2019-09-17 DIAGNOSIS — I10 ESSENTIAL HYPERTENSION: ICD-10-CM

## 2019-09-17 PROCEDURE — 84443 ASSAY THYROID STIM HORMONE: CPT | Performed by: NURSE PRACTITIONER

## 2019-09-17 PROCEDURE — 99214 OFFICE O/P EST MOD 30 MIN: CPT | Performed by: NURSE PRACTITIONER

## 2019-09-17 PROCEDURE — 83735 ASSAY OF MAGNESIUM: CPT | Performed by: NURSE PRACTITIONER

## 2019-09-17 PROCEDURE — 82607 VITAMIN B-12: CPT | Performed by: NURSE PRACTITIONER

## 2019-09-17 PROCEDURE — 80061 LIPID PANEL: CPT | Performed by: NURSE PRACTITIONER

## 2019-09-17 PROCEDURE — 80053 COMPREHEN METABOLIC PANEL: CPT | Performed by: NURSE PRACTITIONER

## 2019-09-17 PROCEDURE — 83036 HEMOGLOBIN GLYCOSYLATED A1C: CPT | Performed by: NURSE PRACTITIONER

## 2019-09-17 NOTE — PROGRESS NOTES
Subjective   Shai Mata is a 74 y.o. male.     Diabetes   He presents for his follow-up diabetic visit. He has type 2 diabetes mellitus. His disease course has been stable. There are no hypoglycemic associated symptoms. Pertinent negatives for hypoglycemia include no confusion, dizziness, headaches, hunger, mood changes, nervousness/anxiousness, pallor, seizures, sleepiness, speech difficulty, sweats or tremors. There are no diabetic associated symptoms. Pertinent negatives for diabetes include no blurred vision, no chest pain, no fatigue, no foot paresthesias, no foot ulcerations, no polydipsia, no polyphagia, no polyuria, no visual change, no weakness and no weight loss. There are no hypoglycemic complications. Symptoms are stable (no further drops.  Patient feels his last RX of insulin was different color was different.). Diabetic complications include nephropathy. (Following nephrology with no recent changes  ) Risk factors for coronary artery disease include sedentary lifestyle, male sex, hypertension, family history and dyslipidemia. Current diabetic treatment includes insulin injections and oral agent (dual therapy). He is compliant with treatment all of the time. His weight is stable. He is following a generally healthy diet. Meal planning includes avoidance of concentrated sweets and carbohydrate counting. He participates in exercise intermittently. His home blood glucose trend is fluctuating minimally. His breakfast blood glucose range is generally  mg/dl. His overall blood glucose range is 140-180 mg/dl. An ACE inhibitor/angiotensin II receptor blocker is being taken. He does not see a podiatrist.  Hypertension   This is a chronic (With Known CAD post CABG x 5, cardiomyopathy, and defibrillator. Following closely with cardiology.  Denies any recent changes.) problem. The current episode started more than 1 year ago. Progression since onset: Denies any recent concerns.  Feels he is doing well. The  problem is controlled. Pertinent negatives include no anxiety, blurred vision, chest pain, headaches, malaise/fatigue, neck pain, orthopnea, palpitations, peripheral edema, PND, shortness of breath or sweats. Risk factors for coronary artery disease include dyslipidemia, family history and sedentary lifestyle. Current antihypertension treatment includes ACE inhibitors, beta blockers and diuretics. The current treatment provides significant improvement. There are no compliance problems.  Hypertensive end-organ damage includes CAD/MI. Identifiable causes of hypertension include a thyroid problem.   Hyperlipidemia   This is a chronic problem. The current episode started more than 1 year ago. Recent lipid tests were reviewed and are variable. Exacerbating diseases include diabetes. Pertinent negatives include no chest pain, focal sensory loss, focal weakness, leg pain, myalgias or shortness of breath. Current antihyperlipidemic treatment includes statins. The current treatment provides significant improvement of lipids. There are no compliance problems.  Risk factors for coronary artery disease include diabetes mellitus, family history, dyslipidemia, obesity and hypertension.   Thyroid Problem   Presents for follow-up (Known hypothyroidism) visit. Patient reports no anxiety, fatigue, palpitations, tremors, visual change or weight loss. The symptoms have been stable (tolerating synthroid). His past medical history is significant for diabetes and hyperlipidemia.      The following portions of the patient's history were reviewed and updated as appropriate: allergies, current medications, past family history, past medical history, past social history, past surgical history and problem list.      Review of Systems   Constitutional: Negative.  Negative for activity change, fatigue, malaise/fatigue and weight loss.        States overall he is feeling fairly well     HENT: Negative.    Eyes: Negative for blurred vision.    Respiratory: Negative.  Negative for shortness of breath.    Cardiovascular: Negative.  Negative for chest pain, palpitations, orthopnea, leg swelling and PND.   Gastrointestinal: Negative.    Endocrine: Negative.  Negative for polydipsia, polyphagia and polyuria.   Musculoskeletal: Negative.  Negative for myalgias and neck pain.   Skin: Negative.  Negative for pallor.   Neurological: Negative.  Negative for dizziness, tremors, focal weakness, seizures, speech difficulty, weakness and headaches.   Psychiatric/Behavioral: Negative.  Negative for confusion. The patient is not nervous/anxious.    All other systems reviewed and are negative.      Procedures    Objective   Physical Exam   Constitutional: He is oriented to person, place, and time. He appears well-developed and well-nourished. No distress.   HENT:   Head: Normocephalic.   Eyes: Conjunctivae are normal. Right eye exhibits no discharge. Left eye exhibits no discharge.   Neck: Neck supple.   Cardiovascular: Normal rate, regular rhythm, normal heart sounds and intact distal pulses.   No murmur heard.  Pulmonary/Chest: Effort normal and breath sounds normal. No respiratory distress. He has no wheezes.   Musculoskeletal: He exhibits no edema.    Shai had a diabetic foot exam performed today.   During the foot exam he had a monofilament test performed.    Neurological Sensory Findings - Unaltered hot/cold right ankle/foot discrimination and unaltered hot/cold left ankle/foot discrimination. Unaltered sharp/dull right ankle/foot discrimination and unaltered sharp/dull left ankle/foot discrimination. No right ankle/foot altered proprioception and no left ankle/foot altered proprioception  Vascular Status -  His right foot exhibits normal foot vasculature  and no edema. His left foot exhibits normal foot vasculature  and no edema.  Skin Integrity  -  His right foot skin is intact.His left foot skin is intact..  Neurological: He is alert and oriented to person,  place, and time.   Skin: Skin is warm and dry. He is not diaphoretic.   Psychiatric: He has a normal mood and affect. His behavior is normal.   Nursing note and vitals reviewed.      Assessment/Plan   Discussed with patient impression and plan, patient verbalizes understanding.  Continue with routine medications.       Fasting labs today       Continue with upcoming cardiology appt    Patient is a non smoker    Patient's Body mass index is 26.88 kg/m². BMI is within normal parameters. No follow-up required.      Shai was seen today for follow-up and diabetes.    Diagnoses and all orders for this visit:    Type 2 diabetes mellitus with hyperglycemia, with long-term current use of insulin (CMS/[facility])  -     Comprehensive Metabolic Panel; Future  -     Hemoglobin A1c; Future  -     Lipid Panel; Future  -     Magnesium; Future  -     TSH; Future  -     Vitamin B12; Future  Continue with current meds    Essential hypertension  Continue with current meds    ASCVD (arteriosclerotic cardiovascular disease)  Continue with current meds    Hyperlipemia, mixed  Continue with current meds    Ischemic cardiomyopathy  Continue with current meds

## 2019-09-18 LAB
ALBUMIN SERPL-MCNC: 4.6 G/DL (ref 3.5–5.2)
ALBUMIN/GLOB SERPL: 2.2 G/DL
ALP SERPL-CCNC: 59 U/L (ref 39–117)
ALT SERPL W P-5'-P-CCNC: 15 U/L (ref 1–41)
ANION GAP SERPL CALCULATED.3IONS-SCNC: 10 MMOL/L (ref 5–15)
AST SERPL-CCNC: 17 U/L (ref 1–40)
BILIRUB SERPL-MCNC: 0.3 MG/DL (ref 0.2–1.2)
BUN BLD-MCNC: 12 MG/DL (ref 8–23)
BUN/CREAT SERPL: 10.7 (ref 7–25)
CALCIUM SPEC-SCNC: 9.3 MG/DL (ref 8.6–10.5)
CHLORIDE SERPL-SCNC: 106 MMOL/L (ref 98–107)
CHOLEST SERPL-MCNC: 115 MG/DL (ref 0–200)
CO2 SERPL-SCNC: 27 MMOL/L (ref 22–29)
CREAT BLD-MCNC: 1.12 MG/DL (ref 0.76–1.27)
GFR SERPL CREATININE-BSD FRML MDRD: 64 ML/MIN/1.73
GLOBULIN UR ELPH-MCNC: 2.1 GM/DL
GLUCOSE BLD-MCNC: 64 MG/DL (ref 65–99)
HBA1C MFR BLD: 7.3 % (ref 4.8–5.6)
HDLC SERPL-MCNC: 45 MG/DL (ref 40–60)
LDLC SERPL CALC-MCNC: 62 MG/DL (ref 0–100)
LDLC/HDLC SERPL: 1.39 {RATIO}
MAGNESIUM SERPL-MCNC: 2.1 MG/DL (ref 1.6–2.4)
POTASSIUM BLD-SCNC: 4.5 MMOL/L (ref 3.5–5.2)
PROT SERPL-MCNC: 6.7 G/DL (ref 6–8.5)
SODIUM BLD-SCNC: 143 MMOL/L (ref 136–145)
TRIGL SERPL-MCNC: 38 MG/DL (ref 0–150)
TSH SERPL DL<=0.05 MIU/L-ACNC: 3.64 UIU/ML (ref 0.27–4.2)
VIT B12 BLD-MCNC: 886 PG/ML (ref 211–946)
VLDLC SERPL-MCNC: 7.6 MG/DL (ref 5–40)

## 2019-09-26 DIAGNOSIS — I10 ESSENTIAL HYPERTENSION: ICD-10-CM

## 2019-09-26 RX ORDER — SPIRONOLACTONE 25 MG/1
TABLET ORAL
Qty: 90 TABLET | Refills: 1 | Status: SHIPPED | OUTPATIENT
Start: 2019-09-26 | End: 2020-03-27 | Stop reason: SDUPTHER

## 2019-09-26 RX ORDER — LISINOPRIL 10 MG/1
TABLET ORAL
Qty: 180 TABLET | Refills: 1 | Status: SHIPPED | OUTPATIENT
Start: 2019-09-26 | End: 2020-03-27 | Stop reason: SDUPTHER

## 2019-10-03 RX ORDER — ERGOCALCIFEROL 1.25 MG/1
CAPSULE ORAL
Qty: 5 CAPSULE | Refills: 0 | Status: SHIPPED | OUTPATIENT
Start: 2019-10-03 | End: 2019-11-04 | Stop reason: SDUPTHER

## 2019-11-04 RX ORDER — ERGOCALCIFEROL 1.25 MG/1
CAPSULE ORAL
Qty: 5 CAPSULE | Refills: 0 | Status: SHIPPED | OUTPATIENT
Start: 2019-11-04 | End: 2019-12-13 | Stop reason: SDUPTHER

## 2019-12-13 RX ORDER — ERGOCALCIFEROL 1.25 MG/1
CAPSULE ORAL
Qty: 5 CAPSULE | Refills: 0 | Status: SHIPPED | OUTPATIENT
Start: 2019-12-13 | End: 2020-09-14

## 2019-12-19 ENCOUNTER — TELEPHONE (OUTPATIENT)
Dept: CARDIOLOGY | Facility: CLINIC | Age: 74
End: 2019-12-19

## 2019-12-19 ENCOUNTER — CLINICAL SUPPORT NO REQUIREMENTS (OUTPATIENT)
Dept: CARDIOLOGY | Facility: CLINIC | Age: 74
End: 2019-12-19

## 2019-12-19 DIAGNOSIS — I47.29 VENTRICULAR TACHYCARDIA (PAROXYSMAL) (HCC): ICD-10-CM

## 2019-12-19 DIAGNOSIS — I25.5 ISCHEMIC CARDIOMYOPATHY: ICD-10-CM

## 2019-12-19 DIAGNOSIS — I50.22 CHRONIC SYSTOLIC CONGESTIVE HEART FAILURE (HCC): ICD-10-CM

## 2019-12-19 PROCEDURE — 93295 DEV INTERROG REMOTE 1/2/MLT: CPT | Performed by: INTERNAL MEDICINE

## 2019-12-19 PROCEDURE — 93296 REM INTERROG EVL PM/IDS: CPT | Performed by: INTERNAL MEDICINE

## 2019-12-19 NOTE — TELEPHONE ENCOUNTER
Called pt due to [facility] home monitor didn't send in scheduled reading.  Left message for pt to return my call.

## 2019-12-30 RX ORDER — LEVOTHYROXINE SODIUM 0.05 MG/1
TABLET ORAL
Qty: 90 TABLET | Refills: 0 | Status: SHIPPED | OUTPATIENT
Start: 2019-12-30 | End: 2020-04-02 | Stop reason: SDUPTHER

## 2019-12-30 RX ORDER — LEVOTHYROXINE SODIUM 0.05 MG/1
TABLET ORAL
Qty: 90 TABLET | Refills: 0 | Status: SHIPPED | OUTPATIENT
Start: 2019-12-30 | End: 2019-12-30

## 2019-12-31 PROCEDURE — 82570 ASSAY OF URINE CREATININE: CPT | Performed by: INTERNAL MEDICINE

## 2019-12-31 PROCEDURE — 80053 COMPREHEN METABOLIC PANEL: CPT | Performed by: INTERNAL MEDICINE

## 2019-12-31 PROCEDURE — 84156 ASSAY OF PROTEIN URINE: CPT | Performed by: INTERNAL MEDICINE

## 2019-12-31 PROCEDURE — 81003 URINALYSIS AUTO W/O SCOPE: CPT | Performed by: INTERNAL MEDICINE

## 2020-01-14 ENCOUNTER — OFFICE VISIT (OUTPATIENT)
Dept: FAMILY MEDICINE CLINIC | Facility: CLINIC | Age: 75
End: 2020-01-14

## 2020-01-14 VITALS
SYSTOLIC BLOOD PRESSURE: 120 MMHG | OXYGEN SATURATION: 99 % | TEMPERATURE: 99 F | DIASTOLIC BLOOD PRESSURE: 62 MMHG | HEART RATE: 87 BPM | HEIGHT: 69 IN | BODY MASS INDEX: 27.55 KG/M2 | WEIGHT: 186 LBS

## 2020-01-14 DIAGNOSIS — E03.8 OTHER SPECIFIED HYPOTHYROIDISM: ICD-10-CM

## 2020-01-14 DIAGNOSIS — I50.22 CHRONIC SYSTOLIC CONGESTIVE HEART FAILURE (HCC): ICD-10-CM

## 2020-01-14 DIAGNOSIS — E78.2 HYPERLIPEMIA, MIXED: ICD-10-CM

## 2020-01-14 DIAGNOSIS — I10 ESSENTIAL HYPERTENSION: ICD-10-CM

## 2020-01-14 DIAGNOSIS — E11.65 TYPE 2 DIABETES MELLITUS WITH HYPERGLYCEMIA, WITH LONG-TERM CURRENT USE OF INSULIN (HCC): Primary | ICD-10-CM

## 2020-01-14 DIAGNOSIS — Z79.4 TYPE 2 DIABETES MELLITUS WITH HYPERGLYCEMIA, WITH LONG-TERM CURRENT USE OF INSULIN (HCC): Primary | ICD-10-CM

## 2020-01-14 PROCEDURE — 80053 COMPREHEN METABOLIC PANEL: CPT | Performed by: NURSE PRACTITIONER

## 2020-01-14 PROCEDURE — 99214 OFFICE O/P EST MOD 30 MIN: CPT | Performed by: NURSE PRACTITIONER

## 2020-01-14 PROCEDURE — 84443 ASSAY THYROID STIM HORMONE: CPT | Performed by: NURSE PRACTITIONER

## 2020-01-14 PROCEDURE — 83036 HEMOGLOBIN GLYCOSYLATED A1C: CPT | Performed by: NURSE PRACTITIONER

## 2020-01-14 PROCEDURE — 80061 LIPID PANEL: CPT | Performed by: NURSE PRACTITIONER

## 2020-01-14 RX ORDER — INSULIN ASPART 100 [IU]/ML
26 INJECTION, SUSPENSION SUBCUTANEOUS 2 TIMES DAILY WITH MEALS
Qty: 30 ML | Refills: 1 | Status: SHIPPED | OUTPATIENT
Start: 2020-01-14 | End: 2021-04-13 | Stop reason: SDUPTHER

## 2020-01-14 RX ORDER — PEN NEEDLE, DIABETIC 31 G X1/4"
10 NEEDLE, DISPOSABLE MISCELLANEOUS NIGHTLY
Qty: 50 EACH | Refills: 2 | Status: SHIPPED | OUTPATIENT
Start: 2020-01-14 | End: 2021-04-13 | Stop reason: SDUPTHER

## 2020-01-14 RX ORDER — CARVEDILOL 12.5 MG/1
12.5 TABLET ORAL 2 TIMES DAILY
Qty: 60 TABLET | Refills: 11 | Status: SHIPPED | OUTPATIENT
Start: 2020-01-14 | End: 2021-01-12 | Stop reason: SDUPTHER

## 2020-01-14 NOTE — PROGRESS NOTES
Subjective   Shai Mata is a 74 y.o. male.     Diabetes   He presents for his follow-up diabetic visit. He has type 2 diabetes mellitus. His disease course has been stable. There are no hypoglycemic associated symptoms. Pertinent negatives for hypoglycemia include no confusion, dizziness, headaches, hunger, mood changes, nervousness/anxiousness, pallor, seizures, sleepiness, speech difficulty, sweats or tremors. There are no diabetic associated symptoms. Pertinent negatives for diabetes include no blurred vision, no chest pain, no fatigue, no foot paresthesias, no foot ulcerations, no polydipsia, no polyphagia, no polyuria, no visual change, no weakness and no weight loss. There are no hypoglycemic complications. Symptoms are stable (Numbers more consistent). Diabetic complications include nephropathy. (Following nephrology with no recent changes  ) Risk factors for coronary artery disease include sedentary lifestyle, male sex, hypertension, family history and dyslipidemia. Current diabetic treatment includes insulin injections and oral agent (dual therapy). He is compliant with treatment all of the time. His weight is stable. He is following a generally healthy diet. Meal planning includes avoidance of concentrated sweets and carbohydrate counting. He participates in exercise intermittently. His home blood glucose trend is fluctuating minimally. His breakfast blood glucose range is generally  mg/dl. His overall blood glucose range is 140-180 mg/dl. An ACE inhibitor/angiotensin II receptor blocker is being taken. He does not see a podiatrist.  Hypertension   This is a chronic (With Known CAD post CABG x 5, cardiomyopathy, and defibrillator. Following closely with cardiology.  Denies any recent changes.) problem. The current episode started more than 1 year ago. Progression since onset: Denies any recent concerns.  Feels he is doing well. The problem is controlled. Pertinent negatives include no anxiety,  blurred vision, chest pain, headaches, malaise/fatigue, neck pain, orthopnea, palpitations, peripheral edema, PND, shortness of breath or sweats. Risk factors for coronary artery disease include dyslipidemia, family history and sedentary lifestyle. Current antihypertension treatment includes ACE inhibitors, beta blockers and diuretics. The current treatment provides significant improvement. There are no compliance problems.  Hypertensive end-organ damage includes CAD/MI. Identifiable causes of hypertension include a thyroid problem.   Hyperlipidemia   This is a chronic problem. The current episode started more than 1 year ago. Recent lipid tests were reviewed and are variable. Exacerbating diseases include diabetes. Pertinent negatives include no chest pain, focal sensory loss, focal weakness, leg pain, myalgias or shortness of breath. Current antihyperlipidemic treatment includes statins. The current treatment provides significant improvement of lipids. There are no compliance problems.  Risk factors for coronary artery disease include diabetes mellitus, family history, dyslipidemia, obesity and hypertension.   Thyroid Problem   Presents for follow-up (Known hypothyroidism) visit. Patient reports no anxiety, fatigue, palpitations, tremors, visual change or weight loss. The symptoms have been stable (tolerating synthroid). His past medical history is significant for diabetes and hyperlipidemia.      The following portions of the patient's history were reviewed and updated as appropriate: allergies, current medications, past family history, past medical history, past social history, past surgical history and problem list.      Review of Systems   Constitutional: Negative.  Negative for activity change, fatigue, malaise/fatigue and weight loss.        States overall he is feeling fairly well     HENT: Negative.    Eyes: Negative for blurred vision.   Respiratory: Negative.  Negative for shortness of breath.     Cardiovascular: Negative.  Negative for chest pain, palpitations, orthopnea, leg swelling and PND.   Gastrointestinal: Negative.    Endocrine: Negative.  Negative for polydipsia, polyphagia and polyuria.   Musculoskeletal: Negative.  Negative for myalgias and neck pain.   Skin: Negative.  Negative for pallor.   Neurological: Negative.  Negative for dizziness, tremors, focal weakness, seizures, speech difficulty, weakness and headaches.   Psychiatric/Behavioral: Negative.  Negative for confusion. The patient is not nervous/anxious.    All other systems reviewed and are negative.      Procedures    Objective   Physical Exam   Constitutional: He is oriented to person, place, and time. He appears well-developed and well-nourished. No distress.   HENT:   Head: Normocephalic.   Eyes: Conjunctivae are normal. Right eye exhibits no discharge. Left eye exhibits no discharge.   Neck: Neck supple.   Cardiovascular: Normal rate, regular rhythm, normal heart sounds and intact distal pulses.   No murmur heard.  Pulmonary/Chest: Effort normal and breath sounds normal. No respiratory distress. He has no wheezes.   Musculoskeletal: He exhibits no edema.   Neurological: He is alert and oriented to person, place, and time.   Skin: Skin is warm and dry. He is not diaphoretic.   Psychiatric: He has a normal mood and affect. His behavior is normal.   Nursing note and vitals reviewed.        Shai was seen today for follow-up and diabetes.    Diagnoses and all orders for this visit:    Type 2 diabetes mellitus with hyperglycemia, with long-term current use of insulin (CMS/[facility])  -     Comprehensive Metabolic Panel; Future  -     Hemoglobin A1c; Future  -     Lipid Panel; Future  -     TSH; Future  -     Comprehensive Metabolic Panel  -     Hemoglobin A1c  -     Lipid Panel  -     TSH  -     Insulin Pen Needle (PEN NEEDLES) 31G X 6 MM misc; 10 Units Every Night.  -     insulin aspart prot-insulin aspart (NOVOLOG MIX 70/30) (70-30) 100  UNIT/ML injection; Inject 26 Units under the skin into the appropriate area as directed 2 (Two) Times a Day With Meals.    Essential hypertension  -     carvedilol (COREG) 12.5 MG tablet; Take 1 tablet by mouth 2 (Two) Times a Day.    Hyperlipemia, mixed  Continue Lipitor    Chronic systolic congestive heart failure (CMS/HCC)  -     carvedilol (COREG) 12.5 MG tablet; Take 1 tablet by mouth 2 (Two) Times a Day.    Other specified hypothyroidism  Continue synthroid at current dose

## 2020-01-15 LAB
ALBUMIN SERPL-MCNC: 4 G/DL (ref 3.5–5.2)
ALBUMIN/GLOB SERPL: 1.5 G/DL
ALP SERPL-CCNC: 51 U/L (ref 39–117)
ALT SERPL W P-5'-P-CCNC: 19 U/L (ref 1–41)
ANION GAP SERPL CALCULATED.3IONS-SCNC: 9.8 MMOL/L (ref 5–15)
AST SERPL-CCNC: 17 U/L (ref 1–40)
BILIRUB SERPL-MCNC: 0.3 MG/DL (ref 0.2–1.2)
BUN BLD-MCNC: 27 MG/DL (ref 8–23)
BUN/CREAT SERPL: 20.9 (ref 7–25)
CALCIUM SPEC-SCNC: 9.3 MG/DL (ref 8.6–10.5)
CHLORIDE SERPL-SCNC: 103 MMOL/L (ref 98–107)
CHOLEST SERPL-MCNC: 125 MG/DL (ref 0–200)
CO2 SERPL-SCNC: 25.2 MMOL/L (ref 22–29)
CREAT BLD-MCNC: 1.29 MG/DL (ref 0.76–1.27)
GFR SERPL CREATININE-BSD FRML MDRD: 54 ML/MIN/1.73
GLOBULIN UR ELPH-MCNC: 2.6 GM/DL
GLUCOSE BLD-MCNC: 79 MG/DL (ref 65–99)
HBA1C MFR BLD: 7.9 % (ref 4.8–5.6)
HDLC SERPL-MCNC: 51 MG/DL (ref 40–60)
LDLC SERPL CALC-MCNC: 66 MG/DL (ref 0–100)
LDLC/HDLC SERPL: 1.29 {RATIO}
POTASSIUM BLD-SCNC: 4.6 MMOL/L (ref 3.5–5.2)
PROT SERPL-MCNC: 6.6 G/DL (ref 6–8.5)
SODIUM BLD-SCNC: 138 MMOL/L (ref 136–145)
TRIGL SERPL-MCNC: 41 MG/DL (ref 0–150)
TSH SERPL DL<=0.05 MIU/L-ACNC: 4.34 UIU/ML (ref 0.27–4.2)
VLDLC SERPL-MCNC: 8.2 MG/DL (ref 5–40)

## 2020-02-17 RX ORDER — AMIODARONE HYDROCHLORIDE 200 MG/1
100 TABLET ORAL DAILY
Qty: 45 TABLET | Refills: 1 | Status: SHIPPED | OUTPATIENT
Start: 2020-02-17 | End: 2020-08-19

## 2020-02-26 ENCOUNTER — OFFICE VISIT (OUTPATIENT)
Dept: CARDIOLOGY | Facility: CLINIC | Age: 75
End: 2020-02-26

## 2020-02-26 VITALS
SYSTOLIC BLOOD PRESSURE: 157 MMHG | DIASTOLIC BLOOD PRESSURE: 73 MMHG | HEIGHT: 69 IN | HEART RATE: 66 BPM | BODY MASS INDEX: 28.58 KG/M2 | WEIGHT: 193 LBS | OXYGEN SATURATION: 96 %

## 2020-02-26 DIAGNOSIS — Z79.4 TYPE 2 DIABETES MELLITUS WITH HYPERGLYCEMIA, WITH LONG-TERM CURRENT USE OF INSULIN (HCC): ICD-10-CM

## 2020-02-26 DIAGNOSIS — I10 ESSENTIAL HYPERTENSION: ICD-10-CM

## 2020-02-26 DIAGNOSIS — E78.2 HYPERLIPEMIA, MIXED: ICD-10-CM

## 2020-02-26 DIAGNOSIS — I50.22 CHRONIC SYSTOLIC CONGESTIVE HEART FAILURE (HCC): ICD-10-CM

## 2020-02-26 DIAGNOSIS — I25.5 ISCHEMIC CARDIOMYOPATHY: ICD-10-CM

## 2020-02-26 DIAGNOSIS — E11.65 TYPE 2 DIABETES MELLITUS WITH HYPERGLYCEMIA, WITH LONG-TERM CURRENT USE OF INSULIN (HCC): ICD-10-CM

## 2020-02-26 DIAGNOSIS — I25.10 ASCVD (ARTERIOSCLEROTIC CARDIOVASCULAR DISEASE): Primary | ICD-10-CM

## 2020-02-26 PROCEDURE — 93000 ELECTROCARDIOGRAM COMPLETE: CPT | Performed by: NURSE PRACTITIONER

## 2020-02-26 PROCEDURE — 99213 OFFICE O/P EST LOW 20 MIN: CPT | Performed by: NURSE PRACTITIONER

## 2020-02-26 NOTE — PROGRESS NOTES
Ekaterina Cox, ANEUDY  Shai Mata  1945 02/26/2020    Patient Active Problem List   Diagnosis   • ASCVD (arteriosclerotic cardiovascular disease)   • S/P CABG x 5   • HTN (hypertension)   • PVC's (premature ventricular contractions)   • Type 2 diabetes mellitus with hyperglycemia, with long-term current use of insulin (CMS/HCC)   • Hyperlipemia, mixed   • Ischemic cardiomyopathy   • Pain of upper abdomen   • Cholecystitis   • Chronic systolic congestive heart failure (CMS/HCC)   • Ventricular tachycardia (paroxysmal) (CMS/[facility])   • Long term current use of antiarrhythmic medical therapy   • Other specified hypothyroidism       Dear Ekaterina Cox, ANEUDY:    Subjective     Chief Complaint   Patient presents with   • Med Refill     in person           History of Present Illness:    Shai Mata is a 74 y.o. male with a history of ischemic cardiomyopathy with most recent EF 45%, chronic systolic congestive heart failure, history of ventricular tachycardia status post ICD implantation on amiodarone, ASCVD status post 5 vessel CABG, diabetes mellitus type 2, hypertension, and dyslipidemia.  He presents today for routine cardiology follow-up.  He denies any chest pains or shortness of breath.  Denies palpitations, dizziness, or lightheadedness.  His blood pressure is elevated in the office today.  However, home readings have been much lower.  His nephrologist is following his hypertension.  He denies any edema, orthopnea, or PND.          No Known Allergies:      Current Outpatient Medications:   •  amiodarone (PACERONE) 200 MG tablet, Take 0.5 tablets by mouth Daily., Disp: 45 tablet, Rfl: 1  •  aspirin 81 MG EC tablet, Take 81 mg by mouth 2 (Two) Times a Day., Disp: , Rfl:   •  atorvastatin (LIPITOR) 80 MG tablet, Take 1 tablet by mouth Daily., Disp: 90 tablet, Rfl: 3  •  carvedilol (COREG) 12.5 MG tablet, Take 1 tablet by mouth 2 (Two) Times a Day., Disp: 60 tablet, Rfl: 11  •  insulin aspart prot-insulin aspart (NOVOLOG  "MIX 70/30) (70-30) 100 UNIT/ML injection, Inject 26 Units under the skin into the appropriate area as directed 2 (Two) Times a Day With Meals., Disp: 30 mL, Rfl: 1  •  Insulin Pen Needle (PEN NEEDLES) 31G X 6 MM misc, 10 Units Every Night., Disp: 50 each, Rfl: 2  •  levothyroxine (SYNTHROID, LEVOTHROID) 50 MCG tablet, TAKE 1 TABLET BY MOUTH ONCE DAILY, Disp: 90 tablet, Rfl: 0  •  lisinopril (PRINIVIL,ZESTRIL) 10 MG tablet, TAKE 1 TABLET BY MOUTH TWICE DAILY, Disp: 180 tablet, Rfl: 1  •  spironolactone (ALDACTONE) 25 MG tablet, TAKE 1 TABLET BY MOUTH ONCE DAILY, Disp: 90 tablet, Rfl: 1  •  vitamin D (ERGOCALCIFEROL) 1.25 MG (02592 UT) capsule capsule, TAKE 1 CAPSULE BY MOUTH ONCE A WEEK, Disp: 5 capsule, Rfl: 0      The following portions of the patient's history were reviewed and updated as appropriate: allergies, current medications, past family history, past medical history, past social history, past surgical history and problem list.    Social History     Tobacco Use   • Smoking status: Former Smoker     Packs/day: 2.00     Types: Cigarettes     Last attempt to quit:      Years since quittin.1   • Smokeless tobacco: Former User     Types: Chew     Quit date:    Substance Use Topics   • Alcohol use: No   • Drug use: No       Review of Systems   Constitution: Negative for malaise/fatigue.   Cardiovascular: Negative for chest pain, dyspnea on exertion, irregular heartbeat, leg swelling, near-syncope, orthopnea, palpitations, paroxysmal nocturnal dyspnea and syncope.   Respiratory: Negative for cough, shortness of breath and wheezing.    Neurological: Negative for dizziness, light-headedness and weakness.       Objective   Vitals:    20 0817   BP: 157/73   BP Location: Left arm   Patient Position: Sitting   Cuff Size: Adult   Pulse: 66   SpO2: 96%   Weight: 87.5 kg (193 lb)   Height: 175.3 cm (69.02\")     Body mass index is 28.49 kg/m².        Physical Exam   Constitutional: He is oriented to " person, place, and time. He appears well-developed and well-nourished.   HENT:   Head: Normocephalic and atraumatic.   Cardiovascular: Normal rate, regular rhythm and normal heart sounds. Exam reveals no S3 and no S4.   No murmur heard.  Pulmonary/Chest: Effort normal and breath sounds normal. He has no wheezes. He has no rales.   Abdominal: Soft. Bowel sounds are normal.   Musculoskeletal: He exhibits no edema.   Neurological: He is alert and oriented to person, place, and time.   Skin: Skin is warm and dry.   Psychiatric: He has a normal mood and affect. His behavior is normal.       Lab Results   Component Value Date     01/14/2020    K 4.6 01/14/2020     01/14/2020    CO2 25.2 01/14/2020    BUN 27 (H) 01/14/2020    CREATININE 1.29 (H) 01/14/2020    GLUCOSE 79 01/14/2020    CALCIUM 9.3 01/14/2020    AST 17 01/14/2020    ALT 19 01/14/2020    ALKPHOS 51 01/14/2020    LABIL2 1.6 08/25/2016     Lab Results   Component Value Date    CKTOTAL 82 04/26/2018     Lab Results   Component Value Date    WBC 5.67 09/12/2018    HGB 12.7 (L) 09/12/2018    HCT 39.8 (L) 09/12/2018     09/12/2018     Lab Results   Component Value Date    INR 1.11 (H) 04/26/2018    INR 1.06 11/19/2017    INR 1.00 01/11/2016     Lab Results   Component Value Date    MG 2.1 09/17/2019     Lab Results   Component Value Date    TSH 4.340 (H) 01/14/2020    PSA 1.140 05/23/2017    CHLPL 133 11/28/2017    TRIG 41 01/14/2020    HDL 51 01/14/2020    LDL 66 01/14/2020      Lab Results   Component Value Date    BNP 26.0 04/26/2018             ECG 12 Lead  Date/Time: 2/26/2020 8:56 AM  Performed by: Natasha Guzman APRN  Authorized by: Natasha Guzman APRN   Comparison: compared with previous ECG   Similar to previous ECG  Rhythm: sinus rhythm  BPM: 69  Comments: AV paced rhythm              Assessment/Plan    Diagnosis Plan   1. ASCVD (arteriosclerotic cardiovascular disease)  ECG 12 Lead    XR Chest 2 View   2. Chronic systolic  congestive heart failure (CMS/HCC)  ECG 12 Lead    XR Chest 2 View   3. Essential hypertension  ECG 12 Lead    XR Chest 2 View   4. Hyperlipemia, mixed  ECG 12 Lead    XR Chest 2 View   5. Ischemic cardiomyopathy  ECG 12 Lead    XR Chest 2 View   6. Type 2 diabetes mellitus with hyperglycemia, with long-term current use of insulin (CMS/HCC)  ECG 12 Lead    XR Chest 2 View                Recommendations:    1. Continue low dose aspirin, atorvastatin, carvedilol, lisinopril, spironolactone, amiodarone.     2. Continue follow up with EP.    3. He has had TSH and CMP as well as eye exam in the last year. Will order CXR today due to amiodarone use.     4. Follow up in 6 months or sooner if needed.      Return in about 6 months (around 8/26/2020) for Recheck.    As always, I appreciate very much the opportunity to participate in the cardiovascular care of your patients.      With Best Regards,    ANEUDY Gonzalez

## 2020-03-02 ENCOUNTER — HOSPITAL ENCOUNTER (OUTPATIENT)
Dept: GENERAL RADIOLOGY | Facility: HOSPITAL | Age: 75
Discharge: HOME | End: 2020-03-02
Admitting: NURSE PRACTITIONER

## 2020-03-02 ENCOUNTER — TELEPHONE (OUTPATIENT)
Dept: CARDIOLOGY | Facility: CLINIC | Age: 75
End: 2020-03-02

## 2020-03-02 DIAGNOSIS — I50.22 CHRONIC SYSTOLIC CONGESTIVE HEART FAILURE (HCC): ICD-10-CM

## 2020-03-02 DIAGNOSIS — I10 ESSENTIAL HYPERTENSION: ICD-10-CM

## 2020-03-02 DIAGNOSIS — I25.5 ISCHEMIC CARDIOMYOPATHY: ICD-10-CM

## 2020-03-02 DIAGNOSIS — I25.10 ASCVD (ARTERIOSCLEROTIC CARDIOVASCULAR DISEASE): ICD-10-CM

## 2020-03-02 DIAGNOSIS — E11.65 TYPE 2 DIABETES MELLITUS WITH HYPERGLYCEMIA, WITH LONG-TERM CURRENT USE OF INSULIN (HCC): ICD-10-CM

## 2020-03-02 DIAGNOSIS — Z79.4 TYPE 2 DIABETES MELLITUS WITH HYPERGLYCEMIA, WITH LONG-TERM CURRENT USE OF INSULIN (HCC): ICD-10-CM

## 2020-03-02 DIAGNOSIS — E78.2 HYPERLIPEMIA, MIXED: ICD-10-CM

## 2020-03-02 PROCEDURE — 71046 X-RAY EXAM CHEST 2 VIEWS: CPT

## 2020-03-02 PROCEDURE — 71046 X-RAY EXAM CHEST 2 VIEWS: CPT | Performed by: RADIOLOGY

## 2020-03-02 NOTE — TELEPHONE ENCOUNTER
Called pt no answer LM.     ----- Message from ANEUDY Gonzalez sent at 3/2/2020 11:49 AM EST -----  Please let him know chest x-ray is clear. Thanks.

## 2020-03-13 ENCOUNTER — OFFICE VISIT (OUTPATIENT)
Dept: CARDIOLOGY | Facility: CLINIC | Age: 75
End: 2020-03-13

## 2020-03-13 VITALS
OXYGEN SATURATION: 97 % | HEART RATE: 70 BPM | WEIGHT: 191.2 LBS | BODY MASS INDEX: 28.32 KG/M2 | HEIGHT: 69 IN | DIASTOLIC BLOOD PRESSURE: 72 MMHG | SYSTOLIC BLOOD PRESSURE: 143 MMHG

## 2020-03-13 DIAGNOSIS — I47.29 VENTRICULAR TACHYCARDIA (PAROXYSMAL) (HCC): Primary | ICD-10-CM

## 2020-03-13 DIAGNOSIS — I50.22 CHRONIC SYSTOLIC CONGESTIVE HEART FAILURE (HCC): ICD-10-CM

## 2020-03-13 DIAGNOSIS — Z79.899 LONG TERM CURRENT USE OF ANTIARRHYTHMIC MEDICAL THERAPY: ICD-10-CM

## 2020-03-13 PROCEDURE — 99214 OFFICE O/P EST MOD 30 MIN: CPT | Performed by: PHYSICIAN ASSISTANT

## 2020-03-13 PROCEDURE — 93284 PRGRMG EVAL IMPLANTABLE DFB: CPT | Performed by: PHYSICIAN ASSISTANT

## 2020-03-13 NOTE — PROGRESS NOTES
Shai Mata  1945  PCP: Ekaterina Cox APRN    SUBJECTIVE:   Shai Mata is a 74 y.o. male seen for a follow up visit regarding the following:     Chief Complaint: Follow up for VT, ICD     HPI:    Pleasant 74-year-old gentleman today follow-up regarding history of VT, congestive heart failure, hypertension and coronary disease.  He has not had any anginal or heart failure symptoms. No issues with cp, sob, edema, palps. Overall stable course. PCP monitors routine labs.     Cardiac PMH: (Old records have been reviewed and summarized below)  1. PVCs                        A. 24 Hour Holter 4/13/16: 47-99 bpm, Average 71 bpm, frequent PVCs 32%, several              runs 4 beat runs NSVT                        B. 24 Hour Holter 5/10/16: 48-81 bpm, Average 65 bpm, frequent PVCs 26% burden                        B. 24 Hour Holter Monitor 8/10/17: HR 42-83, Average 62 bpm, frequent PVCs with                  bigeminy, couplets, and triplets (11.8% burden)                         C. Echocardiogram 8/17/17: EF 36-40%, myxomatous changes of MV with mild to                        moderate MR                        E. RFA of PVC 11/20/17  2. VT                        A. Inducible VT by EPS 11/20/17                        B. BiV ICD implant 11/20/17 [facility]   3. CHF                       A. Echo 11/20/17 LVEF 35%                       B. Echo  04/26/18 LVEF 45%  4. CAD                        A. CABG x 5 2006                        B. Echocardiogram 1/11/16: EF 45-50%, mild to moderate MR                        C. Stress Test 1/13/16: small reversible defect inferolateral and inferior segments, EF                         35%  5. LBBB  6. HTN  7. DM2  8. HLP         Current Outpatient Medications:   •  amiodarone (PACERONE) 200 MG tablet, Take 0.5 tablets by mouth Daily., Disp: 45 tablet, Rfl: 1  •  aspirin 81 MG EC tablet, Take 81 mg by mouth 2 (Two) Times a Day., Disp: , Rfl:   •  atorvastatin (LIPITOR) 80 MG tablet, Take 1  tablet by mouth Daily., Disp: 90 tablet, Rfl: 3  •  carvedilol (COREG) 12.5 MG tablet, Take 1 tablet by mouth 2 (Two) Times a Day., Disp: 60 tablet, Rfl: 11  •  insulin aspart prot-insulin aspart (NOVOLOG MIX 70/30) (70-30) 100 UNIT/ML injection, Inject 26 Units under the skin into the appropriate area as directed 2 (Two) Times a Day With Meals., Disp: 30 mL, Rfl: 1  •  Insulin Pen Needle (PEN NEEDLES) 31G X 6 MM misc, 10 Units Every Night., Disp: 50 each, Rfl: 2  •  levothyroxine (SYNTHROID, LEVOTHROID) 50 MCG tablet, TAKE 1 TABLET BY MOUTH ONCE DAILY, Disp: 90 tablet, Rfl: 0  •  lisinopril (PRINIVIL,ZESTRIL) 10 MG tablet, TAKE 1 TABLET BY MOUTH TWICE DAILY, Disp: 180 tablet, Rfl: 1  •  spironolactone (ALDACTONE) 25 MG tablet, TAKE 1 TABLET BY MOUTH ONCE DAILY, Disp: 90 tablet, Rfl: 1  •  vitamin D (ERGOCALCIFEROL) 1.25 MG (53239 UT) capsule capsule, TAKE 1 CAPSULE BY MOUTH ONCE A WEEK, Disp: 5 capsule, Rfl: 0    Past Medical History, Past Surgical History, Family history, Social History, and Medications were all reviewed with the patient today and updated as necessary.       Patient Active Problem List   Diagnosis   • ASCVD (arteriosclerotic cardiovascular disease)   • S/P CABG x 5   • HTN (hypertension)   • PVC's (premature ventricular contractions)   • Type 2 diabetes mellitus with hyperglycemia, with long-term current use of insulin (CMS/[facility])   • Hyperlipemia, mixed   • Ischemic cardiomyopathy   • Pain of upper abdomen   • Cholecystitis   • Chronic systolic congestive heart failure (CMS/HCC)   • Ventricular tachycardia (paroxysmal) (CMS/HCC)   • Long term current use of antiarrhythmic medical therapy   • Other specified hypothyroidism     No Known Allergies  Past Medical History:   Diagnosis Date   • Abnormal heart rhythm    • ASCVD (arteriosclerotic cardiovascular disease)     s/p CABG in 2006 (5) [facility]   • CHF (congestive heart failure) (CMS/HCC)    • Chicken pox    • Congestive heart  "failure (CMS/HCC)    • Coronary artery disease    • Diverticulitis    • DMII (diabetes mellitus, type 2) (CMS/HCC)     type 2, checks fsbg as needed    • Dyslipidemia    • HTN (hypertension)    • Hx of myocardial infarction, greater than 8 weeks    • Hyperlipidemia    • Measles    • Mitral regurgitation     MILD TO MODERATE   • Mumps    • PVC (premature ventricular contraction)    • SOB (shortness of breath)    • Vitamin D deficiency    • Wears reading eyeglasses      Past Surgical History:   Procedure Laterality Date   • CARDIAC DEFIBRILLATOR PLACEMENT     • CARDIAC ELECTROPHYSIOLOGY PROCEDURE N/A 2017    Procedure: EP/Ablation PVC +/- ICD Implant;  Surgeon: Jose Maldonado MD;  Location:  [facility] INVASIVE LOCATION;  Service:    • CARDIAC ELECTROPHYSIOLOGY PROCEDURE N/A 2017    Procedure: Biventricular Device Insertion;  Surgeon: Jose Maldonado MD;  Location:  [facility];  Service:    • CATARACT EXTRACTION Bilateral    • CORONARY ANGIOPLASTY     • CORONARY ARTERY BYPASS GRAFT  2006    X 5.   [facility]   • PACEMAKER IMPLANTATION     • GA LAP,CHOLECYSTECTOMY N/A 2018    Procedure: CHOLECYSTECTOMY LAPAROSCOPIC;  Surgeon: Eber Hummel MD;  Location:  [facility] OR;  Service: General     Family History   Problem Relation Age of Onset   • Heart disease Mother    • Heart disease Father    • Cancer Sister    • Cancer Sister    • Cancer Sister      Social History     Tobacco Use   • Smoking status: Former Smoker     Packs/day: 2.00     Types: Cigarettes     Last attempt to quit:      Years since quittin.2   • Smokeless tobacco: Former User     Types: Chew     Quit date:    Substance Use Topics   • Alcohol use: No         PHYSICAL EXAM:    /72 (BP Location: Left arm, Patient Position: Sitting)   Pulse 70   Ht 175.3 cm (69\")   Wt 86.7 kg (191 lb 3.2 oz)   SpO2 97%   BMI 28.24 kg/m²        Wt Readings from Last 5 Encounters:   20 86.7 kg " (191 lb 3.2 oz)   02/26/20 87.5 kg (193 lb)   01/14/20 84.4 kg (186 lb)   09/17/19 82.6 kg (182 lb)   09/13/19 81.3 kg (179 lb 3.2 oz)       BP Readings from Last 5 Encounters:   03/13/20 143/72   02/26/20 157/73   01/14/20 120/62   09/17/19 132/68   09/13/19 116/78       General-Well Nourished, Well developed  Eyes - PERRLA  Neck- supple, No mass  CV- regular rate and rhythm, no MRG, No edema  Lung- clear bilaterally  Abd- soft, +BS  Musc/skel - Norm strength and range of motion  Skin- warm and dry  Neuro - Alert & Oriented x 3, appropriate mood.        Medical problems and test results were reviewed with the patient today.     No results found for this or any previous visit (from the past 672 hour(s)).      EKG: (EKG has been independently visualized by me and summarized below)    Procedures     ASSESSMENT and PLAN    1. VT: Amiodarone 100mg daily. No recurrent events   2. Chronic Systolic Heart Failure:  BIVICD, normal function with no events. Battery 4 years..  EF 45%. Monitor daily weights, reduce sodium intake   3. HTN:  Controlled on Coreg, Aldactone, and Zestril.   4. Amiodarone use. Continue current dose monitor for toxicity. EKG from Feb 2020 reviewed and stable.  Normal CMP January, TSH slightly increased- monitor and recheck per PCP. CXR within normal limits in March 2020.       Return in about 6 months (around 9/13/2020) for SJM.        Myrna Aj PA-C   Cardiology/Electrophysiology  3/13/2020  11:15

## 2020-03-27 DIAGNOSIS — I10 ESSENTIAL HYPERTENSION: ICD-10-CM

## 2020-03-27 RX ORDER — SPIRONOLACTONE 25 MG/1
25 TABLET ORAL DAILY
Qty: 90 TABLET | Refills: 1 | Status: SHIPPED | OUTPATIENT
Start: 2020-03-27 | End: 2020-09-24 | Stop reason: SDUPTHER

## 2020-03-27 RX ORDER — LISINOPRIL 10 MG/1
10 TABLET ORAL 2 TIMES DAILY
Qty: 180 TABLET | Refills: 1 | Status: SHIPPED | OUTPATIENT
Start: 2020-03-27 | End: 2020-09-24 | Stop reason: SDUPTHER

## 2020-04-02 RX ORDER — LEVOTHYROXINE SODIUM 0.05 MG/1
50 TABLET ORAL DAILY
Qty: 90 TABLET | Refills: 0 | Status: SHIPPED | OUTPATIENT
Start: 2020-04-02 | End: 2020-05-18 | Stop reason: DRUGHIGH

## 2020-05-13 ENCOUNTER — OFFICE VISIT (OUTPATIENT)
Dept: FAMILY MEDICINE CLINIC | Facility: CLINIC | Age: 75
End: 2020-05-13

## 2020-05-13 VITALS
OXYGEN SATURATION: 98 % | DIASTOLIC BLOOD PRESSURE: 84 MMHG | SYSTOLIC BLOOD PRESSURE: 136 MMHG | WEIGHT: 191 LBS | HEIGHT: 69 IN | HEART RATE: 70 BPM | BODY MASS INDEX: 28.29 KG/M2 | TEMPERATURE: 96.8 F

## 2020-05-13 DIAGNOSIS — Z00.00 MEDICARE ANNUAL WELLNESS VISIT, SUBSEQUENT: Primary | ICD-10-CM

## 2020-05-13 DIAGNOSIS — Z12.5 SCREENING FOR PROSTATE CANCER: ICD-10-CM

## 2020-05-13 DIAGNOSIS — E55.9 VITAMIN D DEFICIENCY: ICD-10-CM

## 2020-05-13 PROCEDURE — 83036 HEMOGLOBIN GLYCOSYLATED A1C: CPT | Performed by: NURSE PRACTITIONER

## 2020-05-13 PROCEDURE — 82607 VITAMIN B-12: CPT | Performed by: NURSE PRACTITIONER

## 2020-05-13 PROCEDURE — 85025 COMPLETE CBC W/AUTO DIFF WBC: CPT | Performed by: NURSE PRACTITIONER

## 2020-05-13 PROCEDURE — G0439 PPPS, SUBSEQ VISIT: HCPCS | Performed by: NURSE PRACTITIONER

## 2020-05-13 PROCEDURE — 82306 VITAMIN D 25 HYDROXY: CPT | Performed by: NURSE PRACTITIONER

## 2020-05-13 PROCEDURE — 80061 LIPID PANEL: CPT | Performed by: NURSE PRACTITIONER

## 2020-05-13 PROCEDURE — 83735 ASSAY OF MAGNESIUM: CPT | Performed by: NURSE PRACTITIONER

## 2020-05-13 PROCEDURE — G0103 PSA SCREENING: HCPCS | Performed by: NURSE PRACTITIONER

## 2020-05-13 PROCEDURE — 80053 COMPREHEN METABOLIC PANEL: CPT | Performed by: NURSE PRACTITIONER

## 2020-05-13 PROCEDURE — 84443 ASSAY THYROID STIM HORMONE: CPT | Performed by: NURSE PRACTITIONER

## 2020-05-13 NOTE — PROGRESS NOTES
The ABCs of the Annual Wellness Visit  Subsequent Medicare Wellness Visit    Chief Complaint   Patient presents with   • Medicare Wellness-subsequent       Subjective   History of Present Illness:  Shai Mata is a 75 y.o. male who presents for a Subsequent Medicare Wellness Visit.    HEALTH RISK ASSESSMENT    Recent Hospitalizations:  No hospitalization(s) within the last year.    Current Medical Providers:  Patient Care Team:  Ekaterina Cox APRN as PCP - General  Ekaterina Cox APRN as PCP - Family Medicine  Aleksey Marsh MD as PCP - Claims Attributed    Smoking Status:  Social History     Tobacco Use   Smoking Status Former Smoker   • Packs/day: 2.00   • Types: Cigarettes   • Last attempt to quit:    • Years since quittin.3   Smokeless Tobacco Former User   • Types: Chew   • Quit date:        Alcohol Consumption:  Social History     Substance and Sexual Activity   Alcohol Use No       Depression Screen:   PHQ-2/PHQ-9 Depression Screening 2020   Little interest or pleasure in doing things 0   Feeling down, depressed, or hopeless 0   Total Score 0       Fall Risk Screen:  STEADI Fall Risk Assessment was completed, and patient is at LOW risk for falls.Assessment completed on:2020    Health Habits and Functional and Cognitive Screening:  Functional & Cognitive Status 2020   Do you have difficulty preparing food and eating? No   Do you have difficulty bathing yourself, getting dressed or grooming yourself? No   Do you have difficulty using the toilet? No   Do you have difficulty moving around from place to place? No   Do you have trouble with steps or getting out of a bed or a chair? No   Current Diet Well Balanced Diet   Dental Exam Up to date   Eye Exam Up to date   Exercise (times per week) 6 times per week   Current Exercise Activities Include Yard Work   Do you need help using the phone?  No   Are you deaf or do you have serious difficulty hearing?  No   Do you need help with  transportation? No   Do you need help shopping? No   Do you need help preparing meals?  No   Do you need help with housework?  No   Do you need help with laundry? No   Do you need help taking your medications? No   Do you need help managing money? No   Do you ever drive or ride in a car without wearing a seat belt? No   Have you felt unusual stress, anger or loneliness in the last month? No   Who do you live with? Alone   If you need help, do you have trouble finding someone available to you? No   Have you been bothered in the last four weeks by sexual problems? No   Do you have difficulty concentrating, remembering or making decisions? -         Does the patient have evidence of cognitive impairment? No    Asprin use counseling:Taking ASA appropriately as indicated    Age-appropriate Screening Schedule:  Refer to the list below for future screening recommendations based on patient's age, sex and/or medical conditions. Orders for these recommended tests are listed in the plan section. The patient has been provided with a written plan.    Health Maintenance   Topic Date Due   • PNEUMOCOCCAL VACCINE (65+ HIGH RISK) (1 of 2 - PCV13) 04/06/2009   • ZOSTER VACCINE (2 of 2) 07/18/2017   • DIABETIC EYE EXAM  03/29/2020   • HEMOGLOBIN A1C  07/14/2020   • INFLUENZA VACCINE  08/01/2020   • URINE MICROALBUMIN  12/31/2020   • LIPID PANEL  01/14/2021   • COLONOSCOPY  11/15/2026   • TDAP/TD VACCINES (2 - Td) 05/23/2027          The following portions of the patient's history were reviewed and updated as appropriate: allergies, current medications, past family history, past medical history, past social history, past surgical history and problem list.    Outpatient Medications Prior to Visit   Medication Sig Dispense Refill   • amiodarone (PACERONE) 200 MG tablet Take 0.5 tablets by mouth Daily. 45 tablet 1   • aspirin 81 MG EC tablet Take 81 mg by mouth 2 (Two) Times a Day.     • atorvastatin (LIPITOR) 80 MG tablet Take 1 tablet  by mouth Daily. 90 tablet 3   • carvedilol (COREG) 12.5 MG tablet Take 1 tablet by mouth 2 (Two) Times a Day. 60 tablet 11   • insulin aspart prot-insulin aspart (NOVOLOG MIX 70/30) (70-30) 100 UNIT/ML injection Inject 26 Units under the skin into the appropriate area as directed 2 (Two) Times a Day With Meals. 30 mL 1   • Insulin Pen Needle (PEN NEEDLES) 31G X 6 MM misc 10 Units Every Night. 50 each 2   • levothyroxine (SYNTHROID, LEVOTHROID) 50 MCG tablet Take 1 tablet by mouth Daily. 90 tablet 0   • lisinopril (PRINIVIL,ZESTRIL) 10 MG tablet Take 1 tablet by mouth 2 (Two) Times a Day. 180 tablet 1   • spironolactone (ALDACTONE) 25 MG tablet Take 1 tablet by mouth Daily. 90 tablet 1   • vitamin D (ERGOCALCIFEROL) 1.25 MG (82254 UT) capsule capsule TAKE 1 CAPSULE BY MOUTH ONCE A WEEK 5 capsule 0     No facility-administered medications prior to visit.        Patient Active Problem List   Diagnosis   • ASCVD (arteriosclerotic cardiovascular disease)   • S/P CABG x 5   • HTN (hypertension)   • PVC's (premature ventricular contractions)   • Type 2 diabetes mellitus with hyperglycemia, with long-term current use of insulin (CMS/[facility])   • Hyperlipemia, mixed   • Ischemic cardiomyopathy   • Pain of upper abdomen   • Cholecystitis   • Chronic systolic congestive heart failure (CMS/HCC)   • Ventricular tachycardia (paroxysmal) (CMS/HCC)   • Long term current use of antiarrhythmic medical therapy   • Other specified hypothyroidism       Advanced Care Planning:  ACP discussion was held with the patient during this visit. Patient does not have an advance directive, information provided.    Review of Systems   Constitutional: Negative.    HENT: Negative.    Respiratory: Negative.    Cardiovascular: Negative.    Gastrointestinal: Negative.    Musculoskeletal: Negative.    Skin: Negative.    All other systems reviewed and are negative.      Compared to one year ago, the patient feels his physical health is the same.  Compared to  "one year ago, the patient feels his mental health is the same.    Reviewed chart for potential of high risk medication in the elderly: yes  Reviewed chart for potential of harmful drug interactions in the elderly:yes    Objective         Vitals:    05/13/20 0808   BP: 136/84   BP Location: Left arm   Patient Position: Sitting   Pulse: 70   Temp: 96.8 °F (36 °C)   TempSrc: Oral   SpO2: 98%   Weight: 86.6 kg (191 lb)   Height: 175.3 cm (69.02\")       Body mass index is 28.19 kg/m².  Discussed the patient's BMI with him. The BMI is in the acceptable range.    Physical Exam   Constitutional: He is oriented to person, place, and time. He appears well-developed and well-nourished. No distress.   HENT:   Head: Normocephalic.   Right Ear: External ear normal.   Left Ear: External ear normal.   Nose: Nose normal.   Mouth/Throat: Oropharynx is clear and moist. No oropharyngeal exudate.   Eyes: Conjunctivae are normal. Right eye exhibits no discharge. Left eye exhibits no discharge.   Neck: Neck supple.   Cardiovascular: Normal rate, regular rhythm, normal heart sounds and intact distal pulses.   No murmur heard.  Pulmonary/Chest: Effort normal and breath sounds normal. No respiratory distress. He has no wheezes.   Musculoskeletal: He exhibits no edema.   Neurological: He is alert and oriented to person, place, and time.   Skin: Skin is warm and dry. He is not diaphoretic.   Psychiatric: He has a normal mood and affect. His behavior is normal.   Nursing note and vitals reviewed.            Assessment/Plan   Medicare Risks and Personalized Health Plan  CMS Preventative Services Quick Reference  Advance Directive Discussion  Colon Cancer Screening  Fall Risk  Hearing Problem  Polypharmacy    The above risks/problems have been discussed with the patient.  Pertinent information has been shared with the patient in the After Visit Summary.  Follow up plans and orders are seen below in the Assessment/Plan Section.    Diagnoses and " all orders for this visit:    1. Medicare annual wellness visit, subsequent (Primary)  -     CBC Auto Differential; Future  -     Comprehensive Metabolic Panel; Future  -     Hemoglobin A1c; Future  -     Lipid Panel; Future  -     Magnesium; Future  -     TSH; Future  -     Vitamin B12; Future  -     Vitamin D 25 Hydroxy; Future  -     PSA Screen; Future  -     CBC Auto Differential  -     Comprehensive Metabolic Panel  -     Hemoglobin A1c  -     Lipid Panel  -     Magnesium  -     TSH  -     Vitamin B12  -     Vitamin D 25 Hydroxy  -     PSA Screen    2. Vitamin D deficiency  -     Vitamin D 25 Hydroxy; Future  -     Vitamin D 25 Hydroxy    3. Screening for prostate cancer  -     PSA Screen; Future  -     PSA Screen      Follow Up:  Return in about 4 months (around 9/13/2020), or if symptoms worsen or fail to improve, for Recheck.     An After Visit Summary and PPPS were given to the patient.

## 2020-05-14 LAB
25(OH)D3 SERPL-MCNC: 26.7 NG/ML (ref 30–100)
ALBUMIN SERPL-MCNC: 4.2 G/DL (ref 3.5–5.2)
ALBUMIN/GLOB SERPL: 1.7 G/DL
ALP SERPL-CCNC: 53 U/L (ref 39–117)
ALT SERPL W P-5'-P-CCNC: 18 U/L (ref 1–41)
ANION GAP SERPL CALCULATED.3IONS-SCNC: 11.2 MMOL/L (ref 5–15)
AST SERPL-CCNC: 18 U/L (ref 1–40)
BASOPHILS # BLD AUTO: 0.1 10*3/MM3 (ref 0–0.2)
BASOPHILS NFR BLD AUTO: 1.5 % (ref 0–1.5)
BILIRUB SERPL-MCNC: 0.6 MG/DL (ref 0.2–1.2)
BUN BLD-MCNC: 16 MG/DL (ref 8–23)
BUN/CREAT SERPL: 13.3 (ref 7–25)
CALCIUM SPEC-SCNC: 9.4 MG/DL (ref 8.6–10.5)
CHLORIDE SERPL-SCNC: 103 MMOL/L (ref 98–107)
CHOLEST SERPL-MCNC: 115 MG/DL (ref 0–200)
CO2 SERPL-SCNC: 25.8 MMOL/L (ref 22–29)
CREAT BLD-MCNC: 1.2 MG/DL (ref 0.76–1.27)
DEPRECATED RDW RBC AUTO: 43 FL (ref 37–54)
EOSINOPHIL # BLD AUTO: 0.26 10*3/MM3 (ref 0–0.4)
EOSINOPHIL NFR BLD AUTO: 3.9 % (ref 0.3–6.2)
ERYTHROCYTE [DISTWIDTH] IN BLOOD BY AUTOMATED COUNT: 13.2 % (ref 12.3–15.4)
GFR SERPL CREATININE-BSD FRML MDRD: 59 ML/MIN/1.73
GLOBULIN UR ELPH-MCNC: 2.5 GM/DL
GLUCOSE BLD-MCNC: 118 MG/DL (ref 65–99)
HBA1C MFR BLD: 8.45 % (ref 4.8–5.6)
HCT VFR BLD AUTO: 38.4 % (ref 37.5–51)
HDLC SERPL-MCNC: 47 MG/DL (ref 40–60)
HGB BLD-MCNC: 13.1 G/DL (ref 13–17.7)
IMM GRANULOCYTES # BLD AUTO: 0.03 10*3/MM3 (ref 0–0.05)
IMM GRANULOCYTES NFR BLD AUTO: 0.4 % (ref 0–0.5)
LDLC SERPL CALC-MCNC: 60 MG/DL (ref 0–100)
LDLC/HDLC SERPL: 1.28 {RATIO}
LYMPHOCYTES # BLD AUTO: 1.48 10*3/MM3 (ref 0.7–3.1)
LYMPHOCYTES NFR BLD AUTO: 22.1 % (ref 19.6–45.3)
MAGNESIUM SERPL-MCNC: 2.1 MG/DL (ref 1.6–2.4)
MCH RBC QN AUTO: 30.5 PG (ref 26.6–33)
MCHC RBC AUTO-ENTMCNC: 34.1 G/DL (ref 31.5–35.7)
MCV RBC AUTO: 89.3 FL (ref 79–97)
MONOCYTES # BLD AUTO: 0.56 10*3/MM3 (ref 0.1–0.9)
MONOCYTES NFR BLD AUTO: 8.3 % (ref 5–12)
NEUTROPHILS # BLD AUTO: 4.28 10*3/MM3 (ref 1.7–7)
NEUTROPHILS NFR BLD AUTO: 63.8 % (ref 42.7–76)
NRBC BLD AUTO-RTO: 0.1 /100 WBC (ref 0–0.2)
PLATELET # BLD AUTO: 181 10*3/MM3 (ref 140–450)
PMV BLD AUTO: 11.2 FL (ref 6–12)
POTASSIUM BLD-SCNC: 4.8 MMOL/L (ref 3.5–5.2)
PROT SERPL-MCNC: 6.7 G/DL (ref 6–8.5)
PSA SERPL-MCNC: 0.83 NG/ML (ref 0–4)
RBC # BLD AUTO: 4.3 10*6/MM3 (ref 4.14–5.8)
SODIUM BLD-SCNC: 140 MMOL/L (ref 136–145)
TRIGL SERPL-MCNC: 39 MG/DL (ref 0–150)
TSH SERPL DL<=0.05 MIU/L-ACNC: 4.25 UIU/ML (ref 0.27–4.2)
VIT B12 BLD-MCNC: 873 PG/ML (ref 211–946)
VLDLC SERPL-MCNC: 7.8 MG/DL (ref 5–40)
WBC NRBC COR # BLD: 6.71 10*3/MM3 (ref 3.4–10.8)

## 2020-05-18 RX ORDER — LEVOTHYROXINE SODIUM 0.07 MG/1
75 TABLET ORAL DAILY
Qty: 30 TABLET | Refills: 3 | Status: SHIPPED | OUTPATIENT
Start: 2020-05-18 | End: 2020-10-21

## 2020-05-19 ENCOUNTER — TELEPHONE (OUTPATIENT)
Dept: FAMILY MEDICINE CLINIC | Facility: CLINIC | Age: 75
End: 2020-05-19

## 2020-05-19 NOTE — TELEPHONE ENCOUNTER
----- Message from ANEUDY Carrizales sent at 5/18/2020  2:21 PM EDT -----  Let patient know synthroid needs adjusted to 75 daily.  Sugar up a little from the past 6 months.  Discussed eating habits and hypoglycemia during last visit. Patient had not been eating as well as usual and finding himself hypoglycemic and eating more than needed    Left message for patient to return call.    Patient notified and verbally understands.

## 2020-08-19 RX ORDER — AMIODARONE HYDROCHLORIDE 200 MG/1
TABLET ORAL
Qty: 45 TABLET | Refills: 0 | Status: SHIPPED | OUTPATIENT
Start: 2020-08-19 | End: 2020-11-18 | Stop reason: SDUPTHER

## 2020-08-26 ENCOUNTER — OFFICE VISIT (OUTPATIENT)
Dept: CARDIOLOGY | Facility: CLINIC | Age: 75
End: 2020-08-26

## 2020-08-26 VITALS
HEIGHT: 69 IN | HEART RATE: 71 BPM | WEIGHT: 180 LBS | BODY MASS INDEX: 26.66 KG/M2 | TEMPERATURE: 97.5 F | DIASTOLIC BLOOD PRESSURE: 59 MMHG | OXYGEN SATURATION: 97 % | SYSTOLIC BLOOD PRESSURE: 112 MMHG

## 2020-08-26 DIAGNOSIS — I10 ESSENTIAL HYPERTENSION: ICD-10-CM

## 2020-08-26 DIAGNOSIS — I50.22 CHRONIC SYSTOLIC CONGESTIVE HEART FAILURE (HCC): ICD-10-CM

## 2020-08-26 DIAGNOSIS — I25.5 ISCHEMIC CARDIOMYOPATHY: ICD-10-CM

## 2020-08-26 DIAGNOSIS — I47.29 VENTRICULAR TACHYCARDIA (PAROXYSMAL) (HCC): ICD-10-CM

## 2020-08-26 DIAGNOSIS — I25.10 ASCVD (ARTERIOSCLEROTIC CARDIOVASCULAR DISEASE): Primary | ICD-10-CM

## 2020-08-26 PROCEDURE — 93000 ELECTROCARDIOGRAM COMPLETE: CPT | Performed by: NURSE PRACTITIONER

## 2020-08-26 PROCEDURE — 99213 OFFICE O/P EST LOW 20 MIN: CPT | Performed by: NURSE PRACTITIONER

## 2020-08-26 NOTE — PROGRESS NOTES
Ekaterina Cox, ANEUDY  Shai Mata  1945 08/26/2020    Patient Active Problem List   Diagnosis   • ASCVD (arteriosclerotic cardiovascular disease)   • S/P CABG x 5   • HTN (hypertension)   • PVC's (premature ventricular contractions)   • Type 2 diabetes mellitus with hyperglycemia, with long-term current use of insulin (CMS/HCC)   • Hyperlipemia, mixed   • Ischemic cardiomyopathy   • Pain of upper abdomen   • Cholecystitis   • Chronic systolic congestive heart failure (CMS/HCC)   • Ventricular tachycardia (paroxysmal) (CMS/HCC)   • Long term current use of antiarrhythmic medical therapy   • Other specified hypothyroidism       Dear Ekaterina Cox, ANEUDY:    Subjective     Chief Complaint   Patient presents with   • Follow-up     6 mths f/up.    • Med Management     Verbal.            History of Present Illness:    Shai Mata is a 75 y.o. male with a history of ischemic cardiomyopathy with most recent EF 45%, chronic systolic congestive heart failure, history of ventricular tachycardia status post ICD implantation on amiodarone, ASCVD status post 5 vessel CABG, diabetes mellitus type 2, hypertension, and dyslipidemia.  He presents today for routine cardiology follow-up. The patient denies chest pain, shortness of breath, palpitations, dizziness, lightheadedness, near syncope, and syncope. He follows with EP and did undergo device check there in March of 2020 with no events noted. He is on amiodarone therapy. He has had CXR, TSH, CMP, and eye exam this year.             No Known Allergies:      Current Outpatient Medications:   •  amiodarone (PACERONE) 200 MG tablet, Take 1/2 (one-half) tablet by mouth once daily, Disp: 45 tablet, Rfl: 0  •  aspirin 81 MG EC tablet, Take 81 mg by mouth 2 (Two) Times a Day., Disp: , Rfl:   •  atorvastatin (LIPITOR) 80 MG tablet, Take 1 tablet by mouth Daily., Disp: 90 tablet, Rfl: 3  •  carvedilol (COREG) 12.5 MG tablet, Take 1 tablet by mouth 2 (Two) Times a Day., Disp: 60 tablet, Rfl:  11  •  insulin aspart prot-insulin aspart (NOVOLOG MIX 70/30) (70-30) 100 UNIT/ML injection, Inject 26 Units under the skin into the appropriate area as directed 2 (Two) Times a Day With Meals., Disp: 30 mL, Rfl: 1  •  Insulin Pen Needle (PEN NEEDLES) 31G X 6 MM misc, 10 Units Every Night., Disp: 50 each, Rfl: 2  •  levothyroxine (Synthroid) 75 MCG tablet, Take 1 tablet by mouth Daily., Disp: 30 tablet, Rfl: 3  •  lisinopril (PRINIVIL,ZESTRIL) 10 MG tablet, Take 1 tablet by mouth 2 (Two) Times a Day., Disp: 180 tablet, Rfl: 1  •  spironolactone (ALDACTONE) 25 MG tablet, Take 1 tablet by mouth Daily., Disp: 90 tablet, Rfl: 1  •  vitamin D (ERGOCALCIFEROL) 1.25 MG (86627 UT) capsule capsule, TAKE 1 CAPSULE BY MOUTH ONCE A WEEK, Disp: 5 capsule, Rfl: 0      The following portions of the patient's history were reviewed and updated as appropriate: allergies, current medications, past family history, past medical history, past social history, past surgical history and problem list.    Social History     Tobacco Use   • Smoking status: Former Smoker     Packs/day: 2.00     Types: Cigarettes     Last attempt to quit:      Years since quittin.6   • Smokeless tobacco: Former User     Types: Chew     Quit date:    Substance Use Topics   • Alcohol use: No   • Drug use: No       Review of Systems   Constitution: Negative for decreased appetite and malaise/fatigue.   Cardiovascular: Negative for chest pain, dyspnea on exertion, irregular heartbeat, leg swelling, near-syncope, orthopnea, palpitations, paroxysmal nocturnal dyspnea and syncope.   Respiratory: Negative for cough, shortness of breath and wheezing.    Neurological: Negative for dizziness, light-headedness and weakness.       Objective   Vitals:    20 0810   BP: 112/59   BP Location: Left arm   Patient Position: Sitting   Cuff Size: Adult   Pulse: 71   Temp: 97.5 °F (36.4 °C)   TempSrc: Infrared   SpO2: 97%   Weight: 81.6 kg (180 lb)   Height: 175.3  "cm (69\")     Body mass index is 26.58 kg/m².        Physical Exam   Constitutional: He is oriented to person, place, and time. He appears well-developed and well-nourished.   HENT:   Head: Normocephalic and atraumatic.   Cardiovascular: Normal rate, regular rhythm and normal heart sounds. Exam reveals no S3 and no S4.   No murmur heard.  Pulmonary/Chest: Effort normal and breath sounds normal. He has no wheezes. He has no rales.   Abdominal: Soft. Bowel sounds are normal.   Musculoskeletal: He exhibits no edema.   Neurological: He is alert and oriented to person, place, and time.   Skin: Skin is warm and dry.   Psychiatric: He has a normal mood and affect. His behavior is normal.       Lab Results   Component Value Date     05/13/2020    K 4.8 05/13/2020     05/13/2020    CO2 25.8 05/13/2020    BUN 16 05/13/2020    CREATININE 1.20 05/13/2020    GLUCOSE 118 (H) 05/13/2020    CALCIUM 9.4 05/13/2020    AST 18 05/13/2020    ALT 18 05/13/2020    ALKPHOS 53 05/13/2020    LABIL2 1.6 08/25/2016     Lab Results   Component Value Date    CKTOTAL 82 04/26/2018     Lab Results   Component Value Date    WBC 6.71 05/13/2020    HGB 13.1 05/13/2020    HCT 38.4 05/13/2020     05/13/2020     Lab Results   Component Value Date    INR 1.11 (H) 04/26/2018    INR 1.06 11/19/2017    INR 1.00 01/11/2016     Lab Results   Component Value Date    MG 2.1 05/13/2020     Lab Results   Component Value Date    TSH 4.250 (H) 05/13/2020    PSA 0.833 05/13/2020    CHLPL 133 11/28/2017    TRIG 39 05/13/2020    HDL 47 05/13/2020    LDL 60 05/13/2020      Lab Results   Component Value Date    BNP 26.0 04/26/2018             ECG 12 Lead  Date/Time: 8/26/2020 8:09 AM  Performed by: Natasha Guzman APRN  Authorized by: Natasha Guzman APRN   Comparison: compared with previous ECG   Similar to previous ECG  BPM: 69  Comments: AV paced rhythm              Assessment/Plan    Diagnosis Plan   1. ASCVD (arteriosclerotic cardiovascular " disease)  ECG 12 Lead   2. Chronic systolic congestive heart failure (CMS/HCC)  ECG 12 Lead   3. Essential hypertension  ECG 12 Lead   4. Ischemic cardiomyopathy  ECG 12 Lead   5. Ventricular tachycardia (paroxysmal) (CMS/HCC)  ECG 12 Lead                Recommendations:    1.  Continue low-dose aspirin, atorvastatin, carvedilol, amiodarone, lisinopril, and spironolactone.    2.  Continue routine follow-up with EP.    3.  Follow-up in 6 months or sooner if needed.       Return in about 6 months (around 2/26/2021) for Recheck.    As always, I appreciate very much the opportunity to participate in the cardiovascular care of your patients.      With Best Regards,    ANEUDY Gonzalez

## 2020-09-11 DIAGNOSIS — E78.2 HYPERLIPEMIA, MIXED: ICD-10-CM

## 2020-09-11 RX ORDER — ATORVASTATIN CALCIUM 80 MG/1
80 TABLET, FILM COATED ORAL DAILY
Qty: 90 TABLET | Refills: 1 | Status: SHIPPED | OUTPATIENT
Start: 2020-09-11 | End: 2021-01-12 | Stop reason: SDUPTHER

## 2020-09-14 ENCOUNTER — OFFICE VISIT (OUTPATIENT)
Dept: FAMILY MEDICINE CLINIC | Facility: CLINIC | Age: 75
End: 2020-09-14

## 2020-09-14 VITALS
DIASTOLIC BLOOD PRESSURE: 58 MMHG | OXYGEN SATURATION: 98 % | TEMPERATURE: 96.6 F | HEART RATE: 70 BPM | WEIGHT: 175 LBS | BODY MASS INDEX: 25.92 KG/M2 | SYSTOLIC BLOOD PRESSURE: 118 MMHG | HEIGHT: 69 IN

## 2020-09-14 DIAGNOSIS — E78.2 HYPERLIPEMIA, MIXED: ICD-10-CM

## 2020-09-14 DIAGNOSIS — I10 ESSENTIAL HYPERTENSION: ICD-10-CM

## 2020-09-14 DIAGNOSIS — E11.65 TYPE 2 DIABETES MELLITUS WITH HYPERGLYCEMIA, WITH LONG-TERM CURRENT USE OF INSULIN (HCC): Primary | ICD-10-CM

## 2020-09-14 DIAGNOSIS — I50.22 CHRONIC SYSTOLIC CONGESTIVE HEART FAILURE (HCC): ICD-10-CM

## 2020-09-14 DIAGNOSIS — Z79.4 TYPE 2 DIABETES MELLITUS WITH HYPERGLYCEMIA, WITH LONG-TERM CURRENT USE OF INSULIN (HCC): Primary | ICD-10-CM

## 2020-09-14 DIAGNOSIS — E03.8 OTHER SPECIFIED HYPOTHYROIDISM: ICD-10-CM

## 2020-09-14 PROCEDURE — 84443 ASSAY THYROID STIM HORMONE: CPT | Performed by: NURSE PRACTITIONER

## 2020-09-14 PROCEDURE — 82607 VITAMIN B-12: CPT | Performed by: NURSE PRACTITIONER

## 2020-09-14 PROCEDURE — 80053 COMPREHEN METABOLIC PANEL: CPT | Performed by: NURSE PRACTITIONER

## 2020-09-14 PROCEDURE — 83036 HEMOGLOBIN GLYCOSYLATED A1C: CPT | Performed by: NURSE PRACTITIONER

## 2020-09-14 PROCEDURE — 99213 OFFICE O/P EST LOW 20 MIN: CPT | Performed by: NURSE PRACTITIONER

## 2020-09-14 PROCEDURE — 83735 ASSAY OF MAGNESIUM: CPT | Performed by: NURSE PRACTITIONER

## 2020-09-14 PROCEDURE — 80061 LIPID PANEL: CPT | Performed by: NURSE PRACTITIONER

## 2020-09-14 NOTE — PROGRESS NOTES
Subjective   Shai Mata is a 75 y.o. male.     Diabetes  He presents for his follow-up diabetic visit. He has type 2 diabetes mellitus. His disease course has been stable. There are no hypoglycemic associated symptoms. Pertinent negatives for hypoglycemia include no confusion, dizziness, headaches, hunger, mood changes, nervousness/anxiousness, pallor, seizures, sleepiness, speech difficulty, sweats or tremors. There are no diabetic associated symptoms. Pertinent negatives for diabetes include no blurred vision, no chest pain, no fatigue, no foot paresthesias, no foot ulcerations, no polydipsia, no polyphagia, no polyuria, no visual change, no weakness and no weight loss. There are no hypoglycemic complications. Symptoms are stable. Diabetic complications include nephropathy. (Following nephrology with no recent changes  ) Risk factors for coronary artery disease include sedentary lifestyle, male sex, hypertension, family history and dyslipidemia. Current diabetic treatment includes insulin injections and oral agent (dual therapy). He is compliant with treatment all of the time. His weight is stable. He is following a generally healthy diet. Meal planning includes avoidance of concentrated sweets and carbohydrate counting. He participates in exercise intermittently. His home blood glucose trend is fluctuating minimally. His breakfast blood glucose range is generally  mg/dl. His overall blood glucose range is 140-180 mg/dl. An ACE inhibitor/angiotensin II receptor blocker is being taken. He does not see a podiatrist.  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 Following closely with cardiology.  Denies any recent changes.) problem. The current episode started more than 1 year ago. Progression since onset: Denies any recent concerns.  Feels he is doing well. The problem is controlled. Pertinent negatives include no anxiety, blurred vision, chest pain,  headaches, malaise/fatigue, neck pain, orthopnea, palpitations, peripheral edema, PND, shortness of breath or sweats. Risk factors for coronary artery disease include dyslipidemia, family history and sedentary lifestyle. Current antihypertension treatment includes ACE inhibitors, beta blockers and diuretics. The current treatment provides significant improvement. There are no compliance problems.  Hypertensive end-organ damage includes CAD/MI. Identifiable causes of hypertension include a thyroid problem.   Hyperlipidemia  This is a chronic problem. The current episode started more than 1 year ago. Recent lipid tests were reviewed and are variable. Exacerbating diseases include diabetes. Pertinent negatives include no chest pain, focal sensory loss, focal weakness, leg pain, myalgias or shortness of breath. Current antihyperlipidemic treatment includes statins. The current treatment provides significant improvement of lipids. There are no compliance problems.  Risk factors for coronary artery disease include diabetes mellitus, family history, dyslipidemia, obesity and hypertension.   Thyroid Problem  Presents for follow-up (Known hypothyroidism) visit. Patient reports no anxiety, fatigue, palpitations, tremors, visual change or weight loss. The symptoms have been stable (tolerating synthroid). His past medical history is significant for diabetes and hyperlipidemia.      The following portions of the patient's history were reviewed and updated as appropriate: allergies, current medications, past family history, past medical history, past social history, past surgical history and problem list.      Review of Systems   Constitutional: Negative.  Negative for activity change, fatigue, malaise/fatigue and weight loss.        States overall he is feeling fairly well     HENT: Negative.    Eyes: Negative for blurred vision.   Respiratory: Negative.  Negative for shortness of breath.    Cardiovascular: Negative.  Negative  for chest pain, palpitations, orthopnea, leg swelling and PND.   Gastrointestinal: Negative.    Endocrine: Negative.  Negative for polydipsia, polyphagia and polyuria.   Musculoskeletal: Negative.  Negative for myalgias and neck pain.   Skin: Negative.  Negative for pallor.   Neurological: Negative.  Negative for dizziness, tremors, focal weakness, seizures, speech difficulty, weakness and headaches.   Psychiatric/Behavioral: Negative.  Negative for confusion. The patient is not nervous/anxious.    All other systems reviewed and are negative.      Procedures    Objective   Physical Exam   Constitutional: He is oriented to person, place, and time. He appears well-developed. No distress.   HENT:   Head: Normocephalic.   Eyes: Conjunctivae are normal. Right eye exhibits no discharge. Left eye exhibits no discharge.   Neck: Neck supple.   Cardiovascular: Normal rate, regular rhythm and normal heart sounds.   No murmur heard.  Pulmonary/Chest: Effort normal and breath sounds normal. No respiratory distress. He has no wheezes.   Neurological: He is alert and oriented to person, place, and time.   Skin: Skin is warm and dry. He is not diaphoretic.   Psychiatric: His behavior is normal.   Nursing note and vitals reviewed.        Shai was seen today for follow-up and diabetes.    Diagnoses and all orders for this visit:    Type 2 diabetes mellitus with hyperglycemia, with long-term current use of insulin (CMS/[facility])  -     Comprehensive Metabolic Panel; Future  -     Lipid Panel; Future  -     Magnesium; Future  -     TSH; Future  -     Vitamin B12; Future  -     Hemoglobin A1c; Future  -     Comprehensive Metabolic Panel  -     Lipid Panel  -     Magnesium  -     TSH  -     Vitamin B12  -     Hemoglobin A1c  Continue with current medications    Other specified hypothyroidism  -     TSH; Future  -     TSH  Continue with current medications    Essential hypertension  -     Comprehensive Metabolic Panel; Future  -     Lipid  Panel; Future  -     Magnesium; Future  -     TSH; Future  -     Lipid Panel  -     Magnesium  -     TSH  -     Vitamin B12  Continue with current medications    Chronic systolic congestive heart failure (CMS/HCC)  Continue with current medications    Hyperlipemia, mixed  -     Comprehensive Metabolic Panel; Future  -     Lipid Panel; Future  -     Comprehensive Metabolic Panel  -     Lipid Panel  Continue with current medications

## 2020-09-15 LAB
ALBUMIN SERPL-MCNC: 4.1 G/DL (ref 3.5–5.2)
ALBUMIN/GLOB SERPL: 1.8 G/DL
ALP SERPL-CCNC: 80 U/L (ref 39–117)
ALT SERPL W P-5'-P-CCNC: 23 U/L (ref 1–41)
ANION GAP SERPL CALCULATED.3IONS-SCNC: 10.7 MMOL/L (ref 5–15)
AST SERPL-CCNC: 15 U/L (ref 1–40)
BILIRUB SERPL-MCNC: 0.3 MG/DL (ref 0–1.2)
BUN SERPL-MCNC: 17 MG/DL (ref 8–23)
BUN/CREAT SERPL: 13.3 (ref 7–25)
CALCIUM SPEC-SCNC: 9.6 MG/DL (ref 8.6–10.5)
CHLORIDE SERPL-SCNC: 103 MMOL/L (ref 98–107)
CHOLEST SERPL-MCNC: 126 MG/DL (ref 0–200)
CO2 SERPL-SCNC: 25.3 MMOL/L (ref 22–29)
CREAT SERPL-MCNC: 1.28 MG/DL (ref 0.76–1.27)
GFR SERPL CREATININE-BSD FRML MDRD: 55 ML/MIN/1.73
GLOBULIN UR ELPH-MCNC: 2.3 GM/DL
GLUCOSE SERPL-MCNC: 60 MG/DL (ref 65–99)
HBA1C MFR BLD: 10.8 % (ref 4.8–5.6)
HDLC SERPL-MCNC: 38 MG/DL (ref 40–60)
LDLC SERPL CALC-MCNC: 73 MG/DL (ref 0–100)
LDLC/HDLC SERPL: 1.93 {RATIO}
MAGNESIUM SERPL-MCNC: 1.9 MG/DL (ref 1.6–2.4)
POTASSIUM SERPL-SCNC: 4.1 MMOL/L (ref 3.5–5.2)
PROT SERPL-MCNC: 6.4 G/DL (ref 6–8.5)
SODIUM SERPL-SCNC: 139 MMOL/L (ref 136–145)
TRIGL SERPL-MCNC: 73 MG/DL (ref 0–150)
TSH SERPL DL<=0.05 MIU/L-ACNC: 0.75 UIU/ML (ref 0.27–4.2)
VIT B12 BLD-MCNC: 1354 PG/ML (ref 211–946)
VLDLC SERPL-MCNC: 14.6 MG/DL (ref 5–40)

## 2020-09-17 ENCOUNTER — TELEPHONE (OUTPATIENT)
Dept: FAMILY MEDICINE CLINIC | Facility: CLINIC | Age: 75
End: 2020-09-17

## 2020-09-17 NOTE — TELEPHONE ENCOUNTER
Patient notified and expressed understanding.           Left voicemail message to return call..      ----- Message from ANEUDY Carrizales sent at 9/15/2020  2:20 PM EDT -----  Labs are stable apart from hsi sugar.  A1C is now a 10.8.  A significant jump make sure he is taking his insulin correctly and medications routinely

## 2020-09-17 NOTE — TELEPHONE ENCOUNTER
----- Message from ANEUDY Carrizales sent at 9/15/2020  2:20 PM EDT -----  Labs are stable apart from hsi sugar.  A1C is now a 10.8.  A significant jump make sure he is taking his insulin correctly and medications routinely

## 2020-09-24 ENCOUNTER — TELEPHONE (OUTPATIENT)
Dept: FAMILY MEDICINE CLINIC | Facility: CLINIC | Age: 75
End: 2020-09-24

## 2020-09-24 DIAGNOSIS — I10 ESSENTIAL HYPERTENSION: ICD-10-CM

## 2020-09-24 RX ORDER — LISINOPRIL 10 MG/1
10 TABLET ORAL 2 TIMES DAILY
Qty: 180 TABLET | Refills: 0 | Status: SHIPPED | OUTPATIENT
Start: 2020-09-24 | End: 2020-12-28

## 2020-09-24 RX ORDER — SPIRONOLACTONE 25 MG/1
25 TABLET ORAL DAILY
Qty: 90 TABLET | Refills: 0 | Status: SHIPPED | OUTPATIENT
Start: 2020-09-24 | End: 2020-12-28

## 2020-10-02 ENCOUNTER — OFFICE VISIT (OUTPATIENT)
Dept: CARDIOLOGY | Facility: CLINIC | Age: 75
End: 2020-10-02

## 2020-10-02 VITALS
HEART RATE: 72 BPM | BODY MASS INDEX: 25.65 KG/M2 | SYSTOLIC BLOOD PRESSURE: 106 MMHG | WEIGHT: 173.2 LBS | DIASTOLIC BLOOD PRESSURE: 64 MMHG | OXYGEN SATURATION: 97 % | HEIGHT: 69 IN

## 2020-10-02 DIAGNOSIS — I50.22 CHRONIC SYSTOLIC CONGESTIVE HEART FAILURE (HCC): ICD-10-CM

## 2020-10-02 DIAGNOSIS — I47.29 VENTRICULAR TACHYCARDIA (PAROXYSMAL) (HCC): Primary | ICD-10-CM

## 2020-10-02 DIAGNOSIS — Z79.899 LONG TERM CURRENT USE OF ANTIARRHYTHMIC MEDICAL THERAPY: ICD-10-CM

## 2020-10-02 PROCEDURE — 99214 OFFICE O/P EST MOD 30 MIN: CPT | Performed by: PHYSICIAN ASSISTANT

## 2020-10-02 PROCEDURE — 93284 PRGRMG EVAL IMPLANTABLE DFB: CPT | Performed by: PHYSICIAN ASSISTANT

## 2020-10-02 NOTE — PROGRESS NOTES
Shai Mata  1945  PCP: Ekaterina Cox APRN    SUBJECTIVE:   Shai Mata is a 75 y.o. male seen for a follow up visit regarding the following:     Chief Complaint: Follow up for VT, ICD, CHF     HPI:    Since last visit the patient's status has been stable since his last visit. He has not had any recurrence of ventricular arrhythmias. No chest pain, sob, edema, palps, ICD shocks. BP has been stable.     Cardiac PMH: (Old records have been reviewed and summarized below)  1. PVCs                        A. 24 Hour Holter 4/13/16: 47-99 bpm, Average 71 bpm, frequent PVCs 32%, several              runs 4 beat runs NSVT                        B. 24 Hour Holter 5/10/16: 48-81 bpm, Average 65 bpm, frequent PVCs 26% burden                        B. 24 Hour Holter Monitor 8/10/17: HR 42-83, Average 62 bpm, frequent PVCs with                  bigeminy, couplets, and triplets (11.8% burden)                         C. Echocardiogram 8/17/17: EF 36-40%, myxomatous changes of MV with mild to                        moderate MR                        E. RFA of PVC 11/20/17  2. VT                        A. Inducible VT by EPS 11/20/17                        B. BiV ICD implant 11/20/17 [facility]   3. CHF                       A. Echo 11/20/17 LVEF 35%                       B. Echo  04/26/18 LVEF 45%  4. CAD                        A. CABG x 5 2006                        B. Echocardiogram 1/11/16: EF 45-50%, mild to moderate MR                        C. Stress Test 1/13/16: small reversible defect inferolateral and inferior segments, EF                         35%  5. LBBB  6. HTN  7. DM2  8. HLP       Current Outpatient Medications:   •  amiodarone (PACERONE) 200 MG tablet, Take 1/2 (one-half) tablet by mouth once daily, Disp: 45 tablet, Rfl: 0  •  aspirin 81 MG EC tablet, Take 81 mg by mouth 2 (Two) Times a Day., Disp: , Rfl:   •  atorvastatin (LIPITOR) 80 MG tablet, Take 1 tablet by mouth Daily., Disp: 90 tablet, Rfl: 1  •   carvedilol (COREG) 12.5 MG tablet, Take 1 tablet by mouth 2 (Two) Times a Day., Disp: 60 tablet, Rfl: 11  •  insulin aspart prot-insulin aspart (NOVOLOG MIX 70/30) (70-30) 100 UNIT/ML injection, Inject 26 Units under the skin into the appropriate area as directed 2 (Two) Times a Day With Meals., Disp: 30 mL, Rfl: 1  •  Insulin Pen Needle (PEN NEEDLES) 31G X 6 MM misc, 10 Units Every Night., Disp: 50 each, Rfl: 2  •  levothyroxine (Synthroid) 75 MCG tablet, Take 1 tablet by mouth Daily., Disp: 30 tablet, Rfl: 3  •  lisinopril (PRINIVIL,ZESTRIL) 10 MG tablet, Take 1 tablet by mouth 2 (Two) Times a Day., Disp: 180 tablet, Rfl: 0  •  spironolactone (ALDACTONE) 25 MG tablet, Take 1 tablet by mouth Daily., Disp: 90 tablet, Rfl: 0    Past Medical History, Past Surgical History, Family history, Social History, and Medications were all reviewed with the patient today and updated as necessary.       Patient Active Problem List   Diagnosis   • ASCVD (arteriosclerotic cardiovascular disease)   • S/P CABG x 5   • HTN (hypertension)   • PVC's (premature ventricular contractions)   • Type 2 diabetes mellitus with hyperglycemia, with long-term current use of insulin (CMS/[facility])   • Hyperlipemia, mixed   • Ischemic cardiomyopathy   • Pain of upper abdomen   • Cholecystitis   • Chronic systolic congestive heart failure (CMS/HCC)   • Ventricular tachycardia (paroxysmal) (CMS/[facility])   • Long term current use of antiarrhythmic medical therapy   • Other specified hypothyroidism     No Known Allergies  Past Medical History:   Diagnosis Date   • Abnormal heart rhythm    • ASCVD (arteriosclerotic cardiovascular disease)     s/p CABG in 2006 (5) [facility]   • CHF (congestive heart failure) (CMS/HCC)    • Chicken pox    • Congestive heart failure (CMS/HCC)    • Coronary artery disease    • Diverticulitis    • DMII (diabetes mellitus, type 2) (CMS/HCC)     type 2, checks fsbg as needed    • Dyslipidemia    • HTN (hypertension)   "  • Hx of myocardial infarction, greater than 8 weeks    • Hyperlipidemia    • Measles    • Mitral regurgitation     MILD TO MODERATE   • Mumps    • PVC (premature ventricular contraction)    • SOB (shortness of breath)    • Vitamin D deficiency    • Wears reading eyeglasses      Past Surgical History:   Procedure Laterality Date   • CARDIAC DEFIBRILLATOR PLACEMENT     • CARDIAC ELECTROPHYSIOLOGY PROCEDURE N/A 2017    Procedure: EP/Ablation PVC +/- ICD Implant;  Surgeon: Jose Maldonado MD;  Location:  [facility] INVASIVE LOCATION;  Service:    • CARDIAC ELECTROPHYSIOLOGY PROCEDURE N/A 2017    Procedure: Biventricular Device Insertion;  Surgeon: Jose Maldonado MD;  Location:  [facility] INV[facility];  Service:    • CATARACT EXTRACTION Bilateral    • CORONARY ANGIOPLASTY     • CORONARY ARTERY BYPASS GRAFT  2006    X 5.   [facility]   • PACEMAKER IMPLANTATION     • [place] LAP,CHOLECYSTECTOMY N/A 2018    Procedure: CHOLECYSTECTOMY LAPAROSCOPIC;  Surgeon: Eber Hummel MD;  Location:  [facility] OR;  Service: General     Family History   Problem Relation Age of Onset   • Heart disease Mother    • Heart disease Father    • No Known Problems Sister    • No Known Problems Sister    • Cancer Sister    • Diabetes Brother    • Cancer Sister    • Cancer Sister    • No Known Problems Sister    • No Known Problems Brother    • No Known Problems Brother      Social History     Tobacco Use   • Smoking status: Former Smoker     Packs/day: 2.00     Types: Cigarettes     Quit date:      Years since quittin.7   • Smokeless tobacco: Former User     Types: Chew     Quit date:    Substance Use Topics   • Alcohol use: No         PHYSICAL EXAM:    /64 (BP Location: Right arm, Patient Position: Sitting)   Pulse 72   Ht 175.3 cm (69\")   Wt 78.6 kg (173 lb 3.2 oz)   SpO2 97%   BMI 25.58 kg/m²        Wt Readings from Last 5 Encounters:   10/02/20 78.6 kg (173 lb 3.2 oz)   20 " 79.4 kg (175 lb)   08/26/20 81.6 kg (180 lb)   05/13/20 86.6 kg (191 lb)   03/13/20 86.7 kg (191 lb 3.2 oz)       BP Readings from Last 5 Encounters:   10/02/20 106/64   09/14/20 118/58   08/26/20 112/59   05/13/20 136/84   03/13/20 143/72       General-Well Nourished, Well developed  Eyes - PERRLA  Neck- supple, No mass  CV- regular rate and rhythm, no MRG, No edema  Lung- clear bilaterally  Abd- soft, +BS  Musc/skel - Norm strength and range of motion  Skin- warm and dry  Neuro - Alert & Oriented x 3, appropriate mood.        Medical problems and test results were reviewed with the patient today.     Recent Results (from the past 672 hour(s))   Comprehensive Metabolic Panel    Collection Time: 09/14/20  8:29 AM    Specimen: Blood   Result Value Ref Range    Glucose 60 (L) 65 - 99 mg/dL    BUN 17 8 - 23 mg/dL    Creatinine 1.28 (H) 0.76 - 1.27 mg/dL    Sodium 139 136 - 145 mmol/L    Potassium 4.1 3.5 - 5.2 mmol/L    Chloride 103 98 - 107 mmol/L    CO2 25.3 22.0 - 29.0 mmol/L    Calcium 9.6 8.6 - 10.5 mg/dL    Total Protein 6.4 6.0 - 8.5 g/dL    Albumin 4.10 3.50 - 5.20 g/dL    ALT (SGPT) 23 1 - 41 U/L    AST (SGOT) 15 1 - 40 U/L    Alkaline Phosphatase 80 39 - 117 U/L    Total Bilirubin 0.3 0.0 - 1.2 mg/dL    eGFR Non African Amer 55 (L) >60 mL/min/1.73    Globulin 2.3 gm/dL    A/G Ratio 1.8 g/dL    BUN/Creatinine Ratio 13.3 7.0 - 25.0    Anion Gap 10.7 5.0 - 15.0 mmol/L   Lipid Panel    Collection Time: 09/14/20  8:29 AM    Specimen: Blood   Result Value Ref Range    Total Cholesterol 126 0 - 200 mg/dL    Triglycerides 73 0 - 150 mg/dL    HDL Cholesterol 38 (L) 40 - 60 mg/dL    LDL Cholesterol  73 0 - 100 mg/dL    VLDL Cholesterol 14.6 5 - 40 mg/dL    LDL/HDL Ratio 1.93    Magnesium    Collection Time: 09/14/20  8:29 AM    Specimen: Blood   Result Value Ref Range    Magnesium 1.9 1.6 - 2.4 mg/dL   TSH    Collection Time: 09/14/20  8:29 AM    Specimen: Blood   Result Value Ref Range    TSH 0.746 0.270 - 4.200  uIU/mL   Vitamin B12    Collection Time: 09/14/20  8:29 AM    Specimen: Blood   Result Value Ref Range    Vitamin B-12 1,354 (H) 211 - 946 pg/mL   Hemoglobin A1c    Collection Time: 09/14/20  8:29 AM    Specimen: Blood   Result Value Ref Range    Hemoglobin A1C 10.80 (H) 4.80 - 5.60 %         EKG: (EKG has been independently visualized by me and summarized below)      ECG 12 Lead    Date/Time: 10/2/2020 1:28 PM  Performed by: Myrna Aj PA  Authorized by: Myrna Aj PA   Comparison: compared with previous ECG from 3/26/2020  Similar to previous ECG  Rhythm: paced  Rate: normal  BPM: 70    Clinical impression: abnormal EKG  Comments: AV paced              ASSESSMENT and PLAN    1. VT: Amiodarone 100mg daily. No recurrent events.   2. Chronic Systolic Heart Failure: stable; Euvolemic. BIVICD, normal function with no events. Battery 3.7 years..  EF 45%. Monitor daily weights, reduce sodium intake   3. HTN:  Controlled on Coreg, Aldactone, and Zestril.   4. Amiodarone use- continue current dose. EKG from Feb 2020 reviewed and stable.  Normal LFTs and TSH 9/2020. CXR within normal limits in March 2020.       Return in about 6 months (around 4/2/2021) for SJM.        Myrna Aj PA-C   Cardiology/Electrophysiology  10/2/2020  13:29 EDT

## 2020-10-21 RX ORDER — LEVOTHYROXINE SODIUM 75 UG/1
TABLET ORAL
Qty: 30 TABLET | Refills: 4 | Status: SHIPPED | OUTPATIENT
Start: 2020-10-21 | End: 2021-03-25 | Stop reason: SDUPTHER

## 2020-11-18 RX ORDER — AMIODARONE HYDROCHLORIDE 200 MG/1
100 TABLET ORAL DAILY
Qty: 45 TABLET | Refills: 0 | Status: SHIPPED | OUTPATIENT
Start: 2020-11-18 | End: 2021-01-12 | Stop reason: SDUPTHER

## 2020-12-01 ENCOUNTER — TELEPHONE (OUTPATIENT)
Dept: CARDIOLOGY | Facility: CLINIC | Age: 75
End: 2020-12-01

## 2020-12-28 DIAGNOSIS — I10 ESSENTIAL HYPERTENSION: ICD-10-CM

## 2020-12-28 RX ORDER — SPIRONOLACTONE 25 MG/1
TABLET ORAL
Qty: 90 TABLET | Refills: 1 | Status: SHIPPED | OUTPATIENT
Start: 2020-12-28 | End: 2021-04-12 | Stop reason: SDUPTHER

## 2020-12-28 RX ORDER — LISINOPRIL 10 MG/1
TABLET ORAL
Qty: 180 TABLET | Refills: 1 | Status: SHIPPED | OUTPATIENT
Start: 2020-12-28 | End: 2021-04-12 | Stop reason: SDUPTHER

## 2021-01-12 ENCOUNTER — OFFICE VISIT (OUTPATIENT)
Dept: FAMILY MEDICINE CLINIC | Facility: CLINIC | Age: 76
End: 2021-01-12

## 2021-01-12 VITALS
TEMPERATURE: 96.9 F | HEIGHT: 69 IN | BODY MASS INDEX: 27.76 KG/M2 | SYSTOLIC BLOOD PRESSURE: 132 MMHG | HEART RATE: 82 BPM | WEIGHT: 187.4 LBS | OXYGEN SATURATION: 99 % | DIASTOLIC BLOOD PRESSURE: 80 MMHG

## 2021-01-12 DIAGNOSIS — I50.22 CHRONIC SYSTOLIC CONGESTIVE HEART FAILURE (HCC): ICD-10-CM

## 2021-01-12 DIAGNOSIS — E11.65 TYPE 2 DIABETES MELLITUS WITH HYPERGLYCEMIA, WITH LONG-TERM CURRENT USE OF INSULIN (HCC): Primary | ICD-10-CM

## 2021-01-12 DIAGNOSIS — E78.2 HYPERLIPEMIA, MIXED: ICD-10-CM

## 2021-01-12 DIAGNOSIS — Z79.4 TYPE 2 DIABETES MELLITUS WITH HYPERGLYCEMIA, WITH LONG-TERM CURRENT USE OF INSULIN (HCC): Primary | ICD-10-CM

## 2021-01-12 DIAGNOSIS — I10 ESSENTIAL HYPERTENSION: ICD-10-CM

## 2021-01-12 DIAGNOSIS — E03.8 OTHER SPECIFIED HYPOTHYROIDISM: ICD-10-CM

## 2021-01-12 PROCEDURE — 80053 COMPREHEN METABOLIC PANEL: CPT | Performed by: NURSE PRACTITIONER

## 2021-01-12 PROCEDURE — 36415 COLL VENOUS BLD VENIPUNCTURE: CPT | Performed by: NURSE PRACTITIONER

## 2021-01-12 PROCEDURE — 84443 ASSAY THYROID STIM HORMONE: CPT | Performed by: NURSE PRACTITIONER

## 2021-01-12 PROCEDURE — 83036 HEMOGLOBIN GLYCOSYLATED A1C: CPT | Performed by: NURSE PRACTITIONER

## 2021-01-12 PROCEDURE — 82607 VITAMIN B-12: CPT | Performed by: NURSE PRACTITIONER

## 2021-01-12 PROCEDURE — 99214 OFFICE O/P EST MOD 30 MIN: CPT | Performed by: NURSE PRACTITIONER

## 2021-01-12 PROCEDURE — 80061 LIPID PANEL: CPT | Performed by: NURSE PRACTITIONER

## 2021-01-12 PROCEDURE — 82043 UR ALBUMIN QUANTITATIVE: CPT | Performed by: NURSE PRACTITIONER

## 2021-01-12 RX ORDER — CARVEDILOL 12.5 MG/1
12.5 TABLET ORAL 2 TIMES DAILY
Qty: 180 TABLET | Refills: 2 | Status: SHIPPED | OUTPATIENT
Start: 2021-01-12 | End: 2021-10-25

## 2021-01-12 RX ORDER — AMIODARONE HYDROCHLORIDE 200 MG/1
100 TABLET ORAL DAILY
Qty: 45 TABLET | Refills: 1 | Status: SHIPPED | OUTPATIENT
Start: 2021-01-12 | End: 2021-07-19 | Stop reason: SDUPTHER

## 2021-01-12 RX ORDER — EMPAGLIFLOZIN 10 MG/1
TABLET, FILM COATED ORAL
Qty: 30 TABLET | Status: CANCELLED | OUTPATIENT
Start: 2021-01-12

## 2021-01-12 RX ORDER — ATORVASTATIN CALCIUM 80 MG/1
80 TABLET, FILM COATED ORAL DAILY
Qty: 90 TABLET | Refills: 1 | Status: SHIPPED | OUTPATIENT
Start: 2021-01-12 | End: 2021-03-16

## 2021-01-12 NOTE — PROGRESS NOTES
Subjective   Shai Mata is a 75 y.o. male.     Diabetes  He presents for his follow-up diabetic visit. He has type 2 diabetes mellitus. His disease course has been stable (Does continue to have intermittent lows resulting in increase foods.  Not happening as often). There are no hypoglycemic associated symptoms. Pertinent negatives for hypoglycemia include no confusion, dizziness, headaches, hunger, mood changes, nervousness/anxiousness, pallor, seizures, sleepiness, speech difficulty, sweats or tremors. There are no diabetic associated symptoms. Pertinent negatives for diabetes include no blurred vision, no chest pain, no fatigue, no foot paresthesias, no foot ulcerations, no polydipsia, no polyphagia, no polyuria, no visual change, no weakness and no weight loss. There are no hypoglycemic complications. Symptoms are stable. Diabetic complications include nephropathy. (Following nephrology with no recent changes  ) Risk factors for coronary artery disease include sedentary lifestyle, male sex, hypertension, family history and dyslipidemia. Current diabetic treatment includes insulin injections and oral agent (dual therapy). He is compliant with treatment all of the time. His weight is stable. He is following a generally healthy diet. Meal planning includes avoidance of concentrated sweets and carbohydrate counting. He participates in exercise intermittently. His home blood glucose trend is fluctuating minimally. His breakfast blood glucose range is generally  mg/dl. His overall blood glucose range is 140-180 mg/dl. An ACE inhibitor/angiotensin II receptor blocker is being taken. He does not see a podiatrist.  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 Following closely with cardiology.  Denies any recent changes.) problem. The current episode started more than 1 year ago. Progression since onset: Denies any recent concerns.  Feels he is doing well. The  problem is controlled. Pertinent negatives include no anxiety, blurred vision, chest pain, headaches, malaise/fatigue, neck pain, orthopnea, palpitations, peripheral edema, PND, shortness of breath or sweats. Risk factors for coronary artery disease include dyslipidemia, family history and sedentary lifestyle. Current antihypertension treatment includes ACE inhibitors, beta blockers and diuretics. The current treatment provides significant improvement. There are no compliance problems.  Hypertensive end-organ damage includes CAD/MI. Identifiable causes of hypertension include a thyroid problem.   Hyperlipidemia  This is a chronic problem. The current episode started more than 1 year ago. Recent lipid tests were reviewed and are variable. Exacerbating diseases include diabetes. Pertinent negatives include no chest pain, focal sensory loss, focal weakness, leg pain, myalgias or shortness of breath. Current antihyperlipidemic treatment includes statins. The current treatment provides significant improvement of lipids. There are no compliance problems.  Risk factors for coronary artery disease include diabetes mellitus, family history, dyslipidemia, obesity and hypertension.   Thyroid Problem  Presents for follow-up (Known hypothyroidism) visit. Patient reports no anxiety, fatigue, palpitations, tremors, visual change or weight loss. The symptoms have been stable (tolerating synthroid). His past medical history is significant for diabetes and hyperlipidemia.      The following portions of the patient's history were reviewed and updated as appropriate: allergies, current medications, past family history, past medical history, past social history, past surgical history and problem list.      Review of Systems   Constitutional: Negative.  Negative for activity change, fatigue, malaise/fatigue and weight loss.        States overall he is feeling fairly well     HENT: Negative.    Eyes: Negative for blurred vision.    Respiratory: Negative.  Negative for shortness of breath.    Cardiovascular: Negative.  Negative for chest pain, palpitations, orthopnea, leg swelling and PND.   Gastrointestinal: Negative.    Endocrine: Negative.  Negative for polydipsia, polyphagia and polyuria.   Musculoskeletal: Negative.  Negative for myalgias and neck pain.   Skin: Negative.  Negative for pallor.   Neurological: Negative.  Negative for dizziness, tremors, focal weakness, seizures, speech difficulty, weakness and headaches.   Psychiatric/Behavioral: Negative.  Negative for confusion. The patient is not nervous/anxious.    All other systems reviewed and are negative.      Procedures    Objective   Physical Exam   Constitutional: He is oriented to person, place, and time. He appears well-developed. No distress.   HENT:   Head: Normocephalic.   Eyes: Conjunctivae are normal. Right eye exhibits no discharge. Left eye exhibits no discharge.   Neck: Neck supple.   Cardiovascular: Normal rate, regular rhythm and normal heart sounds.   No murmur heard.  Pulmonary/Chest: Effort normal and breath sounds normal. No respiratory distress. He has no wheezes.   Neurological: He is alert and oriented to person, place, and time.   Skin: Skin is warm and dry. He is not diaphoretic.   Psychiatric: His behavior is normal.   Nursing note and vitals reviewed.      Diagnoses and all orders for this visit:    1. Type 2 diabetes mellitus with hyperglycemia, with long-term current use of insulin (CMS/[facility]) (Primary)  -     Comprehensive Metabolic Panel; Future  -     Hemoglobin A1c; Future  -     TSH; Future  -     Vitamin B12; Future  -     Lipid Panel; Future  -     MicroAlbumin, Urine, Random - Urine, Clean Catch; Future  -     Comprehensive Metabolic Panel  -     Hemoglobin A1c  -     TSH  -     Vitamin B12  -     Lipid Panel  -     MicroAlbumin, Urine, Random - Urine, Clean Catch  Will await labs and consider adding jardiance if he can afford and reducing the  requirement for insulin.  Unable to afford lantus in the past    2. Essential hypertension  -     carvedilol (COREG) 12.5 MG tablet; Take 1 tablet by mouth 2 (Two) Times a Day.  Dispense: 180 tablet; Refill: 2  -     Comprehensive Metabolic Panel; Future  -     Lipid Panel; Future  -     Comprehensive Metabolic Panel  -     Lipid Panel    3. Chronic systolic congestive heart failure (CMS/HCC)  -     carvedilol (COREG) 12.5 MG tablet; Take 1 tablet by mouth 2 (Two) Times a Day.  Dispense: 180 tablet; Refill: 2  -     amiodarone (PACERONE) 200 MG tablet; Take 0.5 tablets by mouth Daily.  Dispense: 45 tablet; Refill: 1  -     Comprehensive Metabolic Panel; Future  -     Lipid Panel; Future  -     Comprehensive Metabolic Panel  -     Lipid Panel    4. Hyperlipemia, mixed  -     atorvastatin (LIPITOR) 80 MG tablet; Take 1 tablet by mouth Daily.  Dispense: 90 tablet; Refill: 1  -     Comprehensive Metabolic Panel; Future  -     Lipid Panel; Future  -     Comprehensive Metabolic Panel  -     Lipid Panel    5. Other specified hypothyroidism  -     TSH; Future  -     TSH    Other orders  -     Cancel: Empagliflozin (Jardiance) 10 MG tablet; Take  by mouth.  Dispense: 30 tablet

## 2021-01-13 ENCOUNTER — TELEPHONE (OUTPATIENT)
Dept: FAMILY MEDICINE CLINIC | Facility: CLINIC | Age: 76
End: 2021-01-13

## 2021-01-13 LAB
ALBUMIN SERPL-MCNC: 4.2 G/DL (ref 3.5–5.2)
ALBUMIN UR-MCNC: 1.7 MG/DL
ALBUMIN/GLOB SERPL: 2.1 G/DL
ALP SERPL-CCNC: 57 U/L (ref 39–117)
ALT SERPL W P-5'-P-CCNC: 15 U/L (ref 1–41)
ANION GAP SERPL CALCULATED.3IONS-SCNC: 6.4 MMOL/L (ref 5–15)
AST SERPL-CCNC: 16 U/L (ref 1–40)
BILIRUB SERPL-MCNC: 0.3 MG/DL (ref 0–1.2)
BUN SERPL-MCNC: 14 MG/DL (ref 8–23)
BUN/CREAT SERPL: 12 (ref 7–25)
CALCIUM SPEC-SCNC: 9 MG/DL (ref 8.6–10.5)
CHLORIDE SERPL-SCNC: 104 MMOL/L (ref 98–107)
CHOLEST SERPL-MCNC: 121 MG/DL (ref 0–200)
CO2 SERPL-SCNC: 27.6 MMOL/L (ref 22–29)
CREAT SERPL-MCNC: 1.17 MG/DL (ref 0.76–1.27)
GFR SERPL CREATININE-BSD FRML MDRD: 61 ML/MIN/1.73
GLOBULIN UR ELPH-MCNC: 2 GM/DL
GLUCOSE SERPL-MCNC: 157 MG/DL (ref 65–99)
HBA1C MFR BLD: 8.6 % (ref 4.8–5.6)
HDLC SERPL-MCNC: 46 MG/DL (ref 40–60)
LDLC SERPL CALC-MCNC: 66 MG/DL (ref 0–100)
LDLC/HDLC SERPL: 1.5 {RATIO}
POTASSIUM SERPL-SCNC: 4.7 MMOL/L (ref 3.5–5.2)
PROT SERPL-MCNC: 6.2 G/DL (ref 6–8.5)
SODIUM SERPL-SCNC: 138 MMOL/L (ref 136–145)
TRIGL SERPL-MCNC: 30 MG/DL (ref 0–150)
TSH SERPL DL<=0.05 MIU/L-ACNC: 0.88 UIU/ML (ref 0.27–4.2)
VIT B12 BLD-MCNC: 761 PG/ML (ref 211–946)
VLDLC SERPL-MCNC: 9 MG/DL (ref 5–40)

## 2021-01-13 NOTE — TELEPHONE ENCOUNTER
----- Message from ANEUDY Carrizales sent at 1/13/2021  1:15 PM EST -----  Labs look better with overall improvement      Left a message to return call.    Left a second message to return call,Hub may notify patient that his labs looked better,overall improved.    Patient notified & verbalized understanding.

## 2021-01-22 DIAGNOSIS — I50.22 CHRONIC SYSTOLIC CONGESTIVE HEART FAILURE (HCC): ICD-10-CM

## 2021-01-22 DIAGNOSIS — I10 ESSENTIAL HYPERTENSION: ICD-10-CM

## 2021-01-22 RX ORDER — CARVEDILOL 12.5 MG/1
TABLET ORAL
Qty: 120 TABLET | Refills: 0 | OUTPATIENT
Start: 2021-01-22

## 2021-02-25 ENCOUNTER — OFFICE VISIT (OUTPATIENT)
Dept: CARDIOLOGY | Facility: CLINIC | Age: 76
End: 2021-02-25

## 2021-02-25 ENCOUNTER — HOSPITAL ENCOUNTER (OUTPATIENT)
Dept: GENERAL RADIOLOGY | Facility: HOSPITAL | Age: 76
Discharge: HOME | End: 2021-02-25
Admitting: NURSE PRACTITIONER

## 2021-02-25 VITALS
BODY MASS INDEX: 27.67 KG/M2 | HEIGHT: 69 IN | WEIGHT: 186.8 LBS | HEART RATE: 70 BPM | TEMPERATURE: 96.3 F | DIASTOLIC BLOOD PRESSURE: 58 MMHG | SYSTOLIC BLOOD PRESSURE: 107 MMHG

## 2021-02-25 DIAGNOSIS — I10 ESSENTIAL HYPERTENSION: ICD-10-CM

## 2021-02-25 DIAGNOSIS — Z79.899 LONG TERM CURRENT USE OF ANTIARRHYTHMIC MEDICAL THERAPY: ICD-10-CM

## 2021-02-25 DIAGNOSIS — I25.5 ISCHEMIC CARDIOMYOPATHY: ICD-10-CM

## 2021-02-25 DIAGNOSIS — I25.10 ASCVD (ARTERIOSCLEROTIC CARDIOVASCULAR DISEASE): ICD-10-CM

## 2021-02-25 DIAGNOSIS — I25.10 ASCVD (ARTERIOSCLEROTIC CARDIOVASCULAR DISEASE): Primary | ICD-10-CM

## 2021-02-25 DIAGNOSIS — I47.29 VENTRICULAR TACHYCARDIA (PAROXYSMAL) (HCC): ICD-10-CM

## 2021-02-25 DIAGNOSIS — I50.22 CHRONIC SYSTOLIC CONGESTIVE HEART FAILURE (HCC): ICD-10-CM

## 2021-02-25 PROCEDURE — 99213 OFFICE O/P EST LOW 20 MIN: CPT | Performed by: NURSE PRACTITIONER

## 2021-02-25 PROCEDURE — 93000 ELECTROCARDIOGRAM COMPLETE: CPT | Performed by: NURSE PRACTITIONER

## 2021-02-25 PROCEDURE — 71046 X-RAY EXAM CHEST 2 VIEWS: CPT

## 2021-02-25 PROCEDURE — 71046 X-RAY EXAM CHEST 2 VIEWS: CPT | Performed by: RADIOLOGY

## 2021-02-25 NOTE — PROGRESS NOTES
Ekaterina Cox, ANEUDY  Shai Mata  1945 02/25/2021    Patient Active Problem List   Diagnosis   • ASCVD (arteriosclerotic cardiovascular disease)   • S/P CABG x 5   • HTN (hypertension)   • PVC's (premature ventricular contractions)   • Type 2 diabetes mellitus with hyperglycemia, with long-term current use of insulin (CMS/HCC)   • Hyperlipemia, mixed   • Ischemic cardiomyopathy   • Pain of upper abdomen   • Cholecystitis   • Chronic systolic congestive heart failure (CMS/HCC)   • Ventricular tachycardia (paroxysmal) (CMS/[facility])   • Long term current use of antiarrhythmic medical therapy   • Other specified hypothyroidism       Dear Ekaterina Cox, ANEUDY:    Subjective     Chief Complaint   Patient presents with   • Follow-up     ROUTINE 6 MONTH   • Med Management           History of Present Illness:    Shai Mata is a 75 y.o. male with a past medical history of ischemic cardiomyopathy with most recent EF 45%, chronic systolic congestive heart failure, history of ventricular tachycardia status post ICD implantation on amiodarone, ASCVD status post four-vessel CABG, diabetes mellitus type 2, hypertension, and dyslipidemia.  He presents today for routine cardiology follow-up.  Reports he has been doing very well.  He denies any chest pains or shortness of breath.  Denies leg edema, palpitations, or dizziness.  He follows with EP routinely.  Most recent device check did not reveal any events.  Recent CMP, TSH, and lipid panel have been reviewed.  Lipids were at goal.  LFTs were normal.  TSH unremarkable.  The patient does have an eye exam every 6 months.  His last chest x-ray was in March 2020 and was unremarkable.          No Known Allergies:      Current Outpatient Medications:   •  amiodarone (PACERONE) 200 MG tablet, Take 0.5 tablets by mouth Daily., Disp: 45 tablet, Rfl: 1  •  aspirin 81 MG EC tablet, Take 81 mg by mouth 2 (Two) Times a Day., Disp: , Rfl:   •  atorvastatin (LIPITOR) 80 MG tablet, Take 1 tablet by  "mouth Daily., Disp: 90 tablet, Rfl: 1  •  carvedilol (COREG) 12.5 MG tablet, Take 1 tablet by mouth 2 (Two) Times a Day., Disp: 180 tablet, Rfl: 2  •  Euthyrox 75 MCG tablet, Take 1 tablet by mouth once daily, Disp: 30 tablet, Rfl: 4  •  insulin aspart prot-insulin aspart (NOVOLOG MIX 70/30) (70-30) 100 UNIT/ML injection, Inject 26 Units under the skin into the appropriate area as directed 2 (Two) Times a Day With Meals., Disp: 30 mL, Rfl: 1  •  Insulin Pen Needle (PEN NEEDLES) 31G X 6 MM misc, 10 Units Every Night., Disp: 50 each, Rfl: 2  •  lisinopril (PRINIVIL,ZESTRIL) 10 MG tablet, Take 1 tablet by mouth twice daily, Disp: 180 tablet, Rfl: 1  •  spironolactone (ALDACTONE) 25 MG tablet, Take 1 tablet by mouth once daily, Disp: 90 tablet, Rfl: 1      The following portions of the patient's history were reviewed and updated as appropriate: allergies, current medications, past family history, past medical history, past social history, past surgical history and problem list.    Social History     Tobacco Use   • Smoking status: Former Smoker     Packs/day: 2.00     Types: Cigarettes     Quit date:      Years since quittin.1   • Smokeless tobacco: Former User     Types: Chew     Quit date:    Substance Use Topics   • Alcohol use: No   • Drug use: No       Review of Systems   Constitution: Negative for decreased appetite and malaise/fatigue.   Cardiovascular: Negative for chest pain, dyspnea on exertion, irregular heartbeat, leg swelling, near-syncope, orthopnea, palpitations, paroxysmal nocturnal dyspnea and syncope.   Respiratory: Negative for cough, shortness of breath and wheezing.    Neurological: Negative for dizziness, light-headedness and weakness.       Objective   Vitals:    21 0815   BP: 107/58   Pulse: 70   Temp: 96.3 °F (35.7 °C)   Weight: 84.7 kg (186 lb 12.8 oz)   Height: 175.3 cm (69\")     Body mass index is 27.59 kg/m².        Vitals signs reviewed.   Constitutional:       " Appearance: Healthy appearance. Well-developed and not in distress.   HENT:      Head: Normocephalic and atraumatic.   Pulmonary:      Effort: Pulmonary effort is normal.      Breath sounds: Normal breath sounds. No wheezing. No rales.   Cardiovascular:      Normal rate. Regular rhythm.      Murmurs: There is no murmur.      . No S3 and S4 gallop.   Edema:     Peripheral edema absent.   Abdominal:      General: Bowel sounds are normal.      Palpations: Abdomen is soft.   Skin:     General: Skin is warm and dry.   Neurological:      Mental Status: Alert, oriented to person, place, and time and oriented to person, place and time.   Psychiatric:         Mood and Affect: Mood normal.         Behavior: Behavior normal.         Lab Results   Component Value Date     01/12/2021    K 4.7 01/12/2021     01/12/2021    CO2 27.6 01/12/2021    BUN 14 01/12/2021    CREATININE 1.17 01/12/2021    GLUCOSE 157 (H) 01/12/2021    CALCIUM 9.0 01/12/2021    AST 16 01/12/2021    ALT 15 01/12/2021    ALKPHOS 57 01/12/2021    LABIL2 1.6 08/25/2016     Lab Results   Component Value Date    CKTOTAL 82 04/26/2018     Lab Results   Component Value Date    WBC 6.71 05/13/2020    HGB 13.1 05/13/2020    HCT 38.4 05/13/2020     05/13/2020     Lab Results   Component Value Date    INR 1.11 (H) 04/26/2018    INR 1.06 11/19/2017    INR 1.00 01/11/2016     Lab Results   Component Value Date    MG 1.9 09/14/2020     Lab Results   Component Value Date    TSH 0.877 01/12/2021    PSA 0.833 05/13/2020    CHLPL 133 11/28/2017    TRIG 30 01/12/2021    HDL 46 01/12/2021    LDL 66 01/12/2021      Lab Results   Component Value Date    BNP 26.0 04/26/2018             ECG 12 Lead    Date/Time: 2/25/2021 8:16 AM  Performed by: Natasha Guzman APRN  Authorized by: Natasha Guzman APRN   Comparison: compared with previous ECG   Similar to previous ECG  BPM: 69  Comments: AV paced rhythm              Assessment/Plan    Diagnosis Plan   1.  ASCVD (arteriosclerotic cardiovascular disease)  ECG 12 Lead    XR Chest 2 View   2. Essential hypertension  ECG 12 Lead   3. Ischemic cardiomyopathy  ECG 12 Lead   4. Chronic systolic congestive heart failure (CMS/HCC)  ECG 12 Lead    XR Chest 2 View   5. Ventricular tachycardia (paroxysmal) (CMS/HCC)  ECG 12 Lead   6. Long term current use of antiarrhythmic medical therapy  ECG 12 Lead    XR Chest 2 View                Recommendations:    1. Continue with low-dose aspirin, atorvastatin, carvedilol, lisinopril, spironolactone, and amiodarone.  2. I have encouraged him to continue yearly eye exams.  3. I have ordered a chest x-ray today due to long-term amiodarone use.  4. Follow-up in 6 months or sooner if needed.      Return in about 6 months (around 8/25/2021) for Recheck.    As always, I appreciate very much the opportunity to participate in the cardiovascular care of your patients.      With Best Regards,    ANEUDY Gonzalez

## 2021-03-16 DIAGNOSIS — E78.2 HYPERLIPEMIA, MIXED: ICD-10-CM

## 2021-03-16 RX ORDER — ATORVASTATIN CALCIUM 80 MG/1
TABLET, FILM COATED ORAL
Qty: 90 TABLET | Refills: 1 | Status: SHIPPED | OUTPATIENT
Start: 2021-03-16 | End: 2021-09-13

## 2021-03-19 ENCOUNTER — IMMUNIZATION (OUTPATIENT)
Dept: VACCINE CLINIC | Facility: HOSPITAL | Age: 76
End: 2021-03-19

## 2021-03-19 PROCEDURE — 91300 HC SARSCOV02 VAC 30MCG/0.3ML IM: CPT | Performed by: INTERNAL MEDICINE

## 2021-03-19 PROCEDURE — 0001A: CPT | Performed by: INTERNAL MEDICINE

## 2021-03-25 RX ORDER — LEVOTHYROXINE SODIUM 75 UG/1
TABLET ORAL
Qty: 30 TABLET | Refills: 0 | Status: SHIPPED | OUTPATIENT
Start: 2021-03-25 | End: 2021-04-12 | Stop reason: SDUPTHER

## 2021-04-09 ENCOUNTER — IMMUNIZATION (OUTPATIENT)
Dept: VACCINE CLINIC | Facility: HOSPITAL | Age: 76
End: 2021-04-09

## 2021-04-09 PROCEDURE — 91300 HC SARSCOV02 VAC 30MCG/0.3ML IM: CPT | Performed by: INTERNAL MEDICINE

## 2021-04-09 PROCEDURE — 0002A: CPT | Performed by: INTERNAL MEDICINE

## 2021-04-12 ENCOUNTER — OFFICE VISIT (OUTPATIENT)
Dept: FAMILY MEDICINE CLINIC | Facility: CLINIC | Age: 76
End: 2021-04-12

## 2021-04-12 VITALS
SYSTOLIC BLOOD PRESSURE: 110 MMHG | DIASTOLIC BLOOD PRESSURE: 60 MMHG | BODY MASS INDEX: 27.11 KG/M2 | HEART RATE: 87 BPM | TEMPERATURE: 96.8 F | OXYGEN SATURATION: 99 % | WEIGHT: 183 LBS | HEIGHT: 69 IN

## 2021-04-12 DIAGNOSIS — I10 ESSENTIAL HYPERTENSION: ICD-10-CM

## 2021-04-12 DIAGNOSIS — E11.65 TYPE 2 DIABETES MELLITUS WITH HYPERGLYCEMIA, WITH LONG-TERM CURRENT USE OF INSULIN (HCC): Primary | ICD-10-CM

## 2021-04-12 DIAGNOSIS — E78.2 HYPERLIPEMIA, MIXED: ICD-10-CM

## 2021-04-12 DIAGNOSIS — Z79.4 TYPE 2 DIABETES MELLITUS WITH HYPERGLYCEMIA, WITH LONG-TERM CURRENT USE OF INSULIN (HCC): Primary | ICD-10-CM

## 2021-04-12 DIAGNOSIS — E03.8 OTHER SPECIFIED HYPOTHYROIDISM: ICD-10-CM

## 2021-04-12 PROCEDURE — 36415 COLL VENOUS BLD VENIPUNCTURE: CPT | Performed by: NURSE PRACTITIONER

## 2021-04-12 PROCEDURE — 83036 HEMOGLOBIN GLYCOSYLATED A1C: CPT | Performed by: NURSE PRACTITIONER

## 2021-04-12 PROCEDURE — 99214 OFFICE O/P EST MOD 30 MIN: CPT | Performed by: NURSE PRACTITIONER

## 2021-04-12 PROCEDURE — 80053 COMPREHEN METABOLIC PANEL: CPT | Performed by: NURSE PRACTITIONER

## 2021-04-12 PROCEDURE — 80061 LIPID PANEL: CPT | Performed by: NURSE PRACTITIONER

## 2021-04-12 PROCEDURE — 84443 ASSAY THYROID STIM HORMONE: CPT | Performed by: NURSE PRACTITIONER

## 2021-04-12 RX ORDER — SPIRONOLACTONE 25 MG/1
25 TABLET ORAL DAILY
Qty: 90 TABLET | Refills: 1 | Status: SHIPPED | OUTPATIENT
Start: 2021-04-12 | End: 2021-11-01 | Stop reason: SDUPTHER

## 2021-04-12 RX ORDER — LEVOTHYROXINE SODIUM 0.07 MG/1
75 TABLET ORAL DAILY
Qty: 90 TABLET | Refills: 1 | Status: SHIPPED | OUTPATIENT
Start: 2021-04-12 | End: 2021-11-01 | Stop reason: SDUPTHER

## 2021-04-12 RX ORDER — LISINOPRIL 10 MG/1
10 TABLET ORAL 2 TIMES DAILY
Qty: 180 TABLET | Refills: 1 | Status: SHIPPED | OUTPATIENT
Start: 2021-04-12 | End: 2021-11-01 | Stop reason: SDUPTHER

## 2021-04-12 NOTE — PROGRESS NOTES
Subjective   Shai Mata is a 76 y.o. male.     Diabetes  He presents for his follow-up diabetic visit. He has type 2 diabetes mellitus. His disease course has been stable. There are no hypoglycemic associated symptoms. Pertinent negatives for hypoglycemia include no confusion, dizziness, headaches, hunger, mood changes, nervousness/anxiousness, pallor, seizures, sleepiness, speech difficulty, sweats or tremors. There are no diabetic associated symptoms. Pertinent negatives for diabetes include no blurred vision, no chest pain, no fatigue, no foot paresthesias, no foot ulcerations, no polydipsia, no polyphagia, no polyuria, no visual change, no weakness and no weight loss. There are no hypoglycemic complications. Symptoms are stable. Diabetic complications include nephropathy. (Following nephrology with no recent changes  ) Risk factors for coronary artery disease include sedentary lifestyle, male sex, hypertension, family history and dyslipidemia. Current diabetic treatment includes insulin injections and oral agent (dual therapy). He is compliant with treatment all of the time. His weight is stable. He is following a generally healthy diet. Meal planning includes avoidance of concentrated sweets and carbohydrate counting. He participates in exercise intermittently. His home blood glucose trend is fluctuating minimally. His breakfast blood glucose range is generally  mg/dl. His overall blood glucose range is 140-180 mg/dl. An ACE inhibitor/angiotensin II receptor blocker is being taken. He does not see a podiatrist.  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 Following closely with cardiology.  Denies any recent changes.) problem. The current episode started more than 1 year ago. Progression since onset: Denies any recent concerns.  Feels he is doing well. The problem is controlled. Pertinent negatives include no anxiety, blurred vision, chest pain,  headaches, malaise/fatigue, neck pain, orthopnea, palpitations, peripheral edema, PND, shortness of breath or sweats. Risk factors for coronary artery disease include dyslipidemia, family history and sedentary lifestyle. Current antihypertension treatment includes ACE inhibitors, beta blockers and diuretics. The current treatment provides significant improvement. There are no compliance problems.  Hypertensive end-organ damage includes CAD/MI. Identifiable causes of hypertension include a thyroid problem.   Hyperlipidemia  This is a chronic problem. The current episode started more than 1 year ago. Recent lipid tests were reviewed and are variable. Exacerbating diseases include diabetes. Pertinent negatives include no chest pain, focal sensory loss, focal weakness, leg pain, myalgias or shortness of breath. Current antihyperlipidemic treatment includes statins. The current treatment provides significant improvement of lipids. There are no compliance problems.  Risk factors for coronary artery disease include diabetes mellitus, family history, dyslipidemia, obesity and hypertension.   Thyroid Problem  Presents for follow-up (Known hypothyroidism) visit. Patient reports no anxiety, fatigue, palpitations, tremors, visual change or weight loss. The symptoms have been stable (tolerating synthroid). His past medical history is significant for diabetes and hyperlipidemia.      The following portions of the patient's history were reviewed and updated as appropriate: allergies, current medications, past family history, past medical history, past social history, past surgical history and problem list.      Review of Systems   Constitutional: Negative.  Negative for activity change, fatigue, malaise/fatigue and weight loss.        States overall he is feeling fairly well     HENT: Negative.    Eyes: Negative for blurred vision.   Respiratory: Negative.  Negative for shortness of breath.    Cardiovascular: Negative.  Negative  for chest pain, palpitations, orthopnea, leg swelling and PND.   Gastrointestinal: Negative.    Endocrine: Negative.  Negative for polydipsia, polyphagia and polyuria.   Musculoskeletal: Negative.  Negative for myalgias and neck pain.   Skin: Negative.  Negative for pallor.   Neurological: Negative.  Negative for dizziness, tremors, focal weakness, seizures, speech difficulty, weakness and headaches.   Psychiatric/Behavioral: Negative.  Negative for confusion. The patient is not nervous/anxious.    All other systems reviewed and are negative.      Procedures    Objective   Physical Exam   Constitutional: He is oriented to person, place, and time. He appears well-developed. No distress.   HENT:   Head: Normocephalic.   Eyes: Conjunctivae are normal. Right eye exhibits no discharge. Left eye exhibits no discharge.   Cardiovascular: Normal rate, regular rhythm and normal heart sounds.   No murmur heard.  Pulmonary/Chest: Effort normal and breath sounds normal. No respiratory distress. He has no wheezes.   Neurological: He is alert and oriented to person, place, and time.   Skin: Skin is warm and dry. He is not diaphoretic.   Psychiatric: His behavior is normal.   Nursing note and vitals reviewed.      Diagnoses and all orders for this visit:    1. Type 2 diabetes mellitus with hyperglycemia, with long-term current use of insulin (CMS/[facility]) (Primary)  -     Comprehensive Metabolic Panel; Future  -     Hemoglobin A1c; Future  -     Lipid Panel; Future  -     Comprehensive Metabolic Panel  -     Hemoglobin A1c  -     Lipid Panel    2. Essential hypertension  -     spironolactone (ALDACTONE) 25 MG tablet; Take 1 tablet by mouth Daily.  Dispense: 90 tablet; Refill: 1  -     lisinopril (PRINIVIL,ZESTRIL) 10 MG tablet; Take 1 tablet by mouth 2 (Two) Times a Day.  Dispense: 180 tablet; Refill: 1  -     Comprehensive Metabolic Panel; Future  -     Lipid Panel; Future  -     Comprehensive Metabolic Panel  -     Lipid Panel    3.  Other specified hypothyroidism  -     levothyroxine (Euthyrox) 75 MCG tablet; Take 1 tablet by mouth Daily.  Dispense: 90 tablet; Refill: 1  -     TSH; Future  -     TSH    4. Hyperlipemia, mixed  Continue with current medications

## 2021-04-13 ENCOUNTER — TELEPHONE (OUTPATIENT)
Dept: FAMILY MEDICINE CLINIC | Facility: CLINIC | Age: 76
End: 2021-04-13

## 2021-04-13 DIAGNOSIS — E11.65 TYPE 2 DIABETES MELLITUS WITH HYPERGLYCEMIA, WITH LONG-TERM CURRENT USE OF INSULIN (HCC): ICD-10-CM

## 2021-04-13 DIAGNOSIS — Z79.4 TYPE 2 DIABETES MELLITUS WITH HYPERGLYCEMIA, WITH LONG-TERM CURRENT USE OF INSULIN (HCC): ICD-10-CM

## 2021-04-13 LAB
ALBUMIN SERPL-MCNC: 3.7 G/DL (ref 3.5–5.2)
ALBUMIN/GLOB SERPL: 1.6 G/DL
ALP SERPL-CCNC: 69 U/L (ref 39–117)
ALT SERPL W P-5'-P-CCNC: 14 U/L (ref 1–41)
ANION GAP SERPL CALCULATED.3IONS-SCNC: 9.3 MMOL/L (ref 5–15)
AST SERPL-CCNC: 13 U/L (ref 1–40)
BILIRUB SERPL-MCNC: 0.5 MG/DL (ref 0–1.2)
BUN SERPL-MCNC: 23 MG/DL (ref 8–23)
BUN/CREAT SERPL: 18.1 (ref 7–25)
CALCIUM SPEC-SCNC: 8.7 MG/DL (ref 8.6–10.5)
CHLORIDE SERPL-SCNC: 104 MMOL/L (ref 98–107)
CHOLEST SERPL-MCNC: 117 MG/DL (ref 0–200)
CO2 SERPL-SCNC: 24.7 MMOL/L (ref 22–29)
CREAT SERPL-MCNC: 1.27 MG/DL (ref 0.76–1.27)
GFR SERPL CREATININE-BSD FRML MDRD: 55 ML/MIN/1.73
GLOBULIN UR ELPH-MCNC: 2.3 GM/DL
GLUCOSE SERPL-MCNC: 107 MG/DL (ref 65–99)
HBA1C MFR BLD: 9.48 % (ref 4.8–5.6)
HDLC SERPL-MCNC: 39 MG/DL (ref 40–60)
LDLC SERPL CALC-MCNC: 68 MG/DL (ref 0–100)
LDLC/HDLC SERPL: 1.81 {RATIO}
POTASSIUM SERPL-SCNC: 4.8 MMOL/L (ref 3.5–5.2)
PROT SERPL-MCNC: 6 G/DL (ref 6–8.5)
SODIUM SERPL-SCNC: 138 MMOL/L (ref 136–145)
TRIGL SERPL-MCNC: 37 MG/DL (ref 0–150)
TSH SERPL DL<=0.05 MIU/L-ACNC: 0.53 UIU/ML (ref 0.27–4.2)
VLDLC SERPL-MCNC: 10 MG/DL (ref 5–40)

## 2021-04-13 RX ORDER — INSULIN ASPART 100 [IU]/ML
30 INJECTION, SUSPENSION SUBCUTANEOUS 2 TIMES DAILY WITH MEALS
Qty: 30 ML | Refills: 1 | Status: SHIPPED | OUTPATIENT
Start: 2021-04-13 | End: 2021-11-01 | Stop reason: SDUPTHER

## 2021-04-13 RX ORDER — GLIPIZIDE 5 MG/1
5 TABLET ORAL
Qty: 180 TABLET | Refills: 0 | Status: SHIPPED | OUTPATIENT
Start: 2021-04-13 | End: 2021-07-19

## 2021-04-13 RX ORDER — PEN NEEDLE, DIABETIC 30 GX3/16"
1 NEEDLE, DISPOSABLE MISCELLANEOUS 3 TIMES DAILY
Qty: 100 EACH | Refills: 2 | Status: SHIPPED | OUTPATIENT
Start: 2021-04-13

## 2021-04-13 RX ORDER — PEN NEEDLE, DIABETIC 31 G X1/4"
10 NEEDLE, DISPOSABLE MISCELLANEOUS NIGHTLY
Qty: 50 EACH | Refills: 2 | Status: SHIPPED | OUTPATIENT
Start: 2021-04-13 | End: 2022-08-01

## 2021-04-13 NOTE — TELEPHONE ENCOUNTER
----- Message from ANEUDY Carrizales sent at 4/13/2021 12:54 PM EDT -----  Labs are ok except his sugar.  A1C is 9.48.  Needs to monitor bid and increase Insulin to 30 bid.  I also sent new oral to try once daily

## 2021-05-17 ENCOUNTER — OFFICE VISIT (OUTPATIENT)
Dept: FAMILY MEDICINE CLINIC | Facility: CLINIC | Age: 76
End: 2021-05-17

## 2021-05-17 VITALS
OXYGEN SATURATION: 98 % | HEIGHT: 69 IN | TEMPERATURE: 96.8 F | DIASTOLIC BLOOD PRESSURE: 60 MMHG | BODY MASS INDEX: 26.31 KG/M2 | HEART RATE: 103 BPM | WEIGHT: 177.6 LBS | SYSTOLIC BLOOD PRESSURE: 110 MMHG

## 2021-05-17 DIAGNOSIS — Z00.00 MEDICARE ANNUAL WELLNESS VISIT, SUBSEQUENT: Primary | ICD-10-CM

## 2021-05-17 PROCEDURE — G0439 PPPS, SUBSEQ VISIT: HCPCS | Performed by: NURSE PRACTITIONER

## 2021-05-17 NOTE — PROGRESS NOTES
The ABCs of the Annual Wellness Visit  Subsequent Medicare Wellness Visit    Chief Complaint   Patient presents with   • Medicare Wellness-subsequent       Subjective   History of Present Illness:  Shai Mata is a 76 y.o. male who presents for a Subsequent Medicare Wellness Visit.    HEALTH RISK ASSESSMENT    Recent Hospitalizations:  No hospitalization(s) within the last year.    Current Medical Providers:  Patient Care Team:  Ekaterina Cox APRN as PCP - General  Ekaterina Cox APRN as PCP - Family Medicine    Smoking Status:  Social History     Tobacco Use   Smoking Status Former Smoker   • Packs/day: 2.00   • Types: Cigarettes   • Quit date:    • Years since quittin.4   Smokeless Tobacco Former User   • Types: Chew   • Quit date:        Alcohol Consumption:  Social History     Substance and Sexual Activity   Alcohol Use No       Depression Screen:   PHQ-2/PHQ-9 Depression Screening 2021   Little interest or pleasure in doing things 0   Feeling down, depressed, or hopeless 0   Total Score 0       Fall Risk Screen:  MELANIE Fall Risk Assessment was completed, and patient is at LOW risk for falls.Assessment completed on:2021    Health Habits and Functional and Cognitive Screening:  Functional & Cognitive Status 2021   Do you have difficulty preparing food and eating? No   Do you have difficulty bathing yourself, getting dressed or grooming yourself? No   Do you have difficulty using the toilet? No   Do you have difficulty moving around from place to place? No   Do you have trouble with steps or getting out of a bed or a chair? No   Current Diet Well Balanced Diet   Dental Exam Up to date   Eye Exam Up to date   Exercise (times per week) 5 times per week   Current Exercise Activities Include Yard Work   Do you need help using the phone?  No   Are you deaf or do you have serious difficulty hearing?  No   Do you need help with transportation? No   Do you need help shopping? No   Do you need help  preparing meals?  No   Do you need help with housework?  No   Do you need help with laundry? No   Do you need help taking your medications? No   Do you need help managing money? No   Do you ever drive or ride in a car without wearing a seat belt? No   Have you felt unusual stress, anger or loneliness in the last month? No   Who do you live with? Alone   If you need help, do you have trouble finding someone available to you? No   Have you been bothered in the last four weeks by sexual problems? No   Do you have difficulty concentrating, remembering or making decisions? No         Does the patient have evidence of cognitive impairment? No    Asprin use counseling:Taking ASA appropriately as indicated    Age-appropriate Screening Schedule:  Refer to the list below for future screening recommendations based on patient's age, sex and/or medical conditions. Orders for these recommended tests are listed in the plan section. The patient has been provided with a written plan.    Health Maintenance   Topic Date Due   • ZOSTER VACCINE (2 of 2) 07/18/2017   • DIABETIC EYE EXAM  05/12/2021   • INFLUENZA VACCINE  08/01/2021   • HEMOGLOBIN A1C  10/12/2021   • URINE MICROALBUMIN  01/12/2022   • LIPID PANEL  04/12/2022   • TDAP/TD VACCINES (2 - Td) 05/23/2027          The following portions of the patient's history were reviewed and updated as appropriate: allergies, current medications, past family history, past medical history, past social history, past surgical history and problem list.    Outpatient Medications Prior to Visit   Medication Sig Dispense Refill   • amiodarone (PACERONE) 200 MG tablet Take 0.5 tablets by mouth Daily. 45 tablet 1   • aspirin 81 MG EC tablet Take 81 mg by mouth 2 (Two) Times a Day.     • atorvastatin (LIPITOR) 80 MG tablet Take 1 tablet by mouth once daily 90 tablet 1   • carvedilol (COREG) 12.5 MG tablet Take 1 tablet by mouth 2 (Two) Times a Day. 180 tablet 2   • glipizide (Glucotrol) 5 MG tablet  Take 1 tablet by mouth 2 (Two) Times a Day Before Meals. 180 tablet 0   • insulin aspart prot-insulin aspart (NovoLOG Mix 70/30) (70-30) 100 UNIT/ML injection Inject 30 Units under the skin into the appropriate area as directed 2 (Two) Times a Day With Meals. 30 mL 1   • Insulin Pen Needle (Pen Needles) 31G X 6 MM misc 10 Units Every Night. 50 each 2   • Insulin Pen Needle (Pen Needles) 32G X 4 MM misc 1 each 3 (Three) Times a Day. 100 each 2   • levothyroxine (Euthyrox) 75 MCG tablet Take 1 tablet by mouth Daily. 90 tablet 1   • lisinopril (PRINIVIL,ZESTRIL) 10 MG tablet Take 1 tablet by mouth 2 (Two) Times a Day. 180 tablet 1   • spironolactone (ALDACTONE) 25 MG tablet Take 1 tablet by mouth Daily. 90 tablet 1     No facility-administered medications prior to visit.       Patient Active Problem List   Diagnosis   • ASCVD (arteriosclerotic cardiovascular disease)   • S/P CABG x 5   • HTN (hypertension)   • PVC's (premature ventricular contractions)   • Type 2 diabetes mellitus with hyperglycemia, with long-term current use of insulin (CMS/HCC)   • Hyperlipemia, mixed   • Ischemic cardiomyopathy   • Pain of upper abdomen   • Cholecystitis   • Chronic systolic congestive heart failure (CMS/HCC)   • Ventricular tachycardia (paroxysmal) (CMS/HCC)   • Long term current use of antiarrhythmic medical therapy   • Other specified hypothyroidism       Advanced Care Planning:  ACP discussion was held with the patient during this visit. Patient does not have an advance directive, information provided.    Review of Systems    Compared to one year ago, the patient feels his physical health is the same.  Compared to one year ago, the patient feels his mental health is the same.    Reviewed chart for potential of high risk medication in the elderly: yes  Reviewed chart for potential of harmful drug interactions in the elderly:yes    Objective         Vitals:    05/17/21 1043   BP: 110/60   Pulse: 103   Temp: 96.8 °F (36 °C)  "  TempSrc: Temporal   SpO2: 98%   Weight: 80.6 kg (177 lb 9.6 oz)   Height: 175.3 cm (69\")   PainSc: 0-No pain       Body mass index is 26.23 kg/m².  Discussed the patient's BMI with him. The BMI is in the acceptable range.    Physical Exam  Vitals and nursing note reviewed.   Constitutional:       General: He is not in acute distress.     Appearance: He is well-developed and normal weight. He is not ill-appearing.   HENT:      Head: Normocephalic.      Right Ear: Tympanic membrane, ear canal and external ear normal. There is no impacted cerumen.      Left Ear: Tympanic membrane, ear canal and external ear normal. There is no impacted cerumen.   Eyes:      General:         Right eye: No discharge.         Left eye: No discharge.      Conjunctiva/sclera: Conjunctivae normal.   Cardiovascular:      Rate and Rhythm: Normal rate and regular rhythm.      Heart sounds: Normal heart sounds. No murmur heard.     Pulmonary:      Effort: Pulmonary effort is normal.      Breath sounds: Normal breath sounds.   Abdominal:      General: Abdomen is flat. Bowel sounds are normal.      Palpations: Abdomen is soft.   Musculoskeletal:      Right lower leg: No edema.      Left lower leg: No edema.   Skin:     General: Skin is warm and dry.   Neurological:      Mental Status: He is alert and oriented to person, place, and time.   Psychiatric:         Behavior: Behavior normal.         Lab Results   Component Value Date    TRIG 37 04/12/2021    HDL 39 (L) 04/12/2021    LDL 68 04/12/2021    VLDL 10 04/12/2021    HGBA1C 9.48 (H) 04/12/2021        Assessment/Plan   Medicare Risks and Personalized Health Plan  CMS Preventative Services Quick Reference  Cardiovascular risk  Hearing Problem  Inactivity/Sedentary    The above risks/problems have been discussed with the patient.  Pertinent information has been shared with the patient in the After Visit Summary.  Follow up plans and orders are seen below in the Assessment/Plan Section.    There " are no diagnoses linked to this encounter.  Follow Up:  Return in about 4 months (around 9/17/2021), or if symptoms worsen or fail to improve, for Recheck.     An After Visit Summary and PPPS were given to the patient.

## 2021-06-01 ENCOUNTER — TELEPHONE (OUTPATIENT)
Dept: CARDIOLOGY | Facility: CLINIC | Age: 76
End: 2021-06-01

## 2021-06-01 NOTE — TELEPHONE ENCOUNTER
Called Mr Mata in regards to his Merlin home monitor isn't reading.  Left message for a return call.

## 2021-06-02 NOTE — TELEPHONE ENCOUNTER
Unable to troubleshoot and get monitor to work.  Sending new cell adapter and he will call once he receives to see if we can get monitor to transmit.

## 2021-06-03 NOTE — PROGRESS NOTES
Shai Mata  1945  PCP: Ekaterina Cox APRN    SUBJECTIVE:   Shai Mata is a 76 y.o. male seen for a follow up visit regarding the following:     Chief Complaint: Follow up for CHF, ICD check, PVCs, VT     HPI:    Since last visit the patient's status has been stable from a cardiac standpoint. He has not had any anginal or HF symptoms. PVCs well controlled and no recurrent VT. He denies any chest pain, sob, edema, palps.     Cardiac PMH: (Old records have been reviewed and summarized below)  1. PVCs                        A. 24 Hour Holter 4/13/16: 47-99 bpm, Average 71 bpm, frequent PVCs 32%, several              runs 4 beat runs NSVT                        B. 24 Hour Holter 5/10/16: 48-81 bpm, Average 65 bpm, frequent PVCs 26% burden                        B. 24 Hour Holter Monitor 8/10/17: HR 42-83, Average 62 bpm, frequent PVCs with                  bigeminy, couplets, and triplets (11.8% burden)                         C. Echocardiogram 8/17/17: EF 36-40%, myxomatous changes of MV with mild to                        moderate MR                        E. RFA of PVC 11/20/17  2. VT                        A. Inducible VT by EPS 11/20/17                        B. BiV ICD implant 11/20/17 [facility]   3. CHF                       A. Echo 11/20/17 LVEF 35%                       B. Echo  04/26/18 LVEF 45%  4. CAD                        A. CABG x 5 2006                        B. Echocardiogram 1/11/16: EF 45-50%, mild to moderate MR                        C. Stress Test 1/13/16: small reversible defect inferolateral and inferior segments, EF                         35%  5. LBBB  6. HTN  7. DM2  8. HLP      Current Outpatient Medications:   •  amiodarone (PACERONE) 200 MG tablet, Take 0.5 tablets by mouth Daily., Disp: 45 tablet, Rfl: 1  •  aspirin 81 MG EC tablet, Take 81 mg by mouth 2 (Two) Times a Day., Disp: , Rfl:   •  atorvastatin (LIPITOR) 80 MG tablet, Take 1 tablet by mouth once daily, Disp: 90 tablet, Rfl:  1  •  carvedilol (COREG) 12.5 MG tablet, Take 1 tablet by mouth 2 (Two) Times a Day., Disp: 180 tablet, Rfl: 2  •  glipizide (Glucotrol) 5 MG tablet, Take 1 tablet by mouth 2 (Two) Times a Day Before Meals., Disp: 180 tablet, Rfl: 0  •  insulin aspart prot-insulin aspart (NovoLOG Mix 70/30) (70-30) 100 UNIT/ML injection, Inject 30 Units under the skin into the appropriate area as directed 2 (Two) Times a Day With Meals., Disp: 30 mL, Rfl: 1  •  Insulin Pen Needle (Pen Needles) 31G X 6 MM misc, 10 Units Every Night., Disp: 50 each, Rfl: 2  •  Insulin Pen Needle (Pen Needles) 32G X 4 MM misc, 1 each 3 (Three) Times a Day., Disp: 100 each, Rfl: 2  •  levothyroxine (Euthyrox) 75 MCG tablet, Take 1 tablet by mouth Daily., Disp: 90 tablet, Rfl: 1  •  lisinopril (PRINIVIL,ZESTRIL) 10 MG tablet, Take 1 tablet by mouth 2 (Two) Times a Day., Disp: 180 tablet, Rfl: 1  •  spironolactone (ALDACTONE) 25 MG tablet, Take 1 tablet by mouth Daily., Disp: 90 tablet, Rfl: 1    Past Medical History, Past Surgical History, Family history, Social History, and Medications were all reviewed with the patient today and updated as necessary.       Patient Active Problem List   Diagnosis   • ASCVD (arteriosclerotic cardiovascular disease)   • S/P CABG x 5   • HTN (hypertension)   • PVC's (premature ventricular contractions)   • Type 2 diabetes mellitus with hyperglycemia, with long-term current use of insulin (CMS/HCC)   • Hyperlipemia, mixed   • Ischemic cardiomyopathy   • Pain of upper abdomen   • Cholecystitis   • Chronic systolic congestive heart failure (CMS/HCC)   • Ventricular tachycardia (paroxysmal) (CMS/HCC)   • Long term current use of antiarrhythmic medical therapy   • Other specified hypothyroidism     No Known Allergies  Past Medical History:   Diagnosis Date   • Abnormal heart rhythm    • ASCVD (arteriosclerotic cardiovascular disease)     s/p CABG in 2006 (5) [facility]   • CHF (congestive heart failure)  (CMS/[facility])    • Chicken pox    • Congestive heart failure (CMS/HCC)    • Coronary artery disease    • Diverticulitis    • DMII (diabetes mellitus, type 2) (CMS/[facility])     type 2, checks fsbg as needed    • Dyslipidemia    • HTN (hypertension)    • Hx of myocardial infarction, greater than 8 weeks    • Hyperlipidemia    • Measles    • Mitral regurgitation     MILD TO MODERATE   • Mumps    • PVC (premature ventricular contraction)    • SOB (shortness of breath)    • Vitamin D deficiency    • Wears reading eyeglasses      Past Surgical History:   Procedure Laterality Date   • CARDIAC DEFIBRILLATOR PLACEMENT     • CARDIAC ELECTROPHYSIOLOGY PROCEDURE N/A 2017    Procedure: EP/Ablation PVC +/- ICD Implant;  Surgeon: Jose Maldonado MD;  Location: [facility] EP INVASIVE LOCATION;  Service:    • CARDIAC ELECTROPHYSIOLOGY PROCEDURE N/A 2017    Procedure: Biventricular Device Insertion;  Surgeon: Jose Maldonado MD;  Location:  [facility] EP INVASIVE LOCATION;  Service:    • CATARACT EXTRACTION Bilateral    • CORONARY ANGIOPLASTY     • CORONARY ARTERY BYPASS GRAFT  2006    X 5.   [facility]   • PACEMAKER IMPLANTATION     • ND LAP,CHOLECYSTECTOMY N/A 2018    Procedure: CHOLECYSTECTOMY LAPAROSCOPIC;  Surgeon: Eber Hummel MD;  Location: [facility] OR;  Service: General     Family History   Problem Relation Age of Onset   • Heart disease Mother    • Heart disease Father    • No Known Problems Sister    • No Known Problems Sister    • Cancer Sister    • Diabetes Brother    • Cancer Sister    • Cancer Sister    • No Known Problems Sister    • No Known Problems Brother    • No Known Problems Brother      Social History     Tobacco Use   • Smoking status: Former Smoker     Packs/day: 2.00     Types: Cigarettes     Quit date:      Years since quittin.4   • Smokeless tobacco: Former User     Types: Chew     Quit date:    Substance Use Topics   • Alcohol use: No         PHYSICAL EXAM:    BP  "95/58 (BP Location: Left arm, Patient Position: Sitting)   Pulse 70   Ht 175.3 cm (69\")   Wt 79.2 kg (174 lb 9.6 oz)   SpO2 96%   BMI 25.78 kg/m²        Wt Readings from Last 5 Encounters:   06/04/21 79.2 kg (174 lb 9.6 oz)   05/17/21 80.6 kg (177 lb 9.6 oz)   04/12/21 83 kg (183 lb)   02/25/21 84.7 kg (186 lb 12.8 oz)   01/12/21 85 kg (187 lb 6.4 oz)       BP Readings from Last 5 Encounters:   06/04/21 95/58   05/17/21 110/60   04/12/21 110/60   02/25/21 107/58   01/12/21 132/80       General-Well Nourished, Well developed  Eyes - PERRLA  Neck- supple, No mass  CV- regular rate and rhythm, no MRG, No edema  Lung- clear bilaterally  Abd- soft, +BS  Musc/skel - Norm strength and range of motion  Skin- warm and dry  Neuro - Alert & Oriented x 3, appropriate mood.        Medical problems and test results were reviewed with the patient today.     No results found for this or any previous visit (from the past 672 hour(s)).      EKG: (EKG has been independently visualized by me and summarized below)      ECG 12 Lead    Date/Time: 6/4/2021 1:41 PM  Performed by: Myrna Aj PA  Authorized by: Myrna Aj PA   Comparison: compared with previous ECG from 2/25/2021  Similar to previous ECG  Rhythm: paced  Rate: normal  BPM: 70    Clinical impression: abnormal EKG  Comments: Stable qtc              ASSESSMENT and PLAN    1. VT: Amiodarone 100mg daily. No recurrent events.   2. Chronic Systolic Heart Failure: stable; Euvolemic. FC I-II HF symptoms.  EF 45%. Monitor daily weights, reduce sodium intake   3. HTN:  Controlled on Coreg, Aldactone, and Zestril.   4. Amiodarone use- continue current dose. EKG reviewed today and qtc stable.  Normal LFTs and TSH in April 2021. CXR within normal limits in Feb 2021.   5. BIV ICD: normal function. 99% biv pacing. No events. Battery 3 years       Return in about 6 months (around 12/4/2021) for SJM.        Myrna Aj PA-C   Cardiology/Electrophysiology  6/4/2021  13:42 EDT  "

## 2021-06-04 ENCOUNTER — OFFICE VISIT (OUTPATIENT)
Dept: CARDIOLOGY | Facility: CLINIC | Age: 76
End: 2021-06-04

## 2021-06-04 VITALS
BODY MASS INDEX: 25.86 KG/M2 | HEART RATE: 70 BPM | OXYGEN SATURATION: 96 % | HEIGHT: 69 IN | WEIGHT: 174.6 LBS | SYSTOLIC BLOOD PRESSURE: 95 MMHG | DIASTOLIC BLOOD PRESSURE: 58 MMHG

## 2021-06-04 DIAGNOSIS — I50.22 CHRONIC SYSTOLIC CONGESTIVE HEART FAILURE (HCC): ICD-10-CM

## 2021-06-04 DIAGNOSIS — I49.3 PVC'S (PREMATURE VENTRICULAR CONTRACTIONS): Primary | ICD-10-CM

## 2021-06-04 DIAGNOSIS — I47.29 VENTRICULAR TACHYCARDIA (PAROXYSMAL) (HCC): ICD-10-CM

## 2021-06-04 DIAGNOSIS — Z79.899 LONG TERM CURRENT USE OF ANTIARRHYTHMIC MEDICAL THERAPY: ICD-10-CM

## 2021-06-04 PROCEDURE — 93000 ELECTROCARDIOGRAM COMPLETE: CPT | Performed by: PHYSICIAN ASSISTANT

## 2021-06-04 PROCEDURE — 93284 PRGRMG EVAL IMPLANTABLE DFB: CPT | Performed by: PHYSICIAN ASSISTANT

## 2021-06-04 PROCEDURE — 99214 OFFICE O/P EST MOD 30 MIN: CPT | Performed by: PHYSICIAN ASSISTANT

## 2021-07-19 ENCOUNTER — OFFICE VISIT (OUTPATIENT)
Dept: FAMILY MEDICINE CLINIC | Facility: CLINIC | Age: 76
End: 2021-07-19

## 2021-07-19 VITALS
BODY MASS INDEX: 26.07 KG/M2 | HEIGHT: 69 IN | DIASTOLIC BLOOD PRESSURE: 68 MMHG | SYSTOLIC BLOOD PRESSURE: 120 MMHG | OXYGEN SATURATION: 100 % | HEART RATE: 91 BPM | TEMPERATURE: 96.9 F | WEIGHT: 176 LBS

## 2021-07-19 DIAGNOSIS — N18.31 TYPE 2 DIABETES MELLITUS WITH STAGE 3A CHRONIC KIDNEY DISEASE, WITH LONG-TERM CURRENT USE OF INSULIN (HCC): Primary | ICD-10-CM

## 2021-07-19 DIAGNOSIS — I25.10 ASCVD (ARTERIOSCLEROTIC CARDIOVASCULAR DISEASE): ICD-10-CM

## 2021-07-19 DIAGNOSIS — E78.2 HYPERLIPEMIA, MIXED: ICD-10-CM

## 2021-07-19 DIAGNOSIS — I50.22 CHRONIC SYSTOLIC CONGESTIVE HEART FAILURE (HCC): ICD-10-CM

## 2021-07-19 DIAGNOSIS — E11.22 TYPE 2 DIABETES MELLITUS WITH STAGE 3A CHRONIC KIDNEY DISEASE, WITH LONG-TERM CURRENT USE OF INSULIN (HCC): Primary | ICD-10-CM

## 2021-07-19 DIAGNOSIS — E03.8 OTHER SPECIFIED HYPOTHYROIDISM: ICD-10-CM

## 2021-07-19 DIAGNOSIS — Z79.4 TYPE 2 DIABETES MELLITUS WITH STAGE 3A CHRONIC KIDNEY DISEASE, WITH LONG-TERM CURRENT USE OF INSULIN (HCC): Primary | ICD-10-CM

## 2021-07-19 DIAGNOSIS — I10 ESSENTIAL HYPERTENSION: ICD-10-CM

## 2021-07-19 PROCEDURE — 99214 OFFICE O/P EST MOD 30 MIN: CPT | Performed by: NURSE PRACTITIONER

## 2021-07-19 RX ORDER — AMIODARONE HYDROCHLORIDE 200 MG/1
100 TABLET ORAL DAILY
Qty: 45 TABLET | Refills: 1 | Status: SHIPPED | OUTPATIENT
Start: 2021-07-19 | End: 2021-11-01 | Stop reason: SDUPTHER

## 2021-07-19 NOTE — PROGRESS NOTES
Subjective   Shai Mata is a 76 y.o. male.     Hyperlipidemia  This is a chronic problem. The current episode started more than 1 year ago. Recent lipid tests were reviewed and are variable. Exacerbating diseases include diabetes. Pertinent negatives include no chest pain, focal sensory loss, focal weakness, leg pain, myalgias or shortness of breath. Current antihyperlipidemic treatment includes statins. The current treatment provides significant improvement of lipids. There are no compliance problems.  Risk factors for coronary artery disease include diabetes mellitus, family history, dyslipidemia, obesity and hypertension.   Diabetes  He presents for his follow-up diabetic visit. He has type 2 diabetes mellitus. His disease course has been stable. Hypoglycemia symptoms include sweats. Pertinent negatives for hypoglycemia include no confusion, dizziness, headaches, hunger, mood changes, nervousness/anxiousness, pallor, seizures, sleepiness, speech difficulty or tremors. (Has stopped glipizide as he felt this is the reason of low sugar from time to time.  He is active outdoors more at present and admits occasionally misses a meal  ) There are no diabetic associated symptoms. Pertinent negatives for diabetes include no blurred vision, no chest pain, no fatigue, no foot paresthesias, no foot ulcerations, no polydipsia, no polyphagia, no polyuria, no visual change, no weakness and no weight loss. There are no hypoglycemic complications. Symptoms are stable. Diabetic complications include nephropathy. (Following nephrology with no recent changes  ) Risk factors for coronary artery disease include sedentary lifestyle, male sex, hypertension, family history and dyslipidemia. Current diabetic treatment includes insulin injections and oral agent (dual therapy). He is compliant with treatment all of the time. His weight is stable. He is following a generally healthy diet. Meal planning includes avoidance of concentrated  sweets and carbohydrate counting. He participates in exercise intermittently. His home blood glucose trend is fluctuating minimally. His breakfast blood glucose range is generally  mg/dl. His overall blood glucose range is 140-180 mg/dl. An ACE inhibitor/angiotensin II receptor blocker is being taken. He does not see a podiatrist.  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 Following closely with cardiology.  Denies any recent changes.) problem. The current episode started more than 1 year ago. Progression since onset: Denies any recent concerns.  Feels he is doing well. The problem is controlled. Associated symptoms include sweats. Pertinent negatives include no anxiety, blurred vision, chest pain, headaches, malaise/fatigue, neck pain, orthopnea, palpitations, peripheral edema, PND or shortness of breath. Risk factors for coronary artery disease include dyslipidemia, family history and sedentary lifestyle. Current antihypertension treatment includes ACE inhibitors, beta blockers and diuretics. The current treatment provides significant improvement. There are no compliance problems.  Hypertensive end-organ damage includes CAD/MI. Identifiable causes of hypertension include a thyroid problem.   Thyroid Problem  Presents for follow-up (Known hypothyroidism) visit. Patient reports no anxiety, fatigue, palpitations, tremors, visual change or weight loss. The symptoms have been stable (tolerating synthroid). His past medical history is significant for diabetes and hyperlipidemia.      The following portions of the patient's history were reviewed and updated as appropriate: allergies, current medications, past family history, past medical history, past social history, past surgical history and problem list.      Review of Systems   Constitutional: Negative.  Negative for activity change, fatigue, malaise/fatigue and weight loss.        States overall he is  feeling fairly well     HENT: Negative.    Eyes: Negative for blurred vision.   Respiratory: Negative.  Negative for shortness of breath.    Cardiovascular: Negative.  Negative for chest pain, palpitations, orthopnea, leg swelling and PND.   Gastrointestinal: Negative.    Endocrine: Negative.  Negative for polydipsia, polyphagia and polyuria.   Musculoskeletal: Negative.  Negative for myalgias and neck pain.   Skin: Negative.  Negative for pallor.   Neurological: Negative.  Negative for dizziness, tremors, focal weakness, seizures, speech difficulty, weakness and headaches.   Psychiatric/Behavioral: Negative.  Negative for confusion. The patient is not nervous/anxious.    All other systems reviewed and are negative.      Procedures    Objective   Physical Exam   Constitutional: He is oriented to person, place, and time. He appears well-developed. No distress.   HENT:   Head: Normocephalic.   Eyes: Conjunctivae are normal. Right eye exhibits no discharge. Left eye exhibits no discharge.   Cardiovascular: Normal rate, regular rhythm and normal heart sounds.   No murmur heard.  Pulmonary/Chest: Effort normal and breath sounds normal. No respiratory distress. He has no wheezes.   Neurological: He is alert and oriented to person, place, and time.   Skin: Skin is warm and dry. He is not diaphoretic.   Psychiatric: His behavior is normal.   Nursing note and vitals reviewed.    During this visit the following were done:  Labs Reviewed [x]    Labs Ordered [x]    Radiology Reports Reviewed []    Radiology Ordered []    PCP Records Reviewed []    Referring Provider Records Reviewed []    ER Records Reviewed []    Hospital Records Reviewed []    History Obtained From Family []    Radiology Images Reviewed []    Other Reviewed [x]    Records Requested []      Diagnoses and all orders for this visit:    1. Type 2 diabetes mellitus with stage 3a chronic kidney disease, with long-term current use of insulin (CMS/[facility]) (Primary)  -      Comprehensive Metabolic Panel; Future  -     Hemoglobin A1c; Future  -     Lipid Panel; Future  -     TSH; Future  -     Vitamin B12; Future  Stop glipizide for now and reduce insulin to 25 bid    2. Chronic systolic congestive heart failure (CMS/HCC)  -     amiodarone (PACERONE) 200 MG tablet; Take 0.5 tablets by mouth Daily.  Dispense: 45 tablet; Refill: 1    3. Other specified hypothyroidism  Continue with current dose of synthroid    4. ASCVD (arteriosclerotic cardiovascular disease)  Continue with cardiology and current medication    5. Essential hypertension  Continue with current medications    6. Hyperlipemia, mixed    Continue with current medications

## 2021-07-23 ENCOUNTER — LAB (OUTPATIENT)
Dept: FAMILY MEDICINE CLINIC | Facility: CLINIC | Age: 76
End: 2021-07-23

## 2021-07-23 DIAGNOSIS — N18.31 TYPE 2 DIABETES MELLITUS WITH STAGE 3A CHRONIC KIDNEY DISEASE, WITH LONG-TERM CURRENT USE OF INSULIN (HCC): ICD-10-CM

## 2021-07-23 DIAGNOSIS — E11.22 TYPE 2 DIABETES MELLITUS WITH STAGE 3A CHRONIC KIDNEY DISEASE, WITH LONG-TERM CURRENT USE OF INSULIN (HCC): ICD-10-CM

## 2021-07-23 DIAGNOSIS — Z79.4 TYPE 2 DIABETES MELLITUS WITH STAGE 3A CHRONIC KIDNEY DISEASE, WITH LONG-TERM CURRENT USE OF INSULIN (HCC): ICD-10-CM

## 2021-07-23 PROCEDURE — 80061 LIPID PANEL: CPT | Performed by: NURSE PRACTITIONER

## 2021-07-23 PROCEDURE — 36415 COLL VENOUS BLD VENIPUNCTURE: CPT

## 2021-07-23 PROCEDURE — 83036 HEMOGLOBIN GLYCOSYLATED A1C: CPT | Performed by: NURSE PRACTITIONER

## 2021-07-23 PROCEDURE — 82607 VITAMIN B-12: CPT | Performed by: NURSE PRACTITIONER

## 2021-07-23 PROCEDURE — 80053 COMPREHEN METABOLIC PANEL: CPT | Performed by: NURSE PRACTITIONER

## 2021-07-23 PROCEDURE — 84443 ASSAY THYROID STIM HORMONE: CPT | Performed by: NURSE PRACTITIONER

## 2021-07-24 LAB
ALBUMIN SERPL-MCNC: 3.9 G/DL (ref 3.5–5.2)
ALBUMIN/GLOB SERPL: 2 G/DL
ALP SERPL-CCNC: 64 U/L (ref 39–117)
ALT SERPL W P-5'-P-CCNC: 13 U/L (ref 1–41)
ANION GAP SERPL CALCULATED.3IONS-SCNC: 9.7 MMOL/L (ref 5–15)
AST SERPL-CCNC: 18 U/L (ref 1–40)
BILIRUB SERPL-MCNC: 0.3 MG/DL (ref 0–1.2)
BUN SERPL-MCNC: 14 MG/DL (ref 8–23)
BUN/CREAT SERPL: 11.2 (ref 7–25)
CALCIUM SPEC-SCNC: 8.9 MG/DL (ref 8.6–10.5)
CHLORIDE SERPL-SCNC: 107 MMOL/L (ref 98–107)
CHOLEST SERPL-MCNC: 102 MG/DL (ref 0–200)
CO2 SERPL-SCNC: 23.3 MMOL/L (ref 22–29)
CREAT SERPL-MCNC: 1.25 MG/DL (ref 0.76–1.27)
GFR SERPL CREATININE-BSD FRML MDRD: 56 ML/MIN/1.73
GLOBULIN UR ELPH-MCNC: 2 GM/DL
GLUCOSE SERPL-MCNC: 129 MG/DL (ref 65–99)
HBA1C MFR BLD: 7.14 % (ref 4.8–5.6)
HDLC SERPL-MCNC: 40 MG/DL (ref 40–60)
LDLC SERPL CALC-MCNC: 52 MG/DL (ref 0–100)
LDLC/HDLC SERPL: 1.38 {RATIO}
POTASSIUM SERPL-SCNC: 4.9 MMOL/L (ref 3.5–5.2)
PROT SERPL-MCNC: 5.9 G/DL (ref 6–8.5)
SODIUM SERPL-SCNC: 140 MMOL/L (ref 136–145)
TRIGL SERPL-MCNC: 35 MG/DL (ref 0–150)
TSH SERPL DL<=0.05 MIU/L-ACNC: 0.88 UIU/ML (ref 0.27–4.2)
VIT B12 BLD-MCNC: 679 PG/ML (ref 211–946)
VLDLC SERPL-MCNC: 10 MG/DL (ref 5–40)

## 2021-08-05 RX ORDER — GLIPIZIDE 5 MG/1
TABLET ORAL
Qty: 180 TABLET | Refills: 0 | OUTPATIENT
Start: 2021-08-05

## 2021-09-13 DIAGNOSIS — E78.2 HYPERLIPEMIA, MIXED: ICD-10-CM

## 2021-09-13 RX ORDER — ATORVASTATIN CALCIUM 80 MG/1
TABLET, FILM COATED ORAL
Qty: 90 TABLET | Refills: 0 | Status: SHIPPED | OUTPATIENT
Start: 2021-09-13 | End: 2021-11-01 | Stop reason: SDUPTHER

## 2021-09-24 ENCOUNTER — OFFICE VISIT (OUTPATIENT)
Dept: CARDIOLOGY | Facility: CLINIC | Age: 76
End: 2021-09-24

## 2021-09-24 VITALS
TEMPERATURE: 96.6 F | BODY MASS INDEX: 26.04 KG/M2 | DIASTOLIC BLOOD PRESSURE: 66 MMHG | WEIGHT: 175.8 LBS | HEART RATE: 78 BPM | SYSTOLIC BLOOD PRESSURE: 112 MMHG | HEIGHT: 69 IN

## 2021-09-24 DIAGNOSIS — E78.2 HYPERLIPEMIA, MIXED: ICD-10-CM

## 2021-09-24 DIAGNOSIS — I10 ESSENTIAL HYPERTENSION: ICD-10-CM

## 2021-09-24 DIAGNOSIS — I25.10 ASCVD (ARTERIOSCLEROTIC CARDIOVASCULAR DISEASE): Primary | ICD-10-CM

## 2021-09-24 DIAGNOSIS — I47.29 VENTRICULAR TACHYCARDIA (PAROXYSMAL) (HCC): ICD-10-CM

## 2021-09-24 PROCEDURE — 99214 OFFICE O/P EST MOD 30 MIN: CPT | Performed by: PHYSICIAN ASSISTANT

## 2021-09-24 NOTE — PROGRESS NOTES
Ekaterina Cox, ANEUDY  Shai Mata  1945 09/24/2021    Patient Active Problem List   Diagnosis   • ASCVD (arteriosclerotic cardiovascular disease)   • S/P CABG x 5   • HTN (hypertension)   • PVC's (premature ventricular contractions)   • Type 2 diabetes mellitus with hyperglycemia, with long-term current use of insulin (CMS/HCC)   • Hyperlipemia, mixed   • Ischemic cardiomyopathy   • Pain of upper abdomen   • Cholecystitis   • Chronic systolic congestive heart failure (CMS/HCC)   • Ventricular tachycardia (paroxysmal) (CMS/HCC)   • Long term current use of antiarrhythmic medical therapy   • Other specified hypothyroidism       Dear Ekaterina Cox, ANEUDY:    Subjective     History of Present Illness:    Chief Complaint   Patient presents with   • Follow-up     6 MONTH FOLLOW UP       Shai Mata is a pleasant 76 y.o. male with a past medical history significant for  ischemic cardiomyopathy with most recent EF 45%, chronic systolic congestive heart failure, history of ventricular tachycardia status post ICD implantation on amiodarone, ASCVD status post four-vessel CABG, diabetes mellitus type 2, hypertension, and dyslipidemia.  He presents today for routine cardiology follow-up.     Shai reports he has been doing well without any issues lately.  He denies any chest pains, shortness of breath, dizziness, syncope, palpitations.  Blood pressure is also been well controlled.      No Known Allergies:      Current Outpatient Medications:   •  amiodarone (PACERONE) 200 MG tablet, Take 0.5 tablets by mouth Daily., Disp: 45 tablet, Rfl: 1  •  aspirin 81 MG EC tablet, Take 81 mg by mouth 2 (Two) Times a Day., Disp: , Rfl:   •  atorvastatin (LIPITOR) 80 MG tablet, Take 1 tablet by mouth once daily, Disp: 90 tablet, Rfl: 0  •  carvedilol (COREG) 12.5 MG tablet, Take 1 tablet by mouth 2 (Two) Times a Day., Disp: 180 tablet, Rfl: 2  •  insulin aspart prot-insulin aspart (NovoLOG Mix 70/30) (70-30) 100 UNIT/ML injection, Inject 30  "Units under the skin into the appropriate area as directed 2 (Two) Times a Day With Meals., Disp: 30 mL, Rfl: 1  •  Insulin Pen Needle (Pen Needles) 31G X 6 MM misc, 10 Units Every Night., Disp: 50 each, Rfl: 2  •  Insulin Pen Needle (Pen Needles) 32G X 4 MM misc, 1 each 3 (Three) Times a Day., Disp: 100 each, Rfl: 2  •  levothyroxine (Euthyrox) 75 MCG tablet, Take 1 tablet by mouth Daily., Disp: 90 tablet, Rfl: 1  •  lisinopril (PRINIVIL,ZESTRIL) 10 MG tablet, Take 1 tablet by mouth 2 (Two) Times a Day., Disp: 180 tablet, Rfl: 1  •  spironolactone (ALDACTONE) 25 MG tablet, Take 1 tablet by mouth Daily., Disp: 90 tablet, Rfl: 1    The following portions of the patient's history were reviewed and updated as appropriate: allergies, current medications, past family history, past medical history, past social history, past surgical history and problem list.    Social History     Tobacco Use   • Smoking status: Former Smoker     Packs/day: 2.00     Types: Cigarettes     Quit date:      Years since quittin.7   • Smokeless tobacco: Former User     Types: Chew     Quit date:    Vaping Use   • Vaping Use: Never used   Substance Use Topics   • Alcohol use: No   • Drug use: No         Objective   Vitals:    21 0845   BP: 112/66   Pulse: 78   Temp: 96.6 °F (35.9 °C)   Weight: 79.7 kg (175 lb 12.8 oz)   Height: 175.3 cm (69\")     Body mass index is 25.96 kg/m².    Constitutional:       General: Not in acute distress.     Appearance: Healthy appearance. Well-developed and not in distress. Not diaphoretic.   Eyes:      Conjunctiva/sclera: Conjunctivae normal.      Pupils: Pupils are equal, round, and reactive to light.   HENT:      Head: Normocephalic and atraumatic.   Neck:      Vascular: No carotid bruit or JVD.   Pulmonary:      Effort: Pulmonary effort is normal. No respiratory distress.      Breath sounds: Normal breath sounds.   Cardiovascular:      Normal rate. Regular rhythm.   Skin:     General: Skin is " cool.   Neurological:      Mental Status: Alert, oriented to person, place, and time and oriented to person, place and time.         Lab Results   Component Value Date     07/23/2021    K 4.9 07/23/2021     07/23/2021    CO2 23.3 07/23/2021    BUN 14 07/23/2021    CREATININE 1.25 07/23/2021    GLUCOSE 129 (H) 07/23/2021    CALCIUM 8.9 07/23/2021    AST 18 07/23/2021    ALT 13 07/23/2021    ALKPHOS 64 07/23/2021    LABIL2 1.6 08/25/2016     Lab Results   Component Value Date    CKTOTAL 82 04/26/2018     Lab Results   Component Value Date    WBC 6.71 05/13/2020    HGB 13.1 05/13/2020    HCT 38.4 05/13/2020     05/13/2020     Lab Results   Component Value Date    INR 1.11 (H) 04/26/2018    INR 1.06 11/19/2017    INR 1.00 01/11/2016     Lab Results   Component Value Date    MG 1.9 09/14/2020     Lab Results   Component Value Date    TSH 0.878 07/23/2021    PSA 0.833 05/13/2020    CHLPL 133 11/28/2017    TRIG 35 07/23/2021    HDL 40 07/23/2021    LDL 52 07/23/2021      Lab Results   Component Value Date    BNP 26.0 04/26/2018       During this visit the following were done:  Labs Reviewed []    Labs Ordered []    Radiology Reports Reviewed []    Radiology Ordered []    PCP Records Reviewed []    Referring Provider Records Reviewed []    ER Records Reviewed []    Hospital Records Reviewed []    History Obtained From Family []    Radiology Images Reviewed []    Other Reviewed []    Records Requested []       Procedures    Assessment/Plan    Diagnosis Plan   1. ASCVD (arteriosclerotic cardiovascular disease)     2. Hyperlipemia, mixed     3. Essential hypertension     4. Ventricular tachycardia (paroxysmal) (CMS/[facility])              Recommendations:  1. ASCVD  1. No recent anginal symptoms being treated medically with aspirin, Lipitor, Coreg, spironolactone, and lisinopril.  We will continue this regimen for now.  2. History of ventricular dysrhythmias  1. Currently on amiodarone without any issues recent  chest x-ray was unremarkable.  I will continue this.  3. Dyslipidemia  1. Continue atorvastatin recent lipid panel showed excellently controlled LDL.      No follow-ups on file.    As always, I appreciate very much the opportunity to participate in the cardiovascular care of your patients.      With Best Regards,    Erik Livingston PA-C

## 2021-10-25 DIAGNOSIS — I10 ESSENTIAL HYPERTENSION: ICD-10-CM

## 2021-10-25 DIAGNOSIS — I50.22 CHRONIC SYSTOLIC CONGESTIVE HEART FAILURE (HCC): ICD-10-CM

## 2021-10-25 RX ORDER — CARVEDILOL 12.5 MG/1
TABLET ORAL
Qty: 180 TABLET | Refills: 0 | Status: SHIPPED | OUTPATIENT
Start: 2021-10-25 | End: 2021-11-01 | Stop reason: SDUPTHER

## 2021-11-01 ENCOUNTER — OFFICE VISIT (OUTPATIENT)
Dept: FAMILY MEDICINE CLINIC | Facility: CLINIC | Age: 76
End: 2021-11-01

## 2021-11-01 ENCOUNTER — TELEPHONE (OUTPATIENT)
Dept: FAMILY MEDICINE CLINIC | Facility: CLINIC | Age: 76
End: 2021-11-01

## 2021-11-01 VITALS
HEART RATE: 91 BPM | BODY MASS INDEX: 26.54 KG/M2 | OXYGEN SATURATION: 99 % | HEIGHT: 69 IN | WEIGHT: 179.2 LBS | SYSTOLIC BLOOD PRESSURE: 120 MMHG | TEMPERATURE: 96.8 F | DIASTOLIC BLOOD PRESSURE: 62 MMHG

## 2021-11-01 DIAGNOSIS — I10 ESSENTIAL HYPERTENSION: ICD-10-CM

## 2021-11-01 DIAGNOSIS — E11.22 TYPE 2 DIABETES MELLITUS WITH STAGE 3A CHRONIC KIDNEY DISEASE, WITH LONG-TERM CURRENT USE OF INSULIN (HCC): ICD-10-CM

## 2021-11-01 DIAGNOSIS — N18.31 TYPE 2 DIABETES MELLITUS WITH STAGE 3A CHRONIC KIDNEY DISEASE, WITH LONG-TERM CURRENT USE OF INSULIN (HCC): ICD-10-CM

## 2021-11-01 DIAGNOSIS — E11.65 TYPE 2 DIABETES MELLITUS WITH HYPERGLYCEMIA, WITH LONG-TERM CURRENT USE OF INSULIN (HCC): ICD-10-CM

## 2021-11-01 DIAGNOSIS — Z79.4 TYPE 2 DIABETES MELLITUS WITH HYPERGLYCEMIA, WITH LONG-TERM CURRENT USE OF INSULIN (HCC): ICD-10-CM

## 2021-11-01 DIAGNOSIS — E03.8 OTHER SPECIFIED HYPOTHYROIDISM: ICD-10-CM

## 2021-11-01 DIAGNOSIS — Z79.4 TYPE 2 DIABETES MELLITUS WITH STAGE 3A CHRONIC KIDNEY DISEASE, WITH LONG-TERM CURRENT USE OF INSULIN (HCC): ICD-10-CM

## 2021-11-01 DIAGNOSIS — I50.22 CHRONIC SYSTOLIC CONGESTIVE HEART FAILURE (HCC): ICD-10-CM

## 2021-11-01 DIAGNOSIS — E78.2 HYPERLIPEMIA, MIXED: ICD-10-CM

## 2021-11-01 PROCEDURE — 80053 COMPREHEN METABOLIC PANEL: CPT | Performed by: NURSE PRACTITIONER

## 2021-11-01 PROCEDURE — 82306 VITAMIN D 25 HYDROXY: CPT | Performed by: NURSE PRACTITIONER

## 2021-11-01 PROCEDURE — 83036 HEMOGLOBIN GLYCOSYLATED A1C: CPT | Performed by: NURSE PRACTITIONER

## 2021-11-01 PROCEDURE — 80061 LIPID PANEL: CPT | Performed by: NURSE PRACTITIONER

## 2021-11-01 PROCEDURE — 99214 OFFICE O/P EST MOD 30 MIN: CPT | Performed by: NURSE PRACTITIONER

## 2021-11-01 PROCEDURE — 84443 ASSAY THYROID STIM HORMONE: CPT | Performed by: NURSE PRACTITIONER

## 2021-11-01 PROCEDURE — 82607 VITAMIN B-12: CPT | Performed by: NURSE PRACTITIONER

## 2021-11-01 PROCEDURE — 36415 COLL VENOUS BLD VENIPUNCTURE: CPT | Performed by: NURSE PRACTITIONER

## 2021-11-01 RX ORDER — LEVOTHYROXINE SODIUM 0.07 MG/1
75 TABLET ORAL DAILY
Qty: 90 TABLET | Refills: 1 | Status: SHIPPED | OUTPATIENT
Start: 2021-11-01 | End: 2022-03-29 | Stop reason: SDUPTHER

## 2021-11-01 RX ORDER — INSULIN ASPART 100 [IU]/ML
30 INJECTION, SUSPENSION SUBCUTANEOUS 2 TIMES DAILY WITH MEALS
Qty: 30 ML | Refills: 1 | Status: SHIPPED | OUTPATIENT
Start: 2021-11-01 | End: 2022-02-11

## 2021-11-01 RX ORDER — INSULIN ASPART 100 [IU]/ML
30 INJECTION, SUSPENSION SUBCUTANEOUS 2 TIMES DAILY WITH MEALS
Qty: 30 ML | Refills: 1 | Status: SHIPPED | OUTPATIENT
Start: 2021-11-01 | End: 2021-11-01 | Stop reason: SDUPTHER

## 2021-11-01 RX ORDER — ATORVASTATIN CALCIUM 80 MG/1
80 TABLET, FILM COATED ORAL DAILY
Qty: 90 TABLET | Refills: 0 | Status: SHIPPED | OUTPATIENT
Start: 2021-11-01 | End: 2022-03-29 | Stop reason: SDUPTHER

## 2021-11-01 RX ORDER — LISINOPRIL 10 MG/1
10 TABLET ORAL 2 TIMES DAILY
Qty: 180 TABLET | Refills: 1 | Status: SHIPPED | OUTPATIENT
Start: 2021-11-01 | End: 2022-03-29 | Stop reason: SDUPTHER

## 2021-11-01 RX ORDER — SPIRONOLACTONE 25 MG/1
25 TABLET ORAL DAILY
Qty: 90 TABLET | Refills: 1 | Status: SHIPPED | OUTPATIENT
Start: 2021-11-01 | End: 2022-03-29 | Stop reason: SDUPTHER

## 2021-11-01 RX ORDER — AMIODARONE HYDROCHLORIDE 200 MG/1
100 TABLET ORAL DAILY
Qty: 45 TABLET | Refills: 1 | Status: SHIPPED | OUTPATIENT
Start: 2021-11-01 | End: 2022-08-29

## 2021-11-01 RX ORDER — CARVEDILOL 12.5 MG/1
12.5 TABLET ORAL 2 TIMES DAILY
Qty: 180 TABLET | Refills: 1 | Status: SHIPPED | OUTPATIENT
Start: 2021-11-01 | End: 2022-03-29 | Stop reason: SDUPTHER

## 2021-11-01 NOTE — TELEPHONE ENCOUNTER
Which has he got in the past I prefer pens but I only refilled from past RX so continue as routine no changes made

## 2021-11-01 NOTE — TELEPHONE ENCOUNTER
Which has he got in the past I prefer pens but I only refilled from past RX so continue as routine no changes made      Pharmacy notified.

## 2021-11-01 NOTE — PROGRESS NOTES
Subjective   Shai Mata is a 76 y.o. male.     Diabetes  He presents for his follow-up diabetic visit. He has type 2 diabetes mellitus. His disease course has been stable. Hypoglycemia symptoms include sweats. Pertinent negatives for hypoglycemia include no confusion, headaches, hunger, mood changes, nervousness/anxiousness, pallor, seizures, sleepiness, speech difficulty or tremors. There are no diabetic associated symptoms. Pertinent negatives for diabetes include no blurred vision, no fatigue, no foot paresthesias, no foot ulcerations, no polydipsia, no polyphagia, no polyuria, no visual change, no weakness and no weight loss. There are no hypoglycemic complications. Symptoms are stable. Diabetic complications include nephropathy. (Following nephrology with no recent changes  ) Risk factors for coronary artery disease include sedentary lifestyle, male sex, hypertension, family history and dyslipidemia. Current diabetic treatment includes insulin injections and oral agent (dual therapy). He is compliant with treatment all of the time. His weight is stable. He is following a generally healthy diet. Meal planning includes avoidance of concentrated sweets and carbohydrate counting. He participates in exercise intermittently. His home blood glucose trend is fluctuating minimally. His breakfast blood glucose range is generally 110-130 mg/dl. His overall blood glucose range is 140-180 mg/dl. An ACE inhibitor/angiotensin II receptor blocker is being taken. He does not see a podiatrist.  Hyperlipidemia  This is a chronic problem. The current episode started more than 1 year ago. Recent lipid tests were reviewed and are variable. Exacerbating diseases include diabetes and hypothyroidism. Pertinent negatives include no focal sensory loss, focal weakness, leg pain or myalgias. Current antihyperlipidemic treatment includes statins. The current treatment provides significant improvement of lipids. There are no compliance  problems.  Risk factors for coronary artery disease include diabetes mellitus, family history, dyslipidemia, obesity and hypertension.   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 Following closely with cardiology.  Denies any recent changes.) problem. The current episode started more than 1 year ago. Progression since onset: Denies any recent concerns.  Feels he is doing well. The problem is controlled. Associated symptoms include sweats. Pertinent negatives include no anxiety, blurred vision, headaches, malaise/fatigue, neck pain, orthopnea, peripheral edema or PND. Risk factors for coronary artery disease include dyslipidemia, family history and sedentary lifestyle. Current antihypertension treatment includes ACE inhibitors, beta blockers and diuretics. The current treatment provides significant improvement. There are no compliance problems.  Hypertensive end-organ damage includes CAD/MI. Identifiable causes of hypertension include a thyroid problem.   Thyroid Problem  Presents for follow-up (Known hypothyroidism) visit. Patient reports no anxiety, fatigue, tremors, visual change or weight loss. The symptoms have been stable (tolerating synthroid). His past medical history is significant for diabetes.      The following portions of the patient's history were reviewed and updated as appropriate: allergies, current medications, past family history, past medical history, past social history, past surgical history and problem list.      Review of Systems   Constitutional: Negative.  Negative for activity change, fatigue, malaise/fatigue and weight loss.        States overall he is feeling fairly well     HENT: Negative.    Eyes: Negative for blurred vision.   Respiratory: Negative.    Cardiovascular: Negative.  Negative for orthopnea and PND.   Gastrointestinal: Negative.    Endocrine: Negative.  Negative for polydipsia, polyphagia and polyuria.    Musculoskeletal: Negative.  Negative for myalgias and neck pain.   Skin: Negative.  Negative for pallor.   Neurological: Negative.  Negative for tremors, focal weakness, seizures, speech difficulty, weakness and headaches.   Psychiatric/Behavioral: Negative.  Negative for confusion. The patient is not nervous/anxious.    All other systems reviewed and are negative.      Procedures    Objective   Physical Exam   Constitutional: He is oriented to person, place, and time. He appears well-developed. No distress.   HENT:   Head: Normocephalic.   Eyes: Conjunctivae are normal. Right eye exhibits no discharge. Left eye exhibits no discharge.   Cardiovascular: Normal rate, regular rhythm and normal heart sounds.   No murmur heard.  Pulmonary/Chest: Effort normal and breath sounds normal. No respiratory distress. He has no wheezes.   Musculoskeletal:      Right lower leg: No edema.      Left lower leg: No edema.   Neurological: He is alert and oriented to person, place, and time.   Skin: Skin is warm and dry. He is not diaphoretic.   Psychiatric: His behavior is normal.   Nursing note and vitals reviewed.    During this visit the following were done:  Labs Reviewed [x]    Labs Ordered [x]    Radiology Reports Reviewed []    Radiology Ordered []    PCP Records Reviewed []    Referring Provider Records Reviewed []    ER Records Reviewed []    Hospital Records Reviewed []    History Obtained From Family []    Radiology Images Reviewed []    Other Reviewed [x]    Records Requested []      Diagnoses and all orders for this visit:    1. Essential hypertension  -     lisinopril (PRINIVIL,ZESTRIL) 10 MG tablet; Take 1 tablet by mouth 2 (Two) Times a Day.  Dispense: 180 tablet; Refill: 1  -     spironolactone (ALDACTONE) 25 MG tablet; Take 1 tablet by mouth Daily.  Dispense: 90 tablet; Refill: 1  -     carvedilol (COREG) 12.5 MG tablet; Take 1 tablet by mouth 2 (Two) Times a Day.  Dispense: 180 tablet; Refill: 1  -      Comprehensive Metabolic Panel; Future  -     Lipid Panel; Future  -     Comprehensive Metabolic Panel  -     Lipid Panel    2. Chronic systolic congestive heart failure (HCC)  -     carvedilol (COREG) 12.5 MG tablet; Take 1 tablet by mouth 2 (Two) Times a Day.  Dispense: 180 tablet; Refill: 1  -     amiodarone (PACERONE) 200 MG tablet; Take 0.5 tablets by mouth Daily.  Dispense: 45 tablet; Refill: 1  -     Comprehensive Metabolic Panel; Future  -     Lipid Panel; Future  -     Comprehensive Metabolic Panel  -     Lipid Panel    3. Hyperlipemia, mixed  -     atorvastatin (LIPITOR) 80 MG tablet; Take 1 tablet by mouth Daily.  Dispense: 90 tablet; Refill: 0  -     Comprehensive Metabolic Panel; Future  -     Lipid Panel; Future  -     Comprehensive Metabolic Panel  -     Lipid Panel    4. Other specified hypothyroidism  -     levothyroxine (Euthyrox) 75 MCG tablet; Take 1 tablet by mouth Daily.  Dispense: 90 tablet; Refill: 1  -     TSH; Future  -     TSH    5. Type 2 diabetes mellitus with stage 3a chronic kidney disease, with long-term current use of insulin ([facility])  -     amiodarone (PACERONE) 200 MG tablet; Take 0.5 tablets by mouth Daily.  Dispense: 45 tablet; Refill: 1  -     Comprehensive Metabolic Panel; Future  -     Hemoglobin A1c; Future  -     Lipid Panel; Future  -     TSH; Future  -     Vitamin B12; Future  -     Vitamin D 25 Hydroxy; Future  -     Comprehensive Metabolic Panel  -     Hemoglobin A1c  -     Lipid Panel  -     TSH  -     Vitamin B12  -     Vitamin D 25 Hydroxy    6. Type 2 diabetes mellitus with hyperglycemia, with long-term current use of insulin ([facility])  -     Discontinue: insulin aspart prot-insulin aspart (NovoLOG Mix 70/30) (70-30) 100 UNIT/ML injection; Inject 30 Units under the skin into the appropriate area as directed 2 (Two) Times a Day With Meals.  Dispense: 30 mL; Refill: 1  -     insulin aspart prot-insulin aspart (NovoLOG Mix 70/30) (70-30) 100 UNIT/ML injection; Inject 30 Units  under the skin into the appropriate area as directed 2 (Two) Times a Day With Meals.  Dispense: 30 mL; Refill: 1

## 2021-11-01 NOTE — TELEPHONE ENCOUNTER
Pharmacy Name:  [facility]    Pharmacy representative name: CHER    Pharmacy representative phone number: [phone]    What medication are you calling in regards to: NOVALOG 70/30    What question does the pharmacy have: IS THE VIAL OR PENS BEING PRESCRIBED?    Who is the provider that prescribed the medication: KINGA

## 2021-11-02 LAB
25(OH)D3 SERPL-MCNC: 26.7 NG/ML
ALBUMIN SERPL-MCNC: 4.2 G/DL (ref 3.5–5.2)
ALBUMIN/GLOB SERPL: 2.2 G/DL
ALP SERPL-CCNC: 74 U/L (ref 39–117)
ALT SERPL W P-5'-P-CCNC: 16 U/L (ref 1–41)
ANION GAP SERPL CALCULATED.3IONS-SCNC: 7 MMOL/L (ref 5–15)
AST SERPL-CCNC: 14 U/L (ref 1–40)
BILIRUB SERPL-MCNC: 0.4 MG/DL (ref 0–1.2)
BUN SERPL-MCNC: 15 MG/DL (ref 8–23)
BUN/CREAT SERPL: 12.1 (ref 7–25)
CALCIUM SPEC-SCNC: 9.4 MG/DL (ref 8.6–10.5)
CHLORIDE SERPL-SCNC: 106 MMOL/L (ref 98–107)
CHOLEST SERPL-MCNC: 119 MG/DL (ref 0–200)
CO2 SERPL-SCNC: 27 MMOL/L (ref 22–29)
CREAT SERPL-MCNC: 1.24 MG/DL (ref 0.76–1.27)
GFR SERPL CREATININE-BSD FRML MDRD: 57 ML/MIN/1.73
GLOBULIN UR ELPH-MCNC: 1.9 GM/DL
GLUCOSE SERPL-MCNC: 111 MG/DL (ref 65–99)
HBA1C MFR BLD: 8.4 % (ref 4.8–5.6)
HDLC SERPL-MCNC: 46 MG/DL (ref 40–60)
LDLC SERPL CALC-MCNC: 63 MG/DL (ref 0–100)
LDLC/HDLC SERPL: 1.42 {RATIO}
POTASSIUM SERPL-SCNC: 5.2 MMOL/L (ref 3.5–5.2)
PROT SERPL-MCNC: 6.1 G/DL (ref 6–8.5)
SODIUM SERPL-SCNC: 140 MMOL/L (ref 136–145)
TRIGL SERPL-MCNC: 38 MG/DL (ref 0–150)
TSH SERPL DL<=0.05 MIU/L-ACNC: 1.36 UIU/ML (ref 0.27–4.2)
VIT B12 BLD-MCNC: 828 PG/ML (ref 211–946)
VLDLC SERPL-MCNC: 10 MG/DL (ref 5–40)

## 2021-11-24 ENCOUNTER — TELEPHONE (OUTPATIENT)
Dept: CARDIOLOGY | Facility: CLINIC | Age: 76
End: 2021-11-24

## 2021-11-24 NOTE — TELEPHONE ENCOUNTER
Missed remote transmission-Called patient, left message for patient to attempt transmission or call the device clinic for assistance.

## 2021-12-05 PROCEDURE — 93296 REM INTERROG EVL PM/IDS: CPT | Performed by: STUDENT IN AN ORGANIZED HEALTH CARE EDUCATION/TRAINING PROGRAM

## 2021-12-05 PROCEDURE — 93295 DEV INTERROG REMOTE 1/2/MLT: CPT | Performed by: STUDENT IN AN ORGANIZED HEALTH CARE EDUCATION/TRAINING PROGRAM

## 2022-01-24 DIAGNOSIS — I10 ESSENTIAL HYPERTENSION: ICD-10-CM

## 2022-01-24 DIAGNOSIS — I50.22 CHRONIC SYSTOLIC CONGESTIVE HEART FAILURE: ICD-10-CM

## 2022-01-24 RX ORDER — CARVEDILOL 12.5 MG/1
TABLET ORAL
Qty: 180 TABLET | Refills: 0 | OUTPATIENT
Start: 2022-01-24

## 2022-02-11 ENCOUNTER — OFFICE VISIT (OUTPATIENT)
Dept: CARDIOLOGY | Facility: CLINIC | Age: 77
End: 2022-02-11

## 2022-02-11 VITALS
SYSTOLIC BLOOD PRESSURE: 118 MMHG | HEART RATE: 70 BPM | OXYGEN SATURATION: 98 % | HEIGHT: 69 IN | DIASTOLIC BLOOD PRESSURE: 60 MMHG | BODY MASS INDEX: 26.93 KG/M2 | WEIGHT: 181.8 LBS

## 2022-02-11 DIAGNOSIS — I49.3 PVC'S (PREMATURE VENTRICULAR CONTRACTIONS): ICD-10-CM

## 2022-02-11 DIAGNOSIS — I10 PRIMARY HYPERTENSION: ICD-10-CM

## 2022-02-11 DIAGNOSIS — I25.5 ISCHEMIC CARDIOMYOPATHY: ICD-10-CM

## 2022-02-11 DIAGNOSIS — Z79.899 LONG TERM CURRENT USE OF ANTIARRHYTHMIC MEDICAL THERAPY: Primary | ICD-10-CM

## 2022-02-11 DIAGNOSIS — I47.29 VENTRICULAR TACHYCARDIA (PAROXYSMAL): ICD-10-CM

## 2022-02-11 PROCEDURE — 99213 OFFICE O/P EST LOW 20 MIN: CPT | Performed by: NURSE PRACTITIONER

## 2022-02-11 PROCEDURE — 93000 ELECTROCARDIOGRAM COMPLETE: CPT | Performed by: NURSE PRACTITIONER

## 2022-02-11 NOTE — PROGRESS NOTES
[facility] Cardiology    Patient ID: Shai Mata is a 76 y.o. male.  : 1945   Contact: [phone]    Encounter date: 2022    PCP: Ekaterina Cox APRN      Chief complaint:   Chief Complaint   Patient presents with   • ASCVD (arteriosclerotic cardiovascular disease)   • PVC's (premature ventricular contractions)       PROBLEM LIST:  1. PVCs                        A. 24 Hour Holter 16: 47-99 bpm, Average 71 bpm, frequent PVCs 32%, several runs 4 beat runs NSVT                        B. 24 Hour Holter 5/10/16: 48-81 bpm, Average 65 bpm, frequent PVCs 26% burden                        B. 24 Hour Holter Monitor 8/10/17: HR 42-83, Average 62 bpm, frequent PVCs withbigeminy, couplets, and triplets (11.8% burden)                         C. Echocardiogram 17: EF 36-40%, myxomatous changes of MV with mild to  moderate MR                        E. RFA of PVC 17  2. VT                        A. Inducible VT by EPS 17                        B. BiV ICD implant 17 SJM   3. CHF                       A. Echo 17 LVEF 35%                       B. Echo  18 LVEF 45%  4. CAD                        A. CABG x 2006                        B. Echocardiogram 16: EF 45-50%, mild to moderate MR                        C. Stress Test 16: small reversible defect inferolateral and inferior segments, EF35%  5. LBBB  6. HTN  7. DM2  8. HLP    Allergies and Medications:  No Known Allergies    Current Medications:    Current Outpatient Medications:   •  amiodarone (PACERONE) 200 MG tablet, Take 0.5 tablets by mouth Daily., Disp: 45 tablet, Rfl: 1  •  aspirin 81 MG EC tablet, Take 81 mg by mouth 2 (Two) Times a Day., Disp: , Rfl:   •  atorvastatin (LIPITOR) 80 MG tablet, Take 1 tablet by mouth Daily., Disp: 90 tablet, Rfl: 0  •  carvedilol (COREG) 12.5 MG tablet, Take 1 tablet by mouth 2 (Two) Times a Day., Disp: 180 tablet, Rfl: 1  •  insulin NPH (humuLIN  "N,novoLIN N) 100 UNIT/ML injection, Inject 30 Units under the skin into the appropriate area as directed 2 (Two) Times a Day Before Meals., Disp: , Rfl:   •  Insulin Pen Needle (Pen Needles) 31G X 6 MM misc, 10 Units Every Night., Disp: 50 each, Rfl: 2  •  Insulin Pen Needle (Pen Needles) 32G X 4 MM misc, 1 each 3 (Three) Times a Day., Disp: 100 each, Rfl: 2  •  levothyroxine (Euthyrox) 75 MCG tablet, Take 1 tablet by mouth Daily., Disp: 90 tablet, Rfl: 1  •  lisinopril (PRINIVIL,ZESTRIL) 10 MG tablet, Take 1 tablet by mouth 2 (Two) Times a Day., Disp: 180 tablet, Rfl: 1  •  spironolactone (ALDACTONE) 25 MG tablet, Take 1 tablet by mouth Daily., Disp: 90 tablet, Rfl: 1    HPI    Shai Mata is a 76 y.o. male who presents today for follow up on PVCs, VT, s/p BiV ICD implant, systolic heart failure. Since last visit, patient has done well overall. Denies chest pain, SOA, PND, orthopnea, edema. No palpitations. BP controlled.        The following portions of the patient's history were reviewed and updated as appropriate: allergies, current medications and problem list.    Pertinent positives as listed in the HPI.  All other systems reviewed are negative.         Vitals:    02/11/22 1152   BP: 118/60   BP Location: Left arm   Patient Position: Sitting   Cuff Size: Adult   Pulse: 70   SpO2: 98%   Weight: 82.5 kg (181 lb 12.8 oz)   Height: 175.3 cm (69\")       Physical Exam:  General: Alert and oriented.  Neck: Jugular venous pressure is within normal limits. Carotids have normal upstrokes without bruits.   Cardiovascular: Heart has a nondisplaced focal PMI. Regular rate and rhythm without murmur, gallop or rub.  Lungs: Clear without rales or wheezes. Equal expansion is noted.   Extremities: Show no edema.  Skin: Warm and dry.  Neurologic: Nonfocal.     Diagnostic Data:  Lab Results   Component Value Date    GLUCOSE 111 (H) 11/01/2021    BUN 15 11/01/2021    CREATININE 1.24 11/01/2021    EGFRIFNONA 57 (L) 11/01/2021    " EGFRIFAFRI 74 08/25/2016    BCR 12.1 11/01/2021    K 5.2 11/01/2021    CO2 27.0 11/01/2021    CALCIUM 9.4 11/01/2021    PROTENTOTREF 6.7 08/25/2016    ALBUMIN 4.20 11/01/2021    LABIL2 1.6 08/25/2016    AST 14 11/01/2021    ALT 16 11/01/2021     Lab Results   Component Value Date    GLUCOSE 111 (H) 11/01/2021    CALCIUM 9.4 11/01/2021     11/01/2021    K 5.2 11/01/2021    CO2 27.0 11/01/2021     11/01/2021    BUN 15 11/01/2021    CREATININE 1.24 11/01/2021    EGFRIFAFRI 74 08/25/2016    EGFRIFNONA 57 (L) 11/01/2021    BCR 12.1 11/01/2021    ANIONGAP 7.0 11/01/2021     Lab Results   Component Value Date    CHOL 119 11/01/2021    CHLPL 133 11/28/2017    TRIG 38 11/01/2021    HDL 46 11/01/2021    LDL 63 11/01/2021     Lab Results   Component Value Date    WBC 6.71 05/13/2020    HGB 13.1 05/13/2020    HCT 38.4 05/13/2020    MCV 89.3 05/13/2020     05/13/2020     Lab Results   Component Value Date    TSH 1.360 11/01/2021     Device check 2/11/22: RA 98%, BV 99%, normal threshold and impedance, battery life 2.4 years. No events. LV cap confirm pacing margin decreased to 0.5ms.       ECG 12 Lead    Date/Time: 2/11/2022 12:33 PM  Performed by: Mariana Peters APRN  Authorized by: Mariana Peters APRN   Comparison: compared with previous ECG from 6/4/2021  Rhythm: sinus rhythm and paced  BPM: 70  Comments: QTc 470 ms              ASSESSMENT:    ICD-10-CM ICD-9-CM   1. Long term current use of antiarrhythmic medical therapy  Z79.899 V58.69   2. Ischemic cardiomyopathy  I25.5 414.8   3. Primary hypertension  I10 401.9   4. PVC's (premature ventricular contractions)  I49.3 427.69   5. Ventricular tachycardia (paroxysmal) (HCC)  I47.2 427.1     Lab results found above were reviewed with the patient.      PLAN:  1. CXR for amiodarone use. Reviewed recent TSH, CMP which are acceptable.   2. Continue amiodarone for VT/PVCs.   3. Continue Coreg, lisinopril for HTN, cardiomyopathy.   4. Continue all  other current medications.  5. F/up in 6 months, sooner if needed.      Electronically signed by ANEUDY Melendez, 02/11/22, 12:32 PM EST.

## 2022-02-14 ENCOUNTER — HOSPITAL ENCOUNTER (OUTPATIENT)
Dept: GENERAL RADIOLOGY | Facility: HOSPITAL | Age: 77
Discharge: HOME | End: 2022-02-14
Admitting: NURSE PRACTITIONER

## 2022-02-14 DIAGNOSIS — Z79.899 LONG TERM CURRENT USE OF ANTIARRHYTHMIC MEDICAL THERAPY: ICD-10-CM

## 2022-02-14 PROCEDURE — 71046 X-RAY EXAM CHEST 2 VIEWS: CPT

## 2022-02-14 PROCEDURE — 71046 X-RAY EXAM CHEST 2 VIEWS: CPT | Performed by: RADIOLOGY

## 2022-03-06 PROCEDURE — 93296 REM INTERROG EVL PM/IDS: CPT | Performed by: STUDENT IN AN ORGANIZED HEALTH CARE EDUCATION/TRAINING PROGRAM

## 2022-03-06 PROCEDURE — 93295 DEV INTERROG REMOTE 1/2/MLT: CPT | Performed by: STUDENT IN AN ORGANIZED HEALTH CARE EDUCATION/TRAINING PROGRAM

## 2022-03-24 ENCOUNTER — OFFICE VISIT (OUTPATIENT)
Dept: CARDIOLOGY | Facility: CLINIC | Age: 77
End: 2022-03-24

## 2022-03-24 VITALS
TEMPERATURE: 96.9 F | DIASTOLIC BLOOD PRESSURE: 69 MMHG | BODY MASS INDEX: 26.63 KG/M2 | HEIGHT: 69 IN | WEIGHT: 179.8 LBS | HEART RATE: 70 BPM | SYSTOLIC BLOOD PRESSURE: 122 MMHG

## 2022-03-24 DIAGNOSIS — I50.22 CHRONIC SYSTOLIC CONGESTIVE HEART FAILURE: ICD-10-CM

## 2022-03-24 DIAGNOSIS — I47.29 VENTRICULAR TACHYCARDIA (PAROXYSMAL): ICD-10-CM

## 2022-03-24 DIAGNOSIS — Z95.1 S/P CABG X 5: Primary | ICD-10-CM

## 2022-03-24 PROCEDURE — 99024 POSTOP FOLLOW-UP VISIT: CPT | Performed by: PHYSICIAN ASSISTANT

## 2022-03-24 NOTE — PROGRESS NOTES
Ekaterina Cox, ANEUDY  Shai Mata  1945 03/24/2022    Patient Active Problem List   Diagnosis   • ASCVD (arteriosclerotic cardiovascular disease)   • S/P CABG x 5   • HTN (hypertension)   • PVC's (premature ventricular contractions)   • Type 2 diabetes mellitus with hyperglycemia, with long-term current use of insulin (HCC)   • Hyperlipemia, mixed   • Ischemic cardiomyopathy   • Pain of upper abdomen   • Cholecystitis   • Chronic systolic congestive heart failure (HCC)   • Ventricular tachycardia (paroxysmal) (HCC)   • Long term current use of antiarrhythmic medical therapy   • Other specified hypothyroidism       Dear Ekaterina Cox APRN:    Subjective     History of Present Illness:    Chief Complaint   Patient presents with   • Follow-up     Routine follow up and pt states he is feeling good.        Shai Mata is a pleasant 76 y.o. male with a past medical history significant for ischemic cardiomyopathy with most recent EF 45%, chronic systolic congestive heart failure, history of ventricular tachycardia status post ICD implantation on amiodarone, ASCVD status post four-vessel CABG, diabetes mellitus type 2, hypertension, and dyslipidemia.  He presents today for routine cardiology follow-up.     Shai reports he has been doing well since he was last seen he has been following with EP who did recently have him get a chest x-ray which did not show any acute intrathoracic disease.  He denies any chest pains, shortness of breath, palpitations, dizziness, or syncope.    No Known Allergies:      Current Outpatient Medications:   •  amiodarone (PACERONE) 200 MG tablet, Take 0.5 tablets by mouth Daily., Disp: 45 tablet, Rfl: 1  •  aspirin 81 MG EC tablet, Take 81 mg by mouth 2 (Two) Times a Day., Disp: , Rfl:   •  atorvastatin (LIPITOR) 80 MG tablet, Take 1 tablet by mouth Daily., Disp: 90 tablet, Rfl: 0  •  carvedilol (COREG) 12.5 MG tablet, Take 1 tablet by mouth 2 (Two) Times a Day., Disp: 180 tablet, Rfl: 1  •   "insulin NPH (humuLIN N,novoLIN N) 100 UNIT/ML injection, Inject 30 Units under the skin into the appropriate area as directed 2 (Two) Times a Day Before Meals., Disp: , Rfl:   •  Insulin Pen Needle (Pen Needles) 31G X 6 MM misc, 10 Units Every Night., Disp: 50 each, Rfl: 2  •  Insulin Pen Needle (Pen Needles) 32G X 4 MM misc, 1 each 3 (Three) Times a Day., Disp: 100 each, Rfl: 2  •  levothyroxine (Euthyrox) 75 MCG tablet, Take 1 tablet by mouth Daily., Disp: 90 tablet, Rfl: 1  •  lisinopril (PRINIVIL,ZESTRIL) 10 MG tablet, Take 1 tablet by mouth 2 (Two) Times a Day., Disp: 180 tablet, Rfl: 1  •  spironolactone (ALDACTONE) 25 MG tablet, Take 1 tablet by mouth Daily., Disp: 90 tablet, Rfl: 1    The following portions of the patient's history were reviewed and updated as appropriate: allergies, current medications, past family history, past medical history, past social history, past surgical history and problem list.    Social History     Tobacco Use   • Smoking status: Former Smoker     Packs/day: 2.00     Types: Cigarettes     Quit date:      Years since quittin.2   • Smokeless tobacco: Former User     Types: Chew     Quit date:    Vaping Use   • Vaping Use: Never used   Substance Use Topics   • Alcohol use: No   • Drug use: No         Objective   Vitals:    22 0830   BP: 122/69   Pulse: 70   Temp: 96.9 °F (36.1 °C)   Weight: 81.6 kg (179 lb 12.8 oz)   Height: 175.3 cm (69.02\")     Body mass index is 26.54 kg/m².    Constitutional:       General: Not in acute distress.     Appearance: Healthy appearance. Well-developed and not in distress. Not diaphoretic.   Eyes:      Conjunctiva/sclera: Conjunctivae normal.      Pupils: Pupils are equal, round, and reactive to light.   HENT:      Head: Normocephalic and atraumatic.   Neck:      Vascular: No carotid bruit or JVD.   Pulmonary:      Effort: Pulmonary effort is normal. No respiratory distress.      Breath sounds: Normal breath sounds. "   Cardiovascular:      Normal rate. Regular rhythm.   Skin:     General: Skin is cool.   Neurological:      Mental Status: Alert, oriented to person, place, and time and oriented to person, place and time.         Lab Results   Component Value Date     11/01/2021    K 5.2 11/01/2021     11/01/2021    CO2 27.0 11/01/2021    BUN 15 11/01/2021    CREATININE 1.24 11/01/2021    GLUCOSE 111 (H) 11/01/2021    CALCIUM 9.4 11/01/2021    AST 14 11/01/2021    ALT 16 11/01/2021    ALKPHOS 74 11/01/2021    LABIL2 1.6 08/25/2016     Lab Results   Component Value Date    CKTOTAL 82 04/26/2018     Lab Results   Component Value Date    WBC 6.71 05/13/2020    HGB 13.1 05/13/2020    HCT 38.4 05/13/2020     05/13/2020     Lab Results   Component Value Date    INR 1.11 (H) 04/26/2018    INR 1.06 11/19/2017    INR 1.00 01/11/2016     Lab Results   Component Value Date    MG 1.9 09/14/2020     Lab Results   Component Value Date    TSH 1.360 11/01/2021    PSA 0.833 05/13/2020    CHLPL 133 11/28/2017    TRIG 38 11/01/2021    HDL 46 11/01/2021    LDL 63 11/01/2021      Lab Results   Component Value Date    BNP 26.0 04/26/2018       During this visit the following were done:  Labs Reviewed []    Labs Ordered []    Radiology Reports Reviewed []    Radiology Ordered []    PCP Records Reviewed []    Referring Provider Records Reviewed []    ER Records Reviewed []    Hospital Records Reviewed []    History Obtained From Family []    Radiology Images Reviewed []    Other Reviewed []    Records Requested []       Procedures    Assessment/Plan    Diagnosis Plan   1. S/P CABG x 5     2. Ventricular tachycardia (paroxysmal) (HCC)     3. Chronic systolic congestive heart failure (HCC)              Recommendations:  1. Coronary artery disease status post CABG  1. Clinically doing well has no complaints with open questions today.  We will continue current regimen of aspirin, Lipitor, Coreg, lisinopril, and spironolactone.  2. Inducible  V. tach now following with EP status post ICD  1. No recent symptoms or issues following with EP closely tolerating amiodarone well without any sequelae.      Return in about 6 months (around 9/24/2022).    As always, I appreciate very much the opportunity to participate in the cardiovascular care of your patients.      With Best Regards,    Erik Livingston PA-C

## 2022-03-29 ENCOUNTER — OFFICE VISIT (OUTPATIENT)
Dept: FAMILY MEDICINE CLINIC | Facility: CLINIC | Age: 77
End: 2022-03-29

## 2022-03-29 VITALS
HEART RATE: 80 BPM | DIASTOLIC BLOOD PRESSURE: 62 MMHG | WEIGHT: 178 LBS | HEIGHT: 69 IN | SYSTOLIC BLOOD PRESSURE: 122 MMHG | BODY MASS INDEX: 26.36 KG/M2 | TEMPERATURE: 96.9 F | OXYGEN SATURATION: 98 %

## 2022-03-29 DIAGNOSIS — E78.2 HYPERLIPEMIA, MIXED: ICD-10-CM

## 2022-03-29 DIAGNOSIS — N18.31 TYPE 2 DIABETES MELLITUS WITH STAGE 3A CHRONIC KIDNEY DISEASE, WITH LONG-TERM CURRENT USE OF INSULIN: Primary | ICD-10-CM

## 2022-03-29 DIAGNOSIS — Z79.4 TYPE 2 DIABETES MELLITUS WITH STAGE 3A CHRONIC KIDNEY DISEASE, WITH LONG-TERM CURRENT USE OF INSULIN: Primary | ICD-10-CM

## 2022-03-29 DIAGNOSIS — I10 ESSENTIAL HYPERTENSION: ICD-10-CM

## 2022-03-29 DIAGNOSIS — E11.22 TYPE 2 DIABETES MELLITUS WITH STAGE 3A CHRONIC KIDNEY DISEASE, WITH LONG-TERM CURRENT USE OF INSULIN: Primary | ICD-10-CM

## 2022-03-29 DIAGNOSIS — E03.8 OTHER SPECIFIED HYPOTHYROIDISM: ICD-10-CM

## 2022-03-29 DIAGNOSIS — I50.22 CHRONIC SYSTOLIC CONGESTIVE HEART FAILURE: ICD-10-CM

## 2022-03-29 DIAGNOSIS — I25.10 ASCVD (ARTERIOSCLEROTIC CARDIOVASCULAR DISEASE): ICD-10-CM

## 2022-03-29 PROCEDURE — 80061 LIPID PANEL: CPT | Performed by: NURSE PRACTITIONER

## 2022-03-29 PROCEDURE — 84443 ASSAY THYROID STIM HORMONE: CPT | Performed by: NURSE PRACTITIONER

## 2022-03-29 PROCEDURE — 80053 COMPREHEN METABOLIC PANEL: CPT | Performed by: NURSE PRACTITIONER

## 2022-03-29 PROCEDURE — 82043 UR ALBUMIN QUANTITATIVE: CPT | Performed by: NURSE PRACTITIONER

## 2022-03-29 PROCEDURE — 99214 OFFICE O/P EST MOD 30 MIN: CPT | Performed by: NURSE PRACTITIONER

## 2022-03-29 PROCEDURE — 83036 HEMOGLOBIN GLYCOSYLATED A1C: CPT | Performed by: NURSE PRACTITIONER

## 2022-03-29 RX ORDER — SPIRONOLACTONE 25 MG/1
25 TABLET ORAL DAILY
Qty: 90 TABLET | Refills: 1 | Status: SHIPPED | OUTPATIENT
Start: 2022-03-29 | End: 2022-09-30

## 2022-03-29 RX ORDER — CARVEDILOL 12.5 MG/1
12.5 TABLET ORAL 2 TIMES DAILY
Qty: 180 TABLET | Refills: 1 | Status: SHIPPED | OUTPATIENT
Start: 2022-03-29 | End: 2022-07-29

## 2022-03-29 RX ORDER — ATORVASTATIN CALCIUM 80 MG/1
80 TABLET, FILM COATED ORAL DAILY
Qty: 90 TABLET | Refills: 0 | Status: SHIPPED | OUTPATIENT
Start: 2022-03-29 | End: 2022-08-01 | Stop reason: SDUPTHER

## 2022-03-29 RX ORDER — LEVOTHYROXINE SODIUM 0.07 MG/1
75 TABLET ORAL DAILY
Qty: 90 TABLET | Refills: 1 | Status: SHIPPED | OUTPATIENT
Start: 2022-03-29 | End: 2022-08-01 | Stop reason: SDUPTHER

## 2022-03-29 RX ORDER — LISINOPRIL 10 MG/1
10 TABLET ORAL 2 TIMES DAILY
Qty: 180 TABLET | Refills: 1 | Status: SHIPPED | OUTPATIENT
Start: 2022-03-29 | End: 2022-08-01 | Stop reason: SDUPTHER

## 2022-03-29 NOTE — PROGRESS NOTES
Subjective   Shai Mata is a 76 y.o. male.     Hyperlipidemia  This is a chronic problem. The current episode started more than 1 year ago. Recent lipid tests were reviewed and are variable. Exacerbating diseases include diabetes and hypothyroidism. Pertinent negatives include no chest pain, focal sensory loss, focal weakness, leg pain or myalgias. Current antihyperlipidemic treatment includes statins. The current treatment provides significant improvement of lipids. There are no compliance problems.  Risk factors for coronary artery disease include diabetes mellitus, family history, dyslipidemia, obesity and hypertension.   Diabetes  He presents for his follow-up diabetic visit. He has type 2 diabetes mellitus. His disease course has been stable. There are no hypoglycemic associated symptoms. Pertinent negatives for hypoglycemia include no confusion, dizziness, headaches, hunger, mood changes, nervousness/anxiousness, pallor, seizures, sleepiness, speech difficulty or tremors. There are no diabetic associated symptoms. Pertinent negatives for diabetes include no blurred vision, no chest pain, no fatigue, no foot paresthesias, no foot ulcerations, no polydipsia, no polyphagia, no polyuria, no visual change, no weakness and no weight loss. There are no hypoglycemic complications. Symptoms are stable. Diabetic complications include nephropathy. (Following nephrology with no recent changes  ) Risk factors for coronary artery disease include sedentary lifestyle, male sex, hypertension, family history and dyslipidemia. Current diabetic treatment includes insulin injections and oral agent (dual therapy). He is compliant with treatment all of the time. His weight is stable. He is following a generally healthy diet. Meal planning includes avoidance of concentrated sweets and carbohydrate counting. He participates in exercise intermittently. His home blood glucose trend is fluctuating minimally. His breakfast blood  glucose range is generally 110-130 mg/dl. His overall blood glucose range is 140-180 mg/dl. An ACE inhibitor/angiotensin II receptor blocker is being taken. He does not see a podiatrist.  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 Following closely with cardiology.  Denies any recent changes.) problem. The current episode started more than 1 year ago. Progression since onset: Denies any recent concerns.  Feels he is doing well. The problem is controlled. Pertinent negatives include no anxiety, blurred vision, chest pain, headaches, malaise/fatigue, neck pain, orthopnea, peripheral edema or PND. Risk factors for coronary artery disease include dyslipidemia, family history and sedentary lifestyle. Current antihypertension treatment includes ACE inhibitors, beta blockers and diuretics. The current treatment provides significant improvement. There are no compliance problems.  Hypertensive end-organ damage includes CAD/MI. Identifiable causes of hypertension include a thyroid problem.   Thyroid Problem  Presents for follow-up (Known hypothyroidism) visit. Patient reports no anxiety, fatigue, tremors, visual change or weight loss. The symptoms have been stable (tolerating synthroid). His past medical history is significant for diabetes and hyperlipidemia.      The following portions of the patient's history were reviewed and updated as appropriate: allergies, current medications, past family history, past medical history, past social history, past surgical history and problem list.      Review of Systems   Constitutional: Negative.  Negative for activity change, fatigue, malaise/fatigue and weight loss.        States overall he is feeling fairly well     HENT: Negative.    Eyes: Negative for blurred vision.   Respiratory: Negative.    Cardiovascular: Negative.  Negative for chest pain, orthopnea and PND.   Gastrointestinal: Negative.    Endocrine: Negative.  Negative  for polydipsia, polyphagia and polyuria.   Musculoskeletal: Negative.  Negative for myalgias and neck pain.   Skin: Negative.  Negative for pallor.   Neurological: Negative.  Negative for dizziness, tremors, focal weakness, seizures, speech difficulty, weakness and headaches.   Psychiatric/Behavioral: Negative.  Negative for confusion. The patient is not nervous/anxious.    All other systems reviewed and are negative.      Procedures    Objective   Physical Exam   Constitutional: He is oriented to person, place, and time. He appears well-developed. No distress.   HENT:   Head: Normocephalic.   Eyes: Conjunctivae are normal. Right eye exhibits no discharge. Left eye exhibits no discharge.   Cardiovascular: Normal rate, regular rhythm and normal heart sounds.   No murmur heard.  Pulmonary/Chest: Effort normal and breath sounds normal. No respiratory distress. He has no wheezes.   Musculoskeletal:      Right lower leg: No edema.      Left lower leg: No edema.   Neurological: He is alert and oriented to person, place, and time.   Skin: Skin is warm and dry. He is not diaphoretic.   Psychiatric: His behavior is normal.   Nursing note and vitals reviewed.    During this visit the following were done:  Labs Reviewed [x]    Labs Ordered [x]    Radiology Reports Reviewed []    Radiology Ordered []    PCP Records Reviewed []    Referring Provider Records Reviewed []    ER Records Reviewed []    Hospital Records Reviewed []    History Obtained From Family []    Radiology Images Reviewed []    Other Reviewed [x]    Records Requested []      Diagnoses and all orders for this visit:    1. Type 2 diabetes mellitus with stage 3a chronic kidney disease, with long-term current use of insulin ([facility]) (Primary)  -     Comprehensive Metabolic Panel; Future  -     Hemoglobin A1c; Future  -     Lipid Panel; Future  -     TSH; Future  -     MicroAlbumin, Urine, Random - Urine, Clean Catch; Future  -     Comprehensive Metabolic Panel  -      Hemoglobin A1c  -     Lipid Panel  -     TSH  -     MicroAlbumin, Urine, Random - Urine, Clean Catch    2. Other specified hypothyroidism  -     TSH; Future  -     levothyroxine (Euthyrox) 75 MCG tablet; Take 1 tablet by mouth Daily.  Dispense: 90 tablet; Refill: 1    3. Essential hypertension  -     Comprehensive Metabolic Panel; Future  -     Lipid Panel; Future  -     TSH; Future  -     carvedilol (COREG) 12.5 MG tablet; Take 1 tablet by mouth 2 (Two) Times a Day.  Dispense: 180 tablet; Refill: 1  -     lisinopril (PRINIVIL,ZESTRIL) 10 MG tablet; Take 1 tablet by mouth 2 (Two) Times a Day.  Dispense: 180 tablet; Refill: 1  -     spironolactone (ALDACTONE) 25 MG tablet; Take 1 tablet by mouth Daily.  Dispense: 90 tablet; Refill: 1    4. Chronic systolic congestive heart failure (HCC)  -     Comprehensive Metabolic Panel; Future  -     Lipid Panel; Future  -     TSH; Future  -     carvedilol (COREG) 12.5 MG tablet; Take 1 tablet by mouth 2 (Two) Times a Day.  Dispense: 180 tablet; Refill: 1    5. Hyperlipemia, mixed  -     Comprehensive Metabolic Panel; Future  -     Lipid Panel; Future  -     TSH; Future  -     atorvastatin (LIPITOR) 80 MG tablet; Take 1 tablet by mouth Daily.  Dispense: 90 tablet; Refill: 0    6. ASCVD (arteriosclerotic cardiovascular disease)  -     Comprehensive Metabolic Panel; Future  -     Lipid Panel; Future  -     TSH; Future      Patient's Body mass index is 26.27 kg/m². indicating that he is overweight (BMI 25-29.9). Patient's (Body mass index is 26.27 kg/m².) indicates that they are overweight with health conditions that include hypertension, coronary heart disease, diabetes mellitus and dyslipidemias . Weight is unchanged. BMI is is above average; BMI management plan is completed. We discussed portion control and increasing exercise. .

## 2022-03-30 LAB
ALBUMIN SERPL-MCNC: 4 G/DL (ref 3.5–5.2)
ALBUMIN UR-MCNC: <1.2 MG/DL
ALBUMIN/GLOB SERPL: 1.5 G/DL
ALP SERPL-CCNC: 79 U/L (ref 39–117)
ALT SERPL W P-5'-P-CCNC: 14 U/L (ref 1–41)
ANION GAP SERPL CALCULATED.3IONS-SCNC: 17.6 MMOL/L (ref 5–15)
AST SERPL-CCNC: 13 U/L (ref 1–40)
BILIRUB SERPL-MCNC: 0.5 MG/DL (ref 0–1.2)
BUN SERPL-MCNC: 17 MG/DL (ref 8–23)
BUN/CREAT SERPL: 15 (ref 7–25)
CALCIUM SPEC-SCNC: 9.2 MG/DL (ref 8.6–10.5)
CHLORIDE SERPL-SCNC: 102 MMOL/L (ref 98–107)
CHOLEST SERPL-MCNC: 122 MG/DL (ref 0–200)
CO2 SERPL-SCNC: 24.4 MMOL/L (ref 22–29)
CREAT SERPL-MCNC: 1.13 MG/DL (ref 0.76–1.27)
EGFRCR SERPLBLD CKD-EPI 2021: 66.9 ML/MIN/1.73
GLOBULIN UR ELPH-MCNC: 2.6 GM/DL
GLUCOSE SERPL-MCNC: 158 MG/DL (ref 65–99)
HBA1C MFR BLD: 8.3 % (ref 4.8–5.6)
HDLC SERPL-MCNC: 40 MG/DL (ref 40–60)
LDLC SERPL CALC-MCNC: 72 MG/DL (ref 0–100)
LDLC/HDLC SERPL: 1.87 {RATIO}
POTASSIUM SERPL-SCNC: 5.7 MMOL/L (ref 3.5–5.2)
PROT SERPL-MCNC: 6.6 G/DL (ref 6–8.5)
SODIUM SERPL-SCNC: 144 MMOL/L (ref 136–145)
TRIGL SERPL-MCNC: 37 MG/DL (ref 0–150)
TSH SERPL DL<=0.05 MIU/L-ACNC: 0.59 UIU/ML (ref 0.27–4.2)
VLDLC SERPL-MCNC: 10 MG/DL (ref 5–40)

## 2022-03-31 ENCOUNTER — TELEPHONE (OUTPATIENT)
Dept: FAMILY MEDICINE CLINIC | Facility: CLINIC | Age: 77
End: 2022-03-31

## 2022-03-31 NOTE — TELEPHONE ENCOUNTER
----- Message from ANEUDY Carrizales sent at 3/30/2022  5:50 PM EDT -----  Labs are stable      Left a message to return call.    Patient notified.

## 2022-04-19 ENCOUNTER — OFFICE VISIT (OUTPATIENT)
Dept: FAMILY MEDICINE CLINIC | Facility: CLINIC | Age: 77
End: 2022-04-19

## 2022-04-19 VITALS
HEIGHT: 69 IN | TEMPERATURE: 97.3 F | OXYGEN SATURATION: 98 % | BODY MASS INDEX: 26.81 KG/M2 | WEIGHT: 181 LBS | HEART RATE: 75 BPM | DIASTOLIC BLOOD PRESSURE: 60 MMHG | SYSTOLIC BLOOD PRESSURE: 110 MMHG

## 2022-04-19 DIAGNOSIS — R13.10 DYSPHAGIA, UNSPECIFIED TYPE: Primary | ICD-10-CM

## 2022-04-19 PROCEDURE — 99213 OFFICE O/P EST LOW 20 MIN: CPT | Performed by: NURSE PRACTITIONER

## 2022-04-19 NOTE — PROGRESS NOTES
"Chief Complaint  Difficulty Swallowing    Subjective          Shai Mata presents to [facility]  Difficulty Swallowing  This is a new (Difficulty with passing solids.  Liquids pass well) problem. The current episode started more than 1 month ago. The problem occurs intermittently. The problem has been gradually worsening.       Objective   Vital Signs:   /60 (BP Location: Right arm, Patient Position: Sitting)   Pulse 75   Temp 97.3 °F (36.3 °C) (Temporal)   Ht 175.3 cm (69.02\")   Wt 82.1 kg (181 lb)   SpO2 98%   BMI 26.71 kg/m²            Physical Exam  Vitals and nursing note reviewed.   Constitutional:       Appearance: He is well-developed.   Cardiovascular:      Rate and Rhythm: Normal rate and regular rhythm.      Heart sounds: Normal heart sounds. No murmur heard.  Pulmonary:      Effort: Pulmonary effort is normal.      Breath sounds: Normal breath sounds.   Skin:     General: Skin is warm and dry.   Neurological:      Mental Status: He is alert and oriented to person, place, and time.   Psychiatric:         Behavior: Behavior normal.        Result Review :                 Assessment and Plan    Diagnoses and all orders for this visit:    1. Dysphagia, unspecified type (Primary)  -     Ambulatory Referral to Gastroenterology        Follow Up   No follow-ups on file.  Patient was given instructions and counseling regarding his condition or for health maintenance advice. Please see specific information pulled into the AVS if appropriate.       "

## 2022-04-20 ENCOUNTER — OFFICE VISIT (OUTPATIENT)
Dept: GASTROENTEROLOGY | Facility: CLINIC | Age: 77
End: 2022-04-20

## 2022-04-20 VITALS
BODY MASS INDEX: 26.36 KG/M2 | WEIGHT: 178 LBS | HEART RATE: 76 BPM | OXYGEN SATURATION: 99 % | SYSTOLIC BLOOD PRESSURE: 139 MMHG | HEIGHT: 69 IN | DIASTOLIC BLOOD PRESSURE: 59 MMHG

## 2022-04-20 DIAGNOSIS — R13.19 ESOPHAGEAL DYSPHAGIA: Primary | ICD-10-CM

## 2022-04-20 PROCEDURE — 99214 OFFICE O/P EST MOD 30 MIN: CPT | Performed by: PHYSICIAN ASSISTANT

## 2022-04-20 NOTE — PROGRESS NOTES
Chief Complaint   Patient presents with   • Difficulty Swallowing       Shai Mata is a 77 y.o. male who presents to the office today for evaluation of Difficulty Swallowing  .    HPI  Patient presents the clinic today for evaluation of difficulty swallowing.  Patient states this is been an ongoing issue for the last several weeks.  Patient states that his symptoms started out of nowhere.  He has been having difficulty swallowing mainly solids such as meat or breads.  Patient states regardless of how well he tries chewing up food it still seems to hang.  He is also had some difficulty swallowing softer things like chicken and dumplings.  Patient states that he does try to push the food that is having the esophagus down with water the water seems to come back up via vomiting episode.  Patient denies having any heartburn/reflux symptoms.  He has never had an EGD/colonoscopy performed in the past.  Review of Systems   Constitutional: Negative.    HENT: Positive for trouble swallowing. Negative for sore throat.    Eyes: Negative.    Respiratory: Negative for chest tightness.    Cardiovascular: Negative for chest pain.   Gastrointestinal: Negative for abdominal distention, abdominal pain, anal bleeding, blood in stool, constipation, diarrhea, nausea, rectal pain and vomiting.   Endocrine: Negative.    Genitourinary: Negative for difficulty urinating.   Musculoskeletal: Negative for back pain and neck pain.   Skin: Negative.    Allergic/Immunologic: Negative for environmental allergies and food allergies.   Neurological: Negative for dizziness and headaches.   Hematological: Bruises/bleeds easily.   Psychiatric/Behavioral: Positive for sleep disturbance. Negative for agitation. The patient is not nervous/anxious.        ACTIVE PROBLEMS:   Specialty Problems        Gastroenterology Problems    Cholecystitis              PAST MEDICAL HISTORY:  Past Medical History:   Diagnosis Date   • Abnormal heart rhythm    • ASCVD  (arteriosclerotic cardiovascular disease)     s/p CABG in 2006 (5) [facility]   • CHF (congestive heart failure) ([facility])    • Chicken pox    • Congestive heart failure (HCC)    • Coronary artery disease    • Diverticulitis    • DMII (diabetes mellitus, type 2) ([facility])     type 2, checks fsbg as needed    • Dyslipidemia    • HTN (hypertension)    • Hx of myocardial infarction, greater than 8 weeks    • Hyperlipidemia    • Measles    • Mitral regurgitation     MILD TO MODERATE   • Mumps    • PVC (premature ventricular contraction)    • SOB (shortness of breath)    • Vitamin D deficiency    • Wears reading eyeglasses        SURGICAL HISTORY:  Past Surgical History:   Procedure Laterality Date   • CARDIAC DEFIBRILLATOR PLACEMENT     • CARDIAC ELECTROPHYSIOLOGY PROCEDURE N/A 11/20/2017    Procedure: EP/Ablation PVC +/- ICD Implant;  Surgeon: Jose Maldonado MD;  Location: [facility] EP INVASIVE LOCATION;  Service:    • CARDIAC ELECTROPHYSIOLOGY PROCEDURE N/A 11/20/2017    Procedure: Biventricular Device Insertion;  Surgeon: Jose Maldonado MD;  Location: [facility] EP INVASIVE LOCATION;  Service:    • CATARACT EXTRACTION Bilateral    • CORONARY ANGIOPLASTY     • CORONARY ARTERY BYPASS GRAFT  2006    X 5.   [facility]   • PACEMAKER IMPLANTATION     • KS LAP,CHOLECYSTECTOMY N/A 04/27/2018    Procedure: CHOLECYSTECTOMY LAPAROSCOPIC;  Surgeon: Eber Hummel MD;  Location: [facility];  Service: General       FAMILY HISTORY:  Family History   Problem Relation Age of Onset   • Heart disease Mother    • Heart disease Father    • No Known Problems Sister    • No Known Problems Sister    • Cancer Sister    • Diabetes Brother    • Cancer Sister    • Cancer Sister    • No Known Problems Sister    • No Known Problems Brother    • No Known Problems Brother        SOCIAL HISTORY:  Social History     Tobacco Use   • Smoking status: Former Smoker     Packs/day: 2.00     Years: 20.00     Pack years: 40.00  "    Types: Cigarettes     Quit date:      Years since quittin.3   • Smokeless tobacco: Former User     Types: Chew     Quit date:    Substance Use Topics   • Alcohol use: No       CURRENT MEDICATION:    Current Outpatient Medications:   •  amiodarone (PACERONE) 200 MG tablet, Take 0.5 tablets by mouth Daily., Disp: 45 tablet, Rfl: 1  •  aspirin 81 MG EC tablet, Take 81 mg by mouth 2 (Two) Times a Day., Disp: , Rfl:   •  atorvastatin (LIPITOR) 80 MG tablet, Take 1 tablet by mouth Daily., Disp: 90 tablet, Rfl: 0  •  carvedilol (COREG) 12.5 MG tablet, Take 1 tablet by mouth 2 (Two) Times a Day., Disp: 180 tablet, Rfl: 1  •  insulin NPH (humuLIN N,novoLIN N) 100 UNIT/ML injection, Inject 22 Units under the skin into the appropriate area as directed 2 (Two) Times a Day Before Meals., Disp: , Rfl:   •  Insulin Pen Needle (Pen Needles) 31G X 6 MM misc, 10 Units Every Night., Disp: 50 each, Rfl: 2  •  Insulin Pen Needle (Pen Needles) 32G X 4 MM misc, 1 each 3 (Three) Times a Day., Disp: 100 each, Rfl: 2  •  levothyroxine (Euthyrox) 75 MCG tablet, Take 1 tablet by mouth Daily., Disp: 90 tablet, Rfl: 1  •  lisinopril (PRINIVIL,ZESTRIL) 10 MG tablet, Take 1 tablet by mouth 2 (Two) Times a Day., Disp: 180 tablet, Rfl: 1  •  spironolactone (ALDACTONE) 25 MG tablet, Take 1 tablet by mouth Daily., Disp: 90 tablet, Rfl: 1    ALLERGIES:  Patient has no known allergies.    VISIT VITALS:  /59   Pulse 76   Ht 175.3 cm (69\")   Wt 80.7 kg (178 lb)   SpO2 99%   BMI 26.29 kg/m²   Physical Exam  Constitutional:       General: He is not in acute distress.     Appearance: Normal appearance. He is well-developed.   HENT:      Head: Normocephalic and atraumatic.   Eyes:      Pupils: Pupils are equal, round, and reactive to light.   Cardiovascular:      Rate and Rhythm: Normal rate and regular rhythm.      Heart sounds: Normal heart sounds.   Pulmonary:      Effort: Pulmonary effort is normal. No respiratory distress.     "  Breath sounds: Normal breath sounds. No wheezing, rhonchi or rales.   Abdominal:      General: Abdomen is flat. Bowel sounds are normal. There is no distension.      Palpations: Abdomen is soft. There is no mass.      Tenderness: There is no abdominal tenderness. There is no guarding or rebound.      Hernia: No hernia is present.   Musculoskeletal:         General: No swelling. Normal range of motion.      Cervical back: Normal range of motion and neck supple.      Right lower leg: No edema.      Left lower leg: No edema.   Skin:     General: Skin is warm and dry.   Neurological:      Mental Status: He is alert and oriented to person, place, and time.   Psychiatric:         Attention and Perception: Attention normal.         Mood and Affect: Mood normal.         Speech: Speech normal.         Behavior: Behavior normal. Behavior is cooperative.         Thought Content: Thought content normal.         Assessment/Plan   Due to the patient's symptoms-I will go ahead and get him scheduled to have an EGD performed by Dr. David.  The procedure was thoroughly explained to the patient he voiced understanding and agreement to the treatment plan.  Patient may need PPI therapy due to silent reflux   Diagnosis Plan   1. Esophageal dysphagia  Case Request    Case Request       Return After procedure.                   This document has been electronically signed by Brad Wick PA-C  April 20, 2022 10:34 EDT    Part of this note may be an electronic transcription/translation of spoken language to printed text using the Dragon Dictation System.

## 2022-04-21 DIAGNOSIS — R13.19 ESOPHAGEAL DYSPHAGIA: Primary | ICD-10-CM

## 2022-05-10 PROBLEM — R13.19 ESOPHAGEAL DYSPHAGIA: Status: ACTIVE | Noted: 2022-05-10

## 2022-05-12 DIAGNOSIS — E03.8 OTHER SPECIFIED HYPOTHYROIDISM: ICD-10-CM

## 2022-05-12 RX ORDER — LEVOTHYROXINE SODIUM 75 UG/1
TABLET ORAL
Qty: 90 TABLET | Refills: 0 | OUTPATIENT
Start: 2022-05-12

## 2022-05-13 ENCOUNTER — LAB (OUTPATIENT)
Dept: LAB | Facility: HOSPITAL | Age: 77
End: 2022-05-13

## 2022-05-13 DIAGNOSIS — R13.19 ESOPHAGEAL DYSPHAGIA: ICD-10-CM

## 2022-05-17 ENCOUNTER — ANESTHESIA EVENT (OUTPATIENT)
Dept: PERIOP | Facility: HOSPITAL | Age: 77
End: 2022-05-17

## 2022-05-17 ENCOUNTER — HOSPITAL ENCOUNTER (OUTPATIENT)
Facility: HOSPITAL | Age: 77
Setting detail: HOSPITAL OUTPATIENT SURGERY
Discharge: HOME | End: 2022-05-17
Attending: INTERNAL MEDICINE | Admitting: INTERNAL MEDICINE

## 2022-05-17 ENCOUNTER — ANESTHESIA (OUTPATIENT)
Dept: PERIOP | Facility: HOSPITAL | Age: 77
End: 2022-05-17

## 2022-05-17 VITALS
HEART RATE: 71 BPM | DIASTOLIC BLOOD PRESSURE: 69 MMHG | RESPIRATION RATE: 18 BRPM | WEIGHT: 178 LBS | TEMPERATURE: 97.5 F | SYSTOLIC BLOOD PRESSURE: 129 MMHG | HEIGHT: 69 IN | BODY MASS INDEX: 26.36 KG/M2 | OXYGEN SATURATION: 98 %

## 2022-05-17 DIAGNOSIS — R13.19 ESOPHAGEAL DYSPHAGIA: ICD-10-CM

## 2022-05-17 LAB
GLUCOSE BLDC GLUCOMTR-MCNC: 247 MG/DL (ref 70–130)
GLUCOSE BLDC GLUCOMTR-MCNC: 285 MG/DL (ref 70–130)

## 2022-05-17 PROCEDURE — 82962 GLUCOSE BLOOD TEST: CPT

## 2022-05-17 PROCEDURE — 88305 TISSUE EXAM BY PATHOLOGIST: CPT

## 2022-05-17 PROCEDURE — 43239 EGD BIOPSY SINGLE/MULTIPLE: CPT | Performed by: INTERNAL MEDICINE

## 2022-05-17 PROCEDURE — 63710000001 INSULIN REGULAR HUMAN PER 5 UNITS: Performed by: ANESTHESIOLOGY

## 2022-05-17 PROCEDURE — 25010000002 PROPOFOL 10 MG/ML EMULSION: Performed by: NURSE ANESTHETIST, CERTIFIED REGISTERED

## 2022-05-17 RX ORDER — ONDANSETRON 2 MG/ML
4 INJECTION INTRAMUSCULAR; INTRAVENOUS AS NEEDED
Status: DISCONTINUED | OUTPATIENT
Start: 2022-05-17 | End: 2022-05-17 | Stop reason: HOSPADM

## 2022-05-17 RX ORDER — SODIUM CHLORIDE 0.9 % (FLUSH) 0.9 %
10 SYRINGE (ML) INJECTION EVERY 12 HOURS SCHEDULED
Status: DISCONTINUED | OUTPATIENT
Start: 2022-05-17 | End: 2022-05-17 | Stop reason: HOSPADM

## 2022-05-17 RX ORDER — IPRATROPIUM BROMIDE AND ALBUTEROL SULFATE 2.5; .5 MG/3ML; MG/3ML
3 SOLUTION RESPIRATORY (INHALATION) ONCE AS NEEDED
Status: DISCONTINUED | OUTPATIENT
Start: 2022-05-17 | End: 2022-05-17 | Stop reason: HOSPADM

## 2022-05-17 RX ORDER — OXYCODONE HYDROCHLORIDE AND ACETAMINOPHEN 5; 325 MG/1; MG/1
1 TABLET ORAL ONCE AS NEEDED
Status: DISCONTINUED | OUTPATIENT
Start: 2022-05-17 | End: 2022-05-17 | Stop reason: HOSPADM

## 2022-05-17 RX ORDER — PANTOPRAZOLE SODIUM 40 MG/1
40 TABLET, DELAYED RELEASE ORAL
Qty: 60 TABLET | Refills: 5 | Status: SHIPPED | OUTPATIENT
Start: 2022-05-17 | End: 2022-08-12 | Stop reason: SDUPTHER

## 2022-05-17 RX ORDER — SODIUM CHLORIDE, SODIUM LACTATE, POTASSIUM CHLORIDE, CALCIUM CHLORIDE 600; 310; 30; 20 MG/100ML; MG/100ML; MG/100ML; MG/100ML
100 INJECTION, SOLUTION INTRAVENOUS ONCE AS NEEDED
Status: DISCONTINUED | OUTPATIENT
Start: 2022-05-17 | End: 2022-05-17 | Stop reason: HOSPADM

## 2022-05-17 RX ORDER — SODIUM CHLORIDE, SODIUM LACTATE, POTASSIUM CHLORIDE, CALCIUM CHLORIDE 600; 310; 30; 20 MG/100ML; MG/100ML; MG/100ML; MG/100ML
125 INJECTION, SOLUTION INTRAVENOUS ONCE
Status: COMPLETED | OUTPATIENT
Start: 2022-05-17 | End: 2022-05-17

## 2022-05-17 RX ORDER — PROPOFOL 10 MG/ML
VIAL (ML) INTRAVENOUS AS NEEDED
Status: DISCONTINUED | OUTPATIENT
Start: 2022-05-17 | End: 2022-05-17 | Stop reason: SURG

## 2022-05-17 RX ORDER — SODIUM CHLORIDE 0.9 % (FLUSH) 0.9 %
10 SYRINGE (ML) INJECTION AS NEEDED
Status: DISCONTINUED | OUTPATIENT
Start: 2022-05-17 | End: 2022-05-17 | Stop reason: HOSPADM

## 2022-05-17 RX ORDER — FENTANYL CITRATE 50 UG/ML
50 INJECTION, SOLUTION INTRAMUSCULAR; INTRAVENOUS
Status: DISCONTINUED | OUTPATIENT
Start: 2022-05-17 | End: 2022-05-17 | Stop reason: HOSPADM

## 2022-05-17 RX ORDER — MEPERIDINE HYDROCHLORIDE 25 MG/ML
12.5 INJECTION INTRAMUSCULAR; INTRAVENOUS; SUBCUTANEOUS
Status: DISCONTINUED | OUTPATIENT
Start: 2022-05-17 | End: 2022-05-17 | Stop reason: HOSPADM

## 2022-05-17 RX ORDER — MIDAZOLAM HYDROCHLORIDE 1 MG/ML
0.5 INJECTION INTRAMUSCULAR; INTRAVENOUS
Status: DISCONTINUED | OUTPATIENT
Start: 2022-05-17 | End: 2022-05-17 | Stop reason: HOSPADM

## 2022-05-17 RX ADMIN — PROPOFOL 50 MG: 10 INJECTION, EMULSION INTRAVENOUS at 10:36

## 2022-05-17 RX ADMIN — SODIUM CHLORIDE, POTASSIUM CHLORIDE, SODIUM LACTATE AND CALCIUM CHLORIDE: 600; 310; 30; 20 INJECTION, SOLUTION INTRAVENOUS at 10:30

## 2022-05-17 RX ADMIN — HUMAN INSULIN 8 UNITS: 100 INJECTION, SOLUTION SUBCUTANEOUS at 09:50

## 2022-05-17 RX ADMIN — PROPOFOL 100 MG: 10 INJECTION, EMULSION INTRAVENOUS at 10:30

## 2022-05-17 NOTE — ANESTHESIA POSTPROCEDURE EVALUATION
Patient: Shai Mata    Procedure Summary     Date: 05/17/22 Room / Location:  [facility] OR 07 /  [facility]    Anesthesia Start: 1030 Anesthesia Stop: 1042    Procedure: ESOPHAGOGASTRODUODENOSCOPY WITH BIOPSY (N/A Esophagus) Diagnosis:       Esophageal dysphagia      (Esophageal dysphagia [R13.19])    Surgeons: Roula Craig MD Provider: Mateo Garber MD    Anesthesia Type: general ASA Status: 4          Anesthesia Type: general    Vitals  Vitals Value Taken Time   /69 05/17/22 1112   Temp 97.5 °F (36.4 °C) 05/17/22 1042   Pulse 71 05/17/22 1112   Resp 18 05/17/22 1112   SpO2 98 % 05/17/22 1112           Post Anesthesia Care and Evaluation    Patient location during evaluation: PACU  Patient participation: complete - patient participated  Level of consciousness: awake  Pain score: 0  Pain management: adequate  Airway patency: patent  Anesthetic complications: No anesthetic complications  PONV Status: controlled  Cardiovascular status: acceptable and blood pressure returned to baseline  Respiratory status: acceptable and room air  Hydration status: acceptable    Comments: Patient comfortable with discharge at this time.

## 2022-05-17 NOTE — ANESTHESIA PREPROCEDURE EVALUATION
Anesthesia Evaluation     Patient summary reviewed and Nursing notes reviewed   no history of anesthetic complications:  NPO Solid Status: > 8 hours  NPO Liquid Status: > 8 hours           Airway   Mallampati: II  TM distance: >3 FB  Neck ROM: full  No difficulty expected  Dental    (+) edentulous, lower dentures and upper dentures    Pulmonary - normal exam    breath sounds clear to auscultation  (+) shortness of breath,   (-) COPD  Cardiovascular - normal exam    ECG reviewed  Rhythm: regular  Rate: normal    (+) pacemaker pacemaker, ICD, hypertension 2 medications or greater, valvular problems/murmurs MR, past MI  >12 months, CAD, CABG >6 Months, CHF , hyperlipidemia,       Neuro/Psych- negative ROS  (-) seizures, CVA  GI/Hepatic/Renal/Endo    (+)  GERD,  diabetes mellitus type 2 poorly controlled using insulin, thyroid problem hypothyroidism    Musculoskeletal (-) negative ROS    Abdominal  - normal exam   Substance History - negative use     OB/GYN negative ob/gyn ROS         Other - negative ROS                     Anesthesia Plan    ASA 4     general     intravenous induction     Anesthetic plan, all risks, benefits, and alternatives have been provided, discussed and informed consent has been obtained with: patient.  Use of blood products discussed with patient  Consented to blood products.   Plan discussed with CRNA.        CODE STATUS:

## 2022-05-18 LAB — REF LAB TEST METHOD: NORMAL

## 2022-05-18 NOTE — PROGRESS NOTES
Recently, you underwent upper endoscopy.  At that time, I did note narrowing of the distal esophagus due to inflammation from acid reflux.  Biopsies are consistent with inflammation and esophagitis.  I would like for you to start Protonix 40 mg twice daily.  Please take this on an empty stomach.  We will repeat your EGD in about 8 weeks.  If we need to dilate your esophagus, we will do so at that time once the esophagitis has healed.  Please keep your follow-up appointment.

## 2022-06-05 PROCEDURE — 93295 DEV INTERROG REMOTE 1/2/MLT: CPT | Performed by: STUDENT IN AN ORGANIZED HEALTH CARE EDUCATION/TRAINING PROGRAM

## 2022-06-05 PROCEDURE — 93296 REM INTERROG EVL PM/IDS: CPT | Performed by: STUDENT IN AN ORGANIZED HEALTH CARE EDUCATION/TRAINING PROGRAM

## 2022-06-17 ENCOUNTER — OFFICE VISIT (OUTPATIENT)
Dept: GASTROENTEROLOGY | Facility: CLINIC | Age: 77
End: 2022-06-17

## 2022-06-17 VITALS
WEIGHT: 184.6 LBS | HEIGHT: 69 IN | HEART RATE: 88 BPM | DIASTOLIC BLOOD PRESSURE: 69 MMHG | BODY MASS INDEX: 27.34 KG/M2 | SYSTOLIC BLOOD PRESSURE: 133 MMHG

## 2022-06-17 DIAGNOSIS — R13.19 ESOPHAGEAL DYSPHAGIA: Primary | ICD-10-CM

## 2022-06-17 PROCEDURE — 99214 OFFICE O/P EST MOD 30 MIN: CPT | Performed by: PHYSICIAN ASSISTANT

## 2022-06-17 NOTE — PROGRESS NOTES
Chief Complaint   Patient presents with   • EGD follow up       Shai Mata is a 77 y.o. male who presents to the office today for evaluation of EGD follow up  .    HPI   Patient presents to the clinic today for follow-up of difficulty swallowing.  He recently underwent EGD with Dr. David on 5/17 in which he had severe esophagitis.  Patient was started on Protonix 40 mg twice daily and has been doing very well since.  Patient states that his difficulty swallowing has significantly improved.  He is no longer having any issues currently.  He is still avoiding meat products per Dr. David's recommendation.  EGD results:  - LA Grade D (one or more mucosal breaks involving at least 75% of esophageal circumference) esophagitis with  bleeding was found. Biopsies were taken with a cold forceps for histology.  - A medium-sized hiatal hernia was present.  - The entire examined stomach was normal.  - The examined duodenum was normal.  Review of Systems   Constitutional: Negative.    HENT: Negative for sore throat and trouble swallowing.    Eyes: Negative.    Respiratory: Negative for chest tightness.    Cardiovascular: Negative for chest pain.   Gastrointestinal: Negative for abdominal distention, abdominal pain, anal bleeding, blood in stool, constipation, diarrhea, nausea, rectal pain and vomiting.   Endocrine: Negative.    Genitourinary: Negative for difficulty urinating.   Musculoskeletal: Negative for back pain and neck pain.   Skin: Negative.    Allergic/Immunologic: Negative for environmental allergies and food allergies.   Neurological: Negative for dizziness and headaches.   Hematological: Bruises/bleeds easily.   Psychiatric/Behavioral: Positive for sleep disturbance. Negative for agitation. The patient is not nervous/anxious.        ACTIVE PROBLEMS:   Specialty Problems        Gastroenterology Problems    Cholecystitis        Esophageal dysphagia              PAST MEDICAL HISTORY:  Past Medical History:    Diagnosis Date   • Abnormal heart rhythm    • ASCVD (arteriosclerotic cardiovascular disease)     s/p CABG in 2006 (5) [facility]   • CHF (congestive heart failure) ([facility])    • Chicken pox    • Congestive heart failure (HCC)    • Coronary artery disease    • Disease of thyroid gland    • Diverticulitis    • DMII (diabetes mellitus, type 2) ([facility])     type 2, checks fsbg as needed    • Dyslipidemia    • Elevated cholesterol    • GERD (gastroesophageal reflux disease)    • HTN (hypertension)    • Hx of myocardial infarction, greater than 8 weeks     2006   • Hyperlipidemia    • Measles    • Mitral regurgitation     MILD TO MODERATE   • Mumps    • PVC (premature ventricular contraction)    • SOB (shortness of breath)    • Vitamin D deficiency    • Wears reading eyeglasses        SURGICAL HISTORY:  Past Surgical History:   Procedure Laterality Date   • CARDIAC DEFIBRILLATOR PLACEMENT     • CARDIAC ELECTROPHYSIOLOGY PROCEDURE N/A 11/20/2017    Procedure: EP/Ablation PVC +/- ICD Implant;  Surgeon: Jose Maldonado MD;  Location: [facility] EP INVASIVE LOCATION;  Service:    • CARDIAC ELECTROPHYSIOLOGY PROCEDURE N/A 11/20/2017    Procedure: Biventricular Device Insertion;  Surgeon: Jose Maldonado MD;  Location: [facility] EP INVASIVE LOCATION;  Service:    • CATARACT EXTRACTION Bilateral    • CORONARY ANGIOPLASTY     • CORONARY ARTERY BYPASS GRAFT  2006    X 5.   [facility]   • ENDOSCOPY N/A 5/17/2022    Procedure: ESOPHAGOGASTRODUODENOSCOPY WITH BIOPSY;  Surgeon: Roula Craig MD;  Location: [facility];  Service: Gastroenterology;  Laterality: N/A;   • PACEMAKER IMPLANTATION     • IL LAP,CHOLECYSTECTOMY N/A 04/27/2018    Procedure: CHOLECYSTECTOMY LAPAROSCOPIC;  Surgeon: Eber Hummel MD;  Location: [facility] OR;  Service: General       FAMILY HISTORY:  Family History   Problem Relation Age of Onset   • Heart disease Mother    • Heart disease Father    • No Known Problems  Sister    • No Known Problems Sister    • Cancer Sister    • Diabetes Brother    • Cancer Sister    • Cancer Sister    • No Known Problems Sister    • No Known Problems Brother    • No Known Problems Brother        SOCIAL HISTORY:  Social History     Tobacco Use   • Smoking status: Former Smoker     Packs/day: 2.00     Years: 20.00     Pack years: 40.00     Types: Cigarettes     Quit date:      Years since quittin.4   • Smokeless tobacco: Former User     Types: Chew     Quit date:    Substance Use Topics   • Alcohol use: No       CURRENT MEDICATION:    Current Outpatient Medications:   •  amiodarone (PACERONE) 200 MG tablet, Take 0.5 tablets by mouth Daily., Disp: 45 tablet, Rfl: 1  •  aspirin 81 MG EC tablet, Take 81 mg by mouth 2 (Two) Times a Day., Disp: , Rfl:   •  atorvastatin (LIPITOR) 80 MG tablet, Take 1 tablet by mouth Daily., Disp: 90 tablet, Rfl: 0  •  carvedilol (COREG) 12.5 MG tablet, Take 1 tablet by mouth 2 (Two) Times a Day., Disp: 180 tablet, Rfl: 1  •  insulin NPH (humuLIN N,novoLIN N) 100 UNIT/ML injection, Inject 22 Units under the skin into the appropriate area as directed 2 (Two) Times a Day Before Meals., Disp: , Rfl:   •  Insulin Pen Needle (Pen Needles) 31G X 6 MM misc, 10 Units Every Night., Disp: 50 each, Rfl: 2  •  Insulin Pen Needle (Pen Needles) 32G X 4 MM misc, 1 each 3 (Three) Times a Day., Disp: 100 each, Rfl: 2  •  levothyroxine (Euthyrox) 75 MCG tablet, Take 1 tablet by mouth Daily., Disp: 90 tablet, Rfl: 1  •  lisinopril (PRINIVIL,ZESTRIL) 10 MG tablet, Take 1 tablet by mouth 2 (Two) Times a Day., Disp: 180 tablet, Rfl: 1  •  pantoprazole (Protonix) 40 MG EC tablet, Take 1 tablet by mouth 2 (Two) Times a Day Before Meals., Disp: 60 tablet, Rfl: 5  •  spironolactone (ALDACTONE) 25 MG tablet, Take 1 tablet by mouth Daily., Disp: 90 tablet, Rfl: 1    ALLERGIES:  Patient has no known allergies.    VISIT VITALS:  /69 (BP Location: Left arm, Patient Position: Sitting,  "Cuff Size: Adult)   Pulse 88   Ht 175.3 cm (69\")   Wt 83.7 kg (184 lb 9.6 oz)   BMI 27.26 kg/m²   Physical Exam  Constitutional:       General: He is not in acute distress.     Appearance: Normal appearance. He is well-developed.   HENT:      Head: Normocephalic and atraumatic.   Eyes:      Pupils: Pupils are equal, round, and reactive to light.   Cardiovascular:      Rate and Rhythm: Normal rate and regular rhythm.      Heart sounds: Normal heart sounds.   Pulmonary:      Effort: Pulmonary effort is normal. No respiratory distress.      Breath sounds: Normal breath sounds. No wheezing, rhonchi or rales.   Abdominal:      General: Abdomen is flat. Bowel sounds are normal. There is no distension.      Palpations: Abdomen is soft. There is no mass.      Tenderness: There is no abdominal tenderness. There is no guarding or rebound.      Hernia: No hernia is present.   Musculoskeletal:         General: No swelling. Normal range of motion.      Cervical back: Normal range of motion and neck supple.      Right lower leg: No edema.      Left lower leg: No edema.   Skin:     General: Skin is warm and dry.   Neurological:      Mental Status: He is alert and oriented to person, place, and time.   Psychiatric:         Attention and Perception: Attention normal.         Mood and Affect: Mood normal.         Speech: Speech normal.         Behavior: Behavior normal. Behavior is cooperative.         Thought Content: Thought content normal.         Assessment    Patient's recent EGD results were reviewed with him-he voiced understanding of these results.  I will go ahead and get him scheduled to have his 8-week follow-up EGD to verify healing of esophagitis.  Procedure was once again explained to the patient he voiced understanding and agreement to the treatment plan.  He will continue on Protonix 40 mg twice daily.   Diagnosis Plan   1. Esophageal dysphagia  Case Request    Case Request       Return After procedure.     "               This document has been electronically signed by Brad Pedersen PA-C  June 21, 2022 08:50 EDT    Part of this note may be an electronic transcription/translation of spoken language to printed text using the Dragon Dictation System.

## 2022-07-25 ENCOUNTER — LAB (OUTPATIENT)
Dept: LAB | Facility: HOSPITAL | Age: 77
End: 2022-07-25

## 2022-07-25 DIAGNOSIS — R13.19 ESOPHAGEAL DYSPHAGIA: ICD-10-CM

## 2022-07-25 LAB — SARS-COV-2 RNA PNL SPEC NAA+PROBE: NOT DETECTED

## 2022-07-25 PROCEDURE — C9803 HOPD COVID-19 SPEC COLLECT: HCPCS

## 2022-07-25 PROCEDURE — U0004 COV-19 TEST NON-CDC HGH THRU: HCPCS

## 2022-07-25 PROCEDURE — U0005 INFEC AGEN DETEC AMPLI PROBE: HCPCS

## 2022-07-27 ENCOUNTER — ANESTHESIA EVENT (OUTPATIENT)
Dept: PERIOP | Facility: HOSPITAL | Age: 77
End: 2022-07-27

## 2022-07-27 ENCOUNTER — ANESTHESIA (OUTPATIENT)
Dept: PERIOP | Facility: HOSPITAL | Age: 77
End: 2022-07-27

## 2022-07-27 ENCOUNTER — HOSPITAL ENCOUNTER (OUTPATIENT)
Facility: HOSPITAL | Age: 77
Setting detail: HOSPITAL OUTPATIENT SURGERY
Discharge: HOME | End: 2022-07-27
Attending: INTERNAL MEDICINE | Admitting: INTERNAL MEDICINE

## 2022-07-27 VITALS
TEMPERATURE: 97.5 F | OXYGEN SATURATION: 98 % | HEIGHT: 69 IN | BODY MASS INDEX: 26.07 KG/M2 | RESPIRATION RATE: 18 BRPM | SYSTOLIC BLOOD PRESSURE: 113 MMHG | DIASTOLIC BLOOD PRESSURE: 69 MMHG | WEIGHT: 176 LBS | HEART RATE: 69 BPM

## 2022-07-27 DIAGNOSIS — R13.19 ESOPHAGEAL DYSPHAGIA: ICD-10-CM

## 2022-07-27 LAB
GLUCOSE BLDC GLUCOMTR-MCNC: 402 MG/DL (ref 70–130)
GLUCOSE BLDC GLUCOMTR-MCNC: 438 MG/DL (ref 70–130)
GLUCOSE BLDC GLUCOMTR-MCNC: 490 MG/DL (ref 70–130)

## 2022-07-27 PROCEDURE — 82962 GLUCOSE BLOOD TEST: CPT

## 2022-07-27 PROCEDURE — 88305 TISSUE EXAM BY PATHOLOGIST: CPT

## 2022-07-27 PROCEDURE — 25010000002 PROPOFOL 10 MG/ML EMULSION: Performed by: NURSE ANESTHETIST, CERTIFIED REGISTERED

## 2022-07-27 PROCEDURE — 43239 EGD BIOPSY SINGLE/MULTIPLE: CPT | Performed by: INTERNAL MEDICINE

## 2022-07-27 PROCEDURE — 63710000001 INSULIN REGULAR HUMAN PER 5 UNITS: Performed by: ANESTHESIOLOGY

## 2022-07-27 RX ORDER — MIDAZOLAM HYDROCHLORIDE 1 MG/ML
0.5 INJECTION INTRAMUSCULAR; INTRAVENOUS
Status: DISCONTINUED | OUTPATIENT
Start: 2022-07-27 | End: 2022-07-27 | Stop reason: HOSPADM

## 2022-07-27 RX ORDER — SODIUM CHLORIDE 0.9 % (FLUSH) 0.9 %
10 SYRINGE (ML) INJECTION AS NEEDED
Status: DISCONTINUED | OUTPATIENT
Start: 2022-07-27 | End: 2022-07-27 | Stop reason: HOSPADM

## 2022-07-27 RX ORDER — SODIUM CHLORIDE 0.9 % (FLUSH) 0.9 %
10 SYRINGE (ML) INJECTION EVERY 12 HOURS SCHEDULED
Status: DISCONTINUED | OUTPATIENT
Start: 2022-07-27 | End: 2022-07-27 | Stop reason: HOSPADM

## 2022-07-27 RX ORDER — PROPOFOL 10 MG/ML
VIAL (ML) INTRAVENOUS AS NEEDED
Status: DISCONTINUED | OUTPATIENT
Start: 2022-07-27 | End: 2022-07-27 | Stop reason: SURG

## 2022-07-27 RX ORDER — SODIUM CHLORIDE, SODIUM LACTATE, POTASSIUM CHLORIDE, CALCIUM CHLORIDE 600; 310; 30; 20 MG/100ML; MG/100ML; MG/100ML; MG/100ML
125 INJECTION, SOLUTION INTRAVENOUS ONCE
Status: COMPLETED | OUTPATIENT
Start: 2022-07-27 | End: 2022-07-27

## 2022-07-27 RX ORDER — LIDOCAINE HYDROCHLORIDE 20 MG/ML
INJECTION, SOLUTION INFILTRATION; PERINEURAL AS NEEDED
Status: DISCONTINUED | OUTPATIENT
Start: 2022-07-27 | End: 2022-07-27 | Stop reason: SURG

## 2022-07-27 RX ORDER — SODIUM CHLORIDE, SODIUM LACTATE, POTASSIUM CHLORIDE, CALCIUM CHLORIDE 600; 310; 30; 20 MG/100ML; MG/100ML; MG/100ML; MG/100ML
INJECTION, SOLUTION INTRAVENOUS CONTINUOUS PRN
Status: DISCONTINUED | OUTPATIENT
Start: 2022-07-27 | End: 2022-07-27 | Stop reason: SURG

## 2022-07-27 RX ADMIN — PROPOFOL 60 MCG/KG/MIN: 10 INJECTION, EMULSION INTRAVENOUS at 11:24

## 2022-07-27 RX ADMIN — SODIUM CHLORIDE, POTASSIUM CHLORIDE, SODIUM LACTATE AND CALCIUM CHLORIDE 125 ML/HR: 600; 310; 30; 20 INJECTION, SOLUTION INTRAVENOUS at 10:29

## 2022-07-27 RX ADMIN — PROPOFOL 100 MG: 10 INJECTION, EMULSION INTRAVENOUS at 11:24

## 2022-07-27 RX ADMIN — LIDOCAINE HYDROCHLORIDE 60 MG: 20 INJECTION, SOLUTION INFILTRATION; PERINEURAL at 11:24

## 2022-07-27 RX ADMIN — SODIUM CHLORIDE, POTASSIUM CHLORIDE, SODIUM LACTATE AND CALCIUM CHLORIDE: 600; 310; 30; 20 INJECTION, SOLUTION INTRAVENOUS at 11:22

## 2022-07-27 RX ADMIN — HUMAN INSULIN 20 UNITS: 100 INJECTION, SOLUTION SUBCUTANEOUS at 10:32

## 2022-07-27 NOTE — ANESTHESIA POSTPROCEDURE EVALUATION
Patient: Shai Mata    Procedure Summary     Date: 07/27/22 Room / Location:  [facility]    Anesthesia Start: 1122 Anesthesia Stop: 1132    Procedure: ESOPHAGOGASTRODUODENOSCOPY WITH BIOPSY (N/A Esophagus) Diagnosis:       Esophageal dysphagia      (Esophageal dysphagia [R13.19])    Surgeons: Roula Craig MD Provider: Eric Fitzpatrick MD    Anesthesia Type: general ASA Status: 4          Anesthesia Type: general    Vitals  Vitals Value Taken Time   /69 07/27/22 1205   Temp 97.5 °F (36.4 °C) 07/27/22 1135   Pulse 69 07/27/22 1205   Resp 18 07/27/22 1205   SpO2 98 % 07/27/22 1205           Post Anesthesia Care and Evaluation    Patient location during evaluation: PACU  Patient participation: complete - patient participated  Level of consciousness: awake  Pain score: 0  Pain management: adequate    Airway patency: patent  Anesthetic complications: No anesthetic complications  PONV Status: none  Cardiovascular status: acceptable  Respiratory status: acceptable  Hydration status: acceptable

## 2022-07-27 NOTE — ANESTHESIA PREPROCEDURE EVALUATION
Anesthesia Evaluation     Patient summary reviewed and Nursing notes reviewed   no history of anesthetic complications:  NPO Solid Status: > 8 hours  NPO Liquid Status: > 8 hours           Airway   Mallampati: II  TM distance: >3 FB  Neck ROM: full  No difficulty expected  Dental    (+) edentulous, lower dentures and upper dentures    Pulmonary - normal exam    breath sounds clear to auscultation  (+) shortness of breath,   (-) COPD  Cardiovascular - normal exam    ECG reviewed  Rhythm: regular  Rate: normal    (+) pacemaker pacemaker, ICD, hypertension 2 medications or greater, valvular problems/murmurs MR, past MI  >12 months, CAD, CABG >6 Months, CHF , hyperlipidemia,       Neuro/Psych- negative ROS  (-) seizures, CVA  GI/Hepatic/Renal/Endo    (+)  GERD,  diabetes mellitus type 2 poorly controlled using insulin, thyroid problem hypothyroidism    Musculoskeletal (-) negative ROS    Abdominal  - normal exam   Substance History - negative use     OB/GYN negative ob/gyn ROS         Other - negative ROS                         Anesthesia Plan    ASA 4     general     intravenous induction     Anesthetic plan, risks, benefits, and alternatives have been provided, discussed and informed consent has been obtained with: patient.  Use of blood products discussed with patient  Consented to blood products.   Plan discussed with CRNA.        CODE STATUS:

## 2022-07-28 LAB — REF LAB TEST METHOD: NORMAL

## 2022-07-29 DIAGNOSIS — I10 ESSENTIAL HYPERTENSION: ICD-10-CM

## 2022-07-29 DIAGNOSIS — I50.22 CHRONIC SYSTOLIC CONGESTIVE HEART FAILURE: ICD-10-CM

## 2022-07-29 RX ORDER — CARVEDILOL 12.5 MG/1
TABLET ORAL
Qty: 180 TABLET | Refills: 0 | Status: SHIPPED | OUTPATIENT
Start: 2022-07-29 | End: 2022-08-01 | Stop reason: SDUPTHER

## 2022-07-31 DIAGNOSIS — B37.81 CANDIDAL ESOPHAGITIS: Primary | ICD-10-CM

## 2022-07-31 RX ORDER — FLUCONAZOLE 100 MG/1
TABLET ORAL
Qty: 7 TABLET | Refills: 0 | Status: SHIPPED | OUTPATIENT
Start: 2022-07-31 | End: 2022-08-29

## 2022-08-01 ENCOUNTER — TELEPHONE (OUTPATIENT)
Dept: FAMILY MEDICINE CLINIC | Facility: CLINIC | Age: 77
End: 2022-08-01

## 2022-08-01 ENCOUNTER — OFFICE VISIT (OUTPATIENT)
Dept: FAMILY MEDICINE CLINIC | Facility: CLINIC | Age: 77
End: 2022-08-01

## 2022-08-01 VITALS
RESPIRATION RATE: 19 BRPM | WEIGHT: 179 LBS | OXYGEN SATURATION: 99 % | SYSTOLIC BLOOD PRESSURE: 112 MMHG | DIASTOLIC BLOOD PRESSURE: 62 MMHG | HEIGHT: 69 IN | TEMPERATURE: 97.3 F | HEART RATE: 92 BPM | BODY MASS INDEX: 26.51 KG/M2

## 2022-08-01 DIAGNOSIS — N18.31 TYPE 2 DIABETES MELLITUS WITH STAGE 3A CHRONIC KIDNEY DISEASE, WITH LONG-TERM CURRENT USE OF INSULIN: ICD-10-CM

## 2022-08-01 DIAGNOSIS — I50.22 CHRONIC SYSTOLIC CONGESTIVE HEART FAILURE: ICD-10-CM

## 2022-08-01 DIAGNOSIS — I10 ESSENTIAL HYPERTENSION: ICD-10-CM

## 2022-08-01 DIAGNOSIS — Z00.00 MEDICARE ANNUAL WELLNESS VISIT, SUBSEQUENT: Primary | ICD-10-CM

## 2022-08-01 DIAGNOSIS — Z79.4 TYPE 2 DIABETES MELLITUS WITH STAGE 3A CHRONIC KIDNEY DISEASE, WITH LONG-TERM CURRENT USE OF INSULIN: ICD-10-CM

## 2022-08-01 DIAGNOSIS — E11.22 TYPE 2 DIABETES MELLITUS WITH STAGE 3A CHRONIC KIDNEY DISEASE, WITH LONG-TERM CURRENT USE OF INSULIN: ICD-10-CM

## 2022-08-01 DIAGNOSIS — E03.8 OTHER SPECIFIED HYPOTHYROIDISM: ICD-10-CM

## 2022-08-01 DIAGNOSIS — E78.2 HYPERLIPEMIA, MIXED: ICD-10-CM

## 2022-08-01 PROCEDURE — 1170F FXNL STATUS ASSESSED: CPT | Performed by: NURSE PRACTITIONER

## 2022-08-01 PROCEDURE — G0439 PPPS, SUBSEQ VISIT: HCPCS | Performed by: NURSE PRACTITIONER

## 2022-08-01 PROCEDURE — 1160F RVW MEDS BY RX/DR IN RCRD: CPT | Performed by: NURSE PRACTITIONER

## 2022-08-01 RX ORDER — CARVEDILOL 12.5 MG/1
12.5 TABLET ORAL 2 TIMES DAILY
Qty: 180 TABLET | Refills: 1 | Status: SHIPPED | OUTPATIENT
Start: 2022-08-01 | End: 2022-11-29 | Stop reason: SDUPTHER

## 2022-08-01 RX ORDER — LEVOTHYROXINE SODIUM 0.07 MG/1
75 TABLET ORAL DAILY
Qty: 90 TABLET | Refills: 1 | Status: SHIPPED | OUTPATIENT
Start: 2022-08-01 | End: 2022-08-22 | Stop reason: SDUPTHER

## 2022-08-01 RX ORDER — ATORVASTATIN CALCIUM 80 MG/1
80 TABLET, FILM COATED ORAL DAILY
Qty: 90 TABLET | Refills: 1 | Status: SHIPPED | OUTPATIENT
Start: 2022-08-01 | End: 2022-11-29 | Stop reason: SDUPTHER

## 2022-08-01 RX ORDER — LISINOPRIL 10 MG/1
10 TABLET ORAL 2 TIMES DAILY
Qty: 180 TABLET | Refills: 1 | Status: SHIPPED | OUTPATIENT
Start: 2022-08-01 | End: 2022-11-29 | Stop reason: SDUPTHER

## 2022-08-01 NOTE — PROGRESS NOTES
The ABCs of the Annual Wellness Visit  Subsequent Medicare Wellness Visit    Chief Complaint   Patient presents with   • Medicare Wellness-subsequent      Subjective    History of Present Illness:  Shai Mata is a 77 y.o. male who presents for a Subsequent Medicare Wellness Visit.    The following portions of the patient's history were reviewed and   updated as appropriate: allergies, current medications, past family history, past medical history, past social history, past surgical history and problem list.    Compared to one year ago, the patient feels his physical   health is the same.    Compared to one year ago, the patient feels his mental   health is the same.    Recent Hospitalizations:  He was not admitted to the hospital during the last year.       Current Medical Providers:  Patient Care Team:  Ekaterina Cox APRN as PCP - General  Ekaterina Cox APRN as PCP - Family Medicine    Outpatient Medications Prior to Visit   Medication Sig Dispense Refill   • amiodarone (PACERONE) 200 MG tablet Take 0.5 tablets by mouth Daily. 45 tablet 1   • aspirin 81 MG EC tablet Take 81 mg by mouth 2 (Two) Times a Day.     • fluconazole (Diflucan) 100 MG tablet Take 1 tab PO  daily for 7 days 7 tablet 0   • insulin NPH (humuLIN N,novoLIN N) 100 UNIT/ML injection Inject 22 Units under the skin into the appropriate area as directed 2 (Two) Times a Day Before Meals.     • Insulin Pen Needle (Pen Needles) 32G X 4 MM misc 1 each 3 (Three) Times a Day. 100 each 2   • pantoprazole (Protonix) 40 MG EC tablet Take 1 tablet by mouth 2 (Two) Times a Day Before Meals. 60 tablet 5   • spironolactone (ALDACTONE) 25 MG tablet Take 1 tablet by mouth Daily. 90 tablet 1   • atorvastatin (LIPITOR) 80 MG tablet Take 1 tablet by mouth Daily. 90 tablet 0   • carvedilol (COREG) 12.5 MG tablet Take 1 tablet by mouth twice daily 180 tablet 0   • Insulin Pen Needle (Pen Needles) 31G X 6 MM misc 10 Units Every Night. 50 each 2   • levothyroxine (Euthyrox)  "75 MCG tablet Take 1 tablet by mouth Daily. 90 tablet 1   • lisinopril (PRINIVIL,ZESTRIL) 10 MG tablet Take 1 tablet by mouth 2 (Two) Times a Day. 180 tablet 1     No facility-administered medications prior to visit.       No opioid medication identified on active medication list. I have reviewed chart for other potential  high risk medication/s and harmful drug interactions in the elderly.          Aspirin is on active medication list. Aspirin use is indicated based on review of current medical condition/s. Pros and cons of this therapy have been discussed today. Benefits of this medication outweigh potential harm.  Patient has been encouraged to continue taking this medication.  .      Patient Active Problem List   Diagnosis   • ASCVD (arteriosclerotic cardiovascular disease)   • S/P CABG x 5   • HTN (hypertension)   • PVC's (premature ventricular contractions)   • Type 2 diabetes mellitus with hyperglycemia, with long-term current use of insulin (HCC)   • Hyperlipemia, mixed   • Ischemic cardiomyopathy   • Pain of upper abdomen   • Cholecystitis   • Chronic systolic congestive heart failure (HCC)   • Ventricular tachycardia (paroxysmal) (HCC)   • Long term current use of antiarrhythmic medical therapy   • Other specified hypothyroidism   • Esophageal dysphagia     Advance Care Planning  Advance Directive is not on file.  ACP discussion was declined by the patient. Patient does not have an advance directive, information provided.          Objective    Vitals:    08/01/22 1306   BP: 112/62   BP Location: Right arm   Patient Position: Sitting   Pulse: 92   Resp: 19   Temp: 97.3 °F (36.3 °C)   SpO2: 99%   Weight: 81.2 kg (179 lb)   Height: 175.3 cm (69.02\")     Estimated body mass index is 26.42 kg/m² as calculated from the following:    Height as of this encounter: 175.3 cm (69.02\").    Weight as of this encounter: 81.2 kg (179 lb).    BMI is >= 25 and <30. (Overweight) The following options were offered after " discussion;: exercise counseling/recommendations and nutrition counseling/recommendations      Does the patient have evidence of cognitive impairment? No    Physical Exam  Vitals and nursing note reviewed.   Constitutional:       Appearance: He is well-developed.   Cardiovascular:      Rate and Rhythm: Normal rate and regular rhythm.      Heart sounds: Normal heart sounds. No murmur heard.  Pulmonary:      Effort: Pulmonary effort is normal.      Breath sounds: Normal breath sounds.   Musculoskeletal:      Right lower leg: No edema.      Left lower leg: No edema.   Skin:     General: Skin is warm and dry.   Neurological:      Mental Status: He is alert and oriented to person, place, and time.   Psychiatric:         Behavior: Behavior normal.                 HEALTH RISK ASSESSMENT    Smoking Status:  Social History     Tobacco Use   Smoking Status Former Smoker   • Packs/day: 2.00   • Years: 20.00   • Pack years: 40.00   • Types: Cigarettes   • Quit date:    • Years since quittin.6   Smokeless Tobacco Former User   • Types: Chew   • Quit date:      Alcohol Consumption:  Social History     Substance and Sexual Activity   Alcohol Use No     Fall Risk Screen:    [facility], and patient is at LOW risk for falls.Assessment completed on:2022    Depression Screening:  PHQ-2/PHQ-9 Depression Screening 2022   Retired PHQ-9 Total Score -   Retired Total Score -   Little Interest or Pleasure in Doing Things 0-->not at all   Feeling Down, Depressed or Hopeless 0-->not at all   PHQ-9: Brief Depression Severity Measure Score 0       Health Habits and Functional and Cognitive Screening:  Functional & Cognitive Status 2022   Do you have difficulty preparing food and eating? No   Do you have difficulty bathing yourself, getting dressed or grooming yourself? No   Do you have difficulty using the toilet? No   Do you have difficulty moving around from place to place? No   Do you  have trouble with steps or getting out of a bed or a chair? No   Current Diet Low Carb Diet   Dental Exam Other   Eye Exam Up to date   Exercise (times per week) 3 times per week   Current Exercises Include Gardening   Current Exercise Activities Include -   Do you need help using the phone?  No   Are you deaf or do you have serious difficulty hearing?  No   Do you need help with transportation? No   Do you need help shopping? No   Do you need help preparing meals?  No   Do you need help with housework?  No   Do you need help with laundry? No   Do you need help taking your medications? No   Do you need help managing money? No   Do you ever drive or ride in a car without wearing a seat belt? No   Have you felt unusual stress, anger or loneliness in the last month? No   Who do you live with? Alone   If you need help, do you have trouble finding someone available to you? No   Have you been bothered in the last four weeks by sexual problems? No   Do you have difficulty concentrating, remembering or making decisions? No       Age-appropriate Screening Schedule:  Refer to the list below for future screening recommendations based on patient's age, sex and/or medical conditions. Orders for these recommended tests are listed in the plan section. The patient has been provided with a written plan.    Health Maintenance   Topic Date Due   • ZOSTER VACCINE (2 of 2) 07/18/2017   • HEMOGLOBIN A1C  09/29/2022   • INFLUENZA VACCINE  10/01/2022   • DIABETIC EYE EXAM  02/08/2023   • LIPID PANEL  03/29/2023   • URINE MICROALBUMIN  03/29/2023   • TDAP/TD VACCINES (2 - Td or Tdap) 05/23/2027              Assessment & Plan   CMS Preventative Services Quick Reference  Risk Factors Identified During Encounter  Cardiovascular Disease  Fall Risk-High or Moderate  Hearing Problem  Inadequate Social Support, Isolation, Loneliness, Lack of Transportation, Financial Difficulties, or Caregiver Stress   Inactivity/Sedentary  Diabetes monitoring    The above risks/problems have been discussed with the patient.  Follow up actions/plans if indicated are seen below in the Assessment/Plan Section.  Pertinent information has been shared with the patient in the After Visit Summary.    Diagnoses and all orders for this visit:    1. Medicare annual wellness visit, subsequent (Primary)    2. Essential hypertension  -     lisinopril (PRINIVIL,ZESTRIL) 10 MG tablet; Take 1 tablet by mouth 2 (Two) Times a Day.  Dispense: 180 tablet; Refill: 1  -     carvedilol (COREG) 12.5 MG tablet; Take 1 tablet by mouth 2 (Two) Times a Day.  Dispense: 180 tablet; Refill: 1    3. Other specified hypothyroidism  -     levothyroxine (Euthyrox) 75 MCG tablet; Take 1 tablet by mouth Daily.  Dispense: 90 tablet; Refill: 1    4. Chronic systolic congestive heart failure (HCC)  -     carvedilol (COREG) 12.5 MG tablet; Take 1 tablet by mouth 2 (Two) Times a Day.  Dispense: 180 tablet; Refill: 1    5. Hyperlipemia, mixed  -     atorvastatin (LIPITOR) 80 MG tablet; Take 1 tablet by mouth Daily.  Dispense: 90 tablet; Refill: 1    6. Type 2 diabetes mellitus with stage 3a chronic kidney disease, with long-term current use of insulin (HCC)  -     empagliflozin (JARDIANCE) 10 MG tablet tablet; Take 1 tablet by mouth Daily.  Dispense: 90 tablet; Refill: 1  Discussed monitoring glucose and adjusting insulin.  Will try to add Jardiance if affordable   Will follow in the next couple of weeks          Follow Up:   Return in about 4 weeks (around 8/29/2022), or if symptoms worsen or fail to improve, for Recheck.     An After Visit Summary and PPPS were made available to the patient.

## 2022-08-12 ENCOUNTER — OFFICE VISIT (OUTPATIENT)
Dept: GASTROENTEROLOGY | Facility: CLINIC | Age: 77
End: 2022-08-12

## 2022-08-12 VITALS
WEIGHT: 180 LBS | OXYGEN SATURATION: 98 % | DIASTOLIC BLOOD PRESSURE: 74 MMHG | BODY MASS INDEX: 26.66 KG/M2 | SYSTOLIC BLOOD PRESSURE: 139 MMHG | HEART RATE: 72 BPM | HEIGHT: 69 IN

## 2022-08-12 DIAGNOSIS — K21.9 GASTROESOPHAGEAL REFLUX DISEASE WITHOUT ESOPHAGITIS: Primary | ICD-10-CM

## 2022-08-12 PROCEDURE — 99213 OFFICE O/P EST LOW 20 MIN: CPT | Performed by: PHYSICIAN ASSISTANT

## 2022-08-12 RX ORDER — PANTOPRAZOLE SODIUM 40 MG/1
40 TABLET, DELAYED RELEASE ORAL DAILY
Qty: 90 TABLET | Refills: 3 | Status: SHIPPED | OUTPATIENT
Start: 2022-08-12 | End: 2022-11-29 | Stop reason: SDUPTHER

## 2022-08-12 NOTE — PROGRESS NOTES
Chief Complaint   Patient presents with   • Difficulty Swallowing       Shai Mata is a 77 y.o. male who presents to the office today for evaluation of Difficulty Swallowing  .    HPI   Patient presents the clinic today for follow-up of difficulty swallowing.  He recently underwent EGD on 7/27 with Dr. David.  He has been doing extremely well since the procedure.  His swallowing has drastically improved and he is no longer having any issues whatsoever.  On EGD it was noted he did have a candidiasis infection in which Diflucan was sent in for 7 days.  He completed the treatment with good result.  EGD results:  - The examined esophagus was normal. Biopsies were taken with a cold forceps for histology. Mild esophageal  dysmotility.  - A medium-sized hiatal hernia was present.  - The entire examined stomach was normal.  - The examined duodenum was normal.  Review of Systems   Constitutional: Negative.    HENT: Negative for sore throat and trouble swallowing.    Eyes: Negative.    Respiratory: Negative for chest tightness.    Cardiovascular: Negative for chest pain.   Gastrointestinal: Negative for abdominal distention, abdominal pain, anal bleeding, blood in stool, constipation, diarrhea, nausea, rectal pain and vomiting.   Endocrine: Negative.    Genitourinary: Negative for difficulty urinating.   Musculoskeletal: Negative for back pain and neck pain.   Skin: Negative.    Allergic/Immunologic: Negative for environmental allergies and food allergies.   Neurological: Negative for dizziness and headaches.   Hematological: Bruises/bleeds easily.   Psychiatric/Behavioral: Positive for sleep disturbance. Negative for agitation. The patient is not nervous/anxious.        ACTIVE PROBLEMS:   Specialty Problems        Gastroenterology Problems    Cholecystitis        Esophageal dysphagia              PAST MEDICAL HISTORY:  Past Medical History:   Diagnosis Date   • Abnormal heart rhythm    • ASCVD (arteriosclerotic  cardiovascular disease)     s/p CABG in 2006 (5) [facility]   • CHF (congestive heart failure) ([facility])    • Chicken pox    • Congestive heart failure (HCC)    • Coronary artery disease    • Disease of thyroid gland    • Diverticulitis    • DMII (diabetes mellitus, type 2) ([facility])     type 2, checks fsbg as needed    • Dyslipidemia    • Elevated cholesterol    • GERD (gastroesophageal reflux disease)    • HTN (hypertension)    • Hx of myocardial infarction, greater than 8 weeks     2006   • Hyperlipidemia    • Measles    • Mitral regurgitation     MILD TO MODERATE   • Mumps    • PVC (premature ventricular contraction)    • SOB (shortness of breath)    • Vitamin D deficiency    • Wears reading eyeglasses        SURGICAL HISTORY:  Past Surgical History:   Procedure Laterality Date   • CARDIAC DEFIBRILLATOR PLACEMENT     • CARDIAC ELECTROPHYSIOLOGY PROCEDURE N/A 11/20/2017    Procedure: EP/Ablation PVC +/- ICD Implant;  Surgeon: Jose Maldonado MD;  Location: [facility] EP INVASIVE LOCATION;  Service:    • CARDIAC ELECTROPHYSIOLOGY PROCEDURE N/A 11/20/2017    Procedure: Biventricular Device Insertion;  Surgeon: Jose Maldonado MD;  Location: [facility] EP INVASIVE LOCATION;  Service:    • CATARACT EXTRACTION Bilateral    • CORONARY ANGIOPLASTY     • CORONARY ARTERY BYPASS GRAFT  2006    X 5.   [facility]   • ENDOSCOPY N/A 5/17/2022    Procedure: ESOPHAGOGASTRODUODENOSCOPY WITH BIOPSY;  Surgeon: Roula Craig MD;  Location: [facility] OR;  Service: Gastroenterology;  Laterality: N/A;   • ENDOSCOPY N/A 7/27/2022    Procedure: ESOPHAGOGASTRODUODENOSCOPY WITH BIOPSY;  Surgeon: Roula Craig MD;  Location: [facility] OR;  Service: Gastroenterology;  Laterality: N/A;   • PACEMAKER IMPLANTATION     • ID LAP,CHOLECYSTECTOMY N/A 04/27/2018    Procedure: CHOLECYSTECTOMY LAPAROSCOPIC;  Surgeon: Eber Hummel MD;  Location: [facility] OR;  Service: General       FAMILY HISTORY:  Family  History   Problem Relation Age of Onset   • Heart disease Mother    • Heart disease Father    • No Known Problems Sister    • No Known Problems Sister    • Cancer Sister    • Diabetes Brother    • Cancer Sister    • Cancer Sister    • No Known Problems Sister    • No Known Problems Brother    • No Known Problems Brother        SOCIAL HISTORY:  Social History     Tobacco Use   • Smoking status: Former Smoker     Packs/day: 2.00     Years: 20.00     Pack years: 40.00     Types: Cigarettes     Quit date:      Years since quittin.6   • Smokeless tobacco: Former User     Types: Chew     Quit date:    Substance Use Topics   • Alcohol use: No       CURRENT MEDICATION:    Current Outpatient Medications:   •  amiodarone (PACERONE) 200 MG tablet, Take 0.5 tablets by mouth Daily., Disp: 45 tablet, Rfl: 1  •  aspirin 81 MG EC tablet, Take 81 mg by mouth 2 (Two) Times a Day., Disp: , Rfl:   •  atorvastatin (LIPITOR) 80 MG tablet, Take 1 tablet by mouth Daily., Disp: 90 tablet, Rfl: 1  •  carvedilol (COREG) 12.5 MG tablet, Take 1 tablet by mouth 2 (Two) Times a Day., Disp: 180 tablet, Rfl: 1  •  empagliflozin (JARDIANCE) 10 MG tablet tablet, Take 1 tablet by mouth Daily., Disp: 90 tablet, Rfl: 1  •  fluconazole (Diflucan) 100 MG tablet, Take 1 tab PO  daily for 7 days, Disp: 7 tablet, Rfl: 0  •  glyburide micronized (GLYNASE) 1.5 MG tablet, Take 1 tablet by mouth 2 (Two) Times a Day Before Meals., Disp: 180 tablet, Rfl: 0  •  insulin NPH (humuLIN N,novoLIN N) 100 UNIT/ML injection, Inject 22 Units under the skin into the appropriate area as directed 2 (Two) Times a Day Before Meals., Disp: , Rfl:   •  Insulin Pen Needle (Pen Needles) 32G X 4 MM misc, 1 each 3 (Three) Times a Day., Disp: 100 each, Rfl: 2  •  levothyroxine (Euthyrox) 75 MCG tablet, Take 1 tablet by mouth Daily., Disp: 90 tablet, Rfl: 1  •  lisinopril (PRINIVIL,ZESTRIL) 10 MG tablet, Take 1 tablet by mouth 2 (Two) Times a Day., Disp: 180 tablet, Rfl:  "1  •  pantoprazole (Protonix) 40 MG EC tablet, Take 1 tablet by mouth Daily., Disp: 90 tablet, Rfl: 3  •  spironolactone (ALDACTONE) 25 MG tablet, Take 1 tablet by mouth Daily., Disp: 90 tablet, Rfl: 1    ALLERGIES:  Patient has no known allergies.    VISIT VITALS:  /74   Pulse 72   Ht 175.3 cm (69\")   Wt 81.6 kg (180 lb)   SpO2 98%   BMI 26.58 kg/m²   Physical Exam  Constitutional:       General: He is not in acute distress.     Appearance: Normal appearance. He is well-developed.   HENT:      Head: Normocephalic and atraumatic.   Eyes:      Pupils: Pupils are equal, round, and reactive to light.   Cardiovascular:      Rate and Rhythm: Normal rate and regular rhythm.      Heart sounds: Normal heart sounds.   Pulmonary:      Effort: Pulmonary effort is normal. No respiratory distress.      Breath sounds: Normal breath sounds. No wheezing, rhonchi or rales.   Abdominal:      General: Abdomen is flat. Bowel sounds are normal. There is no distension.      Palpations: Abdomen is soft. There is no mass.      Tenderness: There is no abdominal tenderness. There is no guarding or rebound.      Hernia: No hernia is present.   Musculoskeletal:         General: No swelling. Normal range of motion.      Cervical back: Normal range of motion and neck supple.      Right lower leg: No edema.      Left lower leg: No edema.   Skin:     General: Skin is warm and dry.   Neurological:      Mental Status: He is alert and oriented to person, place, and time.   Psychiatric:         Attention and Perception: Attention normal.         Mood and Affect: Mood normal.         Speech: Speech normal.         Behavior: Behavior normal. Behavior is cooperative.         Thought Content: Thought content normal.         Assessment    Patient's recent EGD results were reviewed with him and his daughter-they voiced understanding of these results.  He will continue on Protonix 40 mg once daily for heartburn/reflux symptoms.  He will follow-up " as needed with us and have medicine refilled by primary care when needed.   Diagnosis Plan   1. Gastroesophageal reflux disease without esophagitis  pantoprazole (Protonix) 40 MG EC tablet       Return if symptoms worsen or fail to improve.                   This document has been electronically signed by Brad Pedersen PA-C  August 12, 2022 12:42 EDT    Part of this note may be an electronic transcription/translation of spoken language to printed text using the Dragon Dictation System.

## 2022-08-16 ENCOUNTER — TELEPHONE (OUTPATIENT)
Dept: FAMILY MEDICINE CLINIC | Facility: CLINIC | Age: 77
End: 2022-08-16

## 2022-08-16 NOTE — TELEPHONE ENCOUNTER
Caller: Shai Mata    Relationship to patient: Self    Best call back number: [phone]      Patient is needing: ADVISED PATIENT HE IS OVERDUE FOR FASTING LABS.  HE HAS AN APPOINTMENT ON 8/29/22.  HE ASKED IF HE COULD WAIT UNTIL THEN.  ADVISED PATIENT IT WOULD BE NICE IF HE CAME BEFORE SO THAT WAY HE CAN DISCUSS THE LABS AT THE 8/29/22 APPOINTMENT.

## 2022-08-16 NOTE — TELEPHONE ENCOUNTER
Contacted patient regarding overdue fasting labs, left voicemail for patient to return call.      Hub to read message.

## 2022-08-17 ENCOUNTER — LAB (OUTPATIENT)
Dept: FAMILY MEDICINE CLINIC | Facility: CLINIC | Age: 77
End: 2022-08-17

## 2022-08-17 DIAGNOSIS — Z00.00 MEDICARE ANNUAL WELLNESS VISIT, SUBSEQUENT: ICD-10-CM

## 2022-08-17 PROCEDURE — 83036 HEMOGLOBIN GLYCOSYLATED A1C: CPT | Performed by: NURSE PRACTITIONER

## 2022-08-17 PROCEDURE — 80061 LIPID PANEL: CPT | Performed by: NURSE PRACTITIONER

## 2022-08-17 PROCEDURE — 84443 ASSAY THYROID STIM HORMONE: CPT | Performed by: NURSE PRACTITIONER

## 2022-08-17 PROCEDURE — 80053 COMPREHEN METABOLIC PANEL: CPT | Performed by: NURSE PRACTITIONER

## 2022-08-17 PROCEDURE — 85025 COMPLETE CBC W/AUTO DIFF WBC: CPT | Performed by: NURSE PRACTITIONER

## 2022-08-18 LAB
ALBUMIN SERPL-MCNC: 3.6 G/DL (ref 3.5–5.2)
ALBUMIN/GLOB SERPL: 1.6 G/DL
ALP SERPL-CCNC: 70 U/L (ref 39–117)
ALT SERPL W P-5'-P-CCNC: 20 U/L (ref 1–41)
ANION GAP SERPL CALCULATED.3IONS-SCNC: 13.1 MMOL/L (ref 5–15)
AST SERPL-CCNC: 17 U/L (ref 1–40)
BASOPHILS # BLD AUTO: 0.09 10*3/MM3 (ref 0–0.2)
BASOPHILS NFR BLD AUTO: 1.3 % (ref 0–1.5)
BILIRUB SERPL-MCNC: 0.5 MG/DL (ref 0–1.2)
BUN SERPL-MCNC: 13 MG/DL (ref 8–23)
BUN/CREAT SERPL: 12.3 (ref 7–25)
CALCIUM SPEC-SCNC: 8.9 MG/DL (ref 8.6–10.5)
CHLORIDE SERPL-SCNC: 107 MMOL/L (ref 98–107)
CHOLEST SERPL-MCNC: 100 MG/DL (ref 0–200)
CO2 SERPL-SCNC: 21.9 MMOL/L (ref 22–29)
CREAT SERPL-MCNC: 1.06 MG/DL (ref 0.76–1.27)
DEPRECATED RDW RBC AUTO: 44.1 FL (ref 37–54)
EGFRCR SERPLBLD CKD-EPI 2021: 71.8 ML/MIN/1.73
EOSINOPHIL # BLD AUTO: 0.21 10*3/MM3 (ref 0–0.4)
EOSINOPHIL NFR BLD AUTO: 3 % (ref 0.3–6.2)
ERYTHROCYTE [DISTWIDTH] IN BLOOD BY AUTOMATED COUNT: 13.5 % (ref 12.3–15.4)
GLOBULIN UR ELPH-MCNC: 2.3 GM/DL
GLUCOSE SERPL-MCNC: 165 MG/DL (ref 65–99)
HBA1C MFR BLD: 12.3 % (ref 4.8–5.6)
HCT VFR BLD AUTO: 33.4 % (ref 37.5–51)
HDLC SERPL-MCNC: 37 MG/DL (ref 40–60)
HGB BLD-MCNC: 10.9 G/DL (ref 13–17.7)
IMM GRANULOCYTES # BLD AUTO: 0.02 10*3/MM3 (ref 0–0.05)
IMM GRANULOCYTES NFR BLD AUTO: 0.3 % (ref 0–0.5)
LDLC SERPL CALC-MCNC: 53 MG/DL (ref 0–100)
LDLC/HDLC SERPL: 1.51 {RATIO}
LYMPHOCYTES # BLD AUTO: 1.21 10*3/MM3 (ref 0.7–3.1)
LYMPHOCYTES NFR BLD AUTO: 17.1 % (ref 19.6–45.3)
MCH RBC QN AUTO: 29.3 PG (ref 26.6–33)
MCHC RBC AUTO-ENTMCNC: 32.6 G/DL (ref 31.5–35.7)
MCV RBC AUTO: 89.8 FL (ref 79–97)
MONOCYTES # BLD AUTO: 0.67 10*3/MM3 (ref 0.1–0.9)
MONOCYTES NFR BLD AUTO: 9.4 % (ref 5–12)
NEUTROPHILS NFR BLD AUTO: 4.89 10*3/MM3 (ref 1.7–7)
NEUTROPHILS NFR BLD AUTO: 68.9 % (ref 42.7–76)
NRBC BLD AUTO-RTO: 0 /100 WBC (ref 0–0.2)
PLATELET # BLD AUTO: 201 10*3/MM3 (ref 140–450)
PMV BLD AUTO: 10.9 FL (ref 6–12)
POTASSIUM SERPL-SCNC: 4.9 MMOL/L (ref 3.5–5.2)
PROT SERPL-MCNC: 5.9 G/DL (ref 6–8.5)
RBC # BLD AUTO: 3.72 10*6/MM3 (ref 4.14–5.8)
SODIUM SERPL-SCNC: 142 MMOL/L (ref 136–145)
TRIGL SERPL-MCNC: 36 MG/DL (ref 0–150)
TSH SERPL DL<=0.05 MIU/L-ACNC: 0.07 UIU/ML (ref 0.27–4.2)
VLDLC SERPL-MCNC: 10 MG/DL (ref 5–40)
WBC NRBC COR # BLD: 7.09 10*3/MM3 (ref 3.4–10.8)

## 2022-08-22 ENCOUNTER — TELEPHONE (OUTPATIENT)
Dept: FAMILY MEDICINE CLINIC | Facility: CLINIC | Age: 77
End: 2022-08-22

## 2022-08-22 DIAGNOSIS — D64.9 ANEMIA, UNSPECIFIED TYPE: ICD-10-CM

## 2022-08-22 DIAGNOSIS — E03.8 OTHER SPECIFIED HYPOTHYROIDISM: ICD-10-CM

## 2022-08-22 DIAGNOSIS — E03.8 OTHER SPECIFIED HYPOTHYROIDISM: Primary | ICD-10-CM

## 2022-08-22 RX ORDER — LEVOTHYROXINE SODIUM 0.05 MG/1
50 TABLET ORAL DAILY
Qty: 30 TABLET | Refills: 2 | Status: SHIPPED | OUTPATIENT
Start: 2022-08-22 | End: 2022-11-21

## 2022-08-22 NOTE — TELEPHONE ENCOUNTER
----- Message from ANEUDY Carrizales sent at 8/22/2022 12:49 PM EDT -----  AIC is up to 12 we did make adjustments with last visit he had not been routinely monitoring his sugar.  Also synthroid needs reduced.  Sent new RX of 50 mcg.    Hemoglobin is a little low in one month I want a repeat TSH and CBC he will not have to be fasting for this.     Left message for patient to return call.    Patient returned call and was given lab results.

## 2022-08-24 ENCOUNTER — TELEPHONE (OUTPATIENT)
Dept: CARDIOLOGY | Facility: CLINIC | Age: 77
End: 2022-08-24

## 2022-08-25 DIAGNOSIS — N18.31 TYPE 2 DIABETES MELLITUS WITH STAGE 3A CHRONIC KIDNEY DISEASE, WITH LONG-TERM CURRENT USE OF INSULIN: ICD-10-CM

## 2022-08-25 DIAGNOSIS — Z79.4 TYPE 2 DIABETES MELLITUS WITH STAGE 3A CHRONIC KIDNEY DISEASE, WITH LONG-TERM CURRENT USE OF INSULIN: ICD-10-CM

## 2022-08-25 DIAGNOSIS — E11.22 TYPE 2 DIABETES MELLITUS WITH STAGE 3A CHRONIC KIDNEY DISEASE, WITH LONG-TERM CURRENT USE OF INSULIN: ICD-10-CM

## 2022-08-25 DIAGNOSIS — I50.22 CHRONIC SYSTOLIC CONGESTIVE HEART FAILURE: ICD-10-CM

## 2022-08-29 ENCOUNTER — OFFICE VISIT (OUTPATIENT)
Dept: FAMILY MEDICINE CLINIC | Facility: CLINIC | Age: 77
End: 2022-08-29

## 2022-08-29 VITALS
WEIGHT: 179.6 LBS | HEIGHT: 69 IN | BODY MASS INDEX: 26.6 KG/M2 | DIASTOLIC BLOOD PRESSURE: 58 MMHG | SYSTOLIC BLOOD PRESSURE: 126 MMHG | HEART RATE: 71 BPM | TEMPERATURE: 97.1 F | OXYGEN SATURATION: 99 %

## 2022-08-29 DIAGNOSIS — E11.22 TYPE 2 DIABETES MELLITUS WITH STAGE 3A CHRONIC KIDNEY DISEASE, WITH LONG-TERM CURRENT USE OF INSULIN: ICD-10-CM

## 2022-08-29 DIAGNOSIS — Z79.4 TYPE 2 DIABETES MELLITUS WITH STAGE 3A CHRONIC KIDNEY DISEASE, WITH LONG-TERM CURRENT USE OF INSULIN: ICD-10-CM

## 2022-08-29 DIAGNOSIS — N18.31 TYPE 2 DIABETES MELLITUS WITH STAGE 3A CHRONIC KIDNEY DISEASE, WITH LONG-TERM CURRENT USE OF INSULIN: ICD-10-CM

## 2022-08-29 PROCEDURE — 99213 OFFICE O/P EST LOW 20 MIN: CPT | Performed by: NURSE PRACTITIONER

## 2022-08-29 RX ORDER — LEVOTHYROXINE SODIUM 0.07 MG/1
75 TABLET ORAL DAILY
COMMUNITY
End: 2022-11-04 | Stop reason: DRUGHIGH

## 2022-08-29 RX ORDER — AMIODARONE HYDROCHLORIDE 200 MG/1
TABLET ORAL
Qty: 45 TABLET | Refills: 1 | Status: SHIPPED | OUTPATIENT
Start: 2022-08-29 | End: 2023-02-20

## 2022-08-29 RX ORDER — AMIODARONE HYDROCHLORIDE 200 MG/1
100 TABLET ORAL DAILY
Qty: 45 TABLET | Refills: 1 | Status: CANCELLED | OUTPATIENT
Start: 2022-08-29

## 2022-08-29 NOTE — PROGRESS NOTES
"Chief Complaint  Diabetes (Follow up )    Subjective        Shai Mata presents to [facility]  Diabetes  He presents for his follow-up diabetic visit. He has type 2 diabetes mellitus. His disease course has been improving. There are no hypoglycemic associated symptoms. There are no diabetic associated symptoms. Hypoglycemia complications include nocturnal hypoglycemia. Symptoms are stable. Diabetic complications include heart disease and nephropathy. Risk factors for coronary artery disease include diabetes mellitus, dyslipidemia, male sex, sedentary lifestyle and family history. Current diabetic treatment includes insulin injections. He is compliant with treatment most of the time (improving complinance with monitoring). He is following a generally healthy diet. Meal planning includes avoidance of concentrated sweets. He participates in exercise intermittently. His home blood glucose trend is decreasing steadily. His breakfast blood glucose range is generally 110-130 mg/dl. An ACE inhibitor/angiotensin II receptor blocker is being taken. Eye exam is current.       Objective   Vital Signs:  /58 (BP Location: Right arm, Patient Position: Sitting)   Pulse 71   Temp 97.1 °F (36.2 °C) (Temporal)   Ht 175.3 cm (69\")   Wt 81.5 kg (179 lb 9.6 oz)   SpO2 99%   BMI 26.52 kg/m²   Estimated body mass index is 26.52 kg/m² as calculated from the following:    Height as of this encounter: 175.3 cm (69\").    Weight as of this encounter: 81.5 kg (179 lb 9.6 oz).          Physical Exam  Vitals and nursing note reviewed.   Constitutional:       General: He is not in acute distress.     Appearance: He is well-developed. He is not ill-appearing.   HENT:      Head: Normocephalic.   Cardiovascular:      Rate and Rhythm: Normal rate and regular rhythm.      Heart sounds: Normal heart sounds. No murmur heard.  Pulmonary:      Effort: Pulmonary effort is normal.      Breath sounds: Normal " breath sounds.   Musculoskeletal:      Right lower leg: No edema.      Left lower leg: No edema.   Skin:     General: Skin is warm and dry.   Neurological:      Mental Status: He is alert and oriented to person, place, and time.   Psychiatric:         Behavior: Behavior normal.        Result Review :                Assessment and Plan   Diagnoses and all orders for this visit:    1. Type 2 diabetes mellitus with stage 3a chronic kidney disease, with long-term current use of insulin (HCC)    Reduce glyburide to half tab at bedtime otherwise continue with current regimen otherwise.             Follow Up   Return in about 3 months (around 11/29/2022), or if symptoms worsen or fail to improve, for Recheck.  Patient was given instructions and counseling regarding his condition or for health maintenance advice. Please see specific information pulled into the AVS if appropriate.

## 2022-09-04 PROCEDURE — 93296 REM INTERROG EVL PM/IDS: CPT | Performed by: STUDENT IN AN ORGANIZED HEALTH CARE EDUCATION/TRAINING PROGRAM

## 2022-09-04 PROCEDURE — 93295 DEV INTERROG REMOTE 1/2/MLT: CPT | Performed by: STUDENT IN AN ORGANIZED HEALTH CARE EDUCATION/TRAINING PROGRAM

## 2022-09-14 DIAGNOSIS — E78.2 HYPERLIPEMIA, MIXED: ICD-10-CM

## 2022-09-14 RX ORDER — ATORVASTATIN CALCIUM 80 MG/1
TABLET, FILM COATED ORAL
Qty: 90 TABLET | Refills: 0 | OUTPATIENT
Start: 2022-09-14

## 2022-09-26 ENCOUNTER — OFFICE VISIT (OUTPATIENT)
Dept: CARDIOLOGY | Facility: CLINIC | Age: 77
End: 2022-09-26

## 2022-09-26 VITALS
BODY MASS INDEX: 27.98 KG/M2 | WEIGHT: 184.6 LBS | DIASTOLIC BLOOD PRESSURE: 67 MMHG | HEIGHT: 68 IN | SYSTOLIC BLOOD PRESSURE: 130 MMHG | RESPIRATION RATE: 16 BRPM | HEART RATE: 74 BPM

## 2022-09-26 DIAGNOSIS — I49.3 PVC'S (PREMATURE VENTRICULAR CONTRACTIONS): ICD-10-CM

## 2022-09-26 DIAGNOSIS — Z95.1 S/P CABG X 5: Primary | ICD-10-CM

## 2022-09-26 PROCEDURE — 93000 ELECTROCARDIOGRAM COMPLETE: CPT | Performed by: PHYSICIAN ASSISTANT

## 2022-09-26 PROCEDURE — 99213 OFFICE O/P EST LOW 20 MIN: CPT | Performed by: PHYSICIAN ASSISTANT

## 2022-09-26 NOTE — PROGRESS NOTES
Ekaterina Cox, ANEUDY  Shai Mata  1945 09/26/2022    Patient Active Problem List   Diagnosis   • ASCVD (arteriosclerotic cardiovascular disease)   • S/P CABG x 5   • HTN (hypertension)   • PVC's (premature ventricular contractions)   • Type 2 diabetes mellitus with hyperglycemia, with long-term current use of insulin (HCC)   • Hyperlipemia, mixed   • Ischemic cardiomyopathy   • Pain of upper abdomen   • Cholecystitis   • Chronic systolic congestive heart failure (HCC)   • Ventricular tachycardia (paroxysmal) (HCC)   • Long term current use of antiarrhythmic medical therapy   • Other specified hypothyroidism   • Esophageal dysphagia       Dear Ekaterina Cox APRN:    Subjective     History of Present Illness:    Chief Complaint   Patient presents with   • Coronary Artery Disease     6 mos follow   • Med Management     presented       Shai Mata is a pleasant 77 y.o. male with a past medical history significant for ischemic cardiomyopathy with most recent EF 45%, chronic systolic congestive heart failure, history of ventricular tachycardia status post ICD implantation on amiodarone, ASCVD status post four-vessel CABG, diabetes mellitus type 2, hypertension, and dyslipidemia.  He presents today for routine cardiology follow-up.     Patient denies any chest pain, shortness of breath, palpitations, dizziness, syncope or near syncope.  He did recently have labs performed which did reveal a markedly elevated hemoglobin A1c he states at that time he had not been monitoring his blood glucose and reports he has since made lifestyle changes and reports blood glucose is much better controlled currently.  He denies any symptoms concerning for heart failure such as orthopnea or PND.    No Known Allergies:      Current Outpatient Medications:   •  amiodarone (PACERONE) 200 MG tablet, Take 1/2 (one-half) tablet by mouth once daily, Disp: 45 tablet, Rfl: 1  •  aspirin 81 MG EC tablet, Take 81 mg by mouth 2 (Two) Times a Day.,  "Disp: , Rfl:   •  atorvastatin (LIPITOR) 80 MG tablet, Take 1 tablet by mouth Daily., Disp: 90 tablet, Rfl: 1  •  carvedilol (COREG) 12.5 MG tablet, Take 1 tablet by mouth 2 (Two) Times a Day., Disp: 180 tablet, Rfl: 1  •  glyburide micronized (GLYNASE) 1.5 MG tablet, Take 1 tablet by mouth 2 (Two) Times a Day Before Meals., Disp: 180 tablet, Rfl: 0  •  insulin NPH (humuLIN N,novoLIN N) 100 UNIT/ML injection, Inject 22 Units under the skin into the appropriate area as directed 2 (Two) Times a Day Before Meals., Disp: , Rfl:   •  levothyroxine (Euthyrox) 50 MCG tablet, Take 1 tablet by mouth Daily., Disp: 30 tablet, Rfl: 2  •  lisinopril (PRINIVIL,ZESTRIL) 10 MG tablet, Take 1 tablet by mouth 2 (Two) Times a Day., Disp: 180 tablet, Rfl: 1  •  pantoprazole (Protonix) 40 MG EC tablet, Take 1 tablet by mouth Daily., Disp: 90 tablet, Rfl: 3  •  spironolactone (ALDACTONE) 25 MG tablet, Take 1 tablet by mouth Daily., Disp: 90 tablet, Rfl: 1  •  Insulin Pen Needle (Pen Needles) 32G X 4 MM misc, 1 each 3 (Three) Times a Day., Disp: 100 each, Rfl: 2  •  levothyroxine (SYNTHROID, LEVOTHROID) 75 MCG tablet, Take 75 mcg by mouth Daily., Disp: , Rfl:     The following portions of the patient's history were reviewed and updated as appropriate: allergies, current medications, past family history, past medical history, past social history, past surgical history and problem list.    Social History     Tobacco Use   • Smoking status: Former Smoker     Packs/day: 2.00     Years: 20.00     Pack years: 40.00     Types: Cigarettes     Quit date:      Years since quittin.7   • Smokeless tobacco: Former User     Types: Chew     Quit date:    Vaping Use   • Vaping Use: Never used   Substance Use Topics   • Alcohol use: No   • Drug use: No         Objective   Vitals:    22 0845   BP: 130/67   Pulse: 74   Resp: 16   Weight: 83.7 kg (184 lb 9.6 oz)   Height: 172.7 cm (68\")     Body mass index is 28.07 " kg/m².    Constitutional:       General: Not in acute distress.     Appearance: Healthy appearance. Well-developed and not in distress. Not diaphoretic.   Eyes:      Conjunctiva/sclera: Conjunctivae normal.      Pupils: Pupils are equal, round, and reactive to light.   HENT:      Head: Normocephalic and atraumatic.   Neck:      Vascular: No carotid bruit or JVD.   Pulmonary:      Effort: Pulmonary effort is normal. No respiratory distress.      Breath sounds: Normal breath sounds.   Cardiovascular:      Normal rate. Regular rhythm.   Skin:     General: Skin is cool.   Neurological:      Mental Status: Alert, oriented to person, place, and time and oriented to person, place and time.         Lab Results   Component Value Date     08/17/2022    K 4.9 08/17/2022     08/17/2022    CO2 21.9 (L) 08/17/2022    BUN 13 08/17/2022    CREATININE 1.06 08/17/2022    GLUCOSE 165 (H) 08/17/2022    CALCIUM 8.9 08/17/2022    AST 17 08/17/2022    ALT 20 08/17/2022    ALKPHOS 70 08/17/2022    LABIL2 1.6 08/25/2016     Lab Results   Component Value Date    CKTOTAL 82 04/26/2018     Lab Results   Component Value Date    WBC 7.09 08/17/2022    HGB 10.9 (L) 08/17/2022    HCT 33.4 (L) 08/17/2022     08/17/2022     Lab Results   Component Value Date    INR 1.11 (H) 04/26/2018    INR 1.06 11/19/2017    INR 1.00 01/11/2016     Lab Results   Component Value Date    MG 1.9 09/14/2020     Lab Results   Component Value Date    TSH 0.065 (L) 08/17/2022    PSA 0.833 05/13/2020    CHLPL 133 11/28/2017    TRIG 36 08/17/2022    HDL 37 (L) 08/17/2022    LDL 53 08/17/2022      Lab Results   Component Value Date    BNP 26.0 04/26/2018       During this visit the following were done:  Labs Reviewed []    Labs Ordered []    Radiology Reports Reviewed []    Radiology Ordered []    PCP Records Reviewed []    Referring Provider Records Reviewed []    ER Records Reviewed []    Hospital Records Reviewed []    History Obtained From Family []     Radiology Images Reviewed []    Other Reviewed []    Records Requested []         ECG 12 Lead    Date/Time: 9/26/2022 8:45 AM  Performed by: Erik Livingston PA-C  Authorized by: Erik Livingston PA-C   Comparison: compared with previous ECG   Similar to previous ECG  Rhythm: sinus rhythm  ST Segments: ST segments normal    Clinical impression: normal ECG            Assessment & Plan    Diagnosis Plan   1. S/P CABG x 5     2. PVC's (premature ventricular contractions)              Recommendations:  1. Coronary artery disease  1. No recent anginal symptoms I will continue with GDMT with Lipitor, Coreg, lisinopril, spironolactone, and aspirin.  2. Ischemic cardiomyopathy  1. I do recommend initiation of SGLT2 inhibitor such as Jardiance/Farxiga however will defer this decision to PCP as he is on insulin and insulin dose would need to be adjusted.  Although he does state since monitoring his blood glucose more closely his glucose has been better controlled since his A1c was drawn.  3. Wide-complex tachycardia  1. Following with the EP closely patient on amiodarone with no adverse side effects.  Will defer further management to them.        No follow-ups on file.    As always, I appreciate very much the opportunity to participate in the cardiovascular care of your patients.      With Best Regards,    Erik Livingston PA-C

## 2022-09-30 DIAGNOSIS — I10 ESSENTIAL HYPERTENSION: ICD-10-CM

## 2022-09-30 RX ORDER — SPIRONOLACTONE 25 MG/1
TABLET ORAL
Qty: 90 TABLET | Refills: 0 | Status: SHIPPED | OUTPATIENT
Start: 2022-09-30 | End: 2022-11-29 | Stop reason: SDUPTHER

## 2022-11-04 ENCOUNTER — OFFICE VISIT (OUTPATIENT)
Dept: CARDIOLOGY | Facility: CLINIC | Age: 77
End: 2022-11-04

## 2022-11-04 VITALS
HEART RATE: 71 BPM | SYSTOLIC BLOOD PRESSURE: 134 MMHG | HEIGHT: 68 IN | OXYGEN SATURATION: 98 % | BODY MASS INDEX: 27.74 KG/M2 | DIASTOLIC BLOOD PRESSURE: 70 MMHG | WEIGHT: 183 LBS

## 2022-11-04 DIAGNOSIS — I47.29 VENTRICULAR TACHYCARDIA (PAROXYSMAL): Primary | ICD-10-CM

## 2022-11-04 DIAGNOSIS — Z79.899 LONG TERM CURRENT USE OF ANTIARRHYTHMIC MEDICAL THERAPY: ICD-10-CM

## 2022-11-04 DIAGNOSIS — I49.3 PVC'S (PREMATURE VENTRICULAR CONTRACTIONS): ICD-10-CM

## 2022-11-04 PROCEDURE — 99213 OFFICE O/P EST LOW 20 MIN: CPT | Performed by: NURSE PRACTITIONER

## 2022-11-04 PROCEDURE — 93284 PRGRMG EVAL IMPLANTABLE DFB: CPT | Performed by: NURSE PRACTITIONER

## 2022-11-04 PROCEDURE — 93000 ELECTROCARDIOGRAM COMPLETE: CPT | Performed by: NURSE PRACTITIONER

## 2022-11-04 NOTE — PROGRESS NOTES
Cardiac Electrophysiology Outpatient Note  [facility]    Office Visit     Shai Mata  [id number]  11/04/2022    Primary Care Physician: Ekaterina Cox APRN    Referred By: No ref. provider found    CC:   Chief Complaint   Patient presents with   • Long term current use of antiarrhythmic medical therapy       PROBLEM LIST:  1. PVCs                        A. 24 Hour Holter 4/13/16: 47-99 bpm, Average 71 bpm, frequent PVCs 32%, several runs 4 beat runs NSVT                        B. 24 Hour Holter 5/10/16: 48-81 bpm, Average 65 bpm, frequent PVCs 26% burden                        B. 24 Hour Holter Monitor 8/10/17: HR 42-83, Average 62 bpm, frequent PVCs withbigeminy, couplets, and triplets (11.8% burden)                         C. Echocardiogram 8/17/17: EF 36-40%, myxomatous changes of MV with mild to  moderate MR                        E. RFA of PVC 11/20/17  2. VT                        A. Inducible VT by EPS 11/20/17                        B. BiV ICD implant 11/20/17 [facility]   3. CHF                       A. Echo 11/20/17 LVEF 35%                       B. Echo  04/26/18 LVEF 45%  4. CAD                        A. CABG x 5 2006                        B. Echocardiogram 1/11/16: EF 45-50%, mild to moderate MR                        C. Stress Test 1/13/16: small reversible defect inferolateral and inferior segments, EF35%  5. LBBB  6. HTN  7. DM2  8. HLP    History of Present Illness:   Shai Mata is a 77 y.o. male who presents to the electrophysiology clinic for follow up of PVCs, VT status post BiV ICD implant, systolic heart failure.  He was last seen about 8 months ago.  He has been doing overall well since his last appointment in February.  He reports he had an endoscopy  Was started on medication for reflux and is doing well on that.  He reports he is doing well heart wise, denies chest pain, shortness of breath, palpitations, lower extremity edema, syncope or presyncope,  dizziness or lightheadedness.    Past Surgical History:   Procedure Laterality Date   • CARDIAC DEFIBRILLATOR PLACEMENT     • CARDIAC ELECTROPHYSIOLOGY PROCEDURE N/A 2017    Procedure: EP/Ablation PVC +/- ICD Implant;  Surgeon: Jose Maldonado MD;  Location:  [facility]ATION;  Service:    • CARDIAC ELECTROPHYSIOLOGY PROCEDURE N/A 2017    Procedure: Biventricular Device Insertion;  Surgeon: Jose Maldonado MD;  Location:  [facility];  Service:    • CATARACT EXTRACTION Bilateral    • CORONARY ANGIOPLASTY     • CORONARY ARTERY BYPASS GRAFT  2006    X 5.   [facility]   • ENDOSCOPY N/A 2022    Procedure: ESOPHAGOGASTRODUODENOSCOPY WITH BIOPSY;  Surgeon: Roula Craig MD;  Location: [facility] OR;  Service: Gastroenterology;  Laterality: N/A;   • ENDOSCOPY N/A 2022    Procedure: ESOPHAGOGASTRODUODENOSCOPY WITH BIOPSY;  Surgeon: Roula Craig MD;  Location: [facility] OR;  Service: Gastroenterology;  Laterality: N/A;   • PACEMAKER IMPLANTATION     • MI LAP,CHOLECYSTECTOMY N/A 2018    Procedure: CHOLECYSTECTOMY LAPAROSCOPIC;  Surgeon: Eber Hummel MD;  Location: [facility] OR;  Service: General       Family History   Problem Relation Age of Onset   • Heart disease Mother    • Heart disease Father    • No Known Problems Sister    • No Known Problems Sister    • Cancer Sister    • Diabetes Brother    • Cancer Sister    • Cancer Sister    • No Known Problems Sister    • No Known Problems Brother    • No Known Problems Brother        Social History     Socioeconomic History   • Marital status:    Tobacco Use   • Smoking status: Former     Packs/day: 2.00     Years: 20.00     Pack years: 40.00     Types: Cigarettes     Quit date:      Years since quittin.8   • Smokeless tobacco: Former     Types: Chew     Quit date:    Vaping Use   • Vaping Use: Never used   Substance and Sexual Activity   • Alcohol use: No   •  "Drug use: No   • Sexual activity: Defer         Current Outpatient Medications:   •  amiodarone (PACERONE) 200 MG tablet, Take 1/2 (one-half) tablet by mouth once daily, Disp: 45 tablet, Rfl: 1  •  aspirin 81 MG EC tablet, Take 81 mg by mouth 2 (Two) Times a Day., Disp: , Rfl:   •  atorvastatin (LIPITOR) 80 MG tablet, Take 1 tablet by mouth Daily., Disp: 90 tablet, Rfl: 1  •  carvedilol (COREG) 12.5 MG tablet, Take 1 tablet by mouth 2 (Two) Times a Day., Disp: 180 tablet, Rfl: 1  •  glyburide micronized (GLYNASE) 1.5 MG tablet, TAKE 1 TABLET BY MOUTH TWICE DAILY BEFORE MEAL(S), Disp: 180 tablet, Rfl: 0  •  insulin NPH (humuLIN N,novoLIN N) 100 UNIT/ML injection, Inject 22 Units under the skin into the appropriate area as directed 2 (Two) Times a Day Before Meals., Disp: , Rfl:   •  Insulin Pen Needle (Pen Needles) 32G X 4 MM misc, 1 each 3 (Three) Times a Day., Disp: 100 each, Rfl: 2  •  levothyroxine (Euthyrox) 50 MCG tablet, Take 1 tablet by mouth Daily., Disp: 30 tablet, Rfl: 2  •  lisinopril (PRINIVIL,ZESTRIL) 10 MG tablet, Take 1 tablet by mouth 2 (Two) Times a Day., Disp: 180 tablet, Rfl: 1  •  pantoprazole (Protonix) 40 MG EC tablet, Take 1 tablet by mouth Daily., Disp: 90 tablet, Rfl: 3  •  spironolactone (ALDACTONE) 25 MG tablet, Take 1 tablet by mouth once daily, Disp: 90 tablet, Rfl: 0    Allergies:   No Known Allergies    Review of Systems:  Review of Systems   Constitutional: Negative for malaise/fatigue.   Cardiovascular: Negative for chest pain, dyspnea on exertion, irregular heartbeat, leg swelling, near-syncope, orthopnea, palpitations, paroxysmal nocturnal dyspnea and syncope.        Vital Signs: Blood pressure 134/70, pulse 71, height 172.7 cm (68\"), weight 83 kg (183 lb), SpO2 98 %.    Physical Exam  Vitals reviewed.   Cardiovascular:      Rate and Rhythm: Normal rate and regular rhythm.      Heart sounds: Normal heart sounds.   Pulmonary:      Effort: Pulmonary effort is normal.      Breath " sounds: Normal breath sounds.   Musculoskeletal:      Right lower leg: No edema.      Left lower leg: No edema.   Skin:     General: Skin is warm and dry.   Neurological:      Mental Status: He is alert and oriented to person, place, and time.         Lab Results   Component Value Date    GLUCOSE 165 (H) 08/17/2022    CALCIUM 8.9 08/17/2022     08/17/2022    K 4.9 08/17/2022    CO2 21.9 (L) 08/17/2022     08/17/2022    BUN 13 08/17/2022    CREATININE 1.06 08/17/2022    EGFRIFAFRI 74 08/25/2016    EGFRIFNONA 57 (L) 11/01/2021    BCR 12.3 08/17/2022    ANIONGAP 13.1 08/17/2022     Lab Results   Component Value Date    WBC 7.09 08/17/2022    HGB 10.9 (L) 08/17/2022    HCT 33.4 (L) 08/17/2022    MCV 89.8 08/17/2022     08/17/2022     Lab Results   Component Value Date    INR 1.11 (H) 04/26/2018    INR 1.06 11/19/2017    INR 1.00 01/11/2016    PROTIME 14.4 04/26/2018    PROTIME 11.6 (H) 11/19/2017    PROTIME 11.3 01/11/2016     Lab Results   Component Value Date    TSH 0.065 (L) 08/17/2022        Results for orders placed during the hospital encounter of 04/26/18    Adult Transthoracic Echo Complete W/ Cont if Necessary Per Protocol    Interpretation Summary  · The following left ventricular wall segments are hypokinetic: apical septal and apex hypokinetic.  · Left ventricular systolic function is low normal. Estimated EF = 45%.  · Left atrial cavity size is dilated.  · Mild mitral valve regurgitation is present  · Compared to prior Echo, there is improvement in EF from 35% to 45%.      I personally viewed and interpreted the patient's EKG/Telemetry/lab data.      ECG 12 Lead    Date/Time: 11/4/2022 11:09 AM  Performed by: Maricarmen Hilliard APRN  Authorized by: Maricarmen Hilliard APRN   Comparison: compared with previous ECG   Rhythm: paced  BPM: 71  Pacing: atrial sensed rhythm and ventricular paced rhythmComments:             Shai GOMEZ Mata  reports that he quit smoking about 38 years ago. His  smoking use included cigarettes. He has a 40.00 pack-year smoking history. He quit smokeless tobacco use about 32 years ago.  His smokeless tobacco use included chew.       Device check: [employer] biventricular ICD, mode DDDR, rate 70.  RA pacing 97%, biventricular pacing 96%.  Battery voltage 1.9 years remaining, charge time 9.1 seconds.  Not pacemaker dependent.,  No events noted.  He has home monitored and actively transmitting.  No reprogramming done today.    Assessment & Plan    1. Ventricular tachycardia (paroxysmal)  -No recurrence of VT.  He does continue to take amiodarone 100 mg daily.    2. PVC's (premature ventricular contractions)  -Status post PVC ablation 2017.  See note below    3. Long term current use of antiarrhythmic medical therapy  -We had a discussion about his amiodarone usage today.  We did discuss that there are potential toxicities and he has now been on this medication for about 5 years.  He had lab work done in August that was acceptable, chest x-ray in February was also acceptable.  He gets regular eye checks.  Discussed the possibility of discontinuing his amiodarone at this point though this does run the risk of increasing PVCs or possible recurrence of VT.  At this time we will hold off on discontinuation of the amiodarone and readdress at his next appointment in 6 months.       Follow Up:  No follow-ups on file.    Maricarmen Hilliard, APRN  11/04/2022

## 2022-11-21 DIAGNOSIS — E03.8 OTHER SPECIFIED HYPOTHYROIDISM: ICD-10-CM

## 2022-11-21 RX ORDER — LEVOTHYROXINE SODIUM 0.05 MG/1
TABLET ORAL
Qty: 90 TABLET | Refills: 1 | Status: SHIPPED | OUTPATIENT
Start: 2022-11-21 | End: 2022-11-29 | Stop reason: SDUPTHER

## 2022-11-29 ENCOUNTER — OFFICE VISIT (OUTPATIENT)
Dept: FAMILY MEDICINE CLINIC | Facility: CLINIC | Age: 77
End: 2022-11-29

## 2022-11-29 VITALS
TEMPERATURE: 98.4 F | WEIGHT: 185.4 LBS | HEIGHT: 68 IN | OXYGEN SATURATION: 98 % | DIASTOLIC BLOOD PRESSURE: 60 MMHG | HEART RATE: 71 BPM | BODY MASS INDEX: 28.1 KG/M2 | SYSTOLIC BLOOD PRESSURE: 128 MMHG

## 2022-11-29 DIAGNOSIS — K21.9 GASTROESOPHAGEAL REFLUX DISEASE WITHOUT ESOPHAGITIS: ICD-10-CM

## 2022-11-29 DIAGNOSIS — Z79.4 TYPE 2 DIABETES MELLITUS WITH STAGE 3A CHRONIC KIDNEY DISEASE, WITH LONG-TERM CURRENT USE OF INSULIN: Primary | ICD-10-CM

## 2022-11-29 DIAGNOSIS — E03.8 OTHER SPECIFIED HYPOTHYROIDISM: ICD-10-CM

## 2022-11-29 DIAGNOSIS — N18.31 TYPE 2 DIABETES MELLITUS WITH STAGE 3A CHRONIC KIDNEY DISEASE, WITH LONG-TERM CURRENT USE OF INSULIN: Primary | ICD-10-CM

## 2022-11-29 DIAGNOSIS — I50.22 CHRONIC SYSTOLIC CONGESTIVE HEART FAILURE: ICD-10-CM

## 2022-11-29 DIAGNOSIS — I10 ESSENTIAL HYPERTENSION: ICD-10-CM

## 2022-11-29 DIAGNOSIS — E11.22 TYPE 2 DIABETES MELLITUS WITH STAGE 3A CHRONIC KIDNEY DISEASE, WITH LONG-TERM CURRENT USE OF INSULIN: Primary | ICD-10-CM

## 2022-11-29 DIAGNOSIS — E78.2 HYPERLIPEMIA, MIXED: ICD-10-CM

## 2022-11-29 PROCEDURE — 80053 COMPREHEN METABOLIC PANEL: CPT | Performed by: NURSE PRACTITIONER

## 2022-11-29 PROCEDURE — 85025 COMPLETE CBC W/AUTO DIFF WBC: CPT | Performed by: NURSE PRACTITIONER

## 2022-11-29 PROCEDURE — 80061 LIPID PANEL: CPT | Performed by: NURSE PRACTITIONER

## 2022-11-29 PROCEDURE — 83036 HEMOGLOBIN GLYCOSYLATED A1C: CPT | Performed by: NURSE PRACTITIONER

## 2022-11-29 PROCEDURE — 84443 ASSAY THYROID STIM HORMONE: CPT | Performed by: NURSE PRACTITIONER

## 2022-11-29 PROCEDURE — 99214 OFFICE O/P EST MOD 30 MIN: CPT | Performed by: NURSE PRACTITIONER

## 2022-11-29 RX ORDER — CARVEDILOL 12.5 MG/1
12.5 TABLET ORAL 2 TIMES DAILY
Qty: 180 TABLET | Refills: 1 | Status: SHIPPED | OUTPATIENT
Start: 2022-11-29

## 2022-11-29 RX ORDER — SPIRONOLACTONE 25 MG/1
25 TABLET ORAL DAILY
Qty: 90 TABLET | Refills: 1 | Status: SHIPPED | OUTPATIENT
Start: 2022-11-29

## 2022-11-29 RX ORDER — LISINOPRIL 10 MG/1
10 TABLET ORAL 2 TIMES DAILY
Qty: 180 TABLET | Refills: 1 | Status: SHIPPED | OUTPATIENT
Start: 2022-11-29 | End: 2022-12-27

## 2022-11-29 RX ORDER — ATORVASTATIN CALCIUM 80 MG/1
80 TABLET, FILM COATED ORAL DAILY
Qty: 90 TABLET | Refills: 1 | Status: SHIPPED | OUTPATIENT
Start: 2022-11-29

## 2022-11-29 RX ORDER — LEVOTHYROXINE SODIUM 0.05 MG/1
50 TABLET ORAL DAILY
Qty: 90 TABLET | Refills: 1 | Status: SHIPPED | OUTPATIENT
Start: 2022-11-29 | End: 2022-11-30 | Stop reason: SDUPTHER

## 2022-11-29 RX ORDER — PANTOPRAZOLE SODIUM 40 MG/1
40 TABLET, DELAYED RELEASE ORAL DAILY
Qty: 90 TABLET | Refills: 3 | Status: SHIPPED | OUTPATIENT
Start: 2022-11-29

## 2022-11-29 NOTE — PROGRESS NOTES
Subjective   Shai Mata is a 77 y.o. male.     Diabetes  He presents for his follow-up diabetic visit. He has type 2 diabetes mellitus. His disease course has been stable. There are no hypoglycemic associated symptoms. Pertinent negatives for hypoglycemia include no confusion, dizziness, headaches, hunger, mood changes, nervousness/anxiousness, pallor, seizures, sleepiness, speech difficulty or tremors. There are no diabetic associated symptoms. Pertinent negatives for diabetes include no blurred vision, no chest pain, no fatigue, no foot paresthesias, no foot ulcerations, no polydipsia, no polyphagia, no polyuria, no visual change, no weakness and no weight loss. There are no hypoglycemic complications. Symptoms are stable. Diabetic complications include nephropathy. (Following nephrology with no recent changes  ) Risk factors for coronary artery disease include sedentary lifestyle, male sex, hypertension, family history and dyslipidemia. Current diabetic treatment includes insulin injections and oral agent (dual therapy). He is compliant with treatment all of the time. His weight is stable. He is following a generally healthy diet. Meal planning includes avoidance of concentrated sweets and carbohydrate counting. He participates in exercise intermittently. His home blood glucose trend is fluctuating minimally. His breakfast blood glucose range is generally 110-130 mg/dl. His overall blood glucose range is 140-180 mg/dl. An ACE inhibitor/angiotensin II receptor blocker is being taken. He does not see a podiatrist.  Hyperlipidemia  This is a chronic problem. The current episode started more than 1 year ago. Recent lipid tests were reviewed and are variable. Exacerbating diseases include diabetes and hypothyroidism. Pertinent negatives include no chest pain, focal sensory loss, focal weakness, leg pain or myalgias. Current antihyperlipidemic treatment includes statins. The current treatment provides significant  improvement of lipids. There are no compliance problems.  Risk factors for coronary artery disease include diabetes mellitus, family history, dyslipidemia, obesity and hypertension.   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 Following closely with cardiology.  Denies any recent changes.) problem. The current episode started more than 1 year ago. Progression since onset: Denies any recent concerns.  Feels he is doing well. The problem is controlled. Pertinent negatives include no anxiety, blurred vision, chest pain, headaches, malaise/fatigue, neck pain, orthopnea, peripheral edema or PND. Risk factors for coronary artery disease include dyslipidemia, family history and sedentary lifestyle. Current antihypertension treatment includes ACE inhibitors, beta blockers and diuretics. The current treatment provides significant improvement. There are no compliance problems.  Hypertensive end-organ damage includes CAD/MI. Identifiable causes of hypertension include a thyroid problem.   Thyroid Problem  Presents for follow-up (Known hypothyroidism) visit. Patient reports no anxiety, fatigue, tremors, visual change or weight loss. The symptoms have been stable (tolerating synthroid). His past medical history is significant for diabetes and hyperlipidemia.      The following portions of the patient's history were reviewed and updated as appropriate: allergies, current medications, past family history, past medical history, past social history, past surgical history and problem list.      Review of Systems   Constitutional: Negative.  Negative for activity change, fatigue, malaise/fatigue and weight loss.        States overall he is feeling fairly well     HENT: Negative.    Eyes: Negative for blurred vision.   Respiratory: Negative.    Cardiovascular: Negative.  Negative for chest pain, orthopnea and PND.   Gastrointestinal: Negative.    Endocrine: Negative.  Negative for  polydipsia, polyphagia and polyuria.   Musculoskeletal: Negative.  Negative for myalgias and neck pain.   Skin: Negative.  Negative for pallor.   Neurological: Negative.  Negative for dizziness, tremors, focal weakness, seizures, speech difficulty, weakness and headaches.   Psychiatric/Behavioral: Negative.  Negative for confusion. The patient is not nervous/anxious.    All other systems reviewed and are negative.      Procedures    Objective   Physical Exam   Constitutional: He is oriented to person, place, and time. He appears well-developed. No distress.   HENT:   Head: Normocephalic.   Eyes: Conjunctivae are normal. Right eye exhibits no discharge. Left eye exhibits no discharge.   Cardiovascular: Normal rate, regular rhythm and normal heart sounds.   No murmur heard.  Pulmonary/Chest: Effort normal and breath sounds normal. No respiratory distress. He has no wheezes.   Musculoskeletal:      Right lower leg: No edema.      Left lower leg: No edema.   Neurological: He is alert and oriented to person, place, and time.   Skin: Skin is warm and dry. He is not diaphoretic.   Psychiatric: His behavior is normal.   Nursing note and vitals reviewed.    During this visit the following were done:  Labs Reviewed [x]    Labs Ordered [x]    Radiology Reports Reviewed []    Radiology Ordered []    PCP Records Reviewed []    Referring Provider Records Reviewed []    ER Records Reviewed []    Hospital Records Reviewed []    History Obtained From Family []    Radiology Images Reviewed []    Other Reviewed [x]    Records Requested []      Diagnoses and all orders for this visit:    1. Type 2 diabetes mellitus with stage 3a chronic kidney disease, with long-term current use of insulin (HCC) (Primary)  -     CBC Auto Differential; Future  -     Comprehensive Metabolic Panel; Future  -     Hemoglobin A1c; Future  -     Lipid Panel; Future  -     TSH; Future  -     CBC Auto Differential  -     Comprehensive Metabolic Panel  -      Hemoglobin A1c  -     Lipid Panel  -     TSH    2. Other specified hypothyroidism  -     TSH; Future  -     TSH  -     levothyroxine (SYNTHROID, LEVOTHROID) 50 MCG tablet; Take 1 tablet by mouth Daily.  Dispense: 90 tablet; Refill: 1    3. Essential hypertension  -     Comprehensive Metabolic Panel; Future  -     Lipid Panel; Future  -     Comprehensive Metabolic Panel  -     Lipid Panel  -     spironolactone (ALDACTONE) 25 MG tablet; Take 1 tablet by mouth Daily.  Dispense: 90 tablet; Refill: 1  -     lisinopril (PRINIVIL,ZESTRIL) 10 MG tablet; Take 1 tablet by mouth 2 (Two) Times a Day.  Dispense: 180 tablet; Refill: 1  -     carvedilol (COREG) 12.5 MG tablet; Take 1 tablet by mouth 2 (Two) Times a Day.  Dispense: 180 tablet; Refill: 1    4. Hyperlipemia, mixed  -     Comprehensive Metabolic Panel; Future  -     Lipid Panel; Future  -     Comprehensive Metabolic Panel  -     Lipid Panel  -     atorvastatin (LIPITOR) 80 MG tablet; Take 1 tablet by mouth Daily.  Dispense: 90 tablet; Refill: 1    5. Gastroesophageal reflux disease without esophagitis  -     pantoprazole (Protonix) 40 MG EC tablet; Take 1 tablet by mouth Daily.  Dispense: 90 tablet; Refill: 3    6. Chronic systolic congestive heart failure (HCC)  -     carvedilol (COREG) 12.5 MG tablet; Take 1 tablet by mouth 2 (Two) Times a Day.  Dispense: 180 tablet; Refill: 1      Patient's Body mass index is 28.19 kg/m². indicating that he is overweight (BMI 25-29.9). Patient's (Body mass index is 28.19 kg/m².) indicates that they are overweight with health conditions that include hypertension, coronary heart disease, diabetes mellitus and dyslipidemias . Weight is unchanged. BMI is is above average; BMI management plan is completed. We discussed portion control and increasing exercise. .

## 2022-11-30 ENCOUNTER — TELEPHONE (OUTPATIENT)
Dept: FAMILY MEDICINE CLINIC | Facility: CLINIC | Age: 77
End: 2022-11-30

## 2022-11-30 DIAGNOSIS — E03.8 OTHER SPECIFIED HYPOTHYROIDISM: ICD-10-CM

## 2022-11-30 LAB
ALBUMIN SERPL-MCNC: 4.1 G/DL (ref 3.5–5.2)
ALBUMIN/GLOB SERPL: 1.8 G/DL
ALP SERPL-CCNC: 73 U/L (ref 39–117)
ALT SERPL W P-5'-P-CCNC: 14 U/L (ref 1–41)
ANION GAP SERPL CALCULATED.3IONS-SCNC: 9 MMOL/L (ref 5–15)
AST SERPL-CCNC: 15 U/L (ref 1–40)
BASOPHILS # BLD AUTO: 0.11 10*3/MM3 (ref 0–0.2)
BASOPHILS NFR BLD AUTO: 1.3 % (ref 0–1.5)
BILIRUB SERPL-MCNC: 0.5 MG/DL (ref 0–1.2)
BUN SERPL-MCNC: 14 MG/DL (ref 8–23)
BUN/CREAT SERPL: 12.2 (ref 7–25)
CALCIUM SPEC-SCNC: 9 MG/DL (ref 8.6–10.5)
CHLORIDE SERPL-SCNC: 102 MMOL/L (ref 98–107)
CHOLEST SERPL-MCNC: 114 MG/DL (ref 0–200)
CO2 SERPL-SCNC: 27 MMOL/L (ref 22–29)
CREAT SERPL-MCNC: 1.15 MG/DL (ref 0.76–1.27)
DEPRECATED RDW RBC AUTO: 43.6 FL (ref 37–54)
EGFRCR SERPLBLD CKD-EPI 2021: 65.1 ML/MIN/1.73
EOSINOPHIL # BLD AUTO: 0.18 10*3/MM3 (ref 0–0.4)
EOSINOPHIL NFR BLD AUTO: 2.1 % (ref 0.3–6.2)
ERYTHROCYTE [DISTWIDTH] IN BLOOD BY AUTOMATED COUNT: 13.1 % (ref 12.3–15.4)
GLOBULIN UR ELPH-MCNC: 2.3 GM/DL
GLUCOSE SERPL-MCNC: 203 MG/DL (ref 65–99)
HBA1C MFR BLD: 8 % (ref 4.8–5.6)
HCT VFR BLD AUTO: 36.2 % (ref 37.5–51)
HDLC SERPL-MCNC: 41 MG/DL (ref 40–60)
HGB BLD-MCNC: 11.5 G/DL (ref 13–17.7)
IMM GRANULOCYTES # BLD AUTO: 0.03 10*3/MM3 (ref 0–0.05)
IMM GRANULOCYTES NFR BLD AUTO: 0.3 % (ref 0–0.5)
LDLC SERPL CALC-MCNC: 61 MG/DL (ref 0–100)
LDLC/HDLC SERPL: 1.54 {RATIO}
LYMPHOCYTES # BLD AUTO: 1.22 10*3/MM3 (ref 0.7–3.1)
LYMPHOCYTES NFR BLD AUTO: 14 % (ref 19.6–45.3)
MCH RBC QN AUTO: 28.5 PG (ref 26.6–33)
MCHC RBC AUTO-ENTMCNC: 31.8 G/DL (ref 31.5–35.7)
MCV RBC AUTO: 89.6 FL (ref 79–97)
MONOCYTES # BLD AUTO: 0.67 10*3/MM3 (ref 0.1–0.9)
MONOCYTES NFR BLD AUTO: 7.7 % (ref 5–12)
NEUTROPHILS NFR BLD AUTO: 6.52 10*3/MM3 (ref 1.7–7)
NEUTROPHILS NFR BLD AUTO: 74.6 % (ref 42.7–76)
NRBC BLD AUTO-RTO: 0 /100 WBC (ref 0–0.2)
PLATELET # BLD AUTO: 283 10*3/MM3 (ref 140–450)
PMV BLD AUTO: 11.3 FL (ref 6–12)
POTASSIUM SERPL-SCNC: 4.6 MMOL/L (ref 3.5–5.2)
PROT SERPL-MCNC: 6.4 G/DL (ref 6–8.5)
RBC # BLD AUTO: 4.04 10*6/MM3 (ref 4.14–5.8)
SODIUM SERPL-SCNC: 138 MMOL/L (ref 136–145)
TRIGL SERPL-MCNC: 50 MG/DL (ref 0–150)
TSH SERPL DL<=0.05 MIU/L-ACNC: 5.64 UIU/ML (ref 0.27–4.2)
VLDLC SERPL-MCNC: 12 MG/DL (ref 5–40)
WBC NRBC COR # BLD: 8.73 10*3/MM3 (ref 3.4–10.8)

## 2022-11-30 RX ORDER — LEVOTHYROXINE SODIUM 0.07 MG/1
75 TABLET ORAL DAILY
Qty: 90 TABLET | Refills: 1 | Status: SHIPPED | OUTPATIENT
Start: 2022-11-30 | End: 2023-03-07

## 2022-12-01 NOTE — TELEPHONE ENCOUNTER
Caller: Shai Mata    Relationship to patient: Self    Best call back number:    [phone]      PATIENT CALLED BACK, THE MESSAGE WAS RELAYED AND HE VERBALIZED UNDERSTANDING.

## 2022-12-04 PROCEDURE — 93296 REM INTERROG EVL PM/IDS: CPT | Performed by: STUDENT IN AN ORGANIZED HEALTH CARE EDUCATION/TRAINING PROGRAM

## 2022-12-04 PROCEDURE — 93295 DEV INTERROG REMOTE 1/2/MLT: CPT | Performed by: STUDENT IN AN ORGANIZED HEALTH CARE EDUCATION/TRAINING PROGRAM

## 2022-12-23 DIAGNOSIS — I10 ESSENTIAL HYPERTENSION: ICD-10-CM

## 2022-12-27 RX ORDER — LISINOPRIL 10 MG/1
TABLET ORAL
Qty: 180 TABLET | Refills: 0 | Status: SHIPPED | OUTPATIENT
Start: 2022-12-27 | End: 2023-03-06 | Stop reason: SDUPTHER

## 2023-02-20 DIAGNOSIS — I50.22 CHRONIC SYSTOLIC CONGESTIVE HEART FAILURE: ICD-10-CM

## 2023-02-20 DIAGNOSIS — Z79.4 TYPE 2 DIABETES MELLITUS WITH STAGE 3A CHRONIC KIDNEY DISEASE, WITH LONG-TERM CURRENT USE OF INSULIN: ICD-10-CM

## 2023-02-20 DIAGNOSIS — N18.31 TYPE 2 DIABETES MELLITUS WITH STAGE 3A CHRONIC KIDNEY DISEASE, WITH LONG-TERM CURRENT USE OF INSULIN: ICD-10-CM

## 2023-02-20 DIAGNOSIS — E11.22 TYPE 2 DIABETES MELLITUS WITH STAGE 3A CHRONIC KIDNEY DISEASE, WITH LONG-TERM CURRENT USE OF INSULIN: ICD-10-CM

## 2023-02-20 RX ORDER — AMIODARONE HYDROCHLORIDE 200 MG/1
TABLET ORAL
Qty: 45 TABLET | Refills: 0 | Status: SHIPPED | OUTPATIENT
Start: 2023-02-20

## 2023-02-22 ENCOUNTER — TELEPHONE (OUTPATIENT)
Dept: CARDIOLOGY | Facility: CLINIC | Age: 78
End: 2023-02-22
Payer: MEDICARE

## 2023-02-22 NOTE — TELEPHONE ENCOUNTER
Left message with my name and number for pt to return my call regarding his merlin monitor no transmitting.

## 2023-03-01 ENCOUNTER — OFFICE VISIT (OUTPATIENT)
Dept: FAMILY MEDICINE CLINIC | Facility: CLINIC | Age: 78
End: 2023-03-01
Payer: MEDICARE

## 2023-03-01 VITALS
HEIGHT: 68 IN | TEMPERATURE: 97.1 F | OXYGEN SATURATION: 100 % | HEART RATE: 67 BPM | DIASTOLIC BLOOD PRESSURE: 68 MMHG | SYSTOLIC BLOOD PRESSURE: 132 MMHG | BODY MASS INDEX: 28.55 KG/M2 | WEIGHT: 188.4 LBS

## 2023-03-01 DIAGNOSIS — Z79.4 TYPE 2 DIABETES MELLITUS WITH STAGE 3A CHRONIC KIDNEY DISEASE, WITH LONG-TERM CURRENT USE OF INSULIN: Primary | ICD-10-CM

## 2023-03-01 DIAGNOSIS — I25.10 ASCVD (ARTERIOSCLEROTIC CARDIOVASCULAR DISEASE): ICD-10-CM

## 2023-03-01 DIAGNOSIS — E03.8 OTHER SPECIFIED HYPOTHYROIDISM: ICD-10-CM

## 2023-03-01 DIAGNOSIS — E11.22 TYPE 2 DIABETES MELLITUS WITH STAGE 3A CHRONIC KIDNEY DISEASE, WITH LONG-TERM CURRENT USE OF INSULIN: Primary | ICD-10-CM

## 2023-03-01 DIAGNOSIS — I10 ESSENTIAL HYPERTENSION: ICD-10-CM

## 2023-03-01 DIAGNOSIS — I50.22 CHRONIC SYSTOLIC CONGESTIVE HEART FAILURE: ICD-10-CM

## 2023-03-01 DIAGNOSIS — N18.31 TYPE 2 DIABETES MELLITUS WITH STAGE 3A CHRONIC KIDNEY DISEASE, WITH LONG-TERM CURRENT USE OF INSULIN: Primary | ICD-10-CM

## 2023-03-01 DIAGNOSIS — E78.2 HYPERLIPEMIA, MIXED: ICD-10-CM

## 2023-03-01 PROCEDURE — 83735 ASSAY OF MAGNESIUM: CPT | Performed by: NURSE PRACTITIONER

## 2023-03-01 PROCEDURE — 99213 OFFICE O/P EST LOW 20 MIN: CPT | Performed by: NURSE PRACTITIONER

## 2023-03-01 PROCEDURE — 83036 HEMOGLOBIN GLYCOSYLATED A1C: CPT | Performed by: NURSE PRACTITIONER

## 2023-03-01 PROCEDURE — 80053 COMPREHEN METABOLIC PANEL: CPT | Performed by: NURSE PRACTITIONER

## 2023-03-01 PROCEDURE — 80061 LIPID PANEL: CPT | Performed by: NURSE PRACTITIONER

## 2023-03-01 PROCEDURE — 84443 ASSAY THYROID STIM HORMONE: CPT | Performed by: NURSE PRACTITIONER

## 2023-03-01 PROCEDURE — 36415 COLL VENOUS BLD VENIPUNCTURE: CPT | Performed by: NURSE PRACTITIONER

## 2023-03-02 LAB
ALBUMIN SERPL-MCNC: 4.2 G/DL (ref 3.5–5.2)
ALBUMIN/GLOB SERPL: 1.8 G/DL
ALP SERPL-CCNC: 73 U/L (ref 39–117)
ALT SERPL W P-5'-P-CCNC: 17 U/L (ref 1–41)
ANION GAP SERPL CALCULATED.3IONS-SCNC: 12.3 MMOL/L (ref 5–15)
AST SERPL-CCNC: 21 U/L (ref 1–40)
BILIRUB SERPL-MCNC: 0.6 MG/DL (ref 0–1.2)
BUN SERPL-MCNC: 28 MG/DL (ref 8–23)
BUN/CREAT SERPL: 23 (ref 7–25)
CALCIUM SPEC-SCNC: 9.3 MG/DL (ref 8.6–10.5)
CHLORIDE SERPL-SCNC: 101 MMOL/L (ref 98–107)
CHOLEST SERPL-MCNC: 118 MG/DL (ref 0–200)
CO2 SERPL-SCNC: 25.7 MMOL/L (ref 22–29)
CREAT SERPL-MCNC: 1.22 MG/DL (ref 0.76–1.27)
EGFRCR SERPLBLD CKD-EPI 2021: 60.7 ML/MIN/1.73
GLOBULIN UR ELPH-MCNC: 2.4 GM/DL
GLUCOSE SERPL-MCNC: 262 MG/DL (ref 65–99)
HBA1C MFR BLD: 8.1 % (ref 4.8–5.6)
HDLC SERPL-MCNC: 47 MG/DL (ref 40–60)
LDLC SERPL CALC-MCNC: 59 MG/DL (ref 0–100)
LDLC/HDLC SERPL: 1.28 {RATIO}
MAGNESIUM SERPL-MCNC: 2 MG/DL (ref 1.6–2.4)
POTASSIUM SERPL-SCNC: 5.1 MMOL/L (ref 3.5–5.2)
PROT SERPL-MCNC: 6.6 G/DL (ref 6–8.5)
SODIUM SERPL-SCNC: 139 MMOL/L (ref 136–145)
TRIGL SERPL-MCNC: 55 MG/DL (ref 0–150)
TSH SERPL DL<=0.05 MIU/L-ACNC: 5.05 UIU/ML (ref 0.27–4.2)
VLDLC SERPL-MCNC: 12 MG/DL (ref 5–40)

## 2023-03-05 PROCEDURE — 93295 DEV INTERROG REMOTE 1/2/MLT: CPT | Performed by: STUDENT IN AN ORGANIZED HEALTH CARE EDUCATION/TRAINING PROGRAM

## 2023-03-05 PROCEDURE — 93296 REM INTERROG EVL PM/IDS: CPT | Performed by: STUDENT IN AN ORGANIZED HEALTH CARE EDUCATION/TRAINING PROGRAM

## 2023-03-06 RX ORDER — LISINOPRIL 10 MG/1
10 TABLET ORAL 2 TIMES DAILY
Qty: 180 TABLET | Refills: 1 | Status: SHIPPED | OUTPATIENT
Start: 2023-03-06

## 2023-03-07 ENCOUNTER — TELEPHONE (OUTPATIENT)
Dept: FAMILY MEDICINE CLINIC | Facility: CLINIC | Age: 78
End: 2023-03-07
Payer: MEDICARE

## 2023-03-07 RX ORDER — LEVOTHYROXINE SODIUM 0.1 MG/1
100 TABLET ORAL DAILY
Qty: 90 TABLET | Refills: 1 | Status: SHIPPED | OUTPATIENT
Start: 2023-03-07

## 2023-03-07 NOTE — PROGRESS NOTES
Subjective   Shai Mata is a 77 y.o. male.     Diabetes  He presents for his follow-up diabetic visit. He has type 2 diabetes mellitus. His disease course has been stable. There are no hypoglycemic associated symptoms. Pertinent negatives for hypoglycemia include no confusion, dizziness, headaches, hunger, mood changes, nervousness/anxiousness, pallor, seizures, sleepiness, speech difficulty or tremors. There are no diabetic associated symptoms. Pertinent negatives for diabetes include no blurred vision, no chest pain, no fatigue, no foot paresthesias, no foot ulcerations, no polydipsia, no polyphagia, no polyuria, no visual change, no weakness and no weight loss. There are no hypoglycemic complications. Symptoms are stable. Diabetic complications include nephropathy. (Following nephrology with no recent changes  ) Risk factors for coronary artery disease include sedentary lifestyle, male sex, hypertension, family history and dyslipidemia. Current diabetic treatment includes insulin injections and oral agent (dual therapy). He is compliant with treatment all of the time. His weight is stable. He is following a generally healthy diet. Meal planning includes avoidance of concentrated sweets and carbohydrate counting. He participates in exercise intermittently. His home blood glucose trend is fluctuating minimally. His breakfast blood glucose range is generally 110-130 mg/dl. His overall blood glucose range is 140-180 mg/dl. An ACE inhibitor/angiotensin II receptor blocker is being taken. He does not see a podiatrist.  Hyperlipidemia  This is a chronic problem. The current episode started more than 1 year ago. Recent lipid tests were reviewed and are variable. Exacerbating diseases include diabetes and hypothyroidism. Pertinent negatives include no chest pain, focal sensory loss, focal weakness, leg pain or myalgias. Current antihyperlipidemic treatment includes statins. The current treatment provides significant  improvement of lipids. There are no compliance problems.  Risk factors for coronary artery disease include diabetes mellitus, family history, dyslipidemia, obesity and hypertension.   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 Following closely with cardiology.  Denies any recent changes.) problem. The current episode started more than 1 year ago. Progression since onset: Denies any recent concerns.  Feels he is doing well. The problem is controlled. Pertinent negatives include no anxiety, blurred vision, chest pain, headaches, malaise/fatigue, neck pain, orthopnea, peripheral edema or PND. Risk factors for coronary artery disease include dyslipidemia, family history and sedentary lifestyle. Current antihypertension treatment includes ACE inhibitors, beta blockers and diuretics. The current treatment provides significant improvement. There are no compliance problems.  Hypertensive end-organ damage includes CAD/MI. Identifiable causes of hypertension include a thyroid problem.   Thyroid Problem  Presents for follow-up (Known hypothyroidism) visit. Patient reports no anxiety, fatigue, tremors, visual change or weight loss. The symptoms have been stable (tolerating synthroid). His past medical history is significant for diabetes and hyperlipidemia.      The following portions of the patient's history were reviewed and updated as appropriate: allergies, current medications, past family history, past medical history, past social history, past surgical history and problem list.      Review of Systems   Constitutional: Negative.  Negative for activity change, fatigue, malaise/fatigue and weight loss.        States overall he is feeling fairly well     HENT: Negative.    Eyes: Negative for blurred vision.   Respiratory: Negative.    Cardiovascular: Negative.  Negative for chest pain, orthopnea and PND.   Gastrointestinal: Negative.    Endocrine: Negative.  Negative for  polydipsia, polyphagia and polyuria.   Musculoskeletal: Negative.  Negative for myalgias and neck pain.   Skin: Negative.  Negative for pallor.   Neurological: Negative.  Negative for dizziness, tremors, focal weakness, seizures, speech difficulty, weakness and headaches.   Psychiatric/Behavioral: Negative.  Negative for confusion. The patient is not nervous/anxious.    All other systems reviewed and are negative.      Procedures    Objective   Physical Exam   Constitutional: He is oriented to person, place, and time. He appears well-developed. No distress.   HENT:   Head: Normocephalic.   Eyes: Conjunctivae are normal. Right eye exhibits no discharge. Left eye exhibits no discharge.   Cardiovascular: Normal rate, regular rhythm and normal heart sounds.   No murmur heard.  Pulmonary/Chest: Effort normal and breath sounds normal. No respiratory distress. He has no wheezes.   Musculoskeletal:      Right lower leg: No edema.      Left lower leg: No edema.   Neurological: He is alert and oriented to person, place, and time.   Skin: Skin is warm and dry. He is not diaphoretic.   Psychiatric: His behavior is normal.   Nursing note and vitals reviewed.    During this visit the following were done:  Labs Reviewed [x]    Labs Ordered [x]    Radiology Reports Reviewed []    Radiology Ordered []    PCP Records Reviewed []    Referring Provider Records Reviewed []    ER Records Reviewed []    Hospital Records Reviewed []    History Obtained From Family []    Radiology Images Reviewed []    Other Reviewed [x]    Records Requested []      Diagnoses and all orders for this visit:    1. Type 2 diabetes mellitus with stage 3a chronic kidney disease, with long-term current use of insulin (HCC) (Primary)  -     Comprehensive Metabolic Panel; Future  -     Hemoglobin A1c; Future  -     Lipid Panel; Future  -     Magnesium; Future  -     TSH; Future  -     Comprehensive Metabolic Panel  -     Hemoglobin A1c  -     Lipid Panel  -      Magnesium  -     TSH  Continue with current medication    2. Chronic systolic congestive heart failure (HCC)  Continue with current medication    3. Other specified hypothyroidism  Continue with current medication    4. Hyperlipemia, mixed  Continue with current medication    5. ASCVD (arteriosclerotic cardiovascular disease)  Continue with current medication    6. Essential hypertension  -     lisinopril (PRINIVIL,ZESTRIL) 10 MG tablet; Take 1 tablet by mouth 2 (Two) Times a Day.  Dispense: 180 tablet; Refill: 1      Patient's Body mass index is 28.65 kg/m². indicating that he is overweight (BMI 25-29.9). Patient's (Body mass index is 28.65 kg/m².) indicates that they are overweight with health conditions that include hypertension, coronary heart disease, diabetes mellitus and dyslipidemias . Weight is unchanged. BMI is is above average; BMI management plan is completed. We discussed portion control and increasing exercise. .

## 2023-03-07 NOTE — TELEPHONE ENCOUNTER
----- Message from ANEUDY Carrizales sent at 3/7/2023 12:12 PM EST -----  Synthroid needs adjusted sent 100 mcg daily to [facility] Otherwise stable      Left a message to return call.      Left a second message to return call.      Patient notified & verbalized understanding.

## 2023-03-27 ENCOUNTER — OFFICE VISIT (OUTPATIENT)
Dept: CARDIOLOGY | Facility: CLINIC | Age: 78
End: 2023-03-27
Payer: MEDICARE

## 2023-03-27 VITALS
HEART RATE: 74 BPM | OXYGEN SATURATION: 99 % | SYSTOLIC BLOOD PRESSURE: 146 MMHG | BODY MASS INDEX: 28.49 KG/M2 | DIASTOLIC BLOOD PRESSURE: 80 MMHG | WEIGHT: 188 LBS | HEIGHT: 68 IN

## 2023-03-27 DIAGNOSIS — Z95.1 S/P CABG X 5: ICD-10-CM

## 2023-03-27 DIAGNOSIS — I25.10 ASCVD (ARTERIOSCLEROTIC CARDIOVASCULAR DISEASE): Primary | ICD-10-CM

## 2023-03-27 PROCEDURE — 3079F DIAST BP 80-89 MM HG: CPT | Performed by: PHYSICIAN ASSISTANT

## 2023-03-27 PROCEDURE — 3077F SYST BP >= 140 MM HG: CPT | Performed by: PHYSICIAN ASSISTANT

## 2023-03-27 PROCEDURE — 99213 OFFICE O/P EST LOW 20 MIN: CPT | Performed by: PHYSICIAN ASSISTANT

## 2023-03-27 NOTE — PROGRESS NOTES
Ekaterina Cox, ANEUDY  Shai Mata  1945 03/27/2023    Patient Active Problem List   Diagnosis   • ASCVD (arteriosclerotic cardiovascular disease)   • S/P CABG x 5   • HTN (hypertension)   • PVC's (premature ventricular contractions)   • Type 2 diabetes mellitus with hyperglycemia, with long-term current use of insulin (HCC)   • Hyperlipemia, mixed   • Ischemic cardiomyopathy   • Pain of upper abdomen   • Cholecystitis   • Chronic systolic congestive heart failure (HCC)   • Ventricular tachycardia (paroxysmal)   • Long term current use of antiarrhythmic medical therapy   • Other specified hypothyroidism   • Esophageal dysphagia       Dear Ekaterina Cox APRN:    Subjective     History of Present Illness:    No chief complaint on file.      Shai Mata is a pleasant 77 y.o. male with a past medical history significant for ischemic cardiomyopathy with most recent EF 45%, chronic systolic congestive heart failure, history of ventricular tachycardia status post ICD implantation on amiodarone, ASCVD status post four-vessel CABG, diabetes mellitus type 2, hypertension, and dyslipidemia.  He presents today for routine cardiology follow-up.     Patient denies any chest pain, shortness of breath, palpitations, dizziness, syncope or near syncope.  He is still following with the EP regarding his defibrillator and monitoring with amiodarone with consideration of discontinuing this at next follow-up.  He does deny any known episodes of dysrhythmias.  He did recently have labs by PCP which showed stable renal function and LFTs.  Blood glucose has improved with A1c now down to 8 at last visit was at 12.    No Known Allergies:      Current Outpatient Medications:   •  amiodarone (PACERONE) 200 MG tablet, Take 1/2 (one-half) tablet by mouth once daily, Disp: 45 tablet, Rfl: 0  •  aspirin 81 MG EC tablet, Take 1 tablet by mouth 2 (Two) Times a Day., Disp: , Rfl:   •  atorvastatin (LIPITOR) 80 MG tablet, Take 1 tablet by mouth Daily.,  "Disp: 90 tablet, Rfl: 1  •  carvedilol (COREG) 12.5 MG tablet, Take 1 tablet by mouth 2 (Two) Times a Day., Disp: 180 tablet, Rfl: 1  •  glyburide micronized (GLYNASE) 1.5 MG tablet, TAKE 1 TABLET BY MOUTH TWICE DAILY BEFORE MEAL(S), Disp: 180 tablet, Rfl: 0  •  insulin NPH (humuLIN N,novoLIN N) 100 UNIT/ML injection, Inject 22 Units under the skin into the appropriate area as directed 2 (Two) Times a Day Before Meals., Disp: , Rfl:   •  Insulin Pen Needle (Pen Needles) 32G X 4 MM misc, 1 each 3 (Three) Times a Day., Disp: 100 each, Rfl: 2  •  levothyroxine (Synthroid) 100 MCG tablet, Take 1 tablet by mouth Daily., Disp: 90 tablet, Rfl: 1  •  lisinopril (PRINIVIL,ZESTRIL) 10 MG tablet, Take 1 tablet by mouth 2 (Two) Times a Day., Disp: 180 tablet, Rfl: 1  •  pantoprazole (Protonix) 40 MG EC tablet, Take 1 tablet by mouth Daily., Disp: 90 tablet, Rfl: 3  •  spironolactone (ALDACTONE) 25 MG tablet, Take 1 tablet by mouth Daily., Disp: 90 tablet, Rfl: 1    The following portions of the patient's history were reviewed and updated as appropriate: allergies, current medications, past family history, past medical history, past social history, past surgical history and problem list.    Social History     Tobacco Use   • Smoking status: Former     Packs/day: 2.00     Years: 20.00     Pack years: 40.00     Types: Cigarettes     Quit date:      Years since quittin.2   • Smokeless tobacco: Former     Types: Chew     Quit date:    Vaping Use   • Vaping Use: Never used   Substance Use Topics   • Alcohol use: No   • Drug use: No         Objective   Vitals:    23 0842   BP: 146/80   BP Location: Right arm   Patient Position: Sitting   Cuff Size: Adult   Pulse: 74   SpO2: 99%   Weight: 85.3 kg (188 lb)   Height: 172.7 cm (68\")     Body mass index is 28.59 kg/m².    Constitutional:       General: Not in acute distress.     Appearance: Healthy appearance. Well-developed and not in distress. Not diaphoretic.   Eyes: "      Conjunctiva/sclera: Conjunctivae normal.      Pupils: Pupils are equal, round, and reactive to light.   HENT:      Head: Normocephalic and atraumatic.   Neck:      Vascular: No carotid bruit or JVD.   Pulmonary:      Effort: Pulmonary effort is normal. No respiratory distress.      Breath sounds: Normal breath sounds.   Cardiovascular:      Normal rate. Regular rhythm.   Skin:     General: Skin is cool.   Neurological:      Mental Status: Alert, oriented to person, place, and time and oriented to person, place and time.         Lab Results   Component Value Date     03/01/2023    K 5.1 03/01/2023     03/01/2023    CO2 25.7 03/01/2023    BUN 28 (H) 03/01/2023    CREATININE 1.22 03/01/2023    GLUCOSE 262 (H) 03/01/2023    CALCIUM 9.3 03/01/2023    AST 21 03/01/2023    ALT 17 03/01/2023    ALKPHOS 73 03/01/2023    LABIL2 1.6 08/25/2016     Lab Results   Component Value Date    CKTOTAL 82 04/26/2018     Lab Results   Component Value Date    WBC 8.73 11/29/2022    HGB 11.5 (L) 11/29/2022    HCT 36.2 (L) 11/29/2022     11/29/2022     Lab Results   Component Value Date    INR 1.11 (H) 04/26/2018    INR 1.06 11/19/2017    INR 1.00 01/11/2016     Lab Results   Component Value Date    MG 2.0 03/01/2023     Lab Results   Component Value Date    TSH 5.050 (H) 03/01/2023    PSA 0.833 05/13/2020    CHLPL 133 11/28/2017    TRIG 55 03/01/2023    HDL 47 03/01/2023    LDL 59 03/01/2023      Lab Results   Component Value Date    BNP 26.0 04/26/2018       During this visit the following were done:  Labs Reviewed []    Labs Ordered []    Radiology Reports Reviewed []    Radiology Ordered []    PCP Records Reviewed []    Referring Provider Records Reviewed []    ER Records Reviewed []    Hospital Records Reviewed []    History Obtained From Family []    Radiology Images Reviewed []    Other Reviewed []    Records Requested []       Procedures    Assessment & Plan    Diagnosis Plan   1. ASCVD (arteriosclerotic  cardiovascular disease)        2. S/P CABG x 5                 Recommendations:  1. ASCVD  a. Clinically stable no recent anginal symptoms I will continue Lipitor, carvedilol, lisinopril, spironolactone, and aspirin.  2. Chronic HFrEF/ischemic cardiomyopathy  a. Clinically euvolemic continue regimen mentioned above.  3. History of VT following EP.    No follow-ups on file.    As always, I appreciate very much the opportunity to participate in the cardiovascular care of your patients.      With Best Regards,    Erik Livingston PA-C

## 2023-05-27 DIAGNOSIS — I50.22 CHRONIC SYSTOLIC CONGESTIVE HEART FAILURE: ICD-10-CM

## 2023-05-27 DIAGNOSIS — N18.31 TYPE 2 DIABETES MELLITUS WITH STAGE 3A CHRONIC KIDNEY DISEASE, WITH LONG-TERM CURRENT USE OF INSULIN: ICD-10-CM

## 2023-05-27 DIAGNOSIS — E11.22 TYPE 2 DIABETES MELLITUS WITH STAGE 3A CHRONIC KIDNEY DISEASE, WITH LONG-TERM CURRENT USE OF INSULIN: ICD-10-CM

## 2023-05-27 DIAGNOSIS — Z79.4 TYPE 2 DIABETES MELLITUS WITH STAGE 3A CHRONIC KIDNEY DISEASE, WITH LONG-TERM CURRENT USE OF INSULIN: ICD-10-CM

## 2023-05-27 RX ORDER — AMIODARONE HYDROCHLORIDE 200 MG/1
100 TABLET ORAL DAILY
Qty: 45 TABLET | Refills: 0 | Status: SHIPPED | OUTPATIENT
Start: 2023-05-27

## 2023-06-13 ENCOUNTER — OFFICE VISIT (OUTPATIENT)
Dept: FAMILY MEDICINE CLINIC | Facility: CLINIC | Age: 78
End: 2023-06-13
Payer: MEDICARE

## 2023-06-13 VITALS
HEIGHT: 68 IN | SYSTOLIC BLOOD PRESSURE: 132 MMHG | TEMPERATURE: 97.3 F | HEART RATE: 73 BPM | BODY MASS INDEX: 26.89 KG/M2 | DIASTOLIC BLOOD PRESSURE: 68 MMHG | OXYGEN SATURATION: 96 % | WEIGHT: 177.4 LBS

## 2023-06-13 DIAGNOSIS — E11.22 TYPE 2 DIABETES MELLITUS WITH STAGE 3A CHRONIC KIDNEY DISEASE, WITH LONG-TERM CURRENT USE OF INSULIN: Primary | ICD-10-CM

## 2023-06-13 DIAGNOSIS — I50.22 CHRONIC SYSTOLIC CONGESTIVE HEART FAILURE: ICD-10-CM

## 2023-06-13 DIAGNOSIS — Z79.4 TYPE 2 DIABETES MELLITUS WITH STAGE 3A CHRONIC KIDNEY DISEASE, WITH LONG-TERM CURRENT USE OF INSULIN: Primary | ICD-10-CM

## 2023-06-13 DIAGNOSIS — I10 ESSENTIAL HYPERTENSION: ICD-10-CM

## 2023-06-13 DIAGNOSIS — K21.9 GASTROESOPHAGEAL REFLUX DISEASE WITHOUT ESOPHAGITIS: ICD-10-CM

## 2023-06-13 DIAGNOSIS — E78.2 HYPERLIPEMIA, MIXED: ICD-10-CM

## 2023-06-13 DIAGNOSIS — N18.31 TYPE 2 DIABETES MELLITUS WITH STAGE 3A CHRONIC KIDNEY DISEASE, WITH LONG-TERM CURRENT USE OF INSULIN: Primary | ICD-10-CM

## 2023-06-13 PROCEDURE — 1159F MED LIST DOCD IN RCRD: CPT | Performed by: NURSE PRACTITIONER

## 2023-06-13 PROCEDURE — 36415 COLL VENOUS BLD VENIPUNCTURE: CPT | Performed by: NURSE PRACTITIONER

## 2023-06-13 PROCEDURE — 84443 ASSAY THYROID STIM HORMONE: CPT | Performed by: NURSE PRACTITIONER

## 2023-06-13 PROCEDURE — 3075F SYST BP GE 130 - 139MM HG: CPT | Performed by: NURSE PRACTITIONER

## 2023-06-13 PROCEDURE — 83735 ASSAY OF MAGNESIUM: CPT | Performed by: NURSE PRACTITIONER

## 2023-06-13 PROCEDURE — 1160F RVW MEDS BY RX/DR IN RCRD: CPT | Performed by: NURSE PRACTITIONER

## 2023-06-13 PROCEDURE — 83036 HEMOGLOBIN GLYCOSYLATED A1C: CPT | Performed by: NURSE PRACTITIONER

## 2023-06-13 PROCEDURE — 99214 OFFICE O/P EST MOD 30 MIN: CPT | Performed by: NURSE PRACTITIONER

## 2023-06-13 PROCEDURE — 80053 COMPREHEN METABOLIC PANEL: CPT | Performed by: NURSE PRACTITIONER

## 2023-06-13 PROCEDURE — 3078F DIAST BP <80 MM HG: CPT | Performed by: NURSE PRACTITIONER

## 2023-06-13 PROCEDURE — 80061 LIPID PANEL: CPT | Performed by: NURSE PRACTITIONER

## 2023-06-13 RX ORDER — AMIODARONE HYDROCHLORIDE 200 MG/1
100 TABLET ORAL DAILY
Qty: 45 TABLET | Refills: 2 | Status: SHIPPED | OUTPATIENT
Start: 2023-06-13

## 2023-06-13 RX ORDER — ATORVASTATIN CALCIUM 80 MG/1
80 TABLET, FILM COATED ORAL DAILY
Qty: 90 TABLET | Refills: 1 | Status: SHIPPED | OUTPATIENT
Start: 2023-06-13

## 2023-06-13 RX ORDER — PANTOPRAZOLE SODIUM 40 MG/1
40 TABLET, DELAYED RELEASE ORAL DAILY
Qty: 90 TABLET | Refills: 3 | Status: SHIPPED | OUTPATIENT
Start: 2023-06-13

## 2023-06-13 RX ORDER — CARVEDILOL 12.5 MG/1
12.5 TABLET ORAL 2 TIMES DAILY
Qty: 180 TABLET | Refills: 1 | Status: SHIPPED | OUTPATIENT
Start: 2023-06-13

## 2023-06-13 RX ORDER — SPIRONOLACTONE 25 MG/1
25 TABLET ORAL DAILY
Qty: 90 TABLET | Refills: 1 | Status: SHIPPED | OUTPATIENT
Start: 2023-06-13

## 2023-06-13 NOTE — PROGRESS NOTES
Subjective   Shai Mata is a 78 y.o. male.     Diabetes  He presents for his follow-up diabetic visit. He has type 2 diabetes mellitus. The initial diagnosis of diabetes was made  years ago. His disease course has been stable. There are no hypoglycemic associated symptoms. Pertinent negatives for hypoglycemia include no confusion, dizziness, headaches, hunger, mood changes, nervousness/anxiousness, pallor, seizures, sleepiness, speech difficulty or tremors. There are no diabetic associated symptoms. Pertinent negatives for diabetes include no blurred vision, no chest pain, no fatigue, no foot paresthesias, no foot ulcerations, no polydipsia, no polyphagia, no polyuria, no visual change, no weakness and no weight loss. There are no hypoglycemic complications. Symptoms are stable. Diabetic complications include nephropathy. (Following nephrology with no recent changes  ) Risk factors for coronary artery disease include sedentary lifestyle, male sex, hypertension, family history and dyslipidemia. Current diabetic treatment includes insulin injections and oral agent (dual therapy). He is compliant with treatment all of the time. His weight is stable. He is following a generally healthy diet. Meal planning includes avoidance of concentrated sweets and carbohydrate counting. He participates in exercise intermittently. His home blood glucose trend is fluctuating minimally. His breakfast blood glucose range is generally 110-130 mg/dl. His overall blood glucose range is 140-180 mg/dl. An ACE inhibitor/angiotensin II receptor blocker is being taken. He does not see a podiatrist.  Hyperlipidemia  This is a chronic problem. The current episode started more than 1 year ago. Recent lipid tests were reviewed and are variable. Exacerbating diseases include diabetes and hypothyroidism. Pertinent negatives include no chest pain, focal sensory loss, focal weakness, leg pain or myalgias. Current antihyperlipidemic treatment includes  statins. The current treatment provides significant improvement of lipids. There are no compliance problems.  Risk factors for coronary artery disease include diabetes mellitus, family history, dyslipidemia, obesity and hypertension.   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 Following closely with cardiology.  Denies any recent changes.) problem. The current episode started more than 1 year ago. Progression since onset: Denies any recent concerns.  Feels he is doing well. The problem is controlled. Pertinent negatives include no anxiety, blurred vision, chest pain, headaches, malaise/fatigue, neck pain, orthopnea, peripheral edema or PND. Risk factors for coronary artery disease include dyslipidemia, family history and sedentary lifestyle. Current antihypertension treatment includes ACE inhibitors, beta blockers and diuretics. The current treatment provides significant improvement. There are no compliance problems.  Hypertensive end-organ damage includes CAD/MI. Identifiable causes of hypertension include a thyroid problem.   Thyroid Problem  Presents for follow-up (Known hypothyroidism) visit. Patient reports no anxiety, fatigue, tremors, visual change or weight loss. The symptoms have been stable (tolerating synthroid). His past medical history is significant for diabetes and hyperlipidemia.    The following portions of the patient's history were reviewed and updated as appropriate: allergies, current medications, past family history, past medical history, past social history, past surgical history and problem list.      Review of Systems   Constitutional: Negative.  Negative for activity change, fatigue, malaise/fatigue and weight loss.        States overall he is feeling fairly well     HENT: Negative.     Eyes:  Negative for blurred vision.   Respiratory: Negative.     Cardiovascular: Negative.  Negative for chest pain, orthopnea and PND.    Gastrointestinal: Negative.    Endocrine: Negative.  Negative for polydipsia, polyphagia and polyuria.   Musculoskeletal: Negative.  Negative for myalgias and neck pain.   Skin: Negative.  Negative for pallor.   Neurological: Negative.  Negative for dizziness, tremors, focal weakness, seizures, speech difficulty, weakness and headaches.   Psychiatric/Behavioral: Negative.  Negative for confusion. The patient is not nervous/anxious.    All other systems reviewed and are negative.    Procedures    Objective   Physical Exam   Constitutional: He is oriented to person, place, and time. He appears well-developed. No distress.   HENT:   Head: Normocephalic.   Eyes: Conjunctivae are normal. Right eye exhibits no discharge. Left eye exhibits no discharge.   Cardiovascular: Normal rate, regular rhythm and normal heart sounds.   No murmur heard.  Pulmonary/Chest: Effort normal and breath sounds normal. No respiratory distress. He has no wheezes.   Musculoskeletal:      Right lower leg: No edema.      Left lower leg: No edema.   Neurological: He is alert and oriented to person, place, and time.   Skin: Skin is warm and dry. He is not diaphoretic.   Psychiatric: His behavior is normal.   Nursing note and vitals reviewed.     During this visit the following were done:  Labs Reviewed [x]    Labs Ordered [x]    Radiology Reports Reviewed []    Radiology Ordered []    PCP Records Reviewed []    Referring Provider Records Reviewed []    ER Records Reviewed []    Hospital Records Reviewed []    History Obtained From Family []    Radiology Images Reviewed []    Other Reviewed [x]    Records Requested []      Diagnoses and all orders for this visit:    1. Type 2 diabetes mellitus with stage 3a chronic kidney disease, with long-term current use of insulin (Primary)  -     amiodarone (PACERONE) 200 MG tablet; Take 0.5 tablets by mouth Daily.  Dispense: 45 tablet; Refill: 2  -     glyburide micronized (GLYNASE) 1.5 MG tablet; Take 1  tablet by mouth 2 (Two) Times a Day Before Meals for 90 days.  Dispense: 180 tablet; Refill: 2  -     Comprehensive Metabolic Panel; Future  -     Hemoglobin A1c; Future  -     Lipid Panel; Future  -     Magnesium; Future  -     TSH; Future  -     Comprehensive Metabolic Panel  -     Hemoglobin A1c  -     Lipid Panel  -     Magnesium  -     TSH    2. Chronic systolic congestive heart failure  -     amiodarone (PACERONE) 200 MG tablet; Take 0.5 tablets by mouth Daily.  Dispense: 45 tablet; Refill: 2  -     carvedilol (COREG) 12.5 MG tablet; Take 1 tablet by mouth 2 (Two) Times a Day.  Dispense: 180 tablet; Refill: 1    3. Hyperlipemia, mixed  -     atorvastatin (LIPITOR) 80 MG tablet; Take 1 tablet by mouth Daily.  Dispense: 90 tablet; Refill: 1  -     Comprehensive Metabolic Panel; Future  -     Lipid Panel; Future  -     Comprehensive Metabolic Panel  -     Lipid Panel    4. Essential hypertension  -     carvedilol (COREG) 12.5 MG tablet; Take 1 tablet by mouth 2 (Two) Times a Day.  Dispense: 180 tablet; Refill: 1  -     spironolactone (ALDACTONE) 25 MG tablet; Take 1 tablet by mouth Daily.  Dispense: 90 tablet; Refill: 1  -     Comprehensive Metabolic Panel; Future  -     Lipid Panel; Future  -     Comprehensive Metabolic Panel  -     Lipid Panel    5. Gastroesophageal reflux disease without esophagitis  -     pantoprazole (Protonix) 40 MG EC tablet; Take 1 tablet by mouth Daily.  Dispense: 90 tablet; Refill: 3          Patient's Body mass index is 26.97 kg/m². indicating that he is overweight (BMI 25-29.9). Patient's (Body mass index is 26.97 kg/m².) indicates that they are overweight with health conditions that include hypertension, coronary heart disease, diabetes mellitus and dyslipidemias . Weight is unchanged. BMI is is above average; BMI management plan is completed. We discussed portion control and increasing exercise. .

## 2023-06-14 ENCOUNTER — TELEPHONE (OUTPATIENT)
Dept: FAMILY MEDICINE CLINIC | Facility: CLINIC | Age: 78
End: 2023-06-14
Payer: MEDICARE

## 2023-06-14 LAB
ALBUMIN SERPL-MCNC: 4.2 G/DL (ref 3.5–5.2)
ALBUMIN/GLOB SERPL: 1.8 G/DL
ALP SERPL-CCNC: 76 U/L (ref 39–117)
ALT SERPL W P-5'-P-CCNC: 18 U/L (ref 1–41)
ANION GAP SERPL CALCULATED.3IONS-SCNC: 9.5 MMOL/L (ref 5–15)
AST SERPL-CCNC: 13 U/L (ref 1–40)
BILIRUB SERPL-MCNC: 0.6 MG/DL (ref 0–1.2)
BUN SERPL-MCNC: 13 MG/DL (ref 8–23)
BUN/CREAT SERPL: 10.7 (ref 7–25)
CALCIUM SPEC-SCNC: 9.4 MG/DL (ref 8.6–10.5)
CHLORIDE SERPL-SCNC: 104 MMOL/L (ref 98–107)
CHOLEST SERPL-MCNC: 118 MG/DL (ref 0–200)
CO2 SERPL-SCNC: 25.5 MMOL/L (ref 22–29)
CREAT SERPL-MCNC: 1.22 MG/DL (ref 0.76–1.27)
EGFRCR SERPLBLD CKD-EPI 2021: 60.3 ML/MIN/1.73
GLOBULIN UR ELPH-MCNC: 2.4 GM/DL
GLUCOSE SERPL-MCNC: 256 MG/DL (ref 65–99)
HBA1C MFR BLD: 8.7 % (ref 4.8–5.6)
HDLC SERPL-MCNC: 41 MG/DL (ref 40–60)
LDLC SERPL CALC-MCNC: 66 MG/DL (ref 0–100)
LDLC/HDLC SERPL: 1.64 {RATIO}
MAGNESIUM SERPL-MCNC: 1.9 MG/DL (ref 1.6–2.4)
POTASSIUM SERPL-SCNC: 4.9 MMOL/L (ref 3.5–5.2)
PROT SERPL-MCNC: 6.6 G/DL (ref 6–8.5)
SODIUM SERPL-SCNC: 139 MMOL/L (ref 136–145)
TRIGL SERPL-MCNC: 48 MG/DL (ref 0–150)
TSH SERPL DL<=0.05 MIU/L-ACNC: 0.55 UIU/ML (ref 0.27–4.2)
VLDLC SERPL-MCNC: 11 MG/DL (ref 5–40)

## 2023-06-14 NOTE — TELEPHONE ENCOUNTER
----- Message from ANEUDY Carrizales sent at 6/14/2023 12:45 PM EDT -----  Labs are stable        Left message for pt.

## 2023-09-03 PROCEDURE — 93295 DEV INTERROG REMOTE 1/2/MLT: CPT | Performed by: STUDENT IN AN ORGANIZED HEALTH CARE EDUCATION/TRAINING PROGRAM

## 2023-09-03 PROCEDURE — 93296 REM INTERROG EVL PM/IDS: CPT | Performed by: STUDENT IN AN ORGANIZED HEALTH CARE EDUCATION/TRAINING PROGRAM

## 2023-09-19 ENCOUNTER — OFFICE VISIT (OUTPATIENT)
Dept: FAMILY MEDICINE CLINIC | Facility: CLINIC | Age: 78
End: 2023-09-19
Payer: MEDICARE

## 2023-09-19 VITALS
TEMPERATURE: 97.7 F | HEIGHT: 69 IN | HEART RATE: 74 BPM | SYSTOLIC BLOOD PRESSURE: 132 MMHG | OXYGEN SATURATION: 96 % | WEIGHT: 179.4 LBS | DIASTOLIC BLOOD PRESSURE: 60 MMHG | BODY MASS INDEX: 26.57 KG/M2

## 2023-09-19 DIAGNOSIS — I10 ESSENTIAL HYPERTENSION: ICD-10-CM

## 2023-09-19 DIAGNOSIS — N18.31 TYPE 2 DIABETES MELLITUS WITH STAGE 3A CHRONIC KIDNEY DISEASE, WITH LONG-TERM CURRENT USE OF INSULIN: ICD-10-CM

## 2023-09-19 DIAGNOSIS — Z79.4 TYPE 2 DIABETES MELLITUS WITH STAGE 3A CHRONIC KIDNEY DISEASE, WITH LONG-TERM CURRENT USE OF INSULIN: ICD-10-CM

## 2023-09-19 DIAGNOSIS — E03.8 OTHER SPECIFIED HYPOTHYROIDISM: ICD-10-CM

## 2023-09-19 DIAGNOSIS — E78.2 HYPERLIPEMIA, MIXED: ICD-10-CM

## 2023-09-19 DIAGNOSIS — E11.22 TYPE 2 DIABETES MELLITUS WITH STAGE 3A CHRONIC KIDNEY DISEASE, WITH LONG-TERM CURRENT USE OF INSULIN: ICD-10-CM

## 2023-09-19 DIAGNOSIS — Z00.00 MEDICARE ANNUAL WELLNESS VISIT, SUBSEQUENT: Primary | ICD-10-CM

## 2023-09-19 LAB
ALBUMIN UR-MCNC: 2.2 MG/DL
CREAT UR-MCNC: 43.8 MG/DL
HBA1C MFR BLD: 12.5 % (ref 4.8–5.6)
MICROALBUMIN/CREAT UR: 50.2 MG/G

## 2023-09-19 PROCEDURE — 36415 COLL VENOUS BLD VENIPUNCTURE: CPT | Performed by: NURSE PRACTITIONER

## 2023-09-19 PROCEDURE — 84443 ASSAY THYROID STIM HORMONE: CPT | Performed by: NURSE PRACTITIONER

## 2023-09-19 PROCEDURE — 82570 ASSAY OF URINE CREATININE: CPT | Performed by: NURSE PRACTITIONER

## 2023-09-19 PROCEDURE — 80061 LIPID PANEL: CPT | Performed by: NURSE PRACTITIONER

## 2023-09-19 PROCEDURE — 80053 COMPREHEN METABOLIC PANEL: CPT | Performed by: NURSE PRACTITIONER

## 2023-09-19 PROCEDURE — 83036 HEMOGLOBIN GLYCOSYLATED A1C: CPT | Performed by: NURSE PRACTITIONER

## 2023-09-19 PROCEDURE — 82043 UR ALBUMIN QUANTITATIVE: CPT | Performed by: NURSE PRACTITIONER

## 2023-09-19 RX ORDER — LISINOPRIL 10 MG/1
10 TABLET ORAL 2 TIMES DAILY
Qty: 180 TABLET | Refills: 1 | Status: SHIPPED | OUTPATIENT
Start: 2023-09-19

## 2023-09-19 RX ORDER — LEVOTHYROXINE SODIUM 0.1 MG/1
100 TABLET ORAL DAILY
Qty: 90 TABLET | Refills: 1 | Status: SHIPPED | OUTPATIENT
Start: 2023-09-19

## 2023-09-19 NOTE — PROGRESS NOTES
The ABCs of the Annual Wellness Visit  Subsequent Medicare Wellness Visit    Subjective      Shai Mata is a 78 y.o. male who presents for a Subsequent Medicare Wellness Visit.    The following portions of the patient's history were reviewed and   updated as appropriate: allergies, current medications, past family history, past medical history, past social history, past surgical history, and problem list.    Compared to one year ago, the patient feels his physical   health is the same.    Compared to one year ago, the patient feels his mental   health is the same.    Recent Hospitalizations:  He was not admitted to the hospital during the last year.       Current Medical Providers:  Patient Care Team:  Ekaterina Cox APRN as PCP - General  Ekaterina Cox APRN as PCP - Family Medicine  Marcin Colon MD as Consulting Physician (Cardiology)    Outpatient Medications Prior to Visit   Medication Sig Dispense Refill    amiodarone (PACERONE) 200 MG tablet Take 0.5 tablets by mouth Daily. 45 tablet 2    aspirin 81 MG EC tablet Take 1 tablet by mouth 2 (Two) Times a Day.      atorvastatin (LIPITOR) 80 MG tablet Take 1 tablet by mouth Daily. 90 tablet 1    carvedilol (COREG) 12.5 MG tablet Take 1 tablet by mouth 2 (Two) Times a Day. 180 tablet 1    insulin NPH (humuLIN N,novoLIN N) 100 UNIT/ML injection Inject 22 Units under the skin into the appropriate area as directed 2 (Two) Times a Day Before Meals.      Insulin Pen Needle (Pen Needles) 32G X 4 MM misc 1 each 3 (Three) Times a Day. 100 each 2    pantoprazole (Protonix) 40 MG EC tablet Take 1 tablet by mouth Daily. 90 tablet 3    spironolactone (ALDACTONE) 25 MG tablet Take 1 tablet by mouth Daily. 90 tablet 1    glyburide micronized (GLYNASE) 1.5 MG tablet Take 1 tablet by mouth 2 (Two) Times a Day Before Meals for 90 days. 180 tablet 2    levothyroxine (Synthroid) 100 MCG tablet Take 1 tablet by mouth Daily. 90 tablet 1    lisinopril (PRINIVIL,ZESTRIL) 10 MG tablet Take 1  "tablet by mouth 2 (Two) Times a Day. 180 tablet 1     No facility-administered medications prior to visit.       No opioid medication identified on active medication list. I have reviewed chart for other potential  high risk medication/s and harmful drug interactions in the elderly.        Aspirin is on active medication list. Aspirin use is indicated based on review of current medical condition/s. Pros and cons of this therapy have been discussed today. Benefits of this medication outweigh potential harm.  Patient has been encouraged to continue taking this medication.  .      Patient Active Problem List   Diagnosis    ASCVD (arteriosclerotic cardiovascular disease)    S/P CABG x 5    HTN (hypertension)    PVC's (premature ventricular contractions)    Type 2 diabetes mellitus with hyperglycemia, with long-term current use of insulin    Hyperlipemia, mixed    Ischemic cardiomyopathy    Pain of upper abdomen    Cholecystitis    Chronic systolic congestive heart failure    Ventricular tachycardia (paroxysmal)    Long term current use of antiarrhythmic medical therapy    Other specified hypothyroidism    Esophageal dysphagia     Advance Care Planning   Advance Care Planning     Advance Directive is not on file.  ACP discussion was declined by the patient. Patient does not have an advance directive, declines further assistance.     Objective    Vitals:    09/19/23 0820   BP: 132/60   BP Location: Right arm   Patient Position: Sitting   Pulse: 74   Temp: 97.7 °F (36.5 °C)   TempSrc: Temporal   SpO2: 96%   Weight: 81.4 kg (179 lb 6.4 oz)   Height: 175.3 cm (69\")   PainSc: 0-No pain     Estimated body mass index is 26.49 kg/m² as calculated from the following:    Height as of this encounter: 175.3 cm (69\").    Weight as of this encounter: 81.4 kg (179 lb 6.4 oz).           Does the patient have evidence of cognitive impairment?   No            HEALTH RISK ASSESSMENT    Smoking Status:  Social History     Tobacco Use "   Smoking Status Former    Packs/day: 2.00    Years: 20.00    Pack years: 40.00    Types: Cigarettes    Quit date:     Years since quittin.7   Smokeless Tobacco Former    Types: Chew    Quit date:      Alcohol Consumption:  Social History     Substance and Sexual Activity   Alcohol Use No     Fall Risk Screen:    MELANIE Fall Risk Assessment was completed, and patient is at LOW risk for falls.Assessment completed on:2023    Depression Screenin/19/2023     8:19 AM   PHQ-2/PHQ-9 Depression Screening   Little Interest or Pleasure in Doing Things 0-->not at all   Feeling Down, Depressed or Hopeless 0-->not at all   PHQ-9: Brief Depression Severity Measure Score 0       Health Habits and Functional and Cognitive Screenin/19/2023     8:15 AM   Functional & Cognitive Status   Do you have difficulty preparing food and eating? No   Do you have difficulty bathing yourself, getting dressed or grooming yourself? No   Do you have difficulty using the toilet? No   Do you have difficulty moving around from place to place? No   Do you have trouble with steps or getting out of a bed or a chair? No   Current Diet Well Balanced Diet   Dental Exam Not up to date        Dental Exam Comment pt has false teeth    Eye Exam Up to date   Exercise (times per week) 7 times per week   Current Exercises Include Walking;Gardening;Yard Work   Do you need help using the phone?  No   Are you deaf or do you have serious difficulty hearing?  No   Do you need help to go to places out of walking distance? No   Do you need help shopping? No   Do you need help preparing meals?  No   Do you need help with housework?  No   Do you need help with laundry? No   Do you need help taking your medications? No   Do you need help managing money? No   Do you ever drive or ride in a car without wearing a seat belt? No       Age-appropriate Screening Schedule:  Refer to the list below for future screening recommendations based on  patient's age, sex and/or medical conditions. Orders for these recommended tests are listed in the plan section. The patient has been provided with a written plan.    Health Maintenance   Topic Date Due    URINE MICROALBUMIN  03/29/2023    ZOSTER VACCINE (2 of 2) 09/19/2023 (Originally 7/18/2017)    COVID-19 Vaccine (3 - Pfizer series) 09/21/2023 (Originally 6/4/2021)    Pneumococcal Vaccine 65+ (1 - PCV) 09/19/2024 (Originally 4/6/1951)    INFLUENZA VACCINE  10/01/2023    HEMOGLOBIN A1C  12/13/2023    LIPID PANEL  06/13/2024    BMI FOLLOWUP  06/13/2024    DIABETIC EYE EXAM  08/01/2024    ANNUAL WELLNESS VISIT  09/19/2024    COLORECTAL CANCER SCREENING  11/15/2026    TDAP/TD VACCINES (2 - Td or Tdap) 05/23/2027    HEPATITIS C SCREENING  Completed                  CMS Preventative Services Quick Reference  Risk Factors Identified During Encounter:    Fall Risk-High or Moderate: Discussed Fall Prevention in the home  Immunizations Discussed/Encouraged:  declined  Dental Screening Recommended  Vision Screening Recommended    The above risks/problems have been discussed with the patient.  Pertinent information has been shared with the patient in the After Visit Summary.    Diagnoses and all orders for this visit:    1. Medicare annual wellness visit, subsequent (Primary)  -     Microalbumin / Creatinine Urine Ratio - Urine, Clean Catch; Future  -     Comprehensive Metabolic Panel; Future  -     Hemoglobin A1c; Future  -     Lipid Panel; Future  -     TSH; Future    2. Type 2 diabetes mellitus with stage 3a chronic kidney disease, with long-term current use of insulin  -     glyburide micronized (GLYNASE) 1.5 MG tablet; Take 1 tablet by mouth 2 (Two) Times a Day Before Meals for 90 days.  Dispense: 180 tablet; Refill: 2  -     Microalbumin / Creatinine Urine Ratio - Urine, Clean Catch; Future  -     Comprehensive Metabolic Panel; Future  -     Hemoglobin A1c; Future  -     Lipid Panel; Future  -     TSH; Future    3.  Essential hypertension  -     lisinopril (PRINIVIL,ZESTRIL) 10 MG tablet; Take 1 tablet by mouth 2 (Two) Times a Day.  Dispense: 180 tablet; Refill: 1    4. Hyperlipemia, mixed    5. Other specified hypothyroidism  -     levothyroxine (Synthroid) 100 MCG tablet; Take 1 tablet by mouth Daily.  Dispense: 90 tablet; Refill: 1        Follow Up:   Next Medicare Wellness visit to be scheduled in 1 year.      An After Visit Summary and PPPS were made available to the patient.

## 2023-09-20 ENCOUNTER — TELEPHONE (OUTPATIENT)
Dept: FAMILY MEDICINE CLINIC | Facility: CLINIC | Age: 78
End: 2023-09-20
Payer: MEDICARE

## 2023-09-20 LAB
ALBUMIN SERPL-MCNC: 3.7 G/DL (ref 3.5–5.2)
ALBUMIN/GLOB SERPL: 1.5 G/DL
ALP SERPL-CCNC: 74 U/L (ref 39–117)
ALT SERPL W P-5'-P-CCNC: 19 U/L (ref 1–41)
ANION GAP SERPL CALCULATED.3IONS-SCNC: 8.4 MMOL/L (ref 5–15)
AST SERPL-CCNC: 19 U/L (ref 1–40)
BILIRUB SERPL-MCNC: 0.5 MG/DL (ref 0–1.2)
BUN SERPL-MCNC: 13 MG/DL (ref 8–23)
BUN/CREAT SERPL: 13.3 (ref 7–25)
CALCIUM SPEC-SCNC: 9.1 MG/DL (ref 8.6–10.5)
CHLORIDE SERPL-SCNC: 105 MMOL/L (ref 98–107)
CHOLEST SERPL-MCNC: 114 MG/DL (ref 0–200)
CO2 SERPL-SCNC: 25.6 MMOL/L (ref 22–29)
CREAT SERPL-MCNC: 0.98 MG/DL (ref 0.76–1.27)
EGFRCR SERPLBLD CKD-EPI 2021: 78.4 ML/MIN/1.73
GLOBULIN UR ELPH-MCNC: 2.5 GM/DL
GLUCOSE SERPL-MCNC: 255 MG/DL (ref 65–99)
HDLC SERPL-MCNC: 41 MG/DL (ref 40–60)
LDLC SERPL CALC-MCNC: 61 MG/DL (ref 0–100)
LDLC/HDLC SERPL: 1.53 {RATIO}
POTASSIUM SERPL-SCNC: 4.6 MMOL/L (ref 3.5–5.2)
PROT SERPL-MCNC: 6.2 G/DL (ref 6–8.5)
SODIUM SERPL-SCNC: 139 MMOL/L (ref 136–145)
TRIGL SERPL-MCNC: 51 MG/DL (ref 0–150)
TSH SERPL DL<=0.05 MIU/L-ACNC: 0.27 UIU/ML (ref 0.27–4.2)
VLDLC SERPL-MCNC: 12 MG/DL (ref 5–40)

## 2023-09-20 NOTE — TELEPHONE ENCOUNTER
----- Message from ANEUDY Carrizales sent at 9/20/2023  1:54 PM EDT -----  Let Patient know lab is ok except that sugar has increased significantly with A1C increasing back to 12.5.  Needs to start monitoring blood sugar so that he can be aware of his daily numbers    Left message for patient to return call.

## 2023-09-20 NOTE — TELEPHONE ENCOUNTER
----- Message from ANEUDY Carrizales sent at 9/20/2023  1:54 PM EDT -----  Let Patient know lab is ok except that sugar has increased significantly with A1C increasing back to 12.5.  Needs to start monitoring blood sugar so that he can be aware of his daily numbers      Patient notified and verbalized understanding.

## 2023-09-26 ENCOUNTER — OFFICE VISIT (OUTPATIENT)
Dept: CARDIOLOGY | Facility: CLINIC | Age: 78
End: 2023-09-26
Payer: MEDICARE

## 2023-09-26 ENCOUNTER — TELEPHONE (OUTPATIENT)
Dept: CARDIOLOGY | Facility: CLINIC | Age: 78
End: 2023-09-26
Payer: MEDICARE

## 2023-09-26 VITALS
HEART RATE: 73 BPM | OXYGEN SATURATION: 99 % | DIASTOLIC BLOOD PRESSURE: 78 MMHG | SYSTOLIC BLOOD PRESSURE: 158 MMHG | BODY MASS INDEX: 26.54 KG/M2 | HEIGHT: 69 IN | WEIGHT: 179.2 LBS

## 2023-09-26 DIAGNOSIS — I50.22 CHRONIC SYSTOLIC CONGESTIVE HEART FAILURE: Primary | ICD-10-CM

## 2023-09-26 DIAGNOSIS — Z95.1 S/P CABG X 5: ICD-10-CM

## 2023-09-26 PROCEDURE — 3077F SYST BP >= 140 MM HG: CPT | Performed by: PHYSICIAN ASSISTANT

## 2023-09-26 PROCEDURE — 1160F RVW MEDS BY RX/DR IN RCRD: CPT | Performed by: PHYSICIAN ASSISTANT

## 2023-09-26 PROCEDURE — 1159F MED LIST DOCD IN RCRD: CPT | Performed by: PHYSICIAN ASSISTANT

## 2023-09-26 PROCEDURE — 99213 OFFICE O/P EST LOW 20 MIN: CPT | Performed by: PHYSICIAN ASSISTANT

## 2023-09-26 PROCEDURE — 3078F DIAST BP <80 MM HG: CPT | Performed by: PHYSICIAN ASSISTANT

## 2023-09-26 NOTE — Clinical Note
Can you call him at some point and let him know that I want him to get a chest x ray just for routine monitoring since he is on amiodarone.  Post Acute Skilled Nursing Home Subsequent Visit Note    Date of Service: 12/17/2019  Location seen at: [facility] SKILLED NURSING  Subacute / Skilled Need: Rehabilitation    PCP: Aman Muir MD   Patient Care Team:  Aman Muir MD as PCP - General (Geriatric Medicine)  Gege Dash MD as Post Acute Facility Provider: Physician (Internal Medicine)  Shira Manning CNP as Post Acute Facility Provider: APC (Nurse Practitioner - Family)  Seen by Shirley Barnes CNP today    Ilir Bowers is a 87 year old male presenting to Post Acute Skilled Nursing for: pt/ot.   History of Present Illness: Who presented to [facility] with a past medical history of ILD secondary to chronic hypersensitivity pneumonitis chronic, gout,  gerd, kidney disease, hypertension, pulmonary hypertension, sinus node dysfunction status post dual-chamber pacemaker, obstructive sleep and not on CPAP, RV dysfunction leg cardio pulmonology presented to the emergency room with a 4-week history progressive shortness of breath and SINGLETARY.  Patient's had the symptom for 4 weeks now.  3 weeks ago patient was admitted to [facility] for the same as well as a stable event.  Patient had LHC performed for S OB and Singletary which showed within normal coronaries without stents placed but he has not felt back to baseline.  He is on home O2 2 L nasal cannula and feels he cannot move around and warm his ADLs because of the SINGLETARY limits him.  Morning of admission patient felt like he could not catch his breath and coughing more than usual with a nonproductive cough.  Patient denied any sick contacts travel pets birds contacts recently.  Also the holiday and dietary indiscretion are playing a role.  In the emergency room patient's O2 sat was 82% on room air he was placed on a nonrebreather for about 10 minutes and was weaned to 3 L.  Patient was given prednisone 60 mg daily ceftriaxone/azithromycin for CAP, Tessalon and DuoNeb  treatments and was admitted for shortness of breath.  Former smoker 43 pack years quit 23 years ago.  Secondhand smoke exposure from partner.  FH lung disease in relatives to 1 male workers.  Patient was treated for pneumonia hospital-acquired.  Patient needs to follow up with cardiology for pacemaker Patient was changed from iV abx to po cefdinir 300 mg po bid till 12/7 Patient was also given iv lasix and 1l of ivf. syncope event felt to be 2nd to heart failure.   check.  Social patient resides in a senior living receives homemaker 7 days a week 4 hours/day.  He has a rolling walker in a wheelchair.  He has mail-order pharmacy.  Home O2 is received by [employer].  Patient has a daughter named Rupa Bowers her #[id number] her cell is [phone], son Mak Bowers cell [phone], Dr. Lucia Randhawa cell [phone].  Ehsann is here for PT/OT.  Patient was given 1 dose of vancomycin IVP back.  Patient here for PT/OT. Patient wishes to be a DNR.  12/15 PMHx of ILD secondary to chronic hypersensitivity pneumonitis chronic, gout,  gerd, kidney disease, hypertension, pulmonary hypertension, sinus node dysfunction status post dual-chamber pacemaker, obstructive sleep and not on CPAP, RV dysfunction leg cardio pulmonology,  presented to [facility] with a 4-week history progressive shortness of breath and ESCOTO.    He was recently at [facility] for the same. Patient had LHC performed for SOB and ESCOTO which showed within normal coronaries without stents placed but he has not felt back to baseline.  He is on home O2 2 L nasal cannula and feels he cannot move around and warm his ADLs because of the ESCOTO limits him.    In the emergency room patient's O2 sat was 82% on room air he was placed on a nonrebreather for about 10 minutes and was weaned to 3 L.  Patient was given prednisone 60 mg ceftriaxone, azithromycin for CAP, Tessalon and DuoNeb treatments and was admitted for shortness of breath. He is a former smoker 43 pack years  quit 23 years ago, has secondhand smoke exposure from partner.   He was treated for HCAP and sent here for rehab.  Patient today reports that he is less short of breath denies any cough pain or other discomfort.   Above copied from prior providers note      12/17  Patient c/o not sleeping till 5 am and than woke up at 8 am. He doesn't feel well but could not be specific.  Patient denies any sob, currently. No cp, ha, numbness, tingling, dizziness urinary or bowel issues. Says appetite is ok..    HISTORY  Past Medical, Social, Surgical, and Family History was reviewed with the patient and was updated, as needed.    ADVANCE DIRECTIVES:  Power of  Status:  Status Unknown  Code Status:  Full Code  Goals of Care: rehabilitate and prolong survival    ALLERGIES:  Allergies as of 12/17/2019 - never reviewed   Allergen Reaction Noted   • Pollen Other (See Comments) 12/10/2019       CURRENT MEDICATIONS:   Medications reviewed / reconciled:    BASELINE FUNCTIONAL STATUS:  Independent    CURRENT FUNCTIONAL STATUS:  Weight bearing as tolerated (WBAT), Walker and Wheelchair    REVIEW OF SYSTEMS:  Review of Systems   Constitutional: Positive for fatigue.   All other systems reviewed and are negative.      VITALS:  Vitals:    12/17/19 1121   BP: 110/55   Pulse: 78   Resp: 18   Temp: 98.4 °F (36.9 °C)   SpO2: 96%   PainSc:  0       PHYSICAL ASSESSMENT:  Physical Exam  Vitals signs reviewed.   Constitutional:       Appearance: Normal appearance.   HENT:      Head: Normocephalic and atraumatic.      Right Ear: External ear normal.      Left Ear: External ear normal.      Nose: Nose normal.      Mouth/Throat:      Mouth: Mucous membranes are moist.      Pharynx: Oropharynx is clear.   Eyes:      Conjunctiva/sclera: Conjunctivae normal.   Neck:      Musculoskeletal: Normal range of motion and neck supple.   Cardiovascular:      Rate and Rhythm: Normal rate and regular rhythm.      Pulses: Normal pulses.      Heart sounds: Normal  heart sounds.   Pulmonary:      Effort: Pulmonary effort is normal.      Breath sounds: Normal breath sounds.      Comments: Dec antonia  Abdominal:      General: Bowel sounds are normal.      Palpations: Abdomen is soft.   Musculoskeletal: Normal range of motion.   Skin:     General: Skin is warm and dry.   Neurological:      General: No focal deficit present.      Mental Status: He is alert and oriented to person, place, and time. Mental status is at baseline.   Psychiatric:         Mood and Affect: Mood normal.         Behavior: Behavior normal.         Thought Content: Thought content normal.         Judgment: Judgment normal.         LABS:  na    ASSESSMENT AND PLAN  Assessment   SHANNA (obstructive sleep apnea)  Continue with current rx  No new issues    ESCOTO (dyspnea on exertion)  Improving  Follow up with pulm and pcp    ILD (interstitial lung disease) (CMS/[facility])  Follow up with pulm  Follow up with pcp  Continue with medication and monitor  No new issues    Hypersensitivity pneumonia (CMS/[facility])  Complete abx  Monitor  Follow up with pulm and pcp  No new issues    Chronic obstructive pulmonary disease (COPD) (CMS/[facility])  Continue with 02   Follow up with pulm  No new issues  Continue with medication and monitor        Hypertension  KIERRA diet  Continue with medication and monitor  NO new issues    Pulmonary HTN (CMS/[facility])  Continue with medication and monitor    Pacemaker  Follow up with cardiology    Syncope  No new issues    Chronic kidney disease (CKD)  Monitor    BPH (benign prostatic hyperplasia)  Follow up with gu  Continue with medication and monitor  No new issues    Chronic gout of multiple sites  Pt/ot  Prn medication    Hyperlipemia  Low chol diet  Continue with medication and monitor  No new issues    Debility  Continue with pt/ot        DISCHARGE PLANNING: home    Discussed with: RN / Nursing and Patient    Barriers to discharge: therapy needs and equipment needed (DME, nebulizer, home O2)    Total time spent  is more than 28 minutes, with more than 50% of the time spent in coordination of care, counseling, review of records and discussion of plan of care with the patient /staff /family.

## 2023-09-26 NOTE — TELEPHONE ENCOUNTER
Hub can read      ----- Message from Erik Livingston PA-C sent at 9/26/2023  9:34 AM EDT -----  Can you call him at some point and let him know that I want him to get a chest x ray just for routine monitoring since he is on amiodarone.     Called-left  for call back

## 2023-09-26 NOTE — PROGRESS NOTES
Ekaterina Cox, ANEUDY  Shai Mata  1945 09/26/2023    Patient Active Problem List   Diagnosis    ASCVD (arteriosclerotic cardiovascular disease)    S/P CABG x 5    HTN (hypertension)    PVC's (premature ventricular contractions)    Type 2 diabetes mellitus with hyperglycemia, with long-term current use of insulin    Hyperlipemia, mixed    Ischemic cardiomyopathy    Pain of upper abdomen    Cholecystitis    Chronic systolic congestive heart failure    Ventricular tachycardia (paroxysmal)    Long term current use of antiarrhythmic medical therapy    Other specified hypothyroidism    Esophageal dysphagia       Dear Ekaterina Cox, ANEUDY:    Subjective     History of Present Illness:    Chief Complaint   Patient presents with    Follow-up     routine       Shai Mata is a pleasant 78 y.o. male with a past medical history significant for ischemic cardiomyopathy with most recent EF 45%, chronic systolic congestive heart failure, history of ventricular tachycardia status post ICD implantation on amiodarone, ASCVD status post four-vessel CABG, diabetes mellitus type 2, hypertension, and dyslipidemia.  He presents today for routine cardiology follow-up.      Shai report he has been doing well since he was last seen and has no complaints with open questions.  Since he was last seen he did follow-up with electrophysiology where checkup was unremarkable.  He is continuing amiodarone denies any adverse reactions thus far.  He also denies any chest pains, shortness of breath, palpitations, dizziness, or syncope.    No Known Allergies:      Current Outpatient Medications:     amiodarone (PACERONE) 200 MG tablet, Take 0.5 tablets by mouth Daily., Disp: 45 tablet, Rfl: 2    aspirin 81 MG EC tablet, Take 1 tablet by mouth 2 (Two) Times a Day., Disp: , Rfl:     atorvastatin (LIPITOR) 80 MG tablet, Take 1 tablet by mouth Daily., Disp: 90 tablet, Rfl: 1    carvedilol (COREG) 12.5 MG tablet, Take 1 tablet by mouth 2 (Two) Times a Day.,  "Disp: 180 tablet, Rfl: 1    glyburide micronized (GLYNASE) 1.5 MG tablet, Take 1 tablet by mouth 2 (Two) Times a Day Before Meals for 90 days., Disp: 180 tablet, Rfl: 2    insulin NPH (humuLIN N,novoLIN N) 100 UNIT/ML injection, Inject 22 Units under the skin into the appropriate area as directed 2 (Two) Times a Day Before Meals., Disp: , Rfl:     Insulin Pen Needle (Pen Needles) 32G X 4 MM misc, 1 each 3 (Three) Times a Day., Disp: 100 each, Rfl: 2    levothyroxine (Synthroid) 100 MCG tablet, Take 1 tablet by mouth Daily., Disp: 90 tablet, Rfl: 1    lisinopril (PRINIVIL,ZESTRIL) 10 MG tablet, Take 1 tablet by mouth 2 (Two) Times a Day., Disp: 180 tablet, Rfl: 1    pantoprazole (Protonix) 40 MG EC tablet, Take 1 tablet by mouth Daily., Disp: 90 tablet, Rfl: 3    spironolactone (ALDACTONE) 25 MG tablet, Take 1 tablet by mouth Daily., Disp: 90 tablet, Rfl: 1    The following portions of the patient's history were reviewed and updated as appropriate: allergies, current medications, past family history, past medical history, past social history, past surgical history and problem list.    Social History     Tobacco Use    Smoking status: Former     Packs/day: 2.00     Years: 20.00     Pack years: 40.00     Types: Cigarettes     Quit date:      Years since quittin.7    Smokeless tobacco: Former     Types: Chew     Quit date:    Vaping Use    Vaping Use: Never used   Substance Use Topics    Alcohol use: No    Drug use: No         Objective   Vitals:    23 0828   BP: 158/78   Pulse: 73   SpO2: 99%   Weight: 81.3 kg (179 lb 3.2 oz)   Height: 175.3 cm (69\")     Body mass index is 26.46 kg/m².    Constitutional:       General: Not in acute distress.     Appearance: Healthy appearance. Well-developed and not in distress. Not diaphoretic.   Eyes:      Conjunctiva/sclera: Conjunctivae normal.      Pupils: Pupils are equal, round, and reactive to light.   HENT:      Head: Normocephalic and atraumatic.   Neck:    "   Vascular: No carotid bruit or JVD.   Pulmonary:      Effort: Pulmonary effort is normal. No respiratory distress.      Breath sounds: Normal breath sounds.   Cardiovascular:      Normal rate. Regular rhythm.   Edema:     Peripheral edema absent.   Skin:     General: Skin is cool.   Neurological:      Mental Status: Alert, oriented to person, place, and time and oriented to person, place and time.       Lab Results   Component Value Date     09/19/2023    K 4.6 09/19/2023     09/19/2023    CO2 25.6 09/19/2023    BUN 13 09/19/2023    CREATININE 0.98 09/19/2023    GLUCOSE 255 (H) 09/19/2023    CALCIUM 9.1 09/19/2023    AST 19 09/19/2023    ALT 19 09/19/2023    ALKPHOS 74 09/19/2023    LABIL2 1.6 08/25/2016     Lab Results   Component Value Date    CKTOTAL 82 04/26/2018     Lab Results   Component Value Date    WBC 8.73 11/29/2022    HGB 11.5 (L) 11/29/2022    HCT 36.2 (L) 11/29/2022     11/29/2022     Lab Results   Component Value Date    INR 1.11 (H) 04/26/2018    INR 1.06 11/19/2017    INR 1.00 01/11/2016     Lab Results   Component Value Date    MG 1.9 06/13/2023     Lab Results   Component Value Date    TSH 0.272 09/19/2023    PSA 0.833 05/13/2020    CHLPL 133 11/28/2017    TRIG 51 09/19/2023    HDL 41 09/19/2023    LDL 61 09/19/2023      Lab Results   Component Value Date    BNP 26.0 04/26/2018       During this visit the following were done:  Labs Reviewed []    Labs Ordered []    Radiology Reports Reviewed []    Radiology Ordered []    PCP Records Reviewed []    Referring Provider Records Reviewed []    ER Records Reviewed []    Hospital Records Reviewed []    History Obtained From Family []    Radiology Images Reviewed []    Other Reviewed []    Records Requested []       Procedures    Assessment & Plan    Diagnosis Plan   1. Chronic systolic congestive heart failure  XR Chest 2 View      2. S/P CABG x 5                 Recommendations:  PVCs/V. Tach  No recurrence on ICD interrogation  recently.  We will order chest x-ray for routine monitoring with amiodarone use.  Recent TSH and CMP was unremarkable.  Encouraged him to get an eye exam once yearly.  Cardiomyopathy  Euvolemic today continue current regimen.  Diabetes mellitus  A1c reading recently has risen dramatically now at 12.  I think he would be a great candidate for Jardiance however he is on insulin so we will defer to PCP.  Patient is aware of his elevated blood glucose levels and is trying to improve lifestyle.    Return in about 6 months (around 3/26/2024).    As always, I appreciate very much the opportunity to participate in the cardiovascular care of your patients.      With Best Regards,    Erik Livingston PA-C

## 2023-09-29 ENCOUNTER — HOSPITAL ENCOUNTER (OUTPATIENT)
Dept: GENERAL RADIOLOGY | Facility: HOSPITAL | Age: 78
Discharge: HOME | End: 2023-09-29
Admitting: PHYSICIAN ASSISTANT
Payer: MEDICARE

## 2023-09-29 DIAGNOSIS — I50.22 CHRONIC SYSTOLIC CONGESTIVE HEART FAILURE: ICD-10-CM

## 2023-09-29 PROCEDURE — 71046 X-RAY EXAM CHEST 2 VIEWS: CPT

## 2023-12-19 ENCOUNTER — OFFICE VISIT (OUTPATIENT)
Dept: FAMILY MEDICINE CLINIC | Facility: CLINIC | Age: 78
End: 2023-12-19
Payer: MEDICARE

## 2023-12-19 VITALS
DIASTOLIC BLOOD PRESSURE: 76 MMHG | HEIGHT: 69 IN | HEART RATE: 88 BPM | WEIGHT: 184 LBS | BODY MASS INDEX: 27.25 KG/M2 | OXYGEN SATURATION: 96 % | SYSTOLIC BLOOD PRESSURE: 154 MMHG | TEMPERATURE: 96.8 F

## 2023-12-19 DIAGNOSIS — E11.22 TYPE 2 DIABETES MELLITUS WITH STAGE 3A CHRONIC KIDNEY DISEASE, WITH LONG-TERM CURRENT USE OF INSULIN: Primary | ICD-10-CM

## 2023-12-19 DIAGNOSIS — I50.22 CHRONIC SYSTOLIC CONGESTIVE HEART FAILURE: ICD-10-CM

## 2023-12-19 DIAGNOSIS — I10 ESSENTIAL HYPERTENSION: ICD-10-CM

## 2023-12-19 DIAGNOSIS — E78.2 HYPERLIPEMIA, MIXED: ICD-10-CM

## 2023-12-19 DIAGNOSIS — E03.8 OTHER SPECIFIED HYPOTHYROIDISM: ICD-10-CM

## 2023-12-19 DIAGNOSIS — N18.31 TYPE 2 DIABETES MELLITUS WITH STAGE 3A CHRONIC KIDNEY DISEASE, WITH LONG-TERM CURRENT USE OF INSULIN: Primary | ICD-10-CM

## 2023-12-19 DIAGNOSIS — Z79.4 TYPE 2 DIABETES MELLITUS WITH STAGE 3A CHRONIC KIDNEY DISEASE, WITH LONG-TERM CURRENT USE OF INSULIN: Primary | ICD-10-CM

## 2023-12-19 LAB
EXPIRATION DATE: ABNORMAL
HBA1C MFR BLD: 10.4 % (ref 4.5–5.7)
Lab: ABNORMAL

## 2023-12-19 RX ORDER — ATORVASTATIN CALCIUM 80 MG/1
80 TABLET, FILM COATED ORAL DAILY
Qty: 90 TABLET | Refills: 1 | Status: SHIPPED | OUTPATIENT
Start: 2023-12-19

## 2023-12-19 RX ORDER — CARVEDILOL 12.5 MG/1
12.5 TABLET ORAL 2 TIMES DAILY
Qty: 180 TABLET | Refills: 1 | Status: SHIPPED | OUTPATIENT
Start: 2023-12-19

## 2023-12-19 NOTE — PROGRESS NOTES
Subjective   Shai Mata is a 78 y.o. male.     Diabetes  He presents for his follow-up diabetic visit. He has type 2 diabetes mellitus. His disease course has been stable. There are no hypoglycemic associated symptoms. Pertinent negatives for hypoglycemia include no confusion, dizziness, headaches, hunger, mood changes, nervousness/anxiousness, pallor, seizures, sleepiness, speech difficulty, sweats or tremors. There are no diabetic associated symptoms. Pertinent negatives for diabetes include no blurred vision, no chest pain, no fatigue, no foot paresthesias, no foot ulcerations, no polydipsia, no polyphagia, no polyuria, no visual change, no weakness and no weight loss. There are no hypoglycemic complications. Symptoms are stable. Diabetic complications include nephropathy. (Following nephrology with no recent changes  ) Risk factors for coronary artery disease include sedentary lifestyle, male sex, hypertension, family history and dyslipidemia. Current diabetic treatment includes insulin injections and oral agent (dual therapy). He is compliant with treatment all of the time. His weight is stable. He is following a generally healthy diet. Meal planning includes avoidance of concentrated sweets and carbohydrate counting. He participates in exercise intermittently. His home blood glucose trend is fluctuating minimally. His breakfast blood glucose range is generally 130-140 mg/dl. His overall blood glucose range is 140-180 mg/dl. An ACE inhibitor/angiotensin II receptor blocker is being taken. He does not see a podiatrist.  Hyperlipidemia  This is a chronic problem. The current episode started more than 1 year ago. Recent lipid tests were reviewed and are variable. Exacerbating diseases include diabetes and hypothyroidism. Pertinent negatives include no chest pain, focal sensory loss, focal weakness, leg pain or myalgias. Current antihyperlipidemic treatment includes statins. The current treatment provides  significant improvement of lipids. There are no compliance problems.  Risk factors for coronary artery disease include diabetes mellitus, family history, dyslipidemia, obesity and hypertension.   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 Following closely with cardiology.  Denies any recent changes.) problem. The current episode started more than 1 year ago. The problem is unchanged (Denies any recent concerns.  Feels he is doing well). The problem is controlled. Pertinent negatives include no anxiety, blurred vision, chest pain, headaches, malaise/fatigue, neck pain, orthopnea, peripheral edema, PND or sweats. Risk factors for coronary artery disease include dyslipidemia, family history and sedentary lifestyle. Current antihypertension treatment includes ACE inhibitors, beta blockers and diuretics. The current treatment provides significant improvement. There are no compliance problems.  Hypertensive end-organ damage includes CAD/MI. Identifiable causes of hypertension include a thyroid problem.   Thyroid Problem  Presents for follow-up (Known hypothyroidism) visit. Patient reports no anxiety, fatigue, tremors, visual change or weight loss. The symptoms have been stable (tolerating synthroid). His past medical history is significant for diabetes and hyperlipidemia.      The following portions of the patient's history were reviewed and updated as appropriate: allergies, current medications, past family history, past medical history, past social history, past surgical history and problem list.      Review of Systems   Constitutional: Negative.  Negative for activity change, fatigue, malaise/fatigue and weight loss.        States overall he is feeling fairly well     HENT: Negative.     Eyes:  Negative for blurred vision.   Respiratory: Negative.     Cardiovascular: Negative.  Negative for chest pain, orthopnea and PND.   Gastrointestinal: Negative.    Endocrine:  Negative.  Negative for polydipsia, polyphagia and polyuria.   Musculoskeletal: Negative.  Negative for myalgias and neck pain.   Skin: Negative.  Negative for pallor.   Neurological: Negative.  Negative for dizziness, tremors, focal weakness, seizures, speech difficulty, weakness and headaches.   Psychiatric/Behavioral: Negative.  Negative for confusion. The patient is not nervous/anxious.    All other systems reviewed and are negative.      Procedures    Objective   Physical Exam   Constitutional: He is oriented to person, place, and time. He appears well-developed. No distress.   HENT:   Head: Normocephalic.   Eyes: Conjunctivae are normal. Right eye exhibits no discharge. Left eye exhibits no discharge.   Cardiovascular: Normal rate, regular rhythm and normal heart sounds.   No murmur heard.  Pulmonary/Chest: Effort normal and breath sounds normal. No respiratory distress. He has no wheezes.   Musculoskeletal:      Right lower leg: No edema.      Left lower leg: No edema.   Neurological: He is alert and oriented to person, place, and time.   Skin: Skin is warm and dry. He is not diaphoretic.   Psychiatric: His behavior is normal.   Nursing note and vitals reviewed.       During this visit the following were done:  Labs Reviewed [x]    Labs Ordered [x]    Radiology Reports Reviewed []    Radiology Ordered []    PCP Records Reviewed []    Referring Provider Records Reviewed []    ER Records Reviewed []    Hospital Records Reviewed []    History Obtained From Family []    Radiology Images Reviewed []    Other Reviewed [x]    Records Requested []      Diagnoses and all orders for this visit:    1. Type 2 diabetes mellitus with stage 3a chronic kidney disease, with long-term current use of insulin (Primary)  -     Comprehensive Metabolic Panel; Future  -     Hemoglobin A1c; Future  -     Lipid Panel; Future  -     TSH; Future  -     POC Glycosylated Hemoglobin (Hb A1C)    2. Hyperlipemia, mixed  -     atorvastatin  (LIPITOR) 80 MG tablet; Take 1 tablet by mouth Daily.  Dispense: 90 tablet; Refill: 1  -     Comprehensive Metabolic Panel; Future  -     Lipid Panel; Future    3. Chronic systolic congestive heart failure  -     carvedilol (COREG) 12.5 MG tablet; Take 1 tablet by mouth 2 (Two) Times a Day.  Dispense: 180 tablet; Refill: 1    4. Essential hypertension  -     carvedilol (COREG) 12.5 MG tablet; Take 1 tablet by mouth 2 (Two) Times a Day.  Dispense: 180 tablet; Refill: 1  -     Comprehensive Metabolic Panel; Future  -     Lipid Panel; Future    5. Other specified hypothyroidism  -     TSH; Future          Patient's Body mass index is 27.17 kg/m². indicating that he is overweight (BMI 25-29.9). Patient's (Body mass index is 27.17 kg/m².) indicates that they are overweight with health conditions that include hypertension, coronary heart disease, diabetes mellitus and dyslipidemias . Weight is unchanged. BMI is is above average; BMI management plan is completed. We discussed portion control and increasing exercise. .

## 2024-02-09 ENCOUNTER — TELEPHONE (OUTPATIENT)
Dept: FAMILY MEDICINE CLINIC | Facility: CLINIC | Age: 79
End: 2024-02-09
Payer: MEDICARE

## 2024-02-29 ENCOUNTER — LAB (OUTPATIENT)
Dept: FAMILY MEDICINE CLINIC | Facility: CLINIC | Age: 79
End: 2024-02-29
Payer: MEDICARE

## 2024-02-29 DIAGNOSIS — E78.2 HYPERLIPEMIA, MIXED: ICD-10-CM

## 2024-02-29 DIAGNOSIS — N18.31 TYPE 2 DIABETES MELLITUS WITH STAGE 3A CHRONIC KIDNEY DISEASE, WITH LONG-TERM CURRENT USE OF INSULIN: ICD-10-CM

## 2024-02-29 DIAGNOSIS — E03.8 OTHER SPECIFIED HYPOTHYROIDISM: ICD-10-CM

## 2024-02-29 DIAGNOSIS — E11.22 TYPE 2 DIABETES MELLITUS WITH STAGE 3A CHRONIC KIDNEY DISEASE, WITH LONG-TERM CURRENT USE OF INSULIN: ICD-10-CM

## 2024-02-29 DIAGNOSIS — I10 ESSENTIAL HYPERTENSION: ICD-10-CM

## 2024-02-29 DIAGNOSIS — Z79.4 TYPE 2 DIABETES MELLITUS WITH STAGE 3A CHRONIC KIDNEY DISEASE, WITH LONG-TERM CURRENT USE OF INSULIN: ICD-10-CM

## 2024-02-29 LAB
ALBUMIN SERPL-MCNC: 3.8 G/DL (ref 3.5–5.2)
ALBUMIN/GLOB SERPL: 1.5 G/DL
ALP SERPL-CCNC: 66 U/L (ref 39–117)
ALT SERPL W P-5'-P-CCNC: 17 U/L (ref 1–41)
ANION GAP SERPL CALCULATED.3IONS-SCNC: 10 MMOL/L (ref 5–15)
AST SERPL-CCNC: 20 U/L (ref 1–40)
BILIRUB SERPL-MCNC: 0.4 MG/DL (ref 0–1.2)
BUN SERPL-MCNC: 32 MG/DL (ref 8–23)
BUN/CREAT SERPL: 23.5 (ref 7–25)
CALCIUM SPEC-SCNC: 8.8 MG/DL (ref 8.6–10.5)
CHLORIDE SERPL-SCNC: 107 MMOL/L (ref 98–107)
CHOLEST SERPL-MCNC: 109 MG/DL (ref 0–200)
CO2 SERPL-SCNC: 23 MMOL/L (ref 22–29)
CREAT SERPL-MCNC: 1.36 MG/DL (ref 0.76–1.27)
EGFRCR SERPLBLD CKD-EPI 2021: 52.9 ML/MIN/1.73
GLOBULIN UR ELPH-MCNC: 2.6 GM/DL
GLUCOSE SERPL-MCNC: 167 MG/DL (ref 65–99)
HBA1C MFR BLD: 8.7 % (ref 4.8–5.6)
HDLC SERPL-MCNC: 37 MG/DL (ref 40–60)
LDLC SERPL CALC-MCNC: 61 MG/DL (ref 0–100)
LDLC/HDLC SERPL: 1.71 {RATIO}
POTASSIUM SERPL-SCNC: 4.7 MMOL/L (ref 3.5–5.2)
PROT SERPL-MCNC: 6.4 G/DL (ref 6–8.5)
SODIUM SERPL-SCNC: 140 MMOL/L (ref 136–145)
TRIGL SERPL-MCNC: 43 MG/DL (ref 0–150)
TSH SERPL DL<=0.05 MIU/L-ACNC: 0.44 UIU/ML (ref 0.27–4.2)
VLDLC SERPL-MCNC: 11 MG/DL (ref 5–40)

## 2024-02-29 PROCEDURE — 84443 ASSAY THYROID STIM HORMONE: CPT | Performed by: NURSE PRACTITIONER

## 2024-02-29 PROCEDURE — 36415 COLL VENOUS BLD VENIPUNCTURE: CPT

## 2024-02-29 PROCEDURE — 83036 HEMOGLOBIN GLYCOSYLATED A1C: CPT | Performed by: NURSE PRACTITIONER

## 2024-02-29 PROCEDURE — 80061 LIPID PANEL: CPT | Performed by: NURSE PRACTITIONER

## 2024-02-29 PROCEDURE — 80053 COMPREHEN METABOLIC PANEL: CPT | Performed by: NURSE PRACTITIONER

## 2024-03-11 ENCOUNTER — TELEPHONE (OUTPATIENT)
Dept: FAMILY MEDICINE CLINIC | Facility: CLINIC | Age: 79
End: 2024-03-11
Payer: MEDICARE

## 2024-03-11 DIAGNOSIS — N18.31 TYPE 2 DIABETES MELLITUS WITH STAGE 3A CHRONIC KIDNEY DISEASE, WITH LONG-TERM CURRENT USE OF INSULIN: Primary | ICD-10-CM

## 2024-03-11 DIAGNOSIS — Z79.4 TYPE 2 DIABETES MELLITUS WITH STAGE 3A CHRONIC KIDNEY DISEASE, WITH LONG-TERM CURRENT USE OF INSULIN: Primary | ICD-10-CM

## 2024-03-11 DIAGNOSIS — E11.22 TYPE 2 DIABETES MELLITUS WITH STAGE 3A CHRONIC KIDNEY DISEASE, WITH LONG-TERM CURRENT USE OF INSULIN: Primary | ICD-10-CM

## 2024-03-11 NOTE — TELEPHONE ENCOUNTER
----- Message from ANEUDY Carrizales sent at 3/11/2024  5:15 PM EDT -----  Labs look better except for a decline in reanl function.  Ask patient to return for BMP  This is non fasting

## 2024-03-14 ENCOUNTER — LAB (OUTPATIENT)
Dept: FAMILY MEDICINE CLINIC | Facility: CLINIC | Age: 79
End: 2024-03-14
Payer: MEDICARE

## 2024-03-14 DIAGNOSIS — E11.22 TYPE 2 DIABETES MELLITUS WITH STAGE 3A CHRONIC KIDNEY DISEASE, WITH LONG-TERM CURRENT USE OF INSULIN: ICD-10-CM

## 2024-03-14 DIAGNOSIS — N18.31 TYPE 2 DIABETES MELLITUS WITH STAGE 3A CHRONIC KIDNEY DISEASE, WITH LONG-TERM CURRENT USE OF INSULIN: ICD-10-CM

## 2024-03-14 DIAGNOSIS — Z79.4 TYPE 2 DIABETES MELLITUS WITH STAGE 3A CHRONIC KIDNEY DISEASE, WITH LONG-TERM CURRENT USE OF INSULIN: ICD-10-CM

## 2024-03-14 LAB
ANION GAP SERPL CALCULATED.3IONS-SCNC: 12 MMOL/L (ref 5–15)
BUN SERPL-MCNC: 15 MG/DL (ref 8–23)
BUN/CREAT SERPL: 11.3 (ref 7–25)
CALCIUM SPEC-SCNC: 8.6 MG/DL (ref 8.6–10.5)
CHLORIDE SERPL-SCNC: 105 MMOL/L (ref 98–107)
CO2 SERPL-SCNC: 22 MMOL/L (ref 22–29)
CREAT SERPL-MCNC: 1.33 MG/DL (ref 0.76–1.27)
EGFRCR SERPLBLD CKD-EPI 2021: 54.4 ML/MIN/1.73
GLUCOSE SERPL-MCNC: 189 MG/DL (ref 65–99)
POTASSIUM SERPL-SCNC: 4.2 MMOL/L (ref 3.5–5.2)
SODIUM SERPL-SCNC: 139 MMOL/L (ref 136–145)

## 2024-03-14 PROCEDURE — 80048 BASIC METABOLIC PNL TOTAL CA: CPT | Performed by: NURSE PRACTITIONER

## 2024-03-14 PROCEDURE — 36415 COLL VENOUS BLD VENIPUNCTURE: CPT

## 2024-03-18 ENCOUNTER — TELEPHONE (OUTPATIENT)
Dept: FAMILY MEDICINE CLINIC | Facility: CLINIC | Age: 79
End: 2024-03-18
Payer: MEDICARE

## 2024-03-18 NOTE — TELEPHONE ENCOUNTER
----- Message from ANEUDY Carrizales sent at 3/18/2024 11:15 AM EDT -----  Kidney function is still a little low will continue to watch

## 2024-03-19 ENCOUNTER — OFFICE VISIT (OUTPATIENT)
Dept: FAMILY MEDICINE CLINIC | Facility: CLINIC | Age: 79
End: 2024-03-19
Payer: MEDICARE

## 2024-03-19 VITALS
WEIGHT: 189 LBS | TEMPERATURE: 98 F | SYSTOLIC BLOOD PRESSURE: 130 MMHG | OXYGEN SATURATION: 99 % | DIASTOLIC BLOOD PRESSURE: 72 MMHG | BODY MASS INDEX: 27.99 KG/M2 | HEIGHT: 69 IN | HEART RATE: 71 BPM

## 2024-03-19 DIAGNOSIS — I10 ESSENTIAL HYPERTENSION: ICD-10-CM

## 2024-03-19 DIAGNOSIS — N18.31 TYPE 2 DIABETES MELLITUS WITH STAGE 3A CHRONIC KIDNEY DISEASE, WITH LONG-TERM CURRENT USE OF INSULIN: ICD-10-CM

## 2024-03-19 DIAGNOSIS — E11.22 TYPE 2 DIABETES MELLITUS WITH STAGE 3A CHRONIC KIDNEY DISEASE, WITH LONG-TERM CURRENT USE OF INSULIN: ICD-10-CM

## 2024-03-19 DIAGNOSIS — Z79.4 TYPE 2 DIABETES MELLITUS WITH STAGE 3A CHRONIC KIDNEY DISEASE, WITH LONG-TERM CURRENT USE OF INSULIN: ICD-10-CM

## 2024-03-19 DIAGNOSIS — E78.2 HYPERLIPEMIA, MIXED: ICD-10-CM

## 2024-03-19 DIAGNOSIS — K21.9 GASTROESOPHAGEAL REFLUX DISEASE WITHOUT ESOPHAGITIS: ICD-10-CM

## 2024-03-19 DIAGNOSIS — I50.22 CHRONIC SYSTOLIC CONGESTIVE HEART FAILURE: ICD-10-CM

## 2024-03-19 DIAGNOSIS — E03.8 OTHER SPECIFIED HYPOTHYROIDISM: ICD-10-CM

## 2024-03-19 RX ORDER — SPIRONOLACTONE 25 MG/1
25 TABLET ORAL DAILY
Qty: 90 TABLET | Refills: 1 | Status: SHIPPED | OUTPATIENT
Start: 2024-03-19

## 2024-03-19 RX ORDER — PANTOPRAZOLE SODIUM 40 MG/1
40 TABLET, DELAYED RELEASE ORAL DAILY
Qty: 90 TABLET | Refills: 3 | Status: SHIPPED | OUTPATIENT
Start: 2024-03-19

## 2024-03-19 RX ORDER — AMIODARONE HYDROCHLORIDE 200 MG/1
100 TABLET ORAL DAILY
Qty: 45 TABLET | Refills: 2 | Status: SHIPPED | OUTPATIENT
Start: 2024-03-19

## 2024-03-19 RX ORDER — ATORVASTATIN CALCIUM 80 MG/1
80 TABLET, FILM COATED ORAL DAILY
Qty: 90 TABLET | Refills: 1 | Status: SHIPPED | OUTPATIENT
Start: 2024-03-19

## 2024-03-19 RX ORDER — CARVEDILOL 12.5 MG/1
12.5 TABLET ORAL 2 TIMES DAILY
Qty: 180 TABLET | Refills: 1 | Status: SHIPPED | OUTPATIENT
Start: 2024-03-19

## 2024-03-19 RX ORDER — LISINOPRIL 10 MG/1
10 TABLET ORAL 2 TIMES DAILY
Qty: 180 TABLET | Refills: 1 | Status: SHIPPED | OUTPATIENT
Start: 2024-03-19

## 2024-03-19 RX ORDER — LEVOTHYROXINE SODIUM 0.1 MG/1
100 TABLET ORAL DAILY
Qty: 90 TABLET | Refills: 1 | Status: SHIPPED | OUTPATIENT
Start: 2024-03-19

## 2024-03-19 NOTE — PROGRESS NOTES
Subjective   Shai Mata is a 78 y.o. male.     Diabetes  He presents for his follow-up diabetic visit. He has type 2 diabetes mellitus. His disease course has been stable. There are no hypoglycemic associated symptoms. Pertinent negatives for hypoglycemia include no confusion, dizziness, headaches, hunger, mood changes, nervousness/anxiousness, pallor, seizures, sleepiness, speech difficulty, sweats or tremors. There are no diabetic associated symptoms. Pertinent negatives for diabetes include no blurred vision, no chest pain, no fatigue, no foot paresthesias, no foot ulcerations, no polydipsia, no polyphagia, no polyuria, no visual change, no weakness and no weight loss. There are no hypoglycemic complications. Symptoms are stable. Diabetic complications include nephropathy. (Following nephrology with no recent changes  ) Risk factors for coronary artery disease include sedentary lifestyle, male sex, hypertension, family history and dyslipidemia. Current diabetic treatment includes insulin injections and oral agent (dual therapy). He is compliant with treatment all of the time. His weight is stable. He is following a generally healthy diet. Meal planning includes avoidance of concentrated sweets and carbohydrate counting. He participates in exercise intermittently. His home blood glucose trend is fluctuating minimally. His breakfast blood glucose range is generally 130-140 mg/dl. His overall blood glucose range is 140-180 mg/dl. An ACE inhibitor/angiotensin II receptor blocker is being taken. He does not see a podiatrist.  Hyperlipidemia  This is a chronic problem. The current episode started more than 1 year ago. Recent lipid tests were reviewed and are variable. Exacerbating diseases include diabetes and hypothyroidism. Pertinent negatives include no chest pain, focal sensory loss, focal weakness, leg pain or myalgias. Current antihyperlipidemic treatment includes statins. The current treatment provides  significant improvement of lipids. There are no compliance problems.  Risk factors for coronary artery disease include diabetes mellitus, family history, dyslipidemia, obesity and hypertension.   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 Following closely with cardiology.  Denies any recent changes.) problem. The current episode started more than 1 year ago. The problem is unchanged (Denies any recent concerns.  Feels he is doing well). The problem is controlled. Pertinent negatives include no anxiety, blurred vision, chest pain, headaches, malaise/fatigue, neck pain, orthopnea, peripheral edema, PND or sweats. Risk factors for coronary artery disease include dyslipidemia, family history and sedentary lifestyle. Current antihypertension treatment includes ACE inhibitors, beta blockers and diuretics. The current treatment provides significant improvement. There are no compliance problems.  Hypertensive end-organ damage includes CAD/MI. Identifiable causes of hypertension include a thyroid problem.   Thyroid Problem  Presents for follow-up (Known hypothyroidism) visit. Patient reports no anxiety, fatigue, tremors, visual change or weight loss. The symptoms have been stable (tolerating synthroid). His past medical history is significant for diabetes and hyperlipidemia.      The following portions of the patient's history were reviewed and updated as appropriate: allergies, current medications, past family history, past medical history, past social history, past surgical history and problem list.      Review of Systems   Constitutional: Negative.  Negative for activity change, fatigue, malaise/fatigue and weight loss.        States overall he is feeling fairly well     HENT: Negative.     Eyes:  Negative for blurred vision.   Respiratory: Negative.     Cardiovascular: Negative.  Negative for chest pain, orthopnea and PND.   Gastrointestinal: Negative.    Endocrine:  Negative.  Negative for polydipsia, polyphagia and polyuria.   Musculoskeletal: Negative.  Negative for myalgias and neck pain.   Skin: Negative.  Negative for pallor.   Neurological: Negative.  Negative for dizziness, tremors, focal weakness, seizures, speech difficulty, weakness and headaches.   Psychiatric/Behavioral: Negative.  Negative for confusion. The patient is not nervous/anxious.    All other systems reviewed and are negative.      Procedures    Objective   Physical Exam   Constitutional: He is oriented to person, place, and time. He appears well-developed. No distress.   HENT:   Head: Normocephalic.   Eyes: Conjunctivae are normal. Right eye exhibits no discharge. Left eye exhibits no discharge.   Cardiovascular: Normal rate, regular rhythm and normal heart sounds.   No murmur heard.  Pulmonary/Chest: Effort normal and breath sounds normal. No respiratory distress. He has no wheezes.   Musculoskeletal:      Right lower leg: No edema.      Left lower leg: No edema.   Neurological: He is alert and oriented to person, place, and time.   Skin: Skin is warm and dry. He is not diaphoretic.   Psychiatric: His behavior is normal.   Nursing note and vitals reviewed.       During this visit the following were done:  Labs Reviewed [x]    Labs Ordered [x]    Radiology Reports Reviewed []    Radiology Ordered []    PCP Records Reviewed []    Referring Provider Records Reviewed []    ER Records Reviewed []    Hospital Records Reviewed []    History Obtained From Family []    Radiology Images Reviewed []    Other Reviewed [x]    Records Requested []      Diagnoses and all orders for this visit:    1. Chronic systolic congestive heart failure  -     amiodarone (PACERONE) 200 MG tablet; Take 0.5 tablets by mouth Daily.  Dispense: 45 tablet; Refill: 2  -     carvedilol (COREG) 12.5 MG tablet; Take 1 tablet by mouth 2 (Two) Times a Day.  Dispense: 180 tablet; Refill: 1    2. Type 2 diabetes mellitus with stage 3a chronic  kidney disease, with long-term current use of insulin  -     amiodarone (PACERONE) 200 MG tablet; Take 0.5 tablets by mouth Daily.  Dispense: 45 tablet; Refill: 2  -     glyburide micronized (GLYNASE) 1.5 MG tablet; Take 1 tablet by mouth 2 (Two) Times a Day Before Meals for 90 days.  Dispense: 180 tablet; Refill: 1  -     insulin NPH (humuLIN N,novoLIN N) 100 UNIT/ML injection; Inject 22 Units under the skin into the appropriate area as directed 2 (Two) Times a Day Before Meals.  Dispense: 10 mL; Refill: 11  -     Basic metabolic panel; Future    3. Hyperlipemia, mixed  -     atorvastatin (LIPITOR) 80 MG tablet; Take 1 tablet by mouth Daily.  Dispense: 90 tablet; Refill: 1    4. Essential hypertension  -     carvedilol (COREG) 12.5 MG tablet; Take 1 tablet by mouth 2 (Two) Times a Day.  Dispense: 180 tablet; Refill: 1  -     lisinopril (PRINIVIL,ZESTRIL) 10 MG tablet; Take 1 tablet by mouth 2 (Two) Times a Day.  Dispense: 180 tablet; Refill: 1  -     spironolactone (ALDACTONE) 25 MG tablet; Take 1 tablet by mouth Daily.  Dispense: 90 tablet; Refill: 1    5. Other specified hypothyroidism  -     levothyroxine (Synthroid) 100 MCG tablet; Take 1 tablet by mouth Daily.  Dispense: 90 tablet; Refill: 1    6. Gastroesophageal reflux disease without esophagitis  -     pantoprazole (Protonix) 40 MG EC tablet; Take 1 tablet by mouth Daily.  Dispense: 90 tablet; Refill: 3          Patient's Body mass index is 27.91 kg/m². indicating that he is overweight (BMI 25-29.9). Patient's (Body mass index is 27.91 kg/m².) indicates that they are overweight with health conditions that include hypertension, coronary heart disease, diabetes mellitus and dyslipidemias . Weight is unchanged. BMI is is above average; BMI management plan is completed. We discussed portion control and increasing exercise. .

## 2024-03-26 ENCOUNTER — OFFICE VISIT (OUTPATIENT)
Dept: CARDIOLOGY | Facility: CLINIC | Age: 79
End: 2024-03-26
Payer: MEDICARE

## 2024-03-26 VITALS
HEIGHT: 69 IN | OXYGEN SATURATION: 99 % | DIASTOLIC BLOOD PRESSURE: 74 MMHG | HEART RATE: 56 BPM | SYSTOLIC BLOOD PRESSURE: 133 MMHG | WEIGHT: 190.2 LBS | BODY MASS INDEX: 28.17 KG/M2

## 2024-03-26 DIAGNOSIS — I50.22 CHRONIC SYSTOLIC CONGESTIVE HEART FAILURE: ICD-10-CM

## 2024-03-26 DIAGNOSIS — I25.10 ASCVD (ARTERIOSCLEROTIC CARDIOVASCULAR DISEASE): Primary | ICD-10-CM

## 2024-03-26 DIAGNOSIS — Z95.1 S/P CABG X 5: ICD-10-CM

## 2024-03-26 PROCEDURE — 1160F RVW MEDS BY RX/DR IN RCRD: CPT | Performed by: PHYSICIAN ASSISTANT

## 2024-03-26 PROCEDURE — 3078F DIAST BP <80 MM HG: CPT | Performed by: PHYSICIAN ASSISTANT

## 2024-03-26 PROCEDURE — 1159F MED LIST DOCD IN RCRD: CPT | Performed by: PHYSICIAN ASSISTANT

## 2024-03-26 PROCEDURE — 99213 OFFICE O/P EST LOW 20 MIN: CPT | Performed by: PHYSICIAN ASSISTANT

## 2024-03-26 PROCEDURE — 3075F SYST BP GE 130 - 139MM HG: CPT | Performed by: PHYSICIAN ASSISTANT

## 2024-03-26 NOTE — PROGRESS NOTES
Ekaterina Cox APRN  Shai Mata  1945 03/26/2024    Patient Active Problem List   Diagnosis    ASCVD (arteriosclerotic cardiovascular disease)    S/P CABG x 5    HTN (hypertension)    PVC's (premature ventricular contractions)    Type 2 diabetes mellitus with hyperglycemia, with long-term current use of insulin    Hyperlipemia, mixed    Ischemic cardiomyopathy    Pain of upper abdomen    Cholecystitis    Chronic systolic congestive heart failure    Ventricular tachycardia (paroxysmal)    Long term current use of antiarrhythmic medical therapy    Other specified hypothyroidism    Esophageal dysphagia       Dear Ekaterina Cox APRN:    Subjective     History of Present Illness:    Chief Complaint   Patient presents with    Follow-up     ROUTINE       Shai Mata is a pleasant 78 y.o. male with a past medical history significant for ischemic cardiomyopathy with most recent EF 45%, chronic systolic congestive heart failure, history of ventricular tachycardia status post ICD implantation on amiodarone, ASCVD status post four-vessel CABG, diabetes mellitus type 2, hypertension, and dyslipidemia.  He presents today for routine cardiology follow-up.      Patient denies any chest pain, shortness of breath, palpitations, dizziness, syncope or near syncope. Blood pressure is well controlled. Recent renal function did show a slight rise in his creatinine currently at 1.33    No Known Allergies:      Current Outpatient Medications:     amiodarone (PACERONE) 200 MG tablet, Take 0.5 tablets by mouth Daily., Disp: 45 tablet, Rfl: 2    aspirin 81 MG EC tablet, Take 1 tablet by mouth 2 (Two) Times a Day., Disp: , Rfl:     atorvastatin (LIPITOR) 80 MG tablet, Take 1 tablet by mouth Daily., Disp: 90 tablet, Rfl: 1    carvedilol (COREG) 12.5 MG tablet, Take 1 tablet by mouth 2 (Two) Times a Day., Disp: 180 tablet, Rfl: 1    glyburide micronized (GLYNASE) 1.5 MG tablet, Take 1 tablet by mouth 2 (Two) Times a Day Before Meals for 90  "days., Disp: 180 tablet, Rfl: 1    insulin NPH (humuLIN N,novoLIN N) 100 UNIT/ML injection, Inject 22 Units under the skin into the appropriate area as directed 2 (Two) Times a Day Before Meals., Disp: 10 mL, Rfl: 11    Insulin Pen Needle (Pen Needles) 32G X 4 MM misc, 1 each 3 (Three) Times a Day., Disp: 100 each, Rfl: 2    levothyroxine (Synthroid) 100 MCG tablet, Take 1 tablet by mouth Daily., Disp: 90 tablet, Rfl: 1    lisinopril (PRINIVIL,ZESTRIL) 10 MG tablet, Take 1 tablet by mouth 2 (Two) Times a Day., Disp: 180 tablet, Rfl: 1    pantoprazole (Protonix) 40 MG EC tablet, Take 1 tablet by mouth Daily., Disp: 90 tablet, Rfl: 3    spironolactone (ALDACTONE) 25 MG tablet, Take 1 tablet by mouth Daily., Disp: 90 tablet, Rfl: 1    The following portions of the patient's history were reviewed and updated as appropriate: allergies, current medications, past family history, past medical history, past social history, past surgical history and problem list.    Social History     Tobacco Use    Smoking status: Former     Current packs/day: 0.00     Average packs/day: 2.0 packs/day for 20.0 years (40.0 ttl pk-yrs)     Types: Cigarettes     Start date:      Quit date:      Years since quittin.2    Smokeless tobacco: Former     Types: Chew     Quit date:    Vaping Use    Vaping status: Never Used   Substance Use Topics    Alcohol use: No    Drug use: No         Objective   Vitals:    24 0832   BP: 133/74   Pulse: 56   SpO2: 99%   Weight: 86.3 kg (190 lb 3.2 oz)   Height: 175.3 cm (69\")     Body mass index is 28.09 kg/m².    ROS    Constitutional:       General: Not in acute distress.     Appearance: Healthy appearance. Well-developed and not in distress. Not diaphoretic.   Eyes:      Conjunctiva/sclera: Conjunctivae normal.      Pupils: Pupils are equal, round, and reactive to light.   HENT:      Head: Normocephalic and atraumatic.   Neck:      Vascular: No carotid bruit or JVD.   Pulmonary:      " Effort: Pulmonary effort is normal. No respiratory distress.      Breath sounds: Normal breath sounds.   Cardiovascular:      Normal rate. Regular rhythm.   Edema:     Peripheral edema absent.   Skin:     General: Skin is cool.   Neurological:      Mental Status: Alert, oriented to person, place, and time and oriented to person, place and time.         Lab Results   Component Value Date     03/14/2024    K 4.2 03/14/2024     03/14/2024    CO2 22.0 03/14/2024    BUN 15 03/14/2024    CREATININE 1.33 (H) 03/14/2024    GLUCOSE 189 (H) 03/14/2024    CALCIUM 8.6 03/14/2024    AST 20 02/29/2024    ALT 17 02/29/2024    ALKPHOS 66 02/29/2024    LABIL2 1.6 08/25/2016     Lab Results   Component Value Date    CKTOTAL 82 04/26/2018     Lab Results   Component Value Date    WBC 8.73 11/29/2022    HGB 11.5 (L) 11/29/2022    HCT 36.2 (L) 11/29/2022     11/29/2022     Lab Results   Component Value Date    INR 1.11 (H) 04/26/2018    INR 1.06 11/19/2017    INR 1.00 01/11/2016     Lab Results   Component Value Date    MG 1.9 06/13/2023     Lab Results   Component Value Date    TSH 0.437 02/29/2024    PSA 0.833 05/13/2020    CHLPL 133 11/28/2017    TRIG 43 02/29/2024    HDL 37 (L) 02/29/2024    LDL 61 02/29/2024      Lab Results   Component Value Date    BNP 26.0 04/26/2018       During this visit the following were done:  Labs Reviewed []    Labs Ordered []    Radiology Reports Reviewed []    Radiology Ordered []    PCP Records Reviewed []    Referring Provider Records Reviewed []    ER Records Reviewed []    Hospital Records Reviewed []    History Obtained From Family []    Radiology Images Reviewed []    Other Reviewed []    Records Requested []       Procedures    Assessment & Plan    Diagnosis Plan   1. ASCVD (arteriosclerotic cardiovascular disease)        2. Chronic systolic congestive heart failure        3. S/P CABG x 5                 Recommendations:  Elevated serum creatinine  Slight elevation from his  baseline.  I did encourage increased hydration.  If necessary I recommend holding spironolactone.  I discussed about possible referral to nephrology he declined  Chronic HFrEF  Euvolemic continue Coreg, Lipitor, lisinopril, carvedilol.  History of VT  Following closely with EP tolerating amiodarone well without any adverse reactions.    Return in about 6 months (around 9/26/2024).    As always, I appreciate very much the opportunity to participate in the cardiovascular care of your patients.      With Best Regards,    Erik Livingston PA-C

## 2024-04-22 ENCOUNTER — LAB (OUTPATIENT)
Dept: FAMILY MEDICINE CLINIC | Facility: CLINIC | Age: 79
End: 2024-04-22
Payer: MEDICARE

## 2024-04-22 DIAGNOSIS — Z79.4 TYPE 2 DIABETES MELLITUS WITH STAGE 3A CHRONIC KIDNEY DISEASE, WITH LONG-TERM CURRENT USE OF INSULIN: ICD-10-CM

## 2024-04-22 DIAGNOSIS — N18.31 TYPE 2 DIABETES MELLITUS WITH STAGE 3A CHRONIC KIDNEY DISEASE, WITH LONG-TERM CURRENT USE OF INSULIN: ICD-10-CM

## 2024-04-22 DIAGNOSIS — E11.22 TYPE 2 DIABETES MELLITUS WITH STAGE 3A CHRONIC KIDNEY DISEASE, WITH LONG-TERM CURRENT USE OF INSULIN: ICD-10-CM

## 2024-04-22 PROCEDURE — 80048 BASIC METABOLIC PNL TOTAL CA: CPT | Performed by: NURSE PRACTITIONER

## 2024-04-22 PROCEDURE — 36415 COLL VENOUS BLD VENIPUNCTURE: CPT

## 2024-04-23 ENCOUNTER — TELEPHONE (OUTPATIENT)
Dept: FAMILY MEDICINE CLINIC | Facility: CLINIC | Age: 79
End: 2024-04-23
Payer: MEDICARE

## 2024-04-23 LAB
ANION GAP SERPL CALCULATED.3IONS-SCNC: 8.9 MMOL/L (ref 5–15)
BUN SERPL-MCNC: 15 MG/DL (ref 8–23)
BUN/CREAT SERPL: 11.9 (ref 7–25)
CALCIUM SPEC-SCNC: 9 MG/DL (ref 8.6–10.5)
CHLORIDE SERPL-SCNC: 107 MMOL/L (ref 98–107)
CO2 SERPL-SCNC: 25.1 MMOL/L (ref 22–29)
CREAT SERPL-MCNC: 1.26 MG/DL (ref 0.76–1.27)
EGFRCR SERPLBLD CKD-EPI 2021: 57.7 ML/MIN/1.73
GLUCOSE SERPL-MCNC: 41 MG/DL (ref 65–99)
POTASSIUM SERPL-SCNC: 4.3 MMOL/L (ref 3.5–5.2)
SODIUM SERPL-SCNC: 141 MMOL/L (ref 136–145)

## 2024-04-23 NOTE — TELEPHONE ENCOUNTER
----- Message from ANEUDY Carrizales sent at 4/23/2024 10:16 AM EDT -----  Kidney function improved

## 2024-04-23 NOTE — TELEPHONE ENCOUNTER
Spoke with patient & he verbalized understanding he reports he knew it was low when he left here they had to stop & let him eat he got shaky,will check it more often.

## 2024-05-28 ENCOUNTER — OFFICE VISIT (OUTPATIENT)
Dept: FAMILY MEDICINE CLINIC | Facility: CLINIC | Age: 79
End: 2024-05-28
Payer: MEDICARE

## 2024-05-28 ENCOUNTER — LAB (OUTPATIENT)
Dept: FAMILY MEDICINE CLINIC | Facility: CLINIC | Age: 79
End: 2024-05-28
Payer: MEDICARE

## 2024-05-28 VITALS
HEIGHT: 69 IN | DIASTOLIC BLOOD PRESSURE: 68 MMHG | OXYGEN SATURATION: 99 % | WEIGHT: 187.2 LBS | TEMPERATURE: 97.8 F | BODY MASS INDEX: 27.73 KG/M2 | HEART RATE: 95 BPM | SYSTOLIC BLOOD PRESSURE: 132 MMHG

## 2024-05-28 DIAGNOSIS — E11.22 TYPE 2 DIABETES MELLITUS WITH STAGE 3A CHRONIC KIDNEY DISEASE, WITH LONG-TERM CURRENT USE OF INSULIN: ICD-10-CM

## 2024-05-28 DIAGNOSIS — R06.02 EXERTIONAL SHORTNESS OF BREATH: ICD-10-CM

## 2024-05-28 DIAGNOSIS — Z79.4 TYPE 2 DIABETES MELLITUS WITH STAGE 3A CHRONIC KIDNEY DISEASE, WITH LONG-TERM CURRENT USE OF INSULIN: Primary | ICD-10-CM

## 2024-05-28 DIAGNOSIS — I10 ESSENTIAL HYPERTENSION: ICD-10-CM

## 2024-05-28 DIAGNOSIS — N18.31 TYPE 2 DIABETES MELLITUS WITH STAGE 3A CHRONIC KIDNEY DISEASE, WITH LONG-TERM CURRENT USE OF INSULIN: ICD-10-CM

## 2024-05-28 DIAGNOSIS — I50.22 CHRONIC SYSTOLIC CONGESTIVE HEART FAILURE: ICD-10-CM

## 2024-05-28 DIAGNOSIS — Z79.4 TYPE 2 DIABETES MELLITUS WITH STAGE 3A CHRONIC KIDNEY DISEASE, WITH LONG-TERM CURRENT USE OF INSULIN: ICD-10-CM

## 2024-05-28 DIAGNOSIS — E03.8 OTHER SPECIFIED HYPOTHYROIDISM: ICD-10-CM

## 2024-05-28 DIAGNOSIS — E78.2 HYPERLIPEMIA, MIXED: ICD-10-CM

## 2024-05-28 DIAGNOSIS — N18.31 TYPE 2 DIABETES MELLITUS WITH STAGE 3A CHRONIC KIDNEY DISEASE, WITH LONG-TERM CURRENT USE OF INSULIN: Primary | ICD-10-CM

## 2024-05-28 DIAGNOSIS — E11.22 TYPE 2 DIABETES MELLITUS WITH STAGE 3A CHRONIC KIDNEY DISEASE, WITH LONG-TERM CURRENT USE OF INSULIN: Primary | ICD-10-CM

## 2024-05-28 LAB
ALBUMIN SERPL-MCNC: 3.9 G/DL (ref 3.5–5.2)
ALBUMIN/GLOB SERPL: 1.7 G/DL
ALP SERPL-CCNC: 87 U/L (ref 39–117)
ALT SERPL W P-5'-P-CCNC: 29 U/L (ref 1–41)
ANION GAP SERPL CALCULATED.3IONS-SCNC: 10 MMOL/L (ref 5–15)
AST SERPL-CCNC: 12 U/L (ref 1–40)
BILIRUB SERPL-MCNC: 0.9 MG/DL (ref 0–1.2)
BUN SERPL-MCNC: 19 MG/DL (ref 8–23)
BUN/CREAT SERPL: 13.3 (ref 7–25)
CALCIUM SPEC-SCNC: 8.8 MG/DL (ref 8.6–10.5)
CHLORIDE SERPL-SCNC: 107 MMOL/L (ref 98–107)
CHOLEST SERPL-MCNC: 94 MG/DL (ref 0–200)
CO2 SERPL-SCNC: 25 MMOL/L (ref 22–29)
CREAT SERPL-MCNC: 1.43 MG/DL (ref 0.76–1.27)
EGFRCR SERPLBLD CKD-EPI 2021: 49.5 ML/MIN/1.73
GLOBULIN UR ELPH-MCNC: 2.3 GM/DL
GLUCOSE SERPL-MCNC: 54 MG/DL (ref 65–99)
HBA1C MFR BLD: 6.8 % (ref 4.8–5.6)
HDLC SERPL-MCNC: 35 MG/DL (ref 40–60)
LDLC SERPL CALC-MCNC: 49 MG/DL (ref 0–100)
LDLC/HDLC SERPL: 1.48 {RATIO}
POTASSIUM SERPL-SCNC: 4.2 MMOL/L (ref 3.5–5.2)
PROT SERPL-MCNC: 6.2 G/DL (ref 6–8.5)
SODIUM SERPL-SCNC: 142 MMOL/L (ref 136–145)
TRIGL SERPL-MCNC: 36 MG/DL (ref 0–150)
TSH SERPL DL<=0.05 MIU/L-ACNC: 0.73 UIU/ML (ref 0.27–4.2)
VLDLC SERPL-MCNC: 10 MG/DL (ref 5–40)

## 2024-05-28 PROCEDURE — 84443 ASSAY THYROID STIM HORMONE: CPT | Performed by: NURSE PRACTITIONER

## 2024-05-28 PROCEDURE — 83036 HEMOGLOBIN GLYCOSYLATED A1C: CPT | Performed by: NURSE PRACTITIONER

## 2024-05-28 PROCEDURE — 99214 OFFICE O/P EST MOD 30 MIN: CPT | Performed by: NURSE PRACTITIONER

## 2024-05-28 PROCEDURE — 1126F AMNT PAIN NOTED NONE PRSNT: CPT | Performed by: NURSE PRACTITIONER

## 2024-05-28 PROCEDURE — 80053 COMPREHEN METABOLIC PANEL: CPT | Performed by: NURSE PRACTITIONER

## 2024-05-28 PROCEDURE — 36415 COLL VENOUS BLD VENIPUNCTURE: CPT

## 2024-05-28 PROCEDURE — 80061 LIPID PANEL: CPT | Performed by: NURSE PRACTITIONER

## 2024-05-28 PROCEDURE — 93005 ELECTROCARDIOGRAM TRACING: CPT | Performed by: NURSE PRACTITIONER

## 2024-05-28 PROCEDURE — 3075F SYST BP GE 130 - 139MM HG: CPT | Performed by: NURSE PRACTITIONER

## 2024-05-28 PROCEDURE — 3078F DIAST BP <80 MM HG: CPT | Performed by: NURSE PRACTITIONER

## 2024-05-28 RX ORDER — ATORVASTATIN CALCIUM 80 MG/1
80 TABLET, FILM COATED ORAL DAILY
Qty: 90 TABLET | Refills: 1 | Status: SHIPPED | OUTPATIENT
Start: 2024-05-28

## 2024-05-28 RX ORDER — LISINOPRIL 10 MG/1
10 TABLET ORAL 2 TIMES DAILY
Qty: 180 TABLET | Refills: 1 | Status: SHIPPED | OUTPATIENT
Start: 2024-05-28

## 2024-05-28 RX ORDER — LEVOTHYROXINE SODIUM 0.1 MG/1
100 TABLET ORAL DAILY
Qty: 90 TABLET | Refills: 1 | Status: SHIPPED | OUTPATIENT
Start: 2024-05-28

## 2024-05-28 RX ORDER — SPIRONOLACTONE 25 MG/1
25 TABLET ORAL DAILY
Qty: 90 TABLET | Refills: 1 | Status: SHIPPED | OUTPATIENT
Start: 2024-05-28

## 2024-05-28 RX ORDER — CARVEDILOL 12.5 MG/1
12.5 TABLET ORAL 2 TIMES DAILY
Qty: 180 TABLET | Refills: 1 | Status: SHIPPED | OUTPATIENT
Start: 2024-05-28

## 2024-05-28 NOTE — PROGRESS NOTES
Subjective   Shai Mata is a 79 y.o. male.     Diabetes  He presents for his follow-up diabetic visit. He has type 2 diabetes mellitus. His disease course has been stable. There are no hypoglycemic associated symptoms. Pertinent negatives for hypoglycemia include no confusion, dizziness, headaches, hunger, mood changes, nervousness/anxiousness, pallor, seizures, sleepiness, speech difficulty, sweats or tremors. There are no diabetic associated symptoms. Pertinent negatives for diabetes include no blurred vision, no chest pain, no fatigue, no foot paresthesias, no foot ulcerations, no polydipsia, no polyphagia, no polyuria, no visual change, no weakness and no weight loss. There are no hypoglycemic complications. Symptoms are stable. Diabetic complications include nephropathy. (Following nephrology with no recent changes  ) Risk factors for coronary artery disease include sedentary lifestyle, male sex, hypertension, family history and dyslipidemia. Current diabetic treatment includes insulin injections and oral agent (dual therapy). He is compliant with treatment all of the time. His weight is stable. He is following a generally healthy diet. Meal planning includes avoidance of concentrated sweets and carbohydrate counting. He participates in exercise intermittently. His home blood glucose trend is fluctuating minimally. His breakfast blood glucose range is generally 130-140 mg/dl. His overall blood glucose range is 140-180 mg/dl. An ACE inhibitor/angiotensin II receptor blocker is being taken. He does not see a podiatrist.  Hyperlipidemia  This is a chronic problem. The current episode started more than 1 year ago. Recent lipid tests were reviewed and are variable. Exacerbating diseases include diabetes and hypothyroidism. Associated symptoms include shortness of breath (has noted more short winded with activity.  No chest pain lightheadedness or dizziness). Pertinent negatives include no chest pain, focal  sensory loss, focal weakness, leg pain or myalgias. Current antihyperlipidemic treatment includes statins. The current treatment provides significant improvement of lipids. There are no compliance problems.  Risk factors for coronary artery disease include diabetes mellitus, family history, dyslipidemia, obesity and hypertension.   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 Following closely with cardiology.  Denies any recent changes.) problem. The current episode started more than 1 year ago. The problem is unchanged (Denies any recent concerns.  Feels he is doing well). The problem is controlled. Associated symptoms include shortness of breath (has noted more short winded with activity.  No chest pain lightheadedness or dizziness). Pertinent negatives include no anxiety, blurred vision, chest pain, headaches, malaise/fatigue, neck pain, orthopnea, peripheral edema, PND or sweats. Risk factors for coronary artery disease include dyslipidemia, family history and sedentary lifestyle. Current antihypertension treatment includes ACE inhibitors, beta blockers and diuretics. The current treatment provides significant improvement. There are no compliance problems.  Hypertensive end-organ damage includes CAD/MI. Identifiable causes of hypertension include a thyroid problem.   Thyroid Problem  Presents for follow-up (Known hypothyroidism) visit. Patient reports no anxiety, fatigue, tremors, visual change or weight loss. The symptoms have been stable (tolerating synthroid). His past medical history is significant for diabetes and hyperlipidemia.      The following portions of the patient's history were reviewed and updated as appropriate: allergies, current medications, past family history, past medical history, past social history, past surgical history and problem list.      Review of Systems   Constitutional: Negative.  Negative for activity change, fatigue,  malaise/fatigue and weight loss.        States overall he is feeling fairly well     HENT: Negative.     Eyes:  Negative for blurred vision.   Respiratory:  Positive for shortness of breath (has noted more short winded with activity.  No chest pain lightheadedness or dizziness).    Cardiovascular: Negative.  Negative for chest pain, orthopnea and PND.   Gastrointestinal: Negative.    Endocrine: Negative.  Negative for polydipsia, polyphagia and polyuria.   Musculoskeletal: Negative.  Negative for myalgias and neck pain.   Skin: Negative.  Negative for pallor.   Neurological: Negative.  Negative for dizziness, tremors, focal weakness, seizures, speech difficulty, weakness and headaches.   Psychiatric/Behavioral: Negative.  Negative for confusion. The patient is not nervous/anxious.    All other systems reviewed and are negative.        ECG 12 Lead    Date/Time: 5/28/2024 11:52 AM  Performed by: Ekaterina Cox APRN    Authorized by: Ekaterina Cox APRN  Rhythm: paced  Rate: normal  BPM: 69  Pacing: ventricular paced rhythm  Clinical impression: non-specific ECG          Objective   Physical Exam   Constitutional: He is oriented to person, place, and time. He appears well-developed. No distress.   HENT:   Head: Normocephalic.   Eyes: Conjunctivae are normal. Right eye exhibits no discharge. Left eye exhibits no discharge.   Cardiovascular: Normal rate, regular rhythm and normal heart sounds.   No murmur heard.  Pulmonary/Chest: Effort normal and breath sounds normal. No respiratory distress. He has no wheezes.   Musculoskeletal:      Right lower leg: No edema.      Left lower leg: No edema.   Neurological: He is alert and oriented to person, place, and time.   Skin: Skin is warm and dry. He is not diaphoretic.   Psychiatric: His behavior is normal.   Nursing note and vitals reviewed.       During this visit the following were done:  Labs Reviewed [x]    Labs Ordered [x]    Radiology Reports Reviewed []    Radiology Ordered []     PCP Records Reviewed []    Referring Provider Records Reviewed []    ER Records Reviewed []    Hospital Records Reviewed []    History Obtained From Family []    Radiology Images Reviewed []    Other Reviewed [x]    Records Requested []      Diagnoses and all orders for this visit:    1. Type 2 diabetes mellitus with stage 3a chronic kidney disease, with long-term current use of insulin (Primary)  -     Comprehensive Metabolic Panel; Future  -     Lipid Panel; Future  -     Hemoglobin A1c; Future  -     TSH; Future  -     insulin NPH (humuLIN N,novoLIN N) 100 UNIT/ML injection; Inject 22 Units under the skin into the appropriate area as directed 2 (Two) Times a Day Before Meals.  Dispense: 10 mL; Refill: 11  -     glyburide micronized (GLYNASE) 1.5 MG tablet; Take 1 tablet by mouth 2 (Two) Times a Day Before Meals for 90 days.  Dispense: 180 tablet; Refill: 1    2. Exertional shortness of breath  Will schedule back with cardiology     3. Chronic systolic congestive heart failure  -     carvedilol (COREG) 12.5 MG tablet; Take 1 tablet by mouth 2 (Two) Times a Day.  Dispense: 180 tablet; Refill: 1    4. Hyperlipemia, mixed  -     atorvastatin (LIPITOR) 80 MG tablet; Take 1 tablet by mouth Daily.  Dispense: 90 tablet; Refill: 1    5. Essential hypertension  -     spironolactone (ALDACTONE) 25 MG tablet; Take 1 tablet by mouth Daily.  Dispense: 90 tablet; Refill: 1  -     lisinopril (PRINIVIL,ZESTRIL) 10 MG tablet; Take 1 tablet by mouth 2 (Two) Times a Day.  Dispense: 180 tablet; Refill: 1  -     carvedilol (COREG) 12.5 MG tablet; Take 1 tablet by mouth 2 (Two) Times a Day.  Dispense: 180 tablet; Refill: 1    6. Other specified hypothyroidism  -     levothyroxine (Synthroid) 100 MCG tablet; Take 1 tablet by mouth Daily.  Dispense: 90 tablet; Refill: 1          Patient's Body mass index is 27.64 kg/m². indicating that he is overweight (BMI 25-29.9). Patient's (Body mass index is 27.64 kg/m².) indicates that they are  overweight with health conditions that include hypertension, coronary heart disease, diabetes mellitus and dyslipidemias . Weight is unchanged. BMI is is above average; BMI management plan is completed. We discussed portion control and increasing exercise. .

## 2024-05-29 ENCOUNTER — TELEPHONE (OUTPATIENT)
Dept: FAMILY MEDICINE CLINIC | Facility: CLINIC | Age: 79
End: 2024-05-29
Payer: MEDICARE

## 2024-05-29 NOTE — TELEPHONE ENCOUNTER
Ekaterina Cox APRN [facility]  Let patient know sugar has significant improvements.  Kidneys are back with lower function again.  Would he be willing to visit with nephrology to see if we can make any other changes.

## 2024-05-30 ENCOUNTER — OFFICE VISIT (OUTPATIENT)
Dept: CARDIOLOGY | Facility: CLINIC | Age: 79
End: 2024-05-30
Payer: MEDICARE

## 2024-05-30 VITALS
SYSTOLIC BLOOD PRESSURE: 121 MMHG | WEIGHT: 190.2 LBS | HEART RATE: 74 BPM | OXYGEN SATURATION: 98 % | DIASTOLIC BLOOD PRESSURE: 70 MMHG | HEIGHT: 69 IN | BODY MASS INDEX: 28.17 KG/M2

## 2024-05-30 DIAGNOSIS — I25.10 ASCVD (ARTERIOSCLEROTIC CARDIOVASCULAR DISEASE): Primary | ICD-10-CM

## 2024-05-30 DIAGNOSIS — Z95.1 S/P CABG X 5: ICD-10-CM

## 2024-05-30 DIAGNOSIS — R06.09 DYSPNEA ON EXERTION: ICD-10-CM

## 2024-05-30 DIAGNOSIS — I10 PRIMARY HYPERTENSION: ICD-10-CM

## 2024-05-30 PROCEDURE — 1160F RVW MEDS BY RX/DR IN RCRD: CPT | Performed by: PHYSICIAN ASSISTANT

## 2024-05-30 PROCEDURE — 99214 OFFICE O/P EST MOD 30 MIN: CPT | Performed by: PHYSICIAN ASSISTANT

## 2024-05-30 PROCEDURE — 3074F SYST BP LT 130 MM HG: CPT | Performed by: PHYSICIAN ASSISTANT

## 2024-05-30 PROCEDURE — 1159F MED LIST DOCD IN RCRD: CPT | Performed by: PHYSICIAN ASSISTANT

## 2024-05-30 PROCEDURE — 3078F DIAST BP <80 MM HG: CPT | Performed by: PHYSICIAN ASSISTANT

## 2024-05-30 NOTE — PROGRESS NOTES
Ekaterina Cox, ANEUDY  Shai Mata  1945 05/30/2024    Patient Active Problem List   Diagnosis    ASCVD (arteriosclerotic cardiovascular disease)    S/P CABG x 5    HTN (hypertension)    PVC's (premature ventricular contractions)    Type 2 diabetes mellitus with hyperglycemia, with long-term current use of insulin    Hyperlipemia, mixed    Ischemic cardiomyopathy    Pain of upper abdomen    Cholecystitis    Chronic systolic congestive heart failure    Ventricular tachycardia (paroxysmal)    Long term current use of antiarrhythmic medical therapy    Other specified hypothyroidism    Esophageal dysphagia       Dear Ekaterina Cox, ANEUDY:    Subjective     History of Present Illness:    Chief Complaint   Patient presents with    Follow-up     routine    Shortness of Breath     With activity       Shai Mata is a pleasant 79 y.o. male with a past medical history significant for ischemic cardiomyopathy with most recent EF 45%, chronic systolic congestive heart failure, history of ventricular tachycardia status post ICD implantation on amiodarone, ASCVD status post four-vessel CABG, diabetes mellitus type 2, hypertension, and dyslipidemia.  He presents today for routine cardiology follow-up.      Shai denies any chest pains today but does report some dyspnea that started about 3 months. He states that he is more short of breath when he bends over.  He also reports shortness of breath on exertion particularly walking up any incline will make him really short of breath.  He denies any orthopnea, PND, or pedal edema.       No Known Allergies:      Current Outpatient Medications:     amiodarone (PACERONE) 200 MG tablet, Take 0.5 tablets by mouth Daily., Disp: 45 tablet, Rfl: 2    aspirin 81 MG EC tablet, Take 1 tablet by mouth 2 (Two) Times a Day., Disp: , Rfl:     atorvastatin (LIPITOR) 80 MG tablet, Take 1 tablet by mouth Daily., Disp: 90 tablet, Rfl: 1    carvedilol (COREG) 12.5 MG tablet, Take 1 tablet by mouth 2 (Two)  "Times a Day., Disp: 180 tablet, Rfl: 1    glyburide micronized (GLYNASE) 1.5 MG tablet, Take 1 tablet by mouth 2 (Two) Times a Day Before Meals for 90 days., Disp: 180 tablet, Rfl: 1    insulin NPH (humuLIN N,novoLIN N) 100 UNIT/ML injection, Inject 22 Units under the skin into the appropriate area as directed 2 (Two) Times a Day Before Meals., Disp: 10 mL, Rfl: 11    Insulin Pen Needle (Pen Needles) 32G X 4 MM misc, 1 each 3 (Three) Times a Day., Disp: 100 each, Rfl: 2    levothyroxine (Synthroid) 100 MCG tablet, Take 1 tablet by mouth Daily., Disp: 90 tablet, Rfl: 1    lisinopril (PRINIVIL,ZESTRIL) 10 MG tablet, Take 1 tablet by mouth 2 (Two) Times a Day., Disp: 180 tablet, Rfl: 1    pantoprazole (Protonix) 40 MG EC tablet, Take 1 tablet by mouth Daily., Disp: 90 tablet, Rfl: 3    spironolactone (ALDACTONE) 25 MG tablet, Take 1 tablet by mouth Daily., Disp: 90 tablet, Rfl: 1    The following portions of the patient's history were reviewed and updated as appropriate: allergies, current medications, past family history, past medical history, past social history, past surgical history and problem list.    Social History     Tobacco Use    Smoking status: Former     Current packs/day: 0.00     Average packs/day: 2.0 packs/day for 20.0 years (40.0 ttl pk-yrs)     Types: Cigarettes     Start date:      Quit date:      Years since quittin.4    Smokeless tobacco: Former     Types: Chew     Quit date:    Vaping Use    Vaping status: Never Used   Substance Use Topics    Alcohol use: No    Drug use: No         Objective   Vitals:    24 1118   BP: 121/70   Pulse: 74   SpO2: 98%   Weight: 86.3 kg (190 lb 3.2 oz)   Height: 175.3 cm (69\")     Body mass index is 28.09 kg/m².    ROS    Constitutional:       General: Not in acute distress.     Appearance: Healthy appearance. Well-developed and not in distress. Not diaphoretic.   Eyes:      Conjunctiva/sclera: Conjunctivae normal.      Pupils: Pupils are " equal, round, and reactive to light.   HENT:      Head: Normocephalic and atraumatic.   Neck:      Vascular: No carotid bruit or JVD.   Pulmonary:      Effort: Pulmonary effort is normal. No respiratory distress.      Breath sounds: Normal breath sounds.   Cardiovascular:      Normal rate. Regular rhythm.      Murmurs: There is a grade 2/6 harsh midsystolic murmur at the URSB, radiating to the neck.   Edema:     Peripheral edema absent.   Skin:     General: Skin is cool.   Neurological:      Mental Status: Alert, oriented to person, place, and time and oriented to person, place and time.         Lab Results   Component Value Date     05/28/2024    K 4.2 05/28/2024     05/28/2024    CO2 25.0 05/28/2024    BUN 19 05/28/2024    CREATININE 1.43 (H) 05/28/2024    GLUCOSE 54 (L) 05/28/2024    CALCIUM 8.8 05/28/2024    AST 12 05/28/2024    ALT 29 05/28/2024    ALKPHOS 87 05/28/2024    LABIL2 1.6 08/25/2016     Lab Results   Component Value Date    CKTOTAL 82 04/26/2018     Lab Results   Component Value Date    WBC 8.73 11/29/2022    HGB 11.5 (L) 11/29/2022    HCT 36.2 (L) 11/29/2022     11/29/2022     Lab Results   Component Value Date    INR 1.11 (H) 04/26/2018    INR 1.06 11/19/2017    INR 1.00 01/11/2016     Lab Results   Component Value Date    MG 1.9 06/13/2023     Lab Results   Component Value Date    TSH 0.733 05/28/2024    PSA 0.833 05/13/2020    CHLPL 133 11/28/2017    TRIG 36 05/28/2024    HDL 35 (L) 05/28/2024    LDL 49 05/28/2024      Lab Results   Component Value Date    BNP 26.0 04/26/2018       During this visit the following were done:  Labs Reviewed []    Labs Ordered []    Radiology Reports Reviewed []    Radiology Ordered []    PCP Records Reviewed []    Referring Provider Records Reviewed []    ER Records Reviewed []    Hospital Records Reviewed []    History Obtained From Family []    Radiology Images Reviewed []    Other Reviewed []    Records Requested []        Procedures    Assessment & Plan    Diagnosis Plan   1. ASCVD (arteriosclerotic cardiovascular disease)        2. Primary hypertension        3. S/P CABG x 5        4. Dyspnea on exertion  Adult Transthoracic Echo Complete W/ Cont if Necessary Per Protocol    Complete PFT - Pre & Post Bronchodilator               Recommendations:  Dyspnea on exertion  Will rule out new structural heart disease with echocardiogram.  Since he is on amiodarone therapy will order PFT.  If both of these are unremarkable we will proceed with ischemic evaluation.  In the past he did have upper back pain as presenting symptom and currently is not having any pain at all.  Ischemic cardiomyopathy  He does appear euvolemic today lungs were clear on auscultation no pedal edema noted.  Continue with GDMT with Lipitor, Coreg, lisinopril, spironolactone.  History of VT  Following with EP patient with ICD currently on amiodarone therapy.    Return in about 4 weeks (around 6/27/2024).    As always, I appreciate very much the opportunity to participate in the cardiovascular care of your patients.      With Best Regards,    Erik Livingston PA-C

## 2024-06-03 DIAGNOSIS — Z79.4 TYPE 2 DIABETES MELLITUS WITH STAGE 3A CHRONIC KIDNEY DISEASE, WITH LONG-TERM CURRENT USE OF INSULIN: Primary | ICD-10-CM

## 2024-06-03 DIAGNOSIS — N18.31 TYPE 2 DIABETES MELLITUS WITH STAGE 3A CHRONIC KIDNEY DISEASE, WITH LONG-TERM CURRENT USE OF INSULIN: Primary | ICD-10-CM

## 2024-06-03 DIAGNOSIS — E11.22 TYPE 2 DIABETES MELLITUS WITH STAGE 3A CHRONIC KIDNEY DISEASE, WITH LONG-TERM CURRENT USE OF INSULIN: Primary | ICD-10-CM

## 2024-06-17 ENCOUNTER — HOSPITAL ENCOUNTER (OUTPATIENT)
Dept: RESPIRATORY THERAPY | Facility: HOSPITAL | Age: 79
Discharge: HOME | End: 2024-06-17
Payer: MEDICARE

## 2024-06-17 DIAGNOSIS — R06.09 DYSPNEA ON EXERTION: ICD-10-CM

## 2024-06-17 PROCEDURE — 94060 EVALUATION OF WHEEZING: CPT

## 2024-06-17 PROCEDURE — 94726 PLETHYSMOGRAPHY LUNG VOLUMES: CPT

## 2024-06-17 PROCEDURE — 94640 AIRWAY INHALATION TREATMENT: CPT

## 2024-06-17 PROCEDURE — 94729 DIFFUSING CAPACITY: CPT

## 2024-06-17 RX ORDER — ALBUTEROL SULFATE 2.5 MG/3ML
2.5 SOLUTION RESPIRATORY (INHALATION) ONCE
Status: COMPLETED | OUTPATIENT
Start: 2024-06-17 | End: 2024-06-17

## 2024-06-17 RX ADMIN — ALBUTEROL SULFATE 2.5 MG: 2.5 SOLUTION RESPIRATORY (INHALATION) at 11:56

## 2024-06-18 ENCOUNTER — HOSPITAL ENCOUNTER (OUTPATIENT)
Dept: CARDIOLOGY | Facility: HOSPITAL | Age: 79
Discharge: HOME | End: 2024-06-18
Payer: MEDICARE

## 2024-06-18 DIAGNOSIS — R06.09 DYSPNEA ON EXERTION: ICD-10-CM

## 2024-06-18 PROCEDURE — 93306 TTE W/DOPPLER COMPLETE: CPT

## 2024-06-23 LAB
BH CV ECHO MEAS - ACS: 0.9 CM
BH CV ECHO MEAS - AI P1/2T: 403.6 MSEC
BH CV ECHO MEAS - AO MAX PG: 21.1 MMHG
BH CV ECHO MEAS - AO MEAN PG: 11.5 MMHG
BH CV ECHO MEAS - AO ROOT DIAM: 3.6 CM
BH CV ECHO MEAS - AO V2 MAX: 229.5 CM/SEC
BH CV ECHO MEAS - AO V2 VTI: 47.8 CM
BH CV ECHO MEAS - AVA(I,D): 2.29 CM2
BH CV ECHO MEAS - EDV(CUBED): 175 ML
BH CV ECHO MEAS - EDV(MOD-SP4): 150 ML
BH CV ECHO MEAS - EF(MOD-SP4): 50 %
BH CV ECHO MEAS - ESV(CUBED): 102.1 ML
BH CV ECHO MEAS - ESV(MOD-SP4): 75 ML
BH CV ECHO MEAS - FS: 16.4 %
BH CV ECHO MEAS - IVS/LVPW: 0.95 CM
BH CV ECHO MEAS - IVSD: 1.16 CM
BH CV ECHO MEAS - LA DIMENSION: 4.6 CM
BH CV ECHO MEAS - LAT PEAK E' VEL: 4.9 CM/SEC
BH CV ECHO MEAS - LV DIASTOLIC VOL/BSA (35-75): 74.2 CM2
BH CV ECHO MEAS - LV MASS(C)D: 275.2 GRAMS
BH CV ECHO MEAS - LV MAX PG: 7.1 MMHG
BH CV ECHO MEAS - LV MEAN PG: 4 MMHG
BH CV ECHO MEAS - LV SYSTOLIC VOL/BSA (12-30): 37.1 CM2
BH CV ECHO MEAS - LV V1 MAX: 133 CM/SEC
BH CV ECHO MEAS - LV V1 VTI: 26.4 CM
BH CV ECHO MEAS - LVIDD: 5.6 CM
BH CV ECHO MEAS - LVIDS: 4.7 CM
BH CV ECHO MEAS - LVOT AREA: 4.2 CM2
BH CV ECHO MEAS - LVOT DIAM: 2.3 CM
BH CV ECHO MEAS - LVPWD: 1.21 CM
BH CV ECHO MEAS - MED PEAK E' VEL: 4.7 CM/SEC
BH CV ECHO MEAS - MR MAX PG: 142.1 MMHG
BH CV ECHO MEAS - MR MAX VEL: 596 CM/SEC
BH CV ECHO MEAS - MR MEAN PG: 74 MMHG
BH CV ECHO MEAS - MR MEAN VEL: 390 CM/SEC
BH CV ECHO MEAS - MR VTI: 178 CM
BH CV ECHO MEAS - MV A MAX VEL: 45.3 CM/SEC
BH CV ECHO MEAS - MV E MAX VEL: 116 CM/SEC
BH CV ECHO MEAS - MV E/A: 2.6
BH CV ECHO MEAS - PA ACC TIME: 0.11 SEC
BH CV ECHO MEAS - RAP SYSTOLE: 10 MMHG
BH CV ECHO MEAS - RVSP: 57.1 MMHG
BH CV ECHO MEAS - SV(LVOT): 109.7 ML
BH CV ECHO MEAS - SV(MOD-SP4): 75 ML
BH CV ECHO MEAS - SVI(LVOT): 54.3 ML/M2
BH CV ECHO MEAS - SVI(MOD-SP4): 37.1 ML/M2
BH CV ECHO MEAS - TAPSE (>1.6): 1.58 CM
BH CV ECHO MEAS - TR MAX PG: 47.1 MMHG
BH CV ECHO MEAS - TR MAX VEL: 343 CM/SEC
BH CV ECHO MEASUREMENTS AVERAGE E/E' RATIO: 24.17
LEFT ATRIUM VOLUME INDEX: 32.4 ML/M2

## 2024-06-26 DIAGNOSIS — J98.4 RESTRICTIVE LUNG DISEASE: Primary | ICD-10-CM

## 2024-07-02 ENCOUNTER — OFFICE VISIT (OUTPATIENT)
Dept: CARDIOLOGY | Facility: CLINIC | Age: 79
End: 2024-07-02
Payer: MEDICARE

## 2024-07-02 VITALS
HEART RATE: 70 BPM | BODY MASS INDEX: 29.13 KG/M2 | OXYGEN SATURATION: 96 % | RESPIRATION RATE: 16 BRPM | DIASTOLIC BLOOD PRESSURE: 62 MMHG | WEIGHT: 192.2 LBS | SYSTOLIC BLOOD PRESSURE: 122 MMHG | HEIGHT: 68 IN

## 2024-07-02 DIAGNOSIS — I25.5 ISCHEMIC CARDIOMYOPATHY: ICD-10-CM

## 2024-07-02 DIAGNOSIS — I25.10 ASCVD (ARTERIOSCLEROTIC CARDIOVASCULAR DISEASE): Primary | ICD-10-CM

## 2024-07-02 DIAGNOSIS — I27.20 SEVERE PULMONARY HYPERTENSION: ICD-10-CM

## 2024-07-02 DIAGNOSIS — I34.0 SEVERE MITRAL REGURGITATION BY PRIOR ECHOCARDIOGRAPHY: ICD-10-CM

## 2024-07-02 DIAGNOSIS — Z95.810 PRESENCE OF BIVENTRICULAR IMPLANTABLE CARDIOVERTER-DEFIBRILLATOR (ICD): ICD-10-CM

## 2024-07-02 PROCEDURE — 99214 OFFICE O/P EST MOD 30 MIN: CPT | Performed by: INTERNAL MEDICINE

## 2024-07-02 PROCEDURE — 3074F SYST BP LT 130 MM HG: CPT | Performed by: INTERNAL MEDICINE

## 2024-07-02 PROCEDURE — 3078F DIAST BP <80 MM HG: CPT | Performed by: INTERNAL MEDICINE

## 2024-07-02 NOTE — LETTER
July 5, 2024     ANEUDY Carrizales  [address] [place] [address]    Patient: Shai Mata   YOB: 1945   Date of Visit: 7/2/2024     Dear ANEUDY Carrizales:       Thank you for referring Shai Mata to me for evaluation. Below are the relevant portions of my assessment and plan of care.    If you have questions, please do not hesitate to call me. I look forward to following Shai along with you.         Sincerely,        Renan Villegas MD        CC: No Recipients    Renan Villegas MD  07/05/24 1218  Sign when Signing Visit  Ekaterina Cox APRN  Shai Mata  1945 07/02/2024    Patient Active Problem List   Diagnosis   • ASCVD (arteriosclerotic cardiovascular disease)   • S/P CABG x 5   • HTN (hypertension)   • PVC's (premature ventricular contractions)   • Type 2 diabetes mellitus with hyperglycemia, with long-term current use of insulin   • Hyperlipemia, mixed   • Ischemic cardiomyopathy   • Pain of upper abdomen   • Cholecystitis   • Chronic systolic congestive heart failure   • Ventricular tachycardia (paroxysmal)   • Long term current use of antiarrhythmic medical therapy   • Other specified hypothyroidism   • Esophageal dysphagia       Dear Ekaterina Cox APRN:    Subjective     Shai Mata is a 79 y.o. male with the problems as listed above, presents    Chief complaint: To discuss the recent echo Doppler study findings.    History of Present Illness: Mr. Shai Maat is a pleasant 79-year-old gentleman with history of known coronary artery disease with previous myocardial infarction, status post CABG 1996 (5%) and history of underlying ischemic cardiomyopathy and previous history of ventricular tachycardia for which she has an ICD implantation 11/27/2017 with a biventricular ICD ([employer] medical device).  On recent echo Doppler study, he was noted to have severe mitral regurgitation, mild to moderate arctic incompetence and moderate tricuspid regurgitation with evidence of severe pulmonary  hypertension.  Is LV systolic function was mildly decreased with LV ejection fraction of 46 to 50% with grade 2 diastolic noncompliance of the left ventricle.  On today's visit, Mr. Mata states that he has been doing all right recently although his daughter who accompanied him today indicates that he has been getting more short of breath recently with exertion.  He currently gets short of breath reportedly walking about 50 to 100 yards.  He has some intermittent orthopnea but no leg edema.  He denies any recent chest pain or discomfort.    Complete Transthoracic Echocardiogram with Complete Doppler and Color Flow    Accession Number: [id number]   Date of Study: 6/18/24   Ordering Provider: Erik Livingston PA-C   Clinical Indications: Dyspnea        [facility]  Performing Staff   Cardiology: Renan Villegas MD    Tech: Taj Thrasher        Clinical Indication  Dyspnea   Dx: Dyspnea on exertion [R06.09 (ICD-10-CM)]     Interpretation Summary   •  The left ventricular cavity is mildly dilated.  •  Left ventricular ejection fraction appears to be 46 - 50%.  •  Left ventricular diastolic function is consistent with (grade II w/high LAP) pseudonormalization.  •  There is mild calcification of the aortic valve. There is mild to moderate thickening of the left coronary and right coronary cusp(s) of the aortic valve  •  Mild to moderate aortic valve regurgitation is present. Mild aortic valve stenosis is present.  •  There is mild, bileaflet mitral valve thickening present. Severe mitral valve regurgitation is present. No significant mitral valve stenosis is present.  •  Moderate tricuspid valve regurgitation is present. Estimated right ventricular systolic pressure from tricuspid regurgitation is markedly elevated (>55 mmHg).  •  There is no evidence of pericardial effusion. .     No Known Allergies:    Current Outpatient Medications:   •  amiodarone (PACERONE) 200 MG tablet, Take 0.5 tablets by mouth Daily.,  Disp: 45 tablet, Rfl: 2  •  aspirin 81 MG EC tablet, Take 1 tablet by mouth 2 (Two) Times a Day., Disp: , Rfl:   •  atorvastatin (LIPITOR) 80 MG tablet, Take 1 tablet by mouth Daily., Disp: 90 tablet, Rfl: 1  •  carvedilol (COREG) 12.5 MG tablet, Take 1 tablet by mouth 2 (Two) Times a Day., Disp: 180 tablet, Rfl: 1  •  glyburide micronized (GLYNASE) 1.5 MG tablet, Take 1 tablet by mouth 2 (Two) Times a Day Before Meals for 90 days., Disp: 180 tablet, Rfl: 1  •  insulin NPH (humuLIN N,novoLIN N) 100 UNIT/ML injection, Inject 22 Units under the skin into the appropriate area as directed 2 (Two) Times a Day Before Meals., Disp: 10 mL, Rfl: 11  •  Insulin Pen Needle (Pen Needles) 32G X 4 MM misc, 1 each 3 (Three) Times a Day., Disp: 100 each, Rfl: 2  •  levothyroxine (Synthroid) 100 MCG tablet, Take 1 tablet by mouth Daily., Disp: 90 tablet, Rfl: 1  •  lisinopril (PRINIVIL,ZESTRIL) 10 MG tablet, Take 1 tablet by mouth 2 (Two) Times a Day., Disp: 180 tablet, Rfl: 1  •  pantoprazole (Protonix) 40 MG EC tablet, Take 1 tablet by mouth Daily., Disp: 90 tablet, Rfl: 3  •  spironolactone (ALDACTONE) 25 MG tablet, Take 1 tablet by mouth Daily., Disp: 90 tablet, Rfl: 1    The following portions of the patient's history were reviewed and updated as appropriate: allergies, current medications, past family history, past medical history, past social history, past surgical history and problem list.    Social History     Tobacco Use   • Smoking status: Former     Current packs/day: 0.00     Average packs/day: 2.0 packs/day for 20.0 years (40.0 ttl pk-yrs)     Types: Cigarettes     Start date:      Quit date:      Years since quittin.5   • Smokeless tobacco: Former     Types: Chew     Quit date:    Vaping Use   • Vaping status: Never Used   Substance Use Topics   • Alcohol use: No   • Drug use: No     Review of Systems   Constitutional: Negative for chills and fever.   HENT:  Negative for nosebleeds and sore throat.   "  Respiratory:  Positive for shortness of breath. Negative for cough, hemoptysis and wheezing.    Gastrointestinal:  Negative for abdominal pain, hematemesis, hematochezia, melena, nausea and vomiting.   Genitourinary:  Negative for dysuria and hematuria.   Neurological:  Negative for headaches.     Objective   Vitals:    07/02/24 1301   BP: 122/62   Pulse: 70   Resp: 16   SpO2: 96%   Weight: 87.2 kg (192 lb 3.2 oz)   Height: 172.7 cm (68\")     Body mass index is 29.22 kg/m².    Vitals reviewed.   Constitutional:       Appearance: Well-developed.   Eyes:      Conjunctiva/sclera: Conjunctivae normal.   HENT:      Head: Normocephalic.   Neck:      Thyroid: No thyromegaly.      Vascular: No JVD.      Trachea: No tracheal deviation.   Pulmonary:      Effort: No respiratory distress.      Breath sounds: Normal breath sounds. No wheezing. No rales.   Cardiovascular:      PMI at left midclavicular line. Normal rate. Regular rhythm. Normal S1. Normal S2.       Murmurs: There is a grade 3/6 high frequency blowing holosystolic murmur at the apex, radiating to the axilla.      No gallop.  No click. No rub.   Pulses:     Intact distal pulses.   Edema:     Peripheral edema absent.   Abdominal:      General: Bowel sounds are normal.      Palpations: Abdomen is soft. There is no abdominal mass.      Tenderness: There is no abdominal tenderness.   Musculoskeletal:      Cervical back: Normal range of motion and neck supple. Skin:     General: Skin is warm and dry.   Neurological:      Mental Status: Alert and oriented to person, place, and time.      Cranial Nerves: No cranial nerve deficit.       Lab Results   Component Value Date     05/28/2024    K 4.2 05/28/2024     05/28/2024    CO2 25.0 05/28/2024    BUN 19 05/28/2024    CREATININE 1.43 (H) 05/28/2024    GLUCOSE 54 (L) 05/28/2024    CALCIUM 8.8 05/28/2024    AST 12 05/28/2024    ALT 29 05/28/2024    ALKPHOS 87 05/28/2024    LABIL2 1.6 08/25/2016     Lab Results "   Component Value Date    CKTOTAL 82 04/26/2018     Lab Results   Component Value Date    WBC 8.73 11/29/2022    HGB 11.5 (L) 11/29/2022    HCT 36.2 (L) 11/29/2022     11/29/2022     Lab Results   Component Value Date    INR 1.11 (H) 04/26/2018    INR 1.06 11/19/2017    INR 1.00 01/11/2016     Lab Results   Component Value Date    MG 1.9 06/13/2023     Lab Results   Component Value Date    TSH 0.733 05/28/2024    PSA 0.833 05/13/2020    CHLPL 133 11/28/2017    TRIG 36 05/28/2024    HDL 35 (L) 05/28/2024    LDL 49 05/28/2024      Lab Results   Component Value Date    BNP 26.0 04/26/2018       Assessment & Plan    Diagnosis Plan   1. ASCVD (arteriosclerotic cardiovascular disease), s/p CABG (5V) 1996, clinically asymptomatic and stable        2. With LV ejection fraction about 46 to 50% on recent echo Doppler study on 6/18/2024        3. Severe mitral regurgitation by prior echocardiography noted on echo Doppler study on 6/18/2024.  Ambulatory Referral to Cardiology      4. Severe pulmonary hypertension        5. Presence of biventricular implantable cardioverter-defibrillator (ICD)          Recommendations  I have discussed the results of the echo Doppler study with Mr. Rodriguez and 2 of his daughters 1 of whom was present during the visit.  I have discussed about the options of referral to structural cardiologist (Dr. Mason) for further review and evaluation and consideration of mitral valve repair with a MitraClip procedure if deemed to be suitable candidate for this.  Would continue with carvedilol, lisinopril and spironolactone at current doses.  Will obtain the cost of SGLT2 inhibitors (Farxiga/Jardiance) and initiate 1 of these as affordable.    Return in about 2 months (around 9/2/2024).    As always, Ekaterina Cox, ANEUDY  I appreciate very much the opportunity to participate in the cardiovascular care of your patients. Please do not hesitate to call me with any questions with regards to Shai DYER  Esperanza's evaluation and management.       With Best Regards,        Renan Villegas MD, [facility]    Please note that portions of this note were completed with a voice recognition program.

## 2024-07-02 NOTE — PROGRESS NOTES
Ekaterina Cox, APRN  Shai Mata  1945 07/02/2024    Patient Active Problem List   Diagnosis    ASCVD (arteriosclerotic cardiovascular disease)    S/P CABG x 5    HTN (hypertension)    PVC's (premature ventricular contractions)    Type 2 diabetes mellitus with hyperglycemia, with long-term current use of insulin    Hyperlipemia, mixed    Ischemic cardiomyopathy    Pain of upper abdomen    Cholecystitis    Chronic systolic congestive heart failure    Ventricular tachycardia (paroxysmal)    Long term current use of antiarrhythmic medical therapy    Other specified hypothyroidism    Esophageal dysphagia       Dear Ekaterina Cox, APRN:    Subjective     Shai Mata is a 79 y.o. male with the problems as listed above, presents    Chief complaint: To discuss the recent echo Doppler study findings.    History of Present Illness: Mr. Shai Mata is a pleasant 79-year-old gentleman with history of known coronary artery disease with previous myocardial infarction, status post CABG 1996 (5%) and history of underlying ischemic cardiomyopathy and previous history of ventricular tachycardia for which she has an ICD implantation 11/27/2017 with a biventricular ICD ([employer] medical device).  On recent echo Doppler study, he was noted to have severe mitral regurgitation, mild to moderate arctic incompetence and moderate tricuspid regurgitation with evidence of severe pulmonary hypertension.  Is LV systolic function was mildly decreased with LV ejection fraction of 46 to 50% with grade 2 diastolic noncompliance of the left ventricle.  On today's visit, Mr. Mata states that he has been doing all right recently although his daughter who accompanied him today indicates that he has been getting more short of breath recently with exertion.  He currently gets short of breath reportedly walking about 50 to 100 yards.  He has some intermittent orthopnea but no leg edema.  He denies any recent chest pain or discomfort.    Complete  Transthoracic Echocardiogram with Complete Doppler and Color Flow    Accession Number: [id number]   Date of Study: 6/18/24   Ordering Provider: Erik Livingston PA-C   Clinical Indications: Dyspnea        [facility]  Performing Staff   Cardiology: Renan Villegas MD    Tech: Taj Thrasher        Clinical Indication  Dyspnea   Dx: Dyspnea on exertion [R06.09 (ICD-10-CM)]     Interpretation Summary     The left ventricular cavity is mildly dilated.    Left ventricular ejection fraction appears to be 46 - 50%.    Left ventricular diastolic function is consistent with (grade II w/high LAP) pseudonormalization.    There is mild calcification of the aortic valve. There is mild to moderate thickening of the left coronary and right coronary cusp(s) of the aortic valve    Mild to moderate aortic valve regurgitation is present. Mild aortic valve stenosis is present.    There is mild, bileaflet mitral valve thickening present. Severe mitral valve regurgitation is present. No significant mitral valve stenosis is present.    Moderate tricuspid valve regurgitation is present. Estimated right ventricular systolic pressure from tricuspid regurgitation is markedly elevated (>55 mmHg).    There is no evidence of pericardial effusion. .     No Known Allergies:    Current Outpatient Medications:     amiodarone (PACERONE) 200 MG tablet, Take 0.5 tablets by mouth Daily., Disp: 45 tablet, Rfl: 2    aspirin 81 MG EC tablet, Take 1 tablet by mouth 2 (Two) Times a Day., Disp: , Rfl:     atorvastatin (LIPITOR) 80 MG tablet, Take 1 tablet by mouth Daily., Disp: 90 tablet, Rfl: 1    carvedilol (COREG) 12.5 MG tablet, Take 1 tablet by mouth 2 (Two) Times a Day., Disp: 180 tablet, Rfl: 1    glyburide micronized (GLYNASE) 1.5 MG tablet, Take 1 tablet by mouth 2 (Two) Times a Day Before Meals for 90 days., Disp: 180 tablet, Rfl: 1    insulin NPH (humuLIN N,novoLIN N) 100 UNIT/ML injection, Inject 22 Units under the skin into the  "appropriate area as directed 2 (Two) Times a Day Before Meals., Disp: 10 mL, Rfl: 11    Insulin Pen Needle (Pen Needles) 32G X 4 MM misc, 1 each 3 (Three) Times a Day., Disp: 100 each, Rfl: 2    levothyroxine (Synthroid) 100 MCG tablet, Take 1 tablet by mouth Daily., Disp: 90 tablet, Rfl: 1    lisinopril (PRINIVIL,ZESTRIL) 10 MG tablet, Take 1 tablet by mouth 2 (Two) Times a Day., Disp: 180 tablet, Rfl: 1    pantoprazole (Protonix) 40 MG EC tablet, Take 1 tablet by mouth Daily., Disp: 90 tablet, Rfl: 3    spironolactone (ALDACTONE) 25 MG tablet, Take 1 tablet by mouth Daily., Disp: 90 tablet, Rfl: 1    The following portions of the patient's history were reviewed and updated as appropriate: allergies, current medications, past family history, past medical history, past social history, past surgical history and problem list.    Social History     Tobacco Use    Smoking status: Former     Current packs/day: 0.00     Average packs/day: 2.0 packs/day for 20.0 years (40.0 ttl pk-yrs)     Types: Cigarettes     Start date:      Quit date:      Years since quittin.5    Smokeless tobacco: Former     Types: Chew     Quit date:    Vaping Use    Vaping status: Never Used   Substance Use Topics    Alcohol use: No    Drug use: No     Review of Systems   Constitutional: Negative for chills and fever.   HENT:  Negative for nosebleeds and sore throat.    Respiratory:  Positive for shortness of breath. Negative for cough, hemoptysis and wheezing.    Gastrointestinal:  Negative for abdominal pain, hematemesis, hematochezia, melena, nausea and vomiting.   Genitourinary:  Negative for dysuria and hematuria.   Neurological:  Negative for headaches.     Objective   Vitals:    24 1301   BP: 122/62   Pulse: 70   Resp: 16   SpO2: 96%   Weight: 87.2 kg (192 lb 3.2 oz)   Height: 172.7 cm (68\")     Body mass index is 29.22 kg/m².    Vitals reviewed.   Constitutional:       Appearance: Well-developed.   Eyes:      " Conjunctiva/sclera: Conjunctivae normal.   HENT:      Head: Normocephalic.   Neck:      Thyroid: No thyromegaly.      Vascular: No JVD.      Trachea: No tracheal deviation.   Pulmonary:      Effort: No respiratory distress.      Breath sounds: Normal breath sounds. No wheezing. No rales.   Cardiovascular:      PMI at left midclavicular line. Normal rate. Regular rhythm. Normal S1. Normal S2.       Murmurs: There is a grade 3/6 high frequency blowing holosystolic murmur at the apex, radiating to the axilla.      No gallop.  No click. No rub.   Pulses:     Intact distal pulses.   Edema:     Peripheral edema absent.   Abdominal:      General: Bowel sounds are normal.      Palpations: Abdomen is soft. There is no abdominal mass.      Tenderness: There is no abdominal tenderness.   Musculoskeletal:      Cervical back: Normal range of motion and neck supple. Skin:     General: Skin is warm and dry.   Neurological:      Mental Status: Alert and oriented to person, place, and time.      Cranial Nerves: No cranial nerve deficit.       Lab Results   Component Value Date     05/28/2024    K 4.2 05/28/2024     05/28/2024    CO2 25.0 05/28/2024    BUN 19 05/28/2024    CREATININE 1.43 (H) 05/28/2024    GLUCOSE 54 (L) 05/28/2024    CALCIUM 8.8 05/28/2024    AST 12 05/28/2024    ALT 29 05/28/2024    ALKPHOS 87 05/28/2024    LABIL2 1.6 08/25/2016     Lab Results   Component Value Date    CKTOTAL 82 04/26/2018     Lab Results   Component Value Date    WBC 8.73 11/29/2022    HGB 11.5 (L) 11/29/2022    HCT 36.2 (L) 11/29/2022     11/29/2022     Lab Results   Component Value Date    INR 1.11 (H) 04/26/2018    INR 1.06 11/19/2017    INR 1.00 01/11/2016     Lab Results   Component Value Date    MG 1.9 06/13/2023     Lab Results   Component Value Date    TSH 0.733 05/28/2024    PSA 0.833 05/13/2020    CHLPL 133 11/28/2017    TRIG 36 05/28/2024    HDL 35 (L) 05/28/2024    LDL 49 05/28/2024      Lab Results   Component  Value Date    BNP 26.0 04/26/2018       Assessment & Plan    Diagnosis Plan   1. ASCVD (arteriosclerotic cardiovascular disease), s/p CABG (5V) 1996, clinically asymptomatic and stable        2. With LV ejection fraction about 46 to 50% on recent echo Doppler study on 6/18/2024        3. Severe mitral regurgitation by prior echocardiography noted on echo Doppler study on 6/18/2024.  Ambulatory Referral to Cardiology      4. Severe pulmonary hypertension        5. Presence of biventricular implantable cardioverter-defibrillator (ICD)          Recommendations  I have discussed the results of the echo Doppler study with Mr. Rodriguez and 2 of his daughters 1 of whom was present during the visit.  I have discussed about the options of referral to structural cardiologist (Dr. Mason) for further review and evaluation and consideration of mitral valve repair with a MitraClip procedure if deemed to be suitable candidate for this.  Would continue with carvedilol, lisinopril and spironolactone at current doses.  Will obtain the cost of SGLT2 inhibitors (Farxiga/Jardiance) and initiate 1 of these as affordable.    Return in about 2 months (around 9/2/2024).    As always, Ekaterina Cox, ANEUDY  I appreciate very much the opportunity to participate in the cardiovascular care of your patients. Please do not hesitate to call me with any questions with regards to Shai Mata's evaluation and management.       With Best Regards,        Renan Villegas MD, [facility]    Please note that portions of this note were completed with a voice recognition program.

## 2024-07-08 DIAGNOSIS — I34.0 SEVERE MITRAL REGURGITATION: Primary | ICD-10-CM

## 2024-07-08 NOTE — PROGRESS NOTES
Referral from primary cardiologist for [facility] eval. Pt/family agreeable to consult and treat lesly. Order placed. They are aware that scheduling will be calling to set up. Ayesha Gudino made aware as well.

## 2024-07-15 ENCOUNTER — DOCUMENTATION (OUTPATIENT)
Dept: CARDIOLOGY | Facility: HOSPITAL | Age: 79
End: 2024-07-15
Payer: MEDICARE

## 2024-07-15 NOTE — PROGRESS NOTES
Referral received for severe mitral regurgitation. SHERLEY scheduled with Dr. Mason on 7/30. MitraClip educational folder mailed to daughter's address, my contact information is included.     Will follow up after SHERLEY

## 2024-07-30 ENCOUNTER — HOSPITAL ENCOUNTER (OUTPATIENT)
Facility: HOSPITAL | Age: 79
Setting detail: HOSPITAL OUTPATIENT SURGERY
Discharge: HOME | End: 2024-07-30
Attending: INTERNAL MEDICINE | Admitting: INTERNAL MEDICINE
Payer: MEDICARE

## 2024-07-30 ENCOUNTER — HOSPITAL ENCOUNTER (OUTPATIENT)
Dept: CARDIOLOGY | Facility: HOSPITAL | Age: 79
Discharge: HOME | End: 2024-07-30
Payer: MEDICARE

## 2024-07-30 VITALS
OXYGEN SATURATION: 97 % | WEIGHT: 197.53 LBS | RESPIRATION RATE: 16 BRPM | HEIGHT: 69 IN | BODY MASS INDEX: 29.26 KG/M2 | TEMPERATURE: 96.2 F | HEART RATE: 72 BPM | SYSTOLIC BLOOD PRESSURE: 158 MMHG | DIASTOLIC BLOOD PRESSURE: 72 MMHG

## 2024-07-30 VITALS
HEART RATE: 68 BPM | SYSTOLIC BLOOD PRESSURE: 135 MMHG | DIASTOLIC BLOOD PRESSURE: 85 MMHG | OXYGEN SATURATION: 95 % | RESPIRATION RATE: 20 BRPM

## 2024-07-30 DIAGNOSIS — I25.5 ISCHEMIC CARDIOMYOPATHY: ICD-10-CM

## 2024-07-30 DIAGNOSIS — Z95.1 S/P CABG X 5: ICD-10-CM

## 2024-07-30 DIAGNOSIS — I34.0 SEVERE MITRAL REGURGITATION: ICD-10-CM

## 2024-07-30 DIAGNOSIS — I05.1 RHEUMATIC MITRAL INSUFFICIENCY: ICD-10-CM

## 2024-07-30 DIAGNOSIS — I34.0 NONRHEUMATIC MITRAL VALVE REGURGITATION: Primary | ICD-10-CM

## 2024-07-30 DIAGNOSIS — I25.10 ASCVD (ARTERIOSCLEROTIC CARDIOVASCULAR DISEASE): ICD-10-CM

## 2024-07-30 DIAGNOSIS — I25.10 ASCVD (ARTERIOSCLEROTIC CARDIOVASCULAR DISEASE): Primary | ICD-10-CM

## 2024-07-30 DIAGNOSIS — I20.0 PROGRESSIVE ANGINA: ICD-10-CM

## 2024-07-30 DIAGNOSIS — Z95.1 S/P CABG X 5: Primary | ICD-10-CM

## 2024-07-30 LAB
ANION GAP SERPL CALCULATED.3IONS-SCNC: 8 MMOL/L (ref 5–15)
BH CV ECHO MEAS - AO MAX PG: 17.3 MMHG
BH CV ECHO MEAS - AO MEAN PG: 10 MMHG
BH CV ECHO MEAS - AO V2 MAX: 208 CM/SEC
BH CV ECHO MEAS - AO V2 VTI: 43.9 CM
BH CV ECHO MEAS - AVA(I,D): 1.79 CM2
BH CV ECHO MEAS - LV MAX PG: 2.5 MMHG
BH CV ECHO MEAS - LV MEAN PG: 2 MMHG
BH CV ECHO MEAS - LV V1 MAX: 79.5 CM/SEC
BH CV ECHO MEAS - LV V1 VTI: 17.4 CM
BH CV ECHO MEAS - LVOT AREA: 4.5 CM2
BH CV ECHO MEAS - LVOT DIAM: 2.4 CM
BH CV ECHO MEAS - MV MAX PG: 5.2 MMHG
BH CV ECHO MEAS - MV MEAN PG: 1.75 MMHG
BH CV ECHO MEAS - MV V2 VTI: 33.7 CM
BH CV ECHO MEAS - MVA(VTI): 2.34 CM2
BH CV ECHO MEAS - SV(LVOT): 78.7 ML
BH CV ECHO MEAS - SVI(LVOT): 39.2 ML/M2
BUN SERPL-MCNC: 17 MG/DL (ref 8–23)
BUN/CREAT SERPL: 13.3 (ref 7–25)
CALCIUM SPEC-SCNC: 8.7 MG/DL (ref 8.6–10.5)
CHLORIDE SERPL-SCNC: 111 MMOL/L (ref 98–107)
CHOLEST SERPL-MCNC: 88 MG/DL (ref 0–200)
CO2 SERPL-SCNC: 24 MMOL/L (ref 22–29)
CREAT SERPL-MCNC: 1.28 MG/DL (ref 0.76–1.27)
DEPRECATED RDW RBC AUTO: 58 FL (ref 37–54)
EGFRCR SERPLBLD CKD-EPI 2021: 56.6 ML/MIN/1.73
ERYTHROCYTE [DISTWIDTH] IN BLOOD BY AUTOMATED COUNT: 17.3 % (ref 12.3–15.4)
GLUCOSE BLDC GLUCOMTR-MCNC: 129 MG/DL (ref 70–130)
GLUCOSE SERPL-MCNC: 144 MG/DL (ref 65–99)
HCT VFR BLD AUTO: 33.6 % (ref 37.5–51)
HDLC SERPL-MCNC: 31 MG/DL (ref 40–60)
HGB BLD-MCNC: 10.7 G/DL (ref 13–17.7)
LDLC SERPL CALC-MCNC: 45 MG/DL (ref 0–100)
LDLC/HDLC SERPL: 1.56 {RATIO}
LV EF 2D ECHO EST: 45 %
MCH RBC QN AUTO: 29.2 PG (ref 26.6–33)
MCHC RBC AUTO-ENTMCNC: 31.8 G/DL (ref 31.5–35.7)
MCV RBC AUTO: 91.6 FL (ref 79–97)
PLATELET # BLD AUTO: 182 10*3/MM3 (ref 140–450)
PMV BLD AUTO: 10.4 FL (ref 6–12)
POTASSIUM SERPL-SCNC: 4.3 MMOL/L (ref 3.5–5.2)
RBC # BLD AUTO: 3.67 10*6/MM3 (ref 4.14–5.8)
SODIUM SERPL-SCNC: 143 MMOL/L (ref 136–145)
TRIGL SERPL-MCNC: 43 MG/DL (ref 0–150)
VLDLC SERPL-MCNC: 12 MG/DL (ref 5–40)
WBC NRBC COR # BLD AUTO: 6.36 10*3/MM3 (ref 3.4–10.8)

## 2024-07-30 PROCEDURE — 93459 L HRT ART/GRFT ANGIO: CPT | Performed by: INTERNAL MEDICINE

## 2024-07-30 PROCEDURE — 82948 REAGENT STRIP/BLOOD GLUCOSE: CPT

## 2024-07-30 PROCEDURE — 25010000002 FENTANYL CITRATE (PF) 50 MCG/ML SOLUTION: Performed by: INTERNAL MEDICINE

## 2024-07-30 PROCEDURE — 99152 MOD SED SAME PHYS/QHP 5/>YRS: CPT

## 2024-07-30 PROCEDURE — 25010000002 MIDAZOLAM PER 1 MG: Performed by: INTERNAL MEDICINE

## 2024-07-30 PROCEDURE — 99153 MOD SED SAME PHYS/QHP EA: CPT

## 2024-07-30 PROCEDURE — 93325 DOPPLER ECHO COLOR FLOW MAPG: CPT

## 2024-07-30 PROCEDURE — 80048 BASIC METABOLIC PNL TOTAL CA: CPT | Performed by: INTERNAL MEDICINE

## 2024-07-30 PROCEDURE — 93320 DOPPLER ECHO COMPLETE: CPT

## 2024-07-30 PROCEDURE — 76376 3D RENDER W/INTRP POSTPROCES: CPT

## 2024-07-30 PROCEDURE — 85027 COMPLETE CBC AUTOMATED: CPT | Performed by: INTERNAL MEDICINE

## 2024-07-30 PROCEDURE — 80061 LIPID PANEL: CPT | Performed by: INTERNAL MEDICINE

## 2024-07-30 PROCEDURE — 93321 DOPPLER ECHO F-UP/LMTD STD: CPT

## 2024-07-30 PROCEDURE — 99152 MOD SED SAME PHYS/QHP 5/>YRS: CPT | Performed by: INTERNAL MEDICINE

## 2024-07-30 PROCEDURE — 25510000001 IOPAMIDOL PER 1 ML: Performed by: INTERNAL MEDICINE

## 2024-07-30 PROCEDURE — C1769 GUIDE WIRE: HCPCS | Performed by: INTERNAL MEDICINE

## 2024-07-30 PROCEDURE — C1887 CATHETER, GUIDING: HCPCS | Performed by: INTERNAL MEDICINE

## 2024-07-30 PROCEDURE — C1894 INTRO/SHEATH, NON-LASER: HCPCS | Performed by: INTERNAL MEDICINE

## 2024-07-30 PROCEDURE — 93312 ECHO TRANSESOPHAGEAL: CPT

## 2024-07-30 RX ORDER — FENTANYL CITRATE 50 UG/ML
INJECTION, SOLUTION INTRAMUSCULAR; INTRAVENOUS
Status: DISCONTINUED | OUTPATIENT
Start: 2024-07-30 | End: 2024-07-30 | Stop reason: HOSPADM

## 2024-07-30 RX ORDER — MIDAZOLAM HYDROCHLORIDE 1 MG/ML
INJECTION INTRAMUSCULAR; INTRAVENOUS
Status: DISCONTINUED | OUTPATIENT
Start: 2024-07-30 | End: 2024-07-30 | Stop reason: HOSPADM

## 2024-07-30 RX ORDER — ALPRAZOLAM 0.25 MG/1
0.25 TABLET ORAL 3 TIMES DAILY PRN
Status: DISCONTINUED | OUTPATIENT
Start: 2024-07-30 | End: 2024-07-30 | Stop reason: HOSPADM

## 2024-07-30 RX ORDER — FENTANYL CITRATE 50 UG/ML
INJECTION, SOLUTION INTRAMUSCULAR; INTRAVENOUS
Status: COMPLETED | OUTPATIENT
Start: 2024-07-30 | End: 2024-07-30

## 2024-07-30 RX ORDER — ACETAMINOPHEN 325 MG/1
650 TABLET ORAL EVERY 4 HOURS PRN
Status: DISCONTINUED | OUTPATIENT
Start: 2024-07-30 | End: 2024-07-30 | Stop reason: HOSPADM

## 2024-07-30 RX ORDER — NALOXONE HCL 0.4 MG/ML
0.4 VIAL (ML) INJECTION
Status: DISCONTINUED | OUTPATIENT
Start: 2024-07-30 | End: 2024-07-30 | Stop reason: HOSPADM

## 2024-07-30 RX ORDER — MIDAZOLAM HYDROCHLORIDE 1 MG/ML
INJECTION INTRAMUSCULAR; INTRAVENOUS
Status: COMPLETED | OUTPATIENT
Start: 2024-07-30 | End: 2024-07-30

## 2024-07-30 RX ORDER — HYDROCODONE BITARTRATE AND ACETAMINOPHEN 7.5; 325 MG/1; MG/1
1 TABLET ORAL EVERY 4 HOURS PRN
Status: DISCONTINUED | OUTPATIENT
Start: 2024-07-30 | End: 2024-07-30 | Stop reason: HOSPADM

## 2024-07-30 RX ORDER — SODIUM CHLORIDE 9 MG/ML
100 INJECTION, SOLUTION INTRAVENOUS CONTINUOUS
Status: DISCONTINUED | OUTPATIENT
Start: 2024-07-30 | End: 2024-07-30

## 2024-07-30 RX ORDER — NITROGLYCERIN 0.4 MG/1
0.4 TABLET SUBLINGUAL
Status: DISCONTINUED | OUTPATIENT
Start: 2024-07-30 | End: 2024-07-30 | Stop reason: HOSPADM

## 2024-07-30 RX ORDER — SODIUM CHLORIDE 9 MG/ML
100 INJECTION, SOLUTION INTRAVENOUS CONTINUOUS
Status: DISCONTINUED | OUTPATIENT
Start: 2024-07-30 | End: 2024-07-30 | Stop reason: HOSPADM

## 2024-07-30 RX ORDER — LIDOCAINE HYDROCHLORIDE 10 MG/ML
INJECTION, SOLUTION EPIDURAL; INFILTRATION; INTRACAUDAL; PERINEURAL
Status: DISCONTINUED | OUTPATIENT
Start: 2024-07-30 | End: 2024-07-30 | Stop reason: HOSPADM

## 2024-07-30 RX ORDER — SODIUM CHLORIDE 9 MG/ML
250 INJECTION, SOLUTION INTRAVENOUS ONCE AS NEEDED
Status: DISCONTINUED | OUTPATIENT
Start: 2024-07-30 | End: 2024-07-30 | Stop reason: HOSPADM

## 2024-07-30 RX ORDER — MORPHINE SULFATE 2 MG/ML
1 INJECTION, SOLUTION INTRAMUSCULAR; INTRAVENOUS EVERY 4 HOURS PRN
Status: DISCONTINUED | OUTPATIENT
Start: 2024-07-30 | End: 2024-07-30 | Stop reason: HOSPADM

## 2024-07-30 RX ADMIN — FENTANYL CITRATE 50 MCG: 50 INJECTION, SOLUTION INTRAMUSCULAR; INTRAVENOUS at 08:59

## 2024-07-30 RX ADMIN — FENTANYL CITRATE 50 MCG: 50 INJECTION, SOLUTION INTRAMUSCULAR; INTRAVENOUS at 09:01

## 2024-07-30 RX ADMIN — MIDAZOLAM 2 MG: 1 INJECTION INTRAMUSCULAR; INTRAVENOUS at 08:59

## 2024-07-30 RX ADMIN — MIDAZOLAM 2 MG: 1 INJECTION INTRAMUSCULAR; INTRAVENOUS at 09:01

## 2024-07-30 NOTE — H&P
[facility] Cardiology, Pre-procedure consult  Date of Hospital Visit: 24  Encounter Provider: Sweta Wilde PA-C    Place of Service: [facility]  Patient Name: Shai Mata  :1945  MRN: [id number]     Primary Care Provider: Ekaterina Cox APRN    Chief complaint: Mitral regurgitation     PROBLEM LIST  Mitral regurgitation  Echo 2018: EF 45%, mild MR  Echo 2024: EF 46-50%, grade 2 diastolic dysfunction, aortic calcification with mild to moderate AI and mild AS.  Severe MR without MS, moderate TR, RVSP greater than 55  CAD with ischemic cardiomyopathy  S/p CABG X5   Stress test 2016: Small partially reversible basal inferolateral and mid inferolateral segments.  Echo 2017: EF 35%  Ventricular tachycardia  S/p [employer] Hua BiV ICD 2017  On amiodarone therapy  Frequent PVCs  Attempted PVC ablation 2017  Chronic left bundle branch block  Hypertension   Hyperlipidemia  Type 2 diabetes    History of Present Illness:  Patient is an 79-year-old history of CAD s/p CABG, ischemic cardiomyopathy with ventricular tachycardia s/p BiV ICD in  and recently diagnosed severe mitral regurgitation by echocardiogram presents today for transesophageal echocardiogram for further evaluation for possible MitraClip.  Patient was sent to us by Dr. Villegas in [place].  Patient has had persistent shortness of breath with exertion and now has gotten to the point where he can only walk about 50 to 100 yards before becoming short of breath.  Patient states that even since he was seen by Dr. Villegas, over the last 3 weeks he has had significant progression of his symptoms of shortness of breath with exertion and orthopnea.  Patient does tell me he has not had a heart catheterization since his bypass surgery in  in [place], [place].      I reviewed patient's past medical history, surgical history, family history and social history.    Current Outpatient Medications    Medication Instructions    amiodarone (PACERONE) 100 mg, Oral, Daily    aspirin 81 mg, Oral, 2 Times Daily    atorvastatin (LIPITOR) 80 mg, Oral, Daily    carvedilol (COREG) 12.5 mg, Oral, 2 Times Daily    glyburide micronized (GLYNASE) 1.5 mg, Oral, 2 Times Daily Before Meals    insulin NPH (HUMULIN N,NOVOLIN N) 22 Units, Subcutaneous, 2 Times Daily Before Meals    Insulin Pen Needle (Pen Needles) 32G X 4 MM misc 1 each, Does not apply, 3 Times Daily    levothyroxine (SYNTHROID) 100 mcg, Oral, Daily    lisinopril (PRINIVIL,ZESTRIL) 10 mg, Oral, 2 Times Daily    pantoprazole (PROTONIX) 40 mg, Oral, Daily    spironolactone (ALDACTONE) 25 mg, Oral, Daily          Scheduled Meds:  Continuous Infusions:No current facility-administered medications for this encounter.        Review of Systems            Objective:     Vitals:    07/30/24 0821   BP: 159/80   Pulse: 71   Resp: 16   SpO2: 95%       There is no height or weight on file to calculate BMI.    No intake or output data in the 24 hours ending 07/30/24 0823    Vitals reviewed.   Constitutional:       Appearance: Healthy appearance. Not in distress.   Eyes:      Conjunctiva/sclera: Conjunctivae normal.   HENT:    Mouth/Throat:      Pharynx: Oropharynx is clear.   Neck:      Vascular: JVD normal.   Pulmonary:      Effort: Pulmonary effort is normal.      Breath sounds: No wheezing. No rhonchi. No rales.   Cardiovascular:      Normal rate. Regular rhythm.      Murmurs: There is a grade 2/6 high frequency blowing holosystolic murmur at the apex.      No rub.   Pulses:     Intact distal pulses.   Edema:     Comments: 1+ pitting edema to bilateral lower extremities  Abdominal:      General: There is no distension.   Musculoskeletal:         General: No deformity. Skin:     General: Skin is warm and dry.   Neurological:      General: No focal deficit present.      Mental Status: Alert and oriented to person, place and time.           Lab Review:     CMP:    MELINDA           3/14/2024    09:38 4/22/2024    16:08 5/28/2024    10:45   BMP   BUN 15  15  19    Creatinine 1.33  1.26  1.43    Sodium 139  141  142    Potassium 4.2  4.3  4.2    Chloride 105  107  107    CO2 22.0  25.1  25.0    Calcium 8.6  9.0  8.8           Lab Results   Component Value Date    HGBA1C 6.80 (H) 05/28/2024     CrCl cannot be calculated (Patient's most recent lab result is older than the maximum 30 days allowed.).            Lab Results   Component Value Date    CHOL 94 05/28/2024    CHLPL 133 11/28/2017    TRIG 36 05/28/2024    HDL 35 (L) 05/28/2024    LDL 49 05/28/2024         Results for orders placed during the hospital encounter of 06/18/24    Adult Transthoracic Echo Complete W/ Cont if Necessary Per Protocol    Interpretation Summary    The left ventricular cavity is mildly dilated.    Left ventricular ejection fraction appears to be 46 - 50%.    Left ventricular diastolic function is consistent with (grade II w/high LAP) pseudonormalization.    There is mild calcification of the aortic valve. There is mild to moderate thickening of the left coronary and right coronary cusp(s) of the aortic valve    Mild to moderate aortic valve regurgitation is present. Mild aortic valve stenosis is present.    There is mild, bileaflet mitral valve thickening present. Severe mitral valve regurgitation is present. No significant mitral valve stenosis is present.    Moderate tricuspid valve regurgitation is present. Estimated right ventricular systolic pressure from tricuspid regurgitation is markedly elevated (>55 mmHg).    There is no evidence of pericardial effusion. .               Assessment:   Severe mitral regurgitation  CAD s/p CABG X5, 1996 in [facility]  Last stress test was in 2016, patient has not had a heart catheterization since prior to his CABG.  Hypertension  Hyperlipidemia      Plan:   Proceed with transesophageal echocardiogram for further evaluation of valvular heart disease prior to possible  MitraClip.  Risk and benefits discussed with the patient and he agrees to proceed.  Further recommendations nations to be made postprocedure by Dr. Mason.  Patient will likely need ischemic evaluation given that he has not had a heart catheterization since 1996 as well as a stress test since 2016.      Sweta Wilde PA-C     The patient was seen and examined.  History is as above except additionally his daughter notes that he has had intrascapular pain recently.  On further questioning the patient admits that he has had worsening dyspnea for about 3 weeks and also interscapular pain approximately 3 weeks which is exactly what he had prior to bypass surgery.  Will see if we can coordinate a cardiac catheterization today following the transesophageal echocardiogram.    The MitraClip procedure was explained to the patient and his daughter.  The difference between a surgical mitral valve repair and the MitraClip was also explained.  The patient understands that a surgical mitral valve in general reduces mitral regurgitation more than a MitraClip however the MitraClip is percutaneous.  He understands that there is the possibility of stroke with both.  We reviewed the literature and he was shown a model of the MitraClip.    I Randi Mason MD personally performed the services described in this documentation as scribed by the above individual in my presence, and it is both accurate and complete.    Randi Mason MD, [facility]

## 2024-07-31 ENCOUNTER — DOCUMENTATION (OUTPATIENT)
Dept: CARDIAC REHAB | Facility: HOSPITAL | Age: 79
End: 2024-07-31
Payer: MEDICARE

## 2024-08-02 ENCOUNTER — OFFICE VISIT (OUTPATIENT)
Dept: CARDIOLOGY | Facility: CLINIC | Age: 79
End: 2024-08-02
Payer: MEDICARE

## 2024-08-02 VITALS
HEART RATE: 87 BPM | HEIGHT: 69 IN | BODY MASS INDEX: 28.26 KG/M2 | DIASTOLIC BLOOD PRESSURE: 76 MMHG | WEIGHT: 190.8 LBS | OXYGEN SATURATION: 97 % | SYSTOLIC BLOOD PRESSURE: 144 MMHG

## 2024-08-02 DIAGNOSIS — Z95.1 S/P CABG X 5: ICD-10-CM

## 2024-08-02 DIAGNOSIS — Z95.810 PRESENCE OF BIVENTRICULAR IMPLANTABLE CARDIOVERTER-DEFIBRILLATOR (ICD): ICD-10-CM

## 2024-08-02 DIAGNOSIS — I47.29 VENTRICULAR TACHYCARDIA (PAROXYSMAL): ICD-10-CM

## 2024-08-02 DIAGNOSIS — I49.3 PVC'S (PREMATURE VENTRICULAR CONTRACTIONS): Primary | ICD-10-CM

## 2024-08-02 DIAGNOSIS — K21.9 GASTROESOPHAGEAL REFLUX DISEASE WITHOUT ESOPHAGITIS: ICD-10-CM

## 2024-08-02 RX ORDER — FUROSEMIDE 40 MG/1
40 TABLET ORAL 2 TIMES DAILY
Qty: 30 TABLET | Refills: 3 | Status: SHIPPED | OUTPATIENT
Start: 2024-08-02

## 2024-08-02 RX ORDER — PANTOPRAZOLE SODIUM 40 MG/1
40 TABLET, DELAYED RELEASE ORAL DAILY
Qty: 90 TABLET | Refills: 0 | OUTPATIENT
Start: 2024-08-02

## 2024-08-04 NOTE — PROGRESS NOTES
Cardiac Electrophysiology Outpatient Note  [facility]    Office Visit     Shai Mata  [id number]      Primary Care Physician: Ekaterina Cox APRN        CC:   No chief complaint on file.      PROBLEM LIST:  PVCs/VT  24-hour monitor 2016 32% PVC burden, runs of NSVT  Echocardiogram 2017 EF 36 to 40% myxomatous changes of mitral valve mild to moderate MR  EP study and RFA of PVCs November 20, 2017 with noted inducible VT, subsequent placement of [employer] Hua bivicd Tomassoni  Amiodarone therapy  Cardiomyopathy  Echocardiogram August 2017 EF 36 to 40%  Echocardiogram 2018 EF 45%  Coronary artery disease  Remote CABG X5   2006  stress test 71/6 small reversible defect inferolateral inferior segments EF 35%-medical therapy  Diabetes mellitus type 2    History of Present Illness:   Shai Mata is a 79 y.o. male who presents to the electrophysiology clinic for follow up of PVCs, VT status post BiV ICD implant, systolic heart failure.     Recently he has had a significant increase in shortness of breath and swelling.  He has recently been evaluated for Dr. Mason for mitral clip placement.  Per the patient, plan is to proceed with this but has not yet been scheduled.  No problems with his defibrillator site.  Remains on amiodarone.      Past Surgical History:   Procedure Laterality Date    CARDIAC CATHETERIZATION N/A 7/30/2024    Procedure: Left Heart Cath;  Surgeon: Randi Mason MD;  Location: [facility] CATH INVASIVE LOCATION;  Service: Cardiology;  Laterality: N/A;    CARDIAC DEFIBRILLATOR PLACEMENT      CARDIAC ELECTROPHYSIOLOGY PROCEDURE N/A 11/20/2017    Procedure: EP/Ablation PVC +/- ICD Implant;  Surgeon: Jose Maldonado MD;  Location:  [facility] EP INVASIVE LOCATION;  Service:     CARDIAC ELECTROPHYSIOLOGY PROCEDURE N/A 11/20/2017    Procedure: Biventricular Device Insertion;  Surgeon: Jose Maldonado MD;  Location:  [facility] EP INVASIVE LOCATION;  Service:      CATARACT EXTRACTION Bilateral     CORONARY ANGIOPLASTY      CORONARY ARTERY BYPASS GRAFT  2006    X 5.   [facility]    ENDOSCOPY N/A 2022    Procedure: ESOPHAGOGASTRODUODENOSCOPY WITH BIOPSY;  Surgeon: Roula Craig MD;  Location: [facility] OR;  Service: Gastroenterology;  Laterality: N/A;    ENDOSCOPY N/A 2022    Procedure: ESOPHAGOGASTRODUODENOSCOPY WITH BIOPSY;  Surgeon: Roula Craig MD;  Location: [facility] OR;  Service: Gastroenterology;  Laterality: N/A;    PACEMAKER IMPLANTATION      CO LAPAROSCOPY SURG CHOLECYSTECTOMY N/A 2018    Procedure: CHOLECYSTECTOMY LAPAROSCOPIC;  Surgeon: Eber Hummel MD;  Location: [facility] OR;  Service: General    TRANSESOPHAGEAL ECHOCARDIOGRAM (SHERLEY)  2024       Family History   Problem Relation Age of Onset    Heart disease Mother     Heart disease Father     No Known Problems Sister     No Known Problems Sister     Cancer Sister     Diabetes Brother     Cancer Sister     Cancer Sister     No Known Problems Sister     No Known Problems Brother     No Known Problems Brother        Social History     Socioeconomic History    Marital status:    Tobacco Use    Smoking status: Former     Current packs/day: 0.00     Average packs/day: 2.0 packs/day for 20.0 years (40.0 ttl pk-yrs)     Types: Cigarettes     Start date:      Quit date:      Years since quittin.6    Smokeless tobacco: Former     Types: Chew     Quit date:    Vaping Use    Vaping status: Never Used   Substance and Sexual Activity    Alcohol use: No    Drug use: No    Sexual activity: Defer         Current Outpatient Medications:     amiodarone (PACERONE) 200 MG tablet, Take 0.5 tablets by mouth Daily., Disp: 45 tablet, Rfl: 2    aspirin 81 MG EC tablet, Take 1 tablet by mouth 2 (Two) Times a Day., Disp: , Rfl:     atorvastatin (LIPITOR) 80 MG tablet, Take 1 tablet by mouth Daily., Disp: 90 tablet, Rfl: 1    carvedilol (COREG) 12.5 MG  "tablet, Take 1 tablet by mouth 2 (Two) Times a Day., Disp: 180 tablet, Rfl: 1    glyburide micronized (GLYNASE) 1.5 MG tablet, Take 1 tablet by mouth 2 (Two) Times a Day Before Meals for 90 days., Disp: 180 tablet, Rfl: 1    insulin NPH-insulin regular (humuLIN 70/30,novoLIN 70/30) (70-30) 100 UNIT/ML injection, Inject 22 Units under the skin into the appropriate area as directed 2 (Two) Times a Day With Meals., Disp: , Rfl:     Insulin Pen Needle (Pen Needles) 32G X 4 MM misc, 1 each 3 (Three) Times a Day., Disp: 100 each, Rfl: 2    levothyroxine (Synthroid) 100 MCG tablet, Take 1 tablet by mouth Daily., Disp: 90 tablet, Rfl: 1    lisinopril (PRINIVIL,ZESTRIL) 10 MG tablet, Take 1 tablet by mouth 2 (Two) Times a Day., Disp: 180 tablet, Rfl: 1    pantoprazole (Protonix) 40 MG EC tablet, Take 1 tablet by mouth Daily., Disp: 90 tablet, Rfl: 3    spironolactone (ALDACTONE) 25 MG tablet, Take 1 tablet by mouth Daily., Disp: 90 tablet, Rfl: 1    furosemide (Lasix) 40 MG tablet, Take 1 tablet by mouth 2 (Two) Times a Day., Disp: 30 tablet, Rfl: 3    Allergies:   No Known Allergies    Review of Systems:  Review of Systems   Constitutional: Negative for malaise/fatigue.   Cardiovascular:  Negative for chest pain, dyspnea on exertion, irregular heartbeat, leg swelling, near-syncope, orthopnea, palpitations, paroxysmal nocturnal dyspnea and syncope.        Vital Signs: Blood pressure 144/76, pulse 87, height 175.3 cm (69\"), weight 86.5 kg (190 lb 12.8 oz), SpO2 97%.    Physical Exam  Vitals reviewed.   Cardiovascular:      Rate and Rhythm: Normal rate and regular rhythm.      Heart sounds: Normal heart sounds.   Pulmonary:      Effort: Pulmonary effort is normal.      Breath sounds: Normal breath sounds.   Musculoskeletal:      Right lower leg: No edema.      Left lower leg: No edema.   Skin:     General: Skin is warm and dry.   Neurological:      Mental Status: He is alert and oriented to person, place, and time.         Lab " Results   Component Value Date    GLUCOSE 144 (H) 07/30/2024    CALCIUM 8.7 07/30/2024     07/30/2024    K 4.3 07/30/2024    CO2 24.0 07/30/2024     (H) 07/30/2024    BUN 17 07/30/2024    CREATININE 1.28 (H) 07/30/2024    EGFRIFAFRI 74 08/25/2016    EGFRIFNONA 57 (L) 11/01/2021    BCR 13.3 07/30/2024    ANIONGAP 8.0 07/30/2024     Lab Results   Component Value Date    WBC 6.36 07/30/2024    HGB 10.7 (L) 07/30/2024    HCT 33.6 (L) 07/30/2024    MCV 91.6 07/30/2024     07/30/2024     Lab Results   Component Value Date    INR 1.11 (H) 04/26/2018    INR 1.06 11/19/2017    INR 1.00 01/11/2016    PROTIME 14.4 04/26/2018    PROTIME 11.6 (H) 11/19/2017    PROTIME 11.3 01/11/2016     Lab Results   Component Value Date    TSH 0.733 05/28/2024        Results for orders placed during the hospital encounter of 07/30/24    Adult Transesophageal Echo 3D (SHERLEY) W/ Cont If Necessary Per Protocol    Interpretation Summary    Left ventricular systolic function is low normal. Estimated left ventricular EF = 45-50%    Mild aortic valve stenosis is present.    Aortic valve mean pressure gradient is 10 mmHg.    The mitral annulus is dilated at 4.2 cm. The leaflets appear to be tethered. There is a predominantly central jet of severe mitral regurgitation which courses between A2-P2.    The jet of MR appears on the lateral aspect of A2-P2 in the 3D images.    The mitral valve area on 3D measures 6.09 cm². The mitral valve area by 2D measures 6.09 cm2 in one view and 6.43 cm² in the second view. The posterior mitral valve leaflet length is 1.2 cm.    Mild to moderate tricuspid regurgitation.    Estimated right ventricular systolic pressure from tricuspid regurgitation is mildly elevated (35-45 mmHg).    Anesthesia: Cath lab/Echo lab moderate sedation    I was present with the patient for the duration of moderate sedation and supervised staff who had no other duties and monitored the patient for the entire procedure.    Name  of independent trained observer: Velia Alanis RN  Intra-service start time: 0857  Intra-service end time: 0930      I personally viewed and interpreted the patient's EKG/Telemetry/lab data.      ECG 12 Lead    Date/Time: 8/4/2024 1:21 PM  Performed by: Marcin Colon MD    Authorized by: Marcin Colon MD  Comparison: compared with previous ECG from 5/20/2024  Similar to previous ECG  Comments: Atrial sensed, biventricular paced rhythm with occasional PVCs          Shai Mata  reports that he quit smoking about 40 years ago. His smoking use included cigarettes. He started smoking about 60 years ago. He has a 40 pack-year smoking history. He quit smokeless tobacco use about 34 years ago.  His smokeless tobacco use included chew.    Advance Care Planning   ACP discussion was held with the patient during this visit. Patient does not have an advance directive, declines further assistance.       Manual device interrogation device check:  Assessment & Plan    1. Ventricular tachycardia (paroxysmal)  -No recurrence of VT.  He does continue to take amiodarone 100 mg daily.    2. PVC's (premature ventricular contractions)  -Status post PVC ablation 2017 by Dr. Maldonado.  Multiple PVCs were noted some of them felt to be epicardial.  Ventricular pacing is down slightly from 93% to 91%.  We discussed the pros and cons of continuing amiodarone.  For now we have elected to continue, especially since he may be undergoing mitral clip soon.  We will reassess this in the future.    3. Long term current use of antiarrhythmic medical therapy  -Amiodarone use.  Last chest x-ray in September 2023 was unremarkable.  Labs followed by PCP.      4.  Dilated cardiomyopathy, class II congestive heart failure and coronary disease followed by local cardiologist.  Continue guideline directed medical therapy including Coreg, ACE inhibitor, ACE statin therapy.    Does have increased Sentus of breath and lower extremity swelling.  Will  write a prescription for Lasix to start 40 mg daily for the next 3 days.  He will reassess how his swelling is after that.  Ideally would get him on a as needed Lasix schedule.    5.  Severe MR  Patient is being evaluated for MitraClip by Dr. Mason.  Hopefully this will help to improve some of his symptoms.    Follow Up:  No follow-ups on file.    Marcin Colon MD

## 2024-08-06 ENCOUNTER — DOCUMENTATION (OUTPATIENT)
Dept: CARDIOLOGY | Facility: HOSPITAL | Age: 79
End: 2024-08-06
Payer: MEDICARE

## 2024-08-06 DIAGNOSIS — I34.0 SEVERE MITRAL REGURGITATION: Primary | ICD-10-CM

## 2024-08-06 NOTE — PROGRESS NOTES
SHERLEY complete and LHC completed on 7/30. Will need consult with Dr. Webb prior to MitraClip placement. If all are in agreement, will schedule MitraClip-date TBD.

## 2024-08-15 ENCOUNTER — TELEPHONE (OUTPATIENT)
Dept: CARDIOLOGY | Facility: CLINIC | Age: 79
End: 2024-08-15

## 2024-08-15 NOTE — TELEPHONE ENCOUNTER
Caller: Libia Bragg     Relationship: EMERGENCY CONTACT (DAUGHTER)    Best call back number: [phone]     What is the best time to reach you: ANYTIME.    Who are you requesting to speak with (clinical staff, provider,  specific staff member): PROVIDER      What was the call regarding: PT HAVING A CONSULT WITH CARDIOTHORACIC SURGERY FOR MITRAL VALVE CLIP ON 8.20.24. PT WOULD LIKE TO KNOW IF WE NEED TO KEEP 9.4.24. AND 9.26.24 APPTS WITH ALEXA BROTHERS PA-C OR SHOULD WE PUSH THOSE OUT FURTHER AFTER SURGERY IS SCHEDULED AND COMPLETED FOR POST OP?    Is it okay if the provider responds through MyChart: NO

## 2024-08-16 NOTE — TELEPHONE ENCOUNTER
Libia advised that as of now he he should keep appts to discuss what happens at CT surgery.  The can decide further after they see CT.

## 2024-08-20 ENCOUNTER — OFFICE VISIT (OUTPATIENT)
Dept: CARDIAC SURGERY | Facility: CLINIC | Age: 79
End: 2024-08-20
Payer: MEDICARE

## 2024-08-20 VITALS
BODY MASS INDEX: 26.07 KG/M2 | OXYGEN SATURATION: 98 % | DIASTOLIC BLOOD PRESSURE: 69 MMHG | HEART RATE: 78 BPM | SYSTOLIC BLOOD PRESSURE: 120 MMHG | HEIGHT: 69 IN | TEMPERATURE: 97.3 F | WEIGHT: 176 LBS

## 2024-08-20 DIAGNOSIS — I34.0 SEVERE MITRAL REGURGITATION: Primary | ICD-10-CM

## 2024-08-20 PROBLEM — R10.10 PAIN OF UPPER ABDOMEN: Status: RESOLVED | Noted: 2018-04-26 | Resolved: 2024-08-20

## 2024-08-20 PROBLEM — K81.9 CHOLECYSTITIS: Status: RESOLVED | Noted: 2018-04-26 | Resolved: 2024-08-20

## 2024-08-20 PROCEDURE — 3074F SYST BP LT 130 MM HG: CPT | Performed by: NURSE PRACTITIONER

## 2024-08-20 PROCEDURE — 3078F DIAST BP <80 MM HG: CPT | Performed by: NURSE PRACTITIONER

## 2024-08-20 PROCEDURE — 1159F MED LIST DOCD IN RCRD: CPT | Performed by: NURSE PRACTITIONER

## 2024-08-20 PROCEDURE — 1160F RVW MEDS BY RX/DR IN RCRD: CPT | Performed by: NURSE PRACTITIONER

## 2024-08-20 PROCEDURE — 99215 OFFICE O/P EST HI 40 MIN: CPT | Performed by: NURSE PRACTITIONER

## 2024-08-20 NOTE — PROGRESS NOTES
[facility] Cardiothoracic Surgery New Patient Office Note     Date of Encounter: 2024     Name: Shai Mata  : 1945     Referred By: Randi Mason,*  PCP: Ektaerina Cox APRN    Chief Complaint:    Chief Complaint   Patient presents with    mitral regurgitation     Np referred for mitral regurgitation,complains of shortness of breath.       Subjective      History of Present Illness:    Shai Mata is a 79 y.o. male former smoker former smoker with history of memory impairment, HTN, HLD on statin therapy, insulin-dependent DM, CAD s/p remote CABGx5  at [facility], ischemic cardiomyopathy with V. tach s/p [facility] biVICD 2017 with Dr. Maldonado, and severe mitral regurgitation being referred to Dr Webb by Dr Mason for MitraClip consideration. Pt was experiencing significant ESCALERA at  yards, orthopnea, BLE edema, and chest pain. Symptoms have improved with lasix.    Review of Systems:  Review of Systems   Constitutional: Positive for malaise/fatigue. Negative for chills, decreased appetite, diaphoresis, fever, night sweats, weight gain and weight loss.   HENT:  Positive for hoarse voice.    Eyes: Negative.  Negative for blurred vision, double vision and visual disturbance.   Cardiovascular:  Positive for dyspnea on exertion and leg swelling. Negative for chest pain, claudication, irregular heartbeat, near-syncope, orthopnea, palpitations, paroxysmal nocturnal dyspnea and syncope.   Respiratory:  Positive for shortness of breath. Negative for cough, hemoptysis, sputum production and wheezing.    Hematologic/Lymphatic: Negative for adenopathy and bleeding problem. Bruises/bleeds easily.   Skin: Negative.  Negative for color change, nail changes, poor wound healing and rash.   Musculoskeletal:  Positive for back pain. Negative for falls and muscle cramps.   Gastrointestinal: Negative.  Negative for abdominal pain, dysphagia and heartburn.   Genitourinary:  Negative.  Negative for flank pain.   Neurological: Negative.  Negative for brief paralysis, disturbances in coordination, dizziness, focal weakness, headaches, light-headedness, loss of balance, numbness, paresthesias, sensory change, vertigo and weakness.   Psychiatric/Behavioral: Negative.  Negative for depression and suicidal ideas.    Allergic/Immunologic: Negative for persistent infections.       I have reviewed the following portions of the patient's history: problem list, current medications, allergies, past surgical history, past medical history, past social history, past family history, and ROS and confirm it's accurate.    Allergies:  No Known Allergies    Medications:      Current Outpatient Medications:     amiodarone (PACERONE) 200 MG tablet, Take 0.5 tablets by mouth Daily., Disp: 45 tablet, Rfl: 2    aspirin 81 MG EC tablet, Take 1 tablet by mouth 2 (Two) Times a Day., Disp: , Rfl:     atorvastatin (LIPITOR) 80 MG tablet, Take 1 tablet by mouth Daily., Disp: 90 tablet, Rfl: 1    carvedilol (COREG) 12.5 MG tablet, Take 1 tablet by mouth 2 (Two) Times a Day., Disp: 180 tablet, Rfl: 1    furosemide (Lasix) 40 MG tablet, Take 1 tablet by mouth 2 (Two) Times a Day., Disp: 30 tablet, Rfl: 3    glyburide micronized (GLYNASE) 1.5 MG tablet, Take 1 tablet by mouth 2 (Two) Times a Day Before Meals for 90 days., Disp: 180 tablet, Rfl: 1    insulin NPH-insulin regular (humuLIN 70/30,novoLIN 70/30) (70-30) 100 UNIT/ML injection, Inject 22 Units under the skin into the appropriate area as directed 2 (Two) Times a Day With Meals., Disp: , Rfl:     Insulin Pen Needle (Pen Needles) 32G X 4 MM misc, 1 each 3 (Three) Times a Day., Disp: 100 each, Rfl: 2    levothyroxine (Synthroid) 100 MCG tablet, Take 1 tablet by mouth Daily., Disp: 90 tablet, Rfl: 1    lisinopril (PRINIVIL,ZESTRIL) 10 MG tablet, Take 1 tablet by mouth 2 (Two) Times a Day., Disp: 180 tablet, Rfl: 1    pantoprazole (Protonix) 40 MG EC tablet, Take 1  tablet by mouth Daily., Disp: 90 tablet, Rfl: 3    spironolactone (ALDACTONE) 25 MG tablet, Take 1 tablet by mouth Daily., Disp: 90 tablet, Rfl: 1    History:   Past Medical History:   Diagnosis Date    Abnormal heart rhythm     Arrhythmia     ASCVD (arteriosclerotic cardiovascular disease)     s/p CABG in 2006 (5) [facility]    CHF (congestive heart failure)     Chicken pox     Cholecystitis 04/26/2018    Added automatically from request for surgery [id number]      Congestive heart failure     Coronary artery disease     Disease of thyroid gland     Diverticulitis     DMII (diabetes mellitus, type 2)     type 2, checks fsbg as needed     Dyslipidemia     Elevated cholesterol     GERD (gastroesophageal reflux disease)     HTN (hypertension)     Hx of myocardial infarction, greater than 8 weeks     2006    Hyperlipidemia     Kidney stone     Measles     Mitral regurgitation     MILD TO MODERATE    Mumps     PVC (premature ventricular contraction)     SOB (shortness of breath)     Vitamin D deficiency     Wears reading eyeglasses        Past Surgical History:   Procedure Laterality Date    CARDIAC CATHETERIZATION N/A 7/30/2024    Procedure: Left Heart Cath;  Surgeon: Randi Mason MD;  Location:  [facility] CATH INVASIVE LOCATION;  Service: Cardiology;  Laterality: N/A;    CARDIAC DEFIBRILLATOR PLACEMENT      CARDIAC ELECTROPHYSIOLOGY PROCEDURE N/A 11/20/2017    Procedure: EP/Ablation PVC +/- ICD Implant;  Surgeon: Jose Maldonado MD;  Location:  [facility] EP INVASIVE LOCATION;  Service:     CARDIAC ELECTROPHYSIOLOGY PROCEDURE N/A 11/20/2017    Procedure: Biventricular Device Insertion;  Surgeon: Jose Maldonado MD;  Location:  [facility] EP INVASIVE LOCATION;  Service:     CATARACT EXTRACTION Bilateral     CORONARY ANGIOPLASTY      CORONARY ARTERY BYPASS GRAFT  2006    X 5.   [facility]    ENDOSCOPY N/A 05/17/2022    Procedure: ESOPHAGOGASTRODUODENOSCOPY WITH BIOPSY;  Surgeon:  "Roula Craig MD;  Location: [facility] OR;  Service: Gastroenterology;  Laterality: N/A;    ENDOSCOPY N/A 2022    Procedure: ESOPHAGOGASTRODUODENOSCOPY WITH BIOPSY;  Surgeon: Roula Craig MD;  Location: [facility] OR;  Service: Gastroenterology;  Laterality: N/A;    PACEMAKER IMPLANTATION      IN LAPAROSCOPY SURG CHOLECYSTECTOMY N/A 2018    Procedure: CHOLECYSTECTOMY LAPAROSCOPIC;  Surgeon: Eber Hummel MD;  Location: [facility] OR;  Service: General    TRANSESOPHAGEAL ECHOCARDIOGRAM (SHERLEY)  2024       Social History     Socioeconomic History    Marital status:     Number of children: 3   Tobacco Use    Smoking status: Former     Current packs/day: 0.00     Average packs/day: 2.0 packs/day for 20.0 years (40.0 ttl pk-yrs)     Types: Cigarettes     Start date:      Quit date:      Years since quittin.6    Smokeless tobacco: Former     Types: Chew     Quit date:    Vaping Use    Vaping status: Never Used   Substance and Sexual Activity    Alcohol use: No    Drug use: No    Sexual activity: Defer        Family History   Problem Relation Age of Onset    Heart disease Mother     Heart disease Father     No Known Problems Sister     No Known Problems Sister     Cancer Sister     Diabetes Brother     Cancer Sister     Cancer Sister     No Known Problems Sister     No Known Problems Brother     No Known Problems Brother        Objective   Physical Exam:  Vitals:    24 1355 24 1356   BP: 128/73 120/69   BP Location: Right arm Left arm   Patient Position: Sitting Sitting   Pulse: 78    Temp: 97.3 °F (36.3 °C)    SpO2: 98%    Weight: 79.8 kg (176 lb)    Height: 175.3 cm (69\")  Comment: patient reports       Body mass index is 25.99 kg/m².    Physical Exam  Vitals and nursing note reviewed.   Constitutional:       Appearance: Normal appearance. He is well-developed.      Interventions: He is not intubated.  Neck:      Vascular: No carotid bruit. "   Cardiovascular:      Rate and Rhythm: Normal rate and regular rhythm.      Pulses: Normal pulses.           Radial pulses are 2+ on the right side.      Heart sounds: Normal heart sounds, S1 normal and S2 normal. Murmur heard.   Pulmonary:      Effort: Pulmonary effort is normal. No tachypnea, bradypnea, accessory muscle usage, prolonged expiration, respiratory distress or retractions. He is not intubated.      Breath sounds: Normal breath sounds and air entry. No decreased breath sounds.   Abdominal:      Palpations: Abdomen is soft.   Musculoskeletal:         General: No swelling.      Right lower le+ Edema present.      Left lower le+ Edema present.   Skin:     General: Skin is warm and dry.      Capillary Refill: Capillary refill takes less than 2 seconds.      Findings: No bruising.   Neurological:      General: No focal deficit present.      Mental Status: He is alert and oriented to person, place, and time. Mental status is at baseline.      GCS: GCS eye subscore is 4. GCS verbal subscore is 5. GCS motor subscore is 6.      Motor: Motor function is intact.      Coordination: Coordination is intact.      Gait: Gait is intact.   Psychiatric:         Mood and Affect: Mood normal.         Speech: Speech normal.         Behavior: Behavior normal. Behavior is cooperative.         Cognition and Memory: Cognition normal.         Imaging/Labs:  Adult Transesophageal Echo 3D (SHERLEY) W/ Cont If Necessary Per Protocol (2024 10:42)     Left ventricular systolic function is low normal. Estimated left ventricular EF = 45-50%    Mild aortic valve stenosis is present.    Aortic valve mean pressure gradient is 10 mmHg.    The mitral annulus is dilated at 4.2 cm. The leaflets appear to be tethered. There is a predominantly central jet of severe mitral regurgitation which courses between A2-P2.    The jet of MR appears on the lateral aspect of A2-P2 in the 3D images.    The mitral valve area on 3D measures 6.09  cm². The mitral valve area by 2D measures 6.09 cm2 in one view and 6.43 cm² in the second view. The posterior mitral valve leaflet length is 1.2 cm.    Mild to moderate tricuspid regurgitation.    Estimated right ventricular systolic pressure from tricuspid regurgitation is mildly elevated (35-45 mmHg).    Cardiac Catheterization/Vascular Study (07/30/2024 15:20)   FINAL IMPRESSION:  5/5 patent grafts including 4 vein grafts and LIMA to the LAD.  Severe native vessel disease  Distal small vessel disease   RECOMMENDATIONS:  No further revascularization prior to MitraClip placement.  Continue medical management of the patient's symptoms.    Adult Transthoracic Echo Complete W/ Cont if Necessary Per Protocol (06/18/2024 14:54)     The left ventricular cavity is mildly dilated.    Left ventricular ejection fraction appears to be 46 - 50%.    Left ventricular diastolic function is consistent with (grade II w/high LAP) pseudonormalization.    There is mild calcification of the aortic valve. There is mild to moderate thickening of the left coronary and right coronary cusp(s) of the aortic valve    Mild to moderate aortic valve regurgitation is present. Mild aortic valve stenosis is present.    There is mild, bileaflet mitral valve thickening present. Severe mitral valve regurgitation is present. No significant mitral valve stenosis is present.    Moderate tricuspid valve regurgitation is present. Estimated right ventricular systolic pressure from tricuspid regurgitation is markedly elevated (>55 mmHg).    There is no evidence of pericardial effusion. .0    Assessment / Plan      Assessment / Plan:  Diagnoses and all orders for this visit:    1. Severe mitral regurgitation (Primary)       Referral to Dr Webb for MitraClip  significant ESCALERA at  yards, orthopnea, BLE edema, and chest pain. Symptoms have improved with lasix  TTE 6/20024 with severe mitral regurgitation that was confirmed on 7/2024 SHERLEY. EF of 45-50%;  dilated mitral annulus at 4.2cm, leaflets tethered, and predominant central jet between A2-P2. Also had mild aortic stenosis and mild moderate tricuspid regurgitation  [facility] 7/2024 with severe native disease but patent 5/5 grafts   Pt has symptomatic severe mitral regurgitation and is high risk for open surgical mitral valve via redo sternotomy (operative mortality for MVR is 17%)  Discussed proceeding with MitraClip  Risks of surgery were discussed with the patient including: pain, bleeding, infection, blood clots, kidney damage, stroke, heart attack, or death.  Patient understands risks and agrees to proceed.  Case to be reviewed by Dr Webb prior to scheduling. Family is comfortable with scheduling and meeting Dr Webb in preop.     Follow Up: pending   No follow-ups on file.   Or sooner for any further concerns or worsening sign and symptoms. If unable to reach us in the office please dial 911 or go to the nearest emergency department.      ANEUDY Waller  [facility] Cardiothoracic Surgery    Time Spent: I spent 48 minutes caring for Shai on this date of service. This time includes time spent by me in the following activities: preparing for the visit, reviewing tests, obtaining and/or reviewing a separately obtained history, performing a medically appropriate examination and/or evaluation, counseling and educating the patient/family/caregiver, ordering medications, tests, or procedures, referring and communicating with other health care professionals, documenting information in the medical record, independently interpreting results and communicating that information with the patient/family/caregiver, and care coordination.

## 2024-08-22 ENCOUNTER — PATIENT ROUNDING (BHMG ONLY) (OUTPATIENT)
Dept: CARDIAC SURGERY | Facility: CLINIC | Age: 79
End: 2024-08-22
Payer: MEDICARE

## 2024-08-22 NOTE — PROGRESS NOTES
August 22, 2024    Hello, may I speak with Shai Mata?    My name is SANDRA    I am  with [facility] CARDIOTHORACIC SURGERY  [address]  [facility] [address] [phone].    Before we get started may I verify your date of birth? 1945    I am calling to officially welcome you to our practice and ask about your recent visit. Is this a good time to talk?  YES    Tell me about your visit with us. What things went well?  YES VERY GOOD       We're always looking for ways to make our patients' experiences even better. Do you have recommendations on ways we may improve?  NO    Overall were you satisfied with your first visit to our practice?  YES       I appreciate you taking the time to speak with me today. Is there anything else I can do for you?   NO    Thank you, and have a great day.

## 2024-09-06 PROCEDURE — 93296 REM INTERROG EVL PM/IDS: CPT | Performed by: STUDENT IN AN ORGANIZED HEALTH CARE EDUCATION/TRAINING PROGRAM

## 2024-09-06 PROCEDURE — 93295 DEV INTERROG REMOTE 1/2/MLT: CPT | Performed by: STUDENT IN AN ORGANIZED HEALTH CARE EDUCATION/TRAINING PROGRAM

## 2024-09-10 ENCOUNTER — OFFICE VISIT (OUTPATIENT)
Dept: FAMILY MEDICINE CLINIC | Facility: CLINIC | Age: 79
End: 2024-09-10
Payer: MEDICARE

## 2024-09-10 ENCOUNTER — LAB (OUTPATIENT)
Dept: FAMILY MEDICINE CLINIC | Facility: CLINIC | Age: 79
End: 2024-09-10
Payer: MEDICARE

## 2024-09-10 ENCOUNTER — DOCUMENTATION (OUTPATIENT)
Dept: CARDIOLOGY | Facility: HOSPITAL | Age: 79
End: 2024-09-10
Payer: MEDICARE

## 2024-09-10 VITALS
WEIGHT: 183.2 LBS | HEIGHT: 69 IN | TEMPERATURE: 97.8 F | BODY MASS INDEX: 27.13 KG/M2 | HEART RATE: 74 BPM | SYSTOLIC BLOOD PRESSURE: 118 MMHG | DIASTOLIC BLOOD PRESSURE: 60 MMHG | OXYGEN SATURATION: 100 %

## 2024-09-10 DIAGNOSIS — I10 ESSENTIAL HYPERTENSION: ICD-10-CM

## 2024-09-10 DIAGNOSIS — Z79.4 TYPE 2 DIABETES MELLITUS WITH STAGE 3A CHRONIC KIDNEY DISEASE, WITH LONG-TERM CURRENT USE OF INSULIN: ICD-10-CM

## 2024-09-10 DIAGNOSIS — E11.22 TYPE 2 DIABETES MELLITUS WITH STAGE 3A CHRONIC KIDNEY DISEASE, WITH LONG-TERM CURRENT USE OF INSULIN: ICD-10-CM

## 2024-09-10 DIAGNOSIS — Z79.4 TYPE 2 DIABETES MELLITUS WITH STAGE 3A CHRONIC KIDNEY DISEASE, WITH LONG-TERM CURRENT USE OF INSULIN: Primary | ICD-10-CM

## 2024-09-10 DIAGNOSIS — I50.22 CHRONIC SYSTOLIC CONGESTIVE HEART FAILURE: ICD-10-CM

## 2024-09-10 DIAGNOSIS — D50.9 IRON DEFICIENCY ANEMIA, UNSPECIFIED IRON DEFICIENCY ANEMIA TYPE: ICD-10-CM

## 2024-09-10 DIAGNOSIS — E78.2 HYPERLIPEMIA, MIXED: ICD-10-CM

## 2024-09-10 DIAGNOSIS — N18.31 TYPE 2 DIABETES MELLITUS WITH STAGE 3A CHRONIC KIDNEY DISEASE, WITH LONG-TERM CURRENT USE OF INSULIN: ICD-10-CM

## 2024-09-10 DIAGNOSIS — N18.31 TYPE 2 DIABETES MELLITUS WITH STAGE 3A CHRONIC KIDNEY DISEASE, WITH LONG-TERM CURRENT USE OF INSULIN: Primary | ICD-10-CM

## 2024-09-10 DIAGNOSIS — E11.22 TYPE 2 DIABETES MELLITUS WITH STAGE 3A CHRONIC KIDNEY DISEASE, WITH LONG-TERM CURRENT USE OF INSULIN: Primary | ICD-10-CM

## 2024-09-10 DIAGNOSIS — E03.8 OTHER SPECIFIED HYPOTHYROIDISM: ICD-10-CM

## 2024-09-10 LAB
ALBUMIN SERPL-MCNC: 3.9 G/DL (ref 3.5–5.2)
ALBUMIN/GLOB SERPL: 1.4 G/DL
ALP SERPL-CCNC: 95 U/L (ref 39–117)
ALT SERPL W P-5'-P-CCNC: 18 U/L (ref 1–41)
ANION GAP SERPL CALCULATED.3IONS-SCNC: 6.9 MMOL/L (ref 5–15)
AST SERPL-CCNC: 18 U/L (ref 1–40)
BASOPHILS # BLD AUTO: 0.08 10*3/MM3 (ref 0–0.2)
BASOPHILS NFR BLD AUTO: 1.1 % (ref 0–1.5)
BILIRUB SERPL-MCNC: 0.6 MG/DL (ref 0–1.2)
BUN SERPL-MCNC: 15 MG/DL (ref 8–23)
BUN/CREAT SERPL: 14 (ref 7–25)
CALCIUM SPEC-SCNC: 9.2 MG/DL (ref 8.6–10.5)
CHLORIDE SERPL-SCNC: 103 MMOL/L (ref 98–107)
CHOLEST SERPL-MCNC: 96 MG/DL (ref 0–200)
CO2 SERPL-SCNC: 26.1 MMOL/L (ref 22–29)
CREAT SERPL-MCNC: 1.07 MG/DL (ref 0.76–1.27)
DEPRECATED RDW RBC AUTO: 44.2 FL (ref 37–54)
EGFRCR SERPLBLD CKD-EPI 2021: 70.2 ML/MIN/1.73
EOSINOPHIL # BLD AUTO: 0.44 10*3/MM3 (ref 0–0.4)
EOSINOPHIL NFR BLD AUTO: 5.9 % (ref 0.3–6.2)
ERYTHROCYTE [DISTWIDTH] IN BLOOD BY AUTOMATED COUNT: 14.1 % (ref 12.3–15.4)
GLOBULIN UR ELPH-MCNC: 2.8 GM/DL
GLUCOSE SERPL-MCNC: 30 MG/DL (ref 65–99)
HBA1C MFR BLD: 8.5 % (ref 4.8–5.6)
HCT VFR BLD AUTO: 34 % (ref 37.5–51)
HDLC SERPL-MCNC: 34 MG/DL (ref 40–60)
HGB BLD-MCNC: 11.1 G/DL (ref 13–17.7)
IMM GRANULOCYTES # BLD AUTO: 0.04 10*3/MM3 (ref 0–0.05)
IMM GRANULOCYTES NFR BLD AUTO: 0.5 % (ref 0–0.5)
LDLC SERPL CALC-MCNC: 52 MG/DL (ref 0–100)
LDLC/HDLC SERPL: 1.62 {RATIO}
LYMPHOCYTES # BLD AUTO: 1.04 10*3/MM3 (ref 0.7–3.1)
LYMPHOCYTES NFR BLD AUTO: 13.9 % (ref 19.6–45.3)
MCH RBC QN AUTO: 28.8 PG (ref 26.6–33)
MCHC RBC AUTO-ENTMCNC: 32.6 G/DL (ref 31.5–35.7)
MCV RBC AUTO: 88.3 FL (ref 79–97)
MONOCYTES # BLD AUTO: 0.86 10*3/MM3 (ref 0.1–0.9)
MONOCYTES NFR BLD AUTO: 11.5 % (ref 5–12)
NEUTROPHILS NFR BLD AUTO: 5.01 10*3/MM3 (ref 1.7–7)
NEUTROPHILS NFR BLD AUTO: 67.1 % (ref 42.7–76)
NRBC BLD AUTO-RTO: 0 /100 WBC (ref 0–0.2)
PLATELET # BLD AUTO: 211 10*3/MM3 (ref 140–450)
PMV BLD AUTO: 10.6 FL (ref 6–12)
POTASSIUM SERPL-SCNC: 4.1 MMOL/L (ref 3.5–5.2)
PROT SERPL-MCNC: 6.7 G/DL (ref 6–8.5)
RBC # BLD AUTO: 3.85 10*6/MM3 (ref 4.14–5.8)
SODIUM SERPL-SCNC: 136 MMOL/L (ref 136–145)
TRIGL SERPL-MCNC: 35 MG/DL (ref 0–150)
TSH SERPL DL<=0.05 MIU/L-ACNC: 0.22 UIU/ML (ref 0.27–4.2)
VLDLC SERPL-MCNC: 10 MG/DL (ref 5–40)
WBC NRBC COR # BLD AUTO: 7.47 10*3/MM3 (ref 3.4–10.8)

## 2024-09-10 PROCEDURE — 1126F AMNT PAIN NOTED NONE PRSNT: CPT | Performed by: NURSE PRACTITIONER

## 2024-09-10 PROCEDURE — 84443 ASSAY THYROID STIM HORMONE: CPT | Performed by: NURSE PRACTITIONER

## 2024-09-10 PROCEDURE — 83540 ASSAY OF IRON: CPT | Performed by: NURSE PRACTITIONER

## 2024-09-10 PROCEDURE — G2211 COMPLEX E/M VISIT ADD ON: HCPCS | Performed by: NURSE PRACTITIONER

## 2024-09-10 PROCEDURE — 80061 LIPID PANEL: CPT | Performed by: NURSE PRACTITIONER

## 2024-09-10 PROCEDURE — 99214 OFFICE O/P EST MOD 30 MIN: CPT | Performed by: NURSE PRACTITIONER

## 2024-09-10 PROCEDURE — 1159F MED LIST DOCD IN RCRD: CPT | Performed by: NURSE PRACTITIONER

## 2024-09-10 PROCEDURE — 85025 COMPLETE CBC W/AUTO DIFF WBC: CPT | Performed by: NURSE PRACTITIONER

## 2024-09-10 PROCEDURE — 3074F SYST BP LT 130 MM HG: CPT | Performed by: NURSE PRACTITIONER

## 2024-09-10 PROCEDURE — 1160F RVW MEDS BY RX/DR IN RCRD: CPT | Performed by: NURSE PRACTITIONER

## 2024-09-10 PROCEDURE — 80053 COMPREHEN METABOLIC PANEL: CPT | Performed by: NURSE PRACTITIONER

## 2024-09-10 PROCEDURE — 84466 ASSAY OF TRANSFERRIN: CPT | Performed by: NURSE PRACTITIONER

## 2024-09-10 PROCEDURE — 36415 COLL VENOUS BLD VENIPUNCTURE: CPT

## 2024-09-10 PROCEDURE — 83036 HEMOGLOBIN GLYCOSYLATED A1C: CPT | Performed by: NURSE PRACTITIONER

## 2024-09-10 PROCEDURE — 3078F DIAST BP <80 MM HG: CPT | Performed by: NURSE PRACTITIONER

## 2024-09-10 RX ORDER — SPIRONOLACTONE 25 MG/1
25 TABLET ORAL DAILY
Qty: 90 TABLET | Refills: 1 | Status: SHIPPED | OUTPATIENT
Start: 2024-09-10

## 2024-09-10 RX ORDER — LEVOTHYROXINE SODIUM 100 UG/1
100 TABLET ORAL DAILY
Qty: 90 TABLET | Refills: 1 | Status: SHIPPED | OUTPATIENT
Start: 2024-09-10 | End: 2024-09-11 | Stop reason: SDUPTHER

## 2024-09-10 RX ORDER — LISINOPRIL 10 MG/1
10 TABLET ORAL 2 TIMES DAILY
Qty: 180 TABLET | Refills: 1 | Status: SHIPPED | OUTPATIENT
Start: 2024-09-10

## 2024-09-10 RX ORDER — CARVEDILOL 12.5 MG/1
12.5 TABLET ORAL 2 TIMES DAILY
Qty: 180 TABLET | Refills: 1 | Status: SHIPPED | OUTPATIENT
Start: 2024-09-10

## 2024-09-10 RX ORDER — AMIODARONE HYDROCHLORIDE 200 MG/1
100 TABLET ORAL DAILY
Qty: 45 TABLET | Refills: 2 | Status: CANCELLED | OUTPATIENT
Start: 2024-09-10

## 2024-09-10 RX ORDER — AMIODARONE HYDROCHLORIDE 200 MG/1
100 TABLET ORAL DAILY
Qty: 45 TABLET | Refills: 2 | Status: SHIPPED | OUTPATIENT
Start: 2024-09-10

## 2024-09-10 RX ORDER — ATORVASTATIN CALCIUM 80 MG/1
80 TABLET, FILM COATED ORAL DAILY
Qty: 90 TABLET | Refills: 1 | Status: SHIPPED | OUTPATIENT
Start: 2024-09-10

## 2024-09-10 NOTE — PROGRESS NOTES
Subjective   Shai Mata is a 79 y.o. male.     Diabetes  He presents for his follow-up diabetic visit. He has type 2 diabetes mellitus. His disease course has been fluctuating. There are no hypoglycemic associated symptoms. Pertinent negatives for hypoglycemia include no confusion, dizziness, headaches, hunger, mood changes, nervousness/anxiousness, pallor, seizures, sleepiness, speech difficulty, sweats or tremors. There are no diabetic associated symptoms. Pertinent negatives for diabetes include no blurred vision, no chest pain, no fatigue, no foot paresthesias, no foot ulcerations, no polydipsia, no polyphagia, no polyuria, no visual change, no weakness and no weight loss. (Intermittent lows.    ) There are no hypoglycemic complications. Symptoms are stable. Diabetic complications include nephropathy. (Following nephrology with no recent changes  ) Risk factors for coronary artery disease include sedentary lifestyle, male sex, hypertension, family history and dyslipidemia. Current diabetic treatment includes insulin injections and oral agent (dual therapy). He is compliant with treatment all of the time. His weight is stable. He is following a generally healthy diet. Meal planning includes avoidance of concentrated sweets and carbohydrate counting. He participates in exercise intermittently. His home blood glucose trend is fluctuating minimally. His breakfast blood glucose range is generally 130-140 mg/dl. His overall blood glucose range is 140-180 mg/dl. An ACE inhibitor/angiotensin II receptor blocker is being taken. He does not see a podiatrist.  Hyperlipidemia  This is a chronic problem. The current episode started more than 1 year ago. Recent lipid tests were reviewed and are variable. Exacerbating diseases include diabetes and hypothyroidism. Associated symptoms include shortness of breath (has noted more short winded with activity.  No chest pain lightheadedness or dizziness). Pertinent negatives  include no chest pain, focal sensory loss, focal weakness, leg pain or myalgias. Current antihyperlipidemic treatment includes statins. The current treatment provides significant improvement of lipids. There are no compliance problems.  Risk factors for coronary artery disease include diabetes mellitus, family history, dyslipidemia, obesity and hypertension.   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 Following closely with cardiology. .) problem. The current episode started more than 1 year ago. The problem is unchanged (Denies any recent concerns.  Feels he is doing well). The problem is controlled. Associated symptoms include shortness of breath (has noted more short winded with activity.  No chest pain lightheadedness or dizziness). Pertinent negatives include no anxiety, blurred vision, chest pain, headaches, malaise/fatigue, neck pain, orthopnea, peripheral edema, PND or sweats. Risk factors for coronary artery disease include dyslipidemia, family history and sedentary lifestyle. Current antihypertension treatment includes ACE inhibitors, beta blockers and diuretics. The current treatment provides significant improvement. There are no compliance problems.  Hypertensive end-organ damage includes CAD/MI. Identifiable causes of hypertension include a thyroid problem.   Thyroid Problem  Presents for follow-up (Known hypothyroidism) visit. Patient reports no anxiety, fatigue, tremors, visual change or weight loss. The symptoms have been stable (tolerating synthroid). His past medical history is significant for diabetes and hyperlipidemia.      The following portions of the patient's history were reviewed and updated as appropriate: allergies, current medications, past family history, past medical history, past social history, past surgical history and problem list.      Review of Systems   Constitutional: Negative.  Negative for activity change, fatigue,  malaise/fatigue and weight loss.        States overall he is feeling fairly well     HENT: Negative.     Eyes:  Negative for blurred vision.   Respiratory:  Positive for shortness of breath (has noted more short winded with activity.  No chest pain lightheadedness or dizziness).    Cardiovascular: Negative.  Negative for chest pain, orthopnea and PND.   Gastrointestinal: Negative.    Endocrine: Negative.  Negative for polydipsia, polyphagia and polyuria.   Musculoskeletal: Negative.  Negative for myalgias and neck pain.   Skin: Negative.  Negative for pallor.   Neurological: Negative.  Negative for dizziness, tremors, focal weakness, seizures, speech difficulty, weakness and headaches.   Psychiatric/Behavioral: Negative.  Negative for confusion. The patient is not nervous/anxious.    All other systems reviewed and are negative.      Procedures    Objective   Physical Exam   Constitutional: He is oriented to person, place, and time. He appears well-developed. No distress.   HENT:   Head: Normocephalic.   Eyes: Conjunctivae are normal. Right eye exhibits no discharge. Left eye exhibits no discharge.   Cardiovascular: Normal rate, regular rhythm and normal heart sounds.   No murmur heard.  Pulmonary/Chest: Effort normal and breath sounds normal. No respiratory distress. He has no wheezes.   Musculoskeletal:      Right lower leg: No edema.      Left lower leg: No edema.   Neurological: He is alert and oriented to person, place, and time.   Skin: Skin is warm and dry. He is not diaphoretic.   Psychiatric: His behavior is normal.   Nursing note and vitals reviewed.       During this visit the following were done:  Labs Reviewed [x]    Labs Ordered [x]    Radiology Reports Reviewed []    Radiology Ordered []    PCP Records Reviewed []    Referring Provider Records Reviewed []    ER Records Reviewed []    Hospital Records Reviewed []    History Obtained From Family []    Radiology Images Reviewed []    Other Reviewed [x]     Records Requested []      Diagnoses and all orders for this visit:    1. Type 2 diabetes mellitus with stage 3a chronic kidney disease, with long-term current use of insulin (Primary)  -     CBC Auto Differential; Future  -     Comprehensive Metabolic Panel; Future  -     Hemoglobin A1c; Future  -     Lipid Panel; Future  -     TSH; Future  -     amiodarone (PACERONE) 200 MG tablet; Take 0.5 tablets by mouth Daily.  Dispense: 45 tablet; Refill: 2    2. Chronic systolic congestive heart failure  -     carvedilol (COREG) 12.5 MG tablet; Take 1 tablet by mouth 2 (Two) Times a Day.  Dispense: 180 tablet; Refill: 1  -     amiodarone (PACERONE) 200 MG tablet; Take 0.5 tablets by mouth Daily.  Dispense: 45 tablet; Refill: 2    3. Hyperlipemia, mixed  -     atorvastatin (LIPITOR) 80 MG tablet; Take 1 tablet by mouth Daily.  Dispense: 90 tablet; Refill: 1    4. Essential hypertension  -     carvedilol (COREG) 12.5 MG tablet; Take 1 tablet by mouth 2 (Two) Times a Day.  Dispense: 180 tablet; Refill: 1  -     lisinopril (PRINIVIL,ZESTRIL) 10 MG tablet; Take 1 tablet by mouth 2 (Two) Times a Day.  Dispense: 180 tablet; Refill: 1  -     spironolactone (ALDACTONE) 25 MG tablet; Take 1 tablet by mouth Daily.  Dispense: 90 tablet; Refill: 1  -     Comprehensive Metabolic Panel; Future  -     Lipid Panel; Future    5. Other specified hypothyroidism  -     levothyroxine (Synthroid) 100 MCG tablet; Take 1 tablet by mouth Daily.  Dispense: 90 tablet; Refill: 1  -     TSH; Future                Patient's Body mass index is 27.05 kg/m². indicating that he is overweight (BMI 25-29.9). Patient's (Body mass index is 27.05 kg/m².) indicates that they are overweight with health conditions that include hypertension, coronary heart disease, diabetes mellitus and dyslipidemias . Weight is unchanged. BMI is is above average; BMI management plan is completed. We discussed portion control and increasing exercise. .

## 2024-09-10 NOTE — PROGRESS NOTES
Message received on 9/9 from daughter with questions about upcoming MitraClip procedure date. Called daughter, Edmund to discuss MitraClip date of 10/23. She was told by Dr. Mason that his procedure would be done in the first 1-2 weeks of September and is upset that his date is on 10/23. Management notified of communication discrepancy with MitraClip availability. Will discuss with providers and try to schedule sooner if able.

## 2024-09-11 ENCOUNTER — TELEPHONE (OUTPATIENT)
Dept: FAMILY MEDICINE CLINIC | Facility: CLINIC | Age: 79
End: 2024-09-11
Payer: MEDICARE

## 2024-09-11 ENCOUNTER — TELEPHONE (OUTPATIENT)
Dept: CARDIOLOGY | Facility: CLINIC | Age: 79
End: 2024-09-11
Payer: MEDICARE

## 2024-09-11 DIAGNOSIS — E03.8 OTHER SPECIFIED HYPOTHYROIDISM: ICD-10-CM

## 2024-09-11 DIAGNOSIS — D50.9 IRON DEFICIENCY ANEMIA, UNSPECIFIED IRON DEFICIENCY ANEMIA TYPE: Primary | ICD-10-CM

## 2024-09-11 LAB
IRON 24H UR-MRATE: 39 MCG/DL (ref 59–158)
IRON SATN MFR SERPL: 15 % (ref 20–50)
TIBC SERPL-MCNC: 252 MCG/DL (ref 298–536)
TRANSFERRIN SERPL-MCNC: 169 MG/DL (ref 200–360)

## 2024-09-11 RX ORDER — LEVOTHYROXINE SODIUM 75 UG/1
75 TABLET ORAL DAILY
Qty: 90 TABLET | Refills: 1 | Status: SHIPPED | OUTPATIENT
Start: 2024-09-11

## 2024-09-11 NOTE — TELEPHONE ENCOUNTER
----- Message from Ekaterina Cox sent at 9/11/2024 11:00 AM EDT -----  I added an iron panel to his labs.  Iron is a little low.  Can he start a multivitamin

## 2024-09-11 NOTE — TELEPHONE ENCOUNTER
Returning call in regards to the date given for pts upcoming debbie clip procedure.I returned the call to the daughter and answered any questions she had and explained our surgery dates and availability. Daughter not happy with Oct. surgery date.

## 2024-09-11 NOTE — TELEPHONE ENCOUNTER
----- Message from Ekaterina Cox sent at 9/11/2024 10:12 AM EDT -----  Sugar is up a little back to 8.5.  Sugar was low with labs we did discontinue Glyburide in hopes to reduce hypoglycemic episodes he is aware of this change from visit.  Also thyroid medications need adjustments.  Reduced dose to 75 sent new RX to pharmacy

## 2024-09-18 ENCOUNTER — PREP FOR SURGERY (OUTPATIENT)
Dept: OTHER | Facility: HOSPITAL | Age: 79
End: 2024-09-18
Payer: MEDICARE

## 2024-09-18 DIAGNOSIS — I34.0 NONRHEUMATIC MITRAL VALVE REGURGITATION: Primary | ICD-10-CM

## 2024-09-18 RX ORDER — CEFAZOLIN SODIUM 2 G/100ML
2000 INJECTION, SOLUTION INTRAVENOUS ONCE
OUTPATIENT
Start: 2024-09-18 | End: 2024-09-18

## 2024-09-21 DIAGNOSIS — E03.8 OTHER SPECIFIED HYPOTHYROIDISM: ICD-10-CM

## 2024-09-23 ENCOUNTER — TELEPHONE (OUTPATIENT)
Dept: CARDIOLOGY | Facility: CLINIC | Age: 79
End: 2024-09-23
Payer: MEDICARE

## 2024-09-23 ENCOUNTER — PRE-ADMISSION TESTING (OUTPATIENT)
Dept: PREADMISSION TESTING | Facility: HOSPITAL | Age: 79
DRG: 267 | End: 2024-09-23
Payer: MEDICARE

## 2024-09-23 DIAGNOSIS — I34.0 NONRHEUMATIC MITRAL VALVE REGURGITATION: ICD-10-CM

## 2024-09-23 DIAGNOSIS — I25.10 ASCVD (ARTERIOSCLEROTIC CARDIOVASCULAR DISEASE): Primary | ICD-10-CM

## 2024-09-23 LAB
ALBUMIN SERPL-MCNC: 3.7 G/DL (ref 3.5–5.2)
ALBUMIN/GLOB SERPL: 1.3 G/DL
ALP SERPL-CCNC: 115 U/L (ref 39–117)
ALT SERPL W P-5'-P-CCNC: 18 U/L (ref 1–41)
ANION GAP SERPL CALCULATED.3IONS-SCNC: 10 MMOL/L (ref 5–15)
AST SERPL-CCNC: 19 U/L (ref 1–40)
BILIRUB SERPL-MCNC: 0.3 MG/DL (ref 0–1.2)
BUN SERPL-MCNC: 30 MG/DL (ref 8–23)
BUN/CREAT SERPL: 21.6 (ref 7–25)
CALCIUM SPEC-SCNC: 9 MG/DL (ref 8.6–10.5)
CHLORIDE SERPL-SCNC: 98 MMOL/L (ref 98–107)
CO2 SERPL-SCNC: 23 MMOL/L (ref 22–29)
CREAT SERPL-MCNC: 1.39 MG/DL (ref 0.76–1.27)
DEPRECATED RDW RBC AUTO: 48.2 FL (ref 37–54)
EGFRCR SERPLBLD CKD-EPI 2021: 51.2 ML/MIN/1.73
ERYTHROCYTE [DISTWIDTH] IN BLOOD BY AUTOMATED COUNT: 15.1 % (ref 12.3–15.4)
GLOBULIN UR ELPH-MCNC: 2.9 GM/DL
GLUCOSE SERPL-MCNC: 443 MG/DL (ref 65–99)
HCT VFR BLD AUTO: 34.6 % (ref 37.5–51)
HGB BLD-MCNC: 11.3 G/DL (ref 13–17.7)
MCH RBC QN AUTO: 28.8 PG (ref 26.6–33)
MCHC RBC AUTO-ENTMCNC: 32.7 G/DL (ref 31.5–35.7)
MCV RBC AUTO: 88.3 FL (ref 79–97)
PLATELET # BLD AUTO: 284 10*3/MM3 (ref 140–450)
PMV BLD AUTO: 9.7 FL (ref 6–12)
POTASSIUM SERPL-SCNC: 5.2 MMOL/L (ref 3.5–5.2)
PROT SERPL-MCNC: 6.6 G/DL (ref 6–8.5)
RBC # BLD AUTO: 3.92 10*6/MM3 (ref 4.14–5.8)
SODIUM SERPL-SCNC: 131 MMOL/L (ref 136–145)
WBC NRBC COR # BLD AUTO: 6.37 10*3/MM3 (ref 3.4–10.8)

## 2024-09-23 PROCEDURE — 80053 COMPREHEN METABOLIC PANEL: CPT

## 2024-09-23 PROCEDURE — 85027 COMPLETE CBC AUTOMATED: CPT

## 2024-09-23 PROCEDURE — 36415 COLL VENOUS BLD VENIPUNCTURE: CPT

## 2024-09-23 RX ORDER — LEVOTHYROXINE SODIUM 100 UG/1
100 TABLET ORAL DAILY
COMMUNITY

## 2024-09-23 RX ORDER — LEVOTHYROXINE SODIUM 100 UG/1
100 TABLET ORAL DAILY
Qty: 90 TABLET | Refills: 0 | OUTPATIENT
Start: 2024-09-23

## 2024-09-23 NOTE — PAT
Lab called with critical blood glucose of 443, Dr Abad(on call for [facility]) notified (patient no longer present for appt).

## 2024-09-24 ENCOUNTER — ANESTHESIA EVENT (OUTPATIENT)
Dept: CARDIOLOGY | Facility: HOSPITAL | Age: 79
End: 2024-09-24
Payer: MEDICARE

## 2024-09-25 ENCOUNTER — ANESTHESIA EVENT CONVERTED (OUTPATIENT)
Dept: ANESTHESIOLOGY | Facility: HOSPITAL | Age: 79
End: 2024-09-25
Payer: MEDICARE

## 2024-09-25 ENCOUNTER — ANCILLARY PROCEDURE (OUTPATIENT)
Dept: PERIOP | Facility: HOSPITAL | Age: 79
End: 2024-09-25
Payer: MEDICARE

## 2024-09-25 ENCOUNTER — HOSPITAL ENCOUNTER (INPATIENT)
Facility: HOSPITAL | Age: 79
LOS: 1 days | Discharge: HOME | End: 2024-09-26
Attending: INTERNAL MEDICINE | Admitting: INTERNAL MEDICINE
Payer: MEDICARE

## 2024-09-25 ENCOUNTER — ANESTHESIA (OUTPATIENT)
Dept: CARDIOLOGY | Facility: HOSPITAL | Age: 79
End: 2024-09-25
Payer: MEDICARE

## 2024-09-25 DIAGNOSIS — Z95.818 S/P MITRAL VALVE CLIP IMPLANTATION: ICD-10-CM

## 2024-09-25 DIAGNOSIS — Z98.890 S/P MITRAL VALVE CLIP IMPLANTATION: ICD-10-CM

## 2024-09-25 DIAGNOSIS — I34.0 NONRHEUMATIC MITRAL VALVE REGURGITATION: Primary | ICD-10-CM

## 2024-09-25 DIAGNOSIS — I34.0 NONRHEUMATIC MITRAL VALVE REGURGITATION: ICD-10-CM

## 2024-09-25 LAB
ACT BLD: 293 SECONDS (ref 82–152)
ACT BLD: 317 SECONDS (ref 82–152)
ACT BLD: 330 SECONDS (ref 82–152)
GLUCOSE BLDC GLUCOMTR-MCNC: 167 MG/DL (ref 70–130)
GLUCOSE BLDC GLUCOMTR-MCNC: 198 MG/DL (ref 70–130)
GLUCOSE BLDC GLUCOMTR-MCNC: 204 MG/DL (ref 70–130)
GLUCOSE BLDC GLUCOMTR-MCNC: 219 MG/DL (ref 70–130)
GLUCOSE BLDC GLUCOMTR-MCNC: 375 MG/DL (ref 70–130)

## 2024-09-25 PROCEDURE — 82948 REAGENT STRIP/BLOOD GLUCOSE: CPT

## 2024-09-25 PROCEDURE — 85347 COAGULATION TIME ACTIVATED: CPT

## 2024-09-25 PROCEDURE — 33418 REPAIR TCAT MITRAL VALVE: CPT | Performed by: INTERNAL MEDICINE

## 2024-09-25 PROCEDURE — 25010000002 CEFAZOLIN PER 500 MG: Performed by: PHYSICIAN ASSISTANT

## 2024-09-25 PROCEDURE — C1894 INTRO/SHEATH, NON-LASER: HCPCS | Performed by: INTERNAL MEDICINE

## 2024-09-25 PROCEDURE — 33418 REPAIR TCAT MITRAL VALVE: CPT | Performed by: THORACIC SURGERY (CARDIOTHORACIC VASCULAR SURGERY)

## 2024-09-25 PROCEDURE — 25010000002 ONDANSETRON PER 1 MG

## 2024-09-25 PROCEDURE — 25010000002 HEPARIN (PORCINE) PER 1000 UNITS

## 2024-09-25 PROCEDURE — 25810000003 LACTATED RINGERS PER 1000 ML: Performed by: ANESTHESIOLOGY

## 2024-09-25 PROCEDURE — B245ZZ4 ULTRASONOGRAPHY OF LEFT HEART, TRANSESOPHAGEAL: ICD-10-PCS | Performed by: INTERNAL MEDICINE

## 2024-09-25 PROCEDURE — 93355 ECHO TRANSESOPHAGEAL (TEE): CPT | Performed by: ANESTHESIOLOGY

## 2024-09-25 PROCEDURE — 25010000002 DEXAMETHASONE PER 1 MG: Performed by: INTERNAL MEDICINE

## 2024-09-25 PROCEDURE — 63710000001 INSULIN LISPRO (HUMAN) PER 5 UNITS

## 2024-09-25 PROCEDURE — 25010000002 SUGAMMADEX 200 MG/2ML SOLUTION

## 2024-09-25 PROCEDURE — 25010000002 DEXAMETHASONE PER 1 MG

## 2024-09-25 PROCEDURE — 25010000002 PROPOFOL 10 MG/ML EMULSION

## 2024-09-25 PROCEDURE — 02UG3JZ SUPPLEMENT MITRAL VALVE WITH SYNTHETIC SUBSTITUTE, PERCUTANEOUS APPROACH: ICD-10-PCS | Performed by: INTERNAL MEDICINE

## 2024-09-25 PROCEDURE — 0 DEXTROSE 5 % SOLUTION 250 ML FLEX CONT

## 2024-09-25 PROCEDURE — 25010000002 FENTANYL CITRATE (PF) 100 MCG/2ML SOLUTION

## 2024-09-25 DEVICE — DEV REPR VLV MITRACLIP/G4 DS XTW: Type: IMPLANTABLE DEVICE | Status: FUNCTIONAL

## 2024-09-25 DEVICE — BUNDLE REPR MITRACLIP/G4 NT/NTW/XT/XTW 3CLIP W/CATH/SGC0701: Type: IMPLANTABLE DEVICE | Status: FUNCTIONAL

## 2024-09-25 RX ORDER — SODIUM CHLORIDE 0.9 % (FLUSH) 0.9 %
10 SYRINGE (ML) INJECTION EVERY 12 HOURS SCHEDULED
Status: DISCONTINUED | OUTPATIENT
Start: 2024-09-25 | End: 2024-09-26 | Stop reason: HOSPADM

## 2024-09-25 RX ORDER — ONDANSETRON 2 MG/ML
4 INJECTION INTRAMUSCULAR; INTRAVENOUS EVERY 6 HOURS PRN
Status: DISCONTINUED | OUTPATIENT
Start: 2024-09-25 | End: 2024-09-26 | Stop reason: HOSPADM

## 2024-09-25 RX ORDER — FENTANYL CITRATE 50 UG/ML
INJECTION, SOLUTION INTRAMUSCULAR; INTRAVENOUS AS NEEDED
Status: DISCONTINUED | OUTPATIENT
Start: 2024-09-25 | End: 2024-09-25 | Stop reason: SURG

## 2024-09-25 RX ORDER — HYDROMORPHONE HYDROCHLORIDE 1 MG/ML
0.5 INJECTION, SOLUTION INTRAMUSCULAR; INTRAVENOUS; SUBCUTANEOUS
Status: DISCONTINUED | OUTPATIENT
Start: 2024-09-25 | End: 2024-09-25 | Stop reason: HOSPADM

## 2024-09-25 RX ORDER — MORPHINE SULFATE 2 MG/ML
1 INJECTION, SOLUTION INTRAMUSCULAR; INTRAVENOUS EVERY 4 HOURS PRN
Status: DISCONTINUED | OUTPATIENT
Start: 2024-09-25 | End: 2024-09-26 | Stop reason: HOSPADM

## 2024-09-25 RX ORDER — NALOXONE HCL 0.4 MG/ML
0.4 VIAL (ML) INJECTION
Status: DISCONTINUED | OUTPATIENT
Start: 2024-09-25 | End: 2024-09-26 | Stop reason: HOSPADM

## 2024-09-25 RX ORDER — NITROGLYCERIN 0.4 MG/1
0.4 TABLET SUBLINGUAL
Status: DISCONTINUED | OUTPATIENT
Start: 2024-09-25 | End: 2024-09-26 | Stop reason: HOSPADM

## 2024-09-25 RX ORDER — SPIRONOLACTONE 25 MG/1
25 TABLET ORAL DAILY
Status: DISCONTINUED | OUTPATIENT
Start: 2024-09-26 | End: 2024-09-26 | Stop reason: HOSPADM

## 2024-09-25 RX ORDER — SODIUM CHLORIDE 0.9 % (FLUSH) 0.9 %
10 SYRINGE (ML) INJECTION AS NEEDED
Status: DISCONTINUED | OUTPATIENT
Start: 2024-09-25 | End: 2024-09-26 | Stop reason: HOSPADM

## 2024-09-25 RX ORDER — IPRATROPIUM BROMIDE AND ALBUTEROL SULFATE 2.5; .5 MG/3ML; MG/3ML
3 SOLUTION RESPIRATORY (INHALATION) ONCE AS NEEDED
Status: DISCONTINUED | OUTPATIENT
Start: 2024-09-25 | End: 2024-09-25 | Stop reason: HOSPADM

## 2024-09-25 RX ORDER — HEPARIN SODIUM 1000 [USP'U]/ML
INJECTION, SOLUTION INTRAVENOUS; SUBCUTANEOUS AS NEEDED
Status: DISCONTINUED | OUTPATIENT
Start: 2024-09-25 | End: 2024-09-25 | Stop reason: SURG

## 2024-09-25 RX ORDER — SODIUM CHLORIDE, SODIUM LACTATE, POTASSIUM CHLORIDE, CALCIUM CHLORIDE 600; 310; 30; 20 MG/100ML; MG/100ML; MG/100ML; MG/100ML
9 INJECTION, SOLUTION INTRAVENOUS CONTINUOUS
Status: DISCONTINUED | OUTPATIENT
Start: 2024-09-25 | End: 2024-09-26 | Stop reason: HOSPADM

## 2024-09-25 RX ORDER — LABETALOL HYDROCHLORIDE 5 MG/ML
5 INJECTION, SOLUTION INTRAVENOUS
Status: DISCONTINUED | OUTPATIENT
Start: 2024-09-25 | End: 2024-09-25 | Stop reason: HOSPADM

## 2024-09-25 RX ORDER — HYDRALAZINE HYDROCHLORIDE 20 MG/ML
5 INJECTION INTRAMUSCULAR; INTRAVENOUS
Status: DISCONTINUED | OUTPATIENT
Start: 2024-09-25 | End: 2024-09-25 | Stop reason: HOSPADM

## 2024-09-25 RX ORDER — DROPERIDOL 2.5 MG/ML
0.62 INJECTION, SOLUTION INTRAMUSCULAR; INTRAVENOUS
Status: DISCONTINUED | OUTPATIENT
Start: 2024-09-25 | End: 2024-09-25 | Stop reason: HOSPADM

## 2024-09-25 RX ORDER — ONDANSETRON 4 MG/1
4 TABLET, ORALLY DISINTEGRATING ORAL EVERY 6 HOURS PRN
Status: DISCONTINUED | OUTPATIENT
Start: 2024-09-25 | End: 2024-09-26 | Stop reason: HOSPADM

## 2024-09-25 RX ORDER — LEVOTHYROXINE SODIUM 100 UG/1
100 TABLET ORAL DAILY
Status: DISCONTINUED | OUTPATIENT
Start: 2024-09-26 | End: 2024-09-26 | Stop reason: HOSPADM

## 2024-09-25 RX ORDER — CARVEDILOL 12.5 MG/1
12.5 TABLET ORAL 2 TIMES DAILY
Status: DISCONTINUED | OUTPATIENT
Start: 2024-09-25 | End: 2024-09-26 | Stop reason: HOSPADM

## 2024-09-25 RX ORDER — ONDANSETRON 2 MG/ML
INJECTION INTRAMUSCULAR; INTRAVENOUS AS NEEDED
Status: DISCONTINUED | OUTPATIENT
Start: 2024-09-25 | End: 2024-09-25 | Stop reason: SURG

## 2024-09-25 RX ORDER — ACETAMINOPHEN 325 MG/1
650 TABLET ORAL EVERY 4 HOURS PRN
Status: DISCONTINUED | OUTPATIENT
Start: 2024-09-25 | End: 2024-09-26 | Stop reason: HOSPADM

## 2024-09-25 RX ORDER — AMIODARONE HYDROCHLORIDE 200 MG/1
100 TABLET ORAL DAILY
Status: DISCONTINUED | OUTPATIENT
Start: 2024-09-26 | End: 2024-09-26 | Stop reason: HOSPADM

## 2024-09-25 RX ORDER — PROPOFOL 10 MG/ML
VIAL (ML) INTRAVENOUS AS NEEDED
Status: DISCONTINUED | OUTPATIENT
Start: 2024-09-25 | End: 2024-09-25 | Stop reason: SURG

## 2024-09-25 RX ORDER — LIDOCAINE HYDROCHLORIDE 10 MG/ML
INJECTION, SOLUTION EPIDURAL; INFILTRATION; INTRACAUDAL; PERINEURAL AS NEEDED
Status: DISCONTINUED | OUTPATIENT
Start: 2024-09-25 | End: 2024-09-25 | Stop reason: SURG

## 2024-09-25 RX ORDER — SODIUM CHLORIDE 9 MG/ML
40 INJECTION, SOLUTION INTRAVENOUS AS NEEDED
Status: DISCONTINUED | OUTPATIENT
Start: 2024-09-25 | End: 2024-09-26 | Stop reason: HOSPADM

## 2024-09-25 RX ORDER — LISINOPRIL 10 MG/1
10 TABLET ORAL 2 TIMES DAILY
Status: DISCONTINUED | OUTPATIENT
Start: 2024-09-25 | End: 2024-09-26 | Stop reason: HOSPADM

## 2024-09-25 RX ORDER — INSULIN LISPRO 100 [IU]/ML
2 INJECTION, SOLUTION INTRAVENOUS; SUBCUTANEOUS ONCE
Status: COMPLETED | OUTPATIENT
Start: 2024-09-25 | End: 2024-09-25

## 2024-09-25 RX ORDER — ONDANSETRON 2 MG/ML
4 INJECTION INTRAMUSCULAR; INTRAVENOUS ONCE AS NEEDED
Status: DISCONTINUED | OUTPATIENT
Start: 2024-09-25 | End: 2024-09-25 | Stop reason: HOSPADM

## 2024-09-25 RX ORDER — SODIUM CHLORIDE 0.9 % (FLUSH) 0.9 %
3-10 SYRINGE (ML) INJECTION AS NEEDED
Status: DISCONTINUED | OUTPATIENT
Start: 2024-09-25 | End: 2024-09-25 | Stop reason: HOSPADM

## 2024-09-25 RX ORDER — HYDROCODONE BITARTRATE AND ACETAMINOPHEN 7.5; 325 MG/1; MG/1
1 TABLET ORAL EVERY 4 HOURS PRN
Status: DISCONTINUED | OUTPATIENT
Start: 2024-09-25 | End: 2024-09-26 | Stop reason: HOSPADM

## 2024-09-25 RX ORDER — INSULIN LISPRO 100 [IU]/ML
INJECTION, SOLUTION INTRAVENOUS; SUBCUTANEOUS
Status: COMPLETED
Start: 2024-09-25 | End: 2024-09-25

## 2024-09-25 RX ORDER — ATORVASTATIN CALCIUM 40 MG/1
80 TABLET, FILM COATED ORAL DAILY
Status: DISCONTINUED | OUTPATIENT
Start: 2024-09-26 | End: 2024-09-26 | Stop reason: HOSPADM

## 2024-09-25 RX ORDER — HYDROCODONE BITARTRATE AND ACETAMINOPHEN 5; 325 MG/1; MG/1
1 TABLET ORAL ONCE AS NEEDED
Status: DISCONTINUED | OUTPATIENT
Start: 2024-09-25 | End: 2024-09-25 | Stop reason: HOSPADM

## 2024-09-25 RX ORDER — SODIUM CHLORIDE 9 MG/ML
40 INJECTION, SOLUTION INTRAVENOUS AS NEEDED
Status: DISCONTINUED | OUTPATIENT
Start: 2024-09-25 | End: 2024-09-25 | Stop reason: HOSPADM

## 2024-09-25 RX ORDER — DEXAMETHASONE SODIUM PHOSPHATE 4 MG/ML
INJECTION, SOLUTION INTRA-ARTICULAR; INTRALESIONAL; INTRAMUSCULAR; INTRAVENOUS; SOFT TISSUE AS NEEDED
Status: DISCONTINUED | OUTPATIENT
Start: 2024-09-25 | End: 2024-09-25 | Stop reason: SURG

## 2024-09-25 RX ORDER — SODIUM CHLORIDE 0.9 % (FLUSH) 0.9 %
3 SYRINGE (ML) INJECTION EVERY 12 HOURS SCHEDULED
Status: DISCONTINUED | OUTPATIENT
Start: 2024-09-25 | End: 2024-09-25 | Stop reason: HOSPADM

## 2024-09-25 RX ORDER — DROPERIDOL 2.5 MG/ML
0.62 INJECTION, SOLUTION INTRAMUSCULAR; INTRAVENOUS ONCE AS NEEDED
Status: DISCONTINUED | OUTPATIENT
Start: 2024-09-25 | End: 2024-09-25 | Stop reason: HOSPADM

## 2024-09-25 RX ORDER — LIDOCAINE HYDROCHLORIDE 10 MG/ML
0.5 INJECTION, SOLUTION EPIDURAL; INFILTRATION; INTRACAUDAL; PERINEURAL ONCE AS NEEDED
Status: DISCONTINUED | OUTPATIENT
Start: 2024-09-25 | End: 2024-09-26 | Stop reason: HOSPADM

## 2024-09-25 RX ORDER — DEXAMETHASONE SODIUM PHOSPHATE 10 MG/ML
8 INJECTION INTRAMUSCULAR; INTRAVENOUS ONCE
Status: COMPLETED | OUTPATIENT
Start: 2024-09-25 | End: 2024-09-25

## 2024-09-25 RX ORDER — FAMOTIDINE 10 MG/ML
20 INJECTION, SOLUTION INTRAVENOUS ONCE
Status: DISCONTINUED | OUTPATIENT
Start: 2024-09-25 | End: 2024-09-26 | Stop reason: HOSPADM

## 2024-09-25 RX ORDER — FAMOTIDINE 20 MG/1
20 TABLET, FILM COATED ORAL ONCE
Status: DISCONTINUED | OUTPATIENT
Start: 2024-09-25 | End: 2024-09-26 | Stop reason: HOSPADM

## 2024-09-25 RX ORDER — FENTANYL CITRATE 50 UG/ML
50 INJECTION, SOLUTION INTRAMUSCULAR; INTRAVENOUS
Status: DISCONTINUED | OUTPATIENT
Start: 2024-09-25 | End: 2024-09-25 | Stop reason: HOSPADM

## 2024-09-25 RX ORDER — ASPIRIN 81 MG/1
81 TABLET ORAL 2 TIMES DAILY
Status: DISCONTINUED | OUTPATIENT
Start: 2024-09-25 | End: 2024-09-26 | Stop reason: HOSPADM

## 2024-09-25 RX ORDER — EPHEDRINE SULFATE 50 MG/ML
INJECTION INTRAVENOUS AS NEEDED
Status: DISCONTINUED | OUTPATIENT
Start: 2024-09-25 | End: 2024-09-25 | Stop reason: SURG

## 2024-09-25 RX ORDER — PANTOPRAZOLE SODIUM 40 MG/1
40 TABLET, DELAYED RELEASE ORAL DAILY
Status: DISCONTINUED | OUTPATIENT
Start: 2024-09-26 | End: 2024-09-26 | Stop reason: HOSPADM

## 2024-09-25 RX ORDER — ROCURONIUM BROMIDE 10 MG/ML
INJECTION, SOLUTION INTRAVENOUS AS NEEDED
Status: DISCONTINUED | OUTPATIENT
Start: 2024-09-25 | End: 2024-09-25 | Stop reason: SURG

## 2024-09-25 RX ORDER — MIDAZOLAM HYDROCHLORIDE 1 MG/ML
0.5 INJECTION INTRAMUSCULAR; INTRAVENOUS
Status: DISCONTINUED | OUTPATIENT
Start: 2024-09-25 | End: 2024-09-26 | Stop reason: HOSPADM

## 2024-09-25 RX ADMIN — ASPIRIN 81 MG: 81 TABLET, COATED ORAL at 23:20

## 2024-09-25 RX ADMIN — EPHEDRINE SULFATE 5 MG: 50 INJECTION INTRAVENOUS at 13:18

## 2024-09-25 RX ADMIN — PROPOFOL 120 MG: 10 INJECTION, EMULSION INTRAVENOUS at 13:07

## 2024-09-25 RX ADMIN — EPHEDRINE SULFATE 5 MG: 50 INJECTION INTRAVENOUS at 13:13

## 2024-09-25 RX ADMIN — HEPARIN SODIUM 3000 UNITS: 1000 INJECTION INTRAVENOUS; SUBCUTANEOUS at 13:25

## 2024-09-25 RX ADMIN — SODIUM CHLORIDE, POTASSIUM CHLORIDE, SODIUM LACTATE AND CALCIUM CHLORIDE: 600; 310; 30; 20 INJECTION, SOLUTION INTRAVENOUS at 12:50

## 2024-09-25 RX ADMIN — LISINOPRIL 10 MG: 10 TABLET ORAL at 23:21

## 2024-09-25 RX ADMIN — DEXAMETHASONE SODIUM PHOSPHATE 8 MG: 10 INJECTION INTRAMUSCULAR; INTRAVENOUS at 23:22

## 2024-09-25 RX ADMIN — HEPARIN SODIUM 3000 UNITS: 1000 INJECTION INTRAVENOUS; SUBCUTANEOUS at 14:23

## 2024-09-25 RX ADMIN — ROCURONIUM BROMIDE 15 MG: 10 INJECTION INTRAVENOUS at 14:23

## 2024-09-25 RX ADMIN — ROCURONIUM BROMIDE 50 MG: 10 INJECTION INTRAVENOUS at 13:07

## 2024-09-25 RX ADMIN — ROCURONIUM BROMIDE 15 MG: 10 INJECTION INTRAVENOUS at 15:00

## 2024-09-25 RX ADMIN — CARVEDILOL 12.5 MG: 12.5 TABLET, FILM COATED ORAL at 23:21

## 2024-09-25 RX ADMIN — EPHEDRINE SULFATE 5 MG: 50 INJECTION INTRAVENOUS at 14:11

## 2024-09-25 RX ADMIN — SUGAMMADEX 200 MG: 100 INJECTION, SOLUTION INTRAVENOUS at 15:22

## 2024-09-25 RX ADMIN — NOREPINEPHRINE BITARTRATE 0.02 MCG/KG/MIN: 1 INJECTION, SOLUTION, CONCENTRATE INTRAVENOUS at 13:14

## 2024-09-25 RX ADMIN — EPHEDRINE SULFATE 5 MG: 50 INJECTION INTRAVENOUS at 13:53

## 2024-09-25 RX ADMIN — ROCURONIUM BROMIDE 15 MG: 10 INJECTION INTRAVENOUS at 13:49

## 2024-09-25 RX ADMIN — INSULIN LISPRO 2 UNITS: 100 INJECTION, SOLUTION INTRAVENOUS; SUBCUTANEOUS at 16:41

## 2024-09-25 RX ADMIN — EPHEDRINE SULFATE 5 MG: 50 INJECTION INTRAVENOUS at 13:07

## 2024-09-25 RX ADMIN — DEXAMETHASONE SODIUM PHOSPHATE 4 MG: 4 INJECTION INTRA-ARTICULAR; INTRALESIONAL; INTRAMUSCULAR; INTRAVENOUS; SOFT TISSUE at 13:12

## 2024-09-25 RX ADMIN — LIDOCAINE HYDROCHLORIDE 50 MG: 10 INJECTION, SOLUTION EPIDURAL; INFILTRATION; INTRACAUDAL; PERINEURAL at 13:07

## 2024-09-25 RX ADMIN — ONDANSETRON 4 MG: 2 INJECTION INTRAMUSCULAR; INTRAVENOUS at 15:09

## 2024-09-25 RX ADMIN — HEPARIN SODIUM 10000 UNITS: 1000 INJECTION INTRAVENOUS; SUBCUTANEOUS at 13:34

## 2024-09-25 RX ADMIN — FENTANYL CITRATE 100 MCG: 50 INJECTION, SOLUTION INTRAMUSCULAR; INTRAVENOUS at 13:07

## 2024-09-25 RX ADMIN — Medication 10 ML: at 23:21

## 2024-09-25 RX ADMIN — SODIUM CHLORIDE 2000 MG: 900 INJECTION INTRAVENOUS at 13:00

## 2024-09-25 RX ADMIN — EPHEDRINE SULFATE 5 MG: 50 INJECTION INTRAVENOUS at 13:30

## 2024-09-25 NOTE — OP NOTE
Preoperative diagnosis: Severe symptomatic mitral regurgitation    Postoperative diagnosis: Severe symptomatic mitral regurgitation    Procedure: MitraClip placement of a single XT W between A2-P2 segments of the mitral valve    Indication: Symptomatic severe mitral regurgitation in a patient who is felt to be very high risk for surgery following cardiology and surgical evaluation.    Operators:  Interventional cardiologist: Randi Mason MD/Humphrey Deluna MD  Surgeon:  Ubaldo Webb MD  Echocardiographer:   Osmin Amaya MD    Anesthesia: [facility]    Procedure: Informed consent obtained.  The patient was brought to the catheterization lab where they were intubated by anesthesia.  Ancef was given on call to the lab as well as 8 mg of Decadron.  The patient was then prepped and draped in the usual sterile fashion over both femoral veins.    Transesophageal echocardiographic images of the mitral valve and the remaining cardiac structures were then obtained.    Using the modified Seldinger technique access was obtained in the right femoral vein and a 6 French hemostasis sheath was placed.  3000 units of intravenous heparin was given.  Transseptal puncture was then performed under transesophageal guidance a little more than 4 cm above the mitral valve.  Full heparinization was then performed.  The ACT remained greater than 250 during the entire procedure.  The [place] wire was then placed in the left atrium.  The atrial septum was then dilated using a 20 French long dilator.    The MitraClip guide was then placed across the interatrial septum under fluoroscopic and echocardiographic guidance.  The wire and dilator were removed.  The XT W MitraClip was then placed through the guide and positioned appropriately above the jet between A2-P2.  The posterior leaflet was very hard to grasp.  The clip was then placed below the leaflets and the leaflets were grasp successfully.  The clip was then closed to 60°  with a good reduction in mitral regurgitation and then closed to 20° at which time only mild mitral regurgitation remained.  The mean gradient across the mitral valve was 2 mmHg.  A tissue bridge was documented by echocardiography.     No pericardial effusion was present at the conclusion of the case.    LV systolic function was remained unchanged throughout the case.    The sheath was removed.  The venous access access site was closed using 0 silk figure-of-eight suture.  There were no complications.     Estimated blood loss less than 25 mL      Randi Mason MD, FACC

## 2024-09-25 NOTE — OP NOTE
DATE OF PROCEDURE: 9/25/2024     PREOPERATIVE DIAGNOSES:  1. Severe mitral regurgitation     POSTOPERATIVE DIAGNOSES:    1. Severe mitral regurgitation     PROCEDURES PERFORMED:    1. Mitraclip XTw Insertion    SURGEON: Ubaldo Webb MD       Cardiologists:    1. Randi Mason MD  2. Tristan Deluna MD     ANESTHESIA: General endotracheal anesthesia with Dr. Ubaldo Amaya MD     ESTIMATED BLOOD LOSS: Less than 25 mL      TOTAL FLUOROSCOPY TIME: 11.4     EXPOSURE: 296 mGy     INDICATIONS: 79-year-old  male with a history of hypertension, hyperlipidemia, diabetes mellitus, previous tobacco abuse, coronary artery disease status post CABG x 5 in 2006 at [facility] and ischemic cardiomyopathy status post BiV ICD in 2017 who presented with shortness of breath, lower extremity edema and chest pain.  He was found to have severe mitral regurgitation and was felt to be a reasonable candidate for Mitraclip insertion. The risks and benefits of surgery were discussed with the patient including pain, bleeding, infection, renal failure, stroke and death. The patient understood these risks and wished to proceed with surgery.      DESCRIPTION OF PROCEDURE:  The patient was taken to the operating room and placed under general endotracheal anesthesia.  He was prepped and draped in the usual sterile fashion.  A timeout was performed including the patient's name, procedure and antibiotic administration.  Transesophageal echocardiogram was performed.  Needle access of the right common femoral vein was obtained and a 6 FR sheath placed.  2000 units of heparin was administered.  The wire was then advanced into the superior vena cava.  The [place] sheath and wire were then pulled down into position along the fossa and tenting was at an acceptable height.  A transeptal puncture was then made and the [place] wire placed in the left atrium.  Heparin was given for a target ACT over 250.  The septum was then dilated using  modified Seldinger technique and the guide and clip were safely inserted.  XTw clip position was then directed using fluoroscopy and echocardiogram.  The first grasp had regurgitant jets both medially and laterally.  Decision was made to regrasp for potential second clip insertion.  The second grasper was difficult and after multiple adjustments a reasonable grasp was made with good insertion at A2/P2.  Regurgitation was reduced to mild and the surrounding regurgitant jets were improved as compared to the first grasp.  The gradients were 2 mm Hg.  This was felt to be satisfactory and the clip was released.  Results did not warrant a second clip insertion.  The system and guide were removed and hemostasis in the groin achieved with a figure of eight silk suture.  The patient tolerated the procedure well and was transported to the recovery room in stable condition.

## 2024-09-25 NOTE — H&P
Pre-procedure History and Physical  [facility] at [facility]      Patient:  Shai Mata  :  1945  MRN: [id number]    PCP:  Ekaterina Cox APRN  PHONE:  [phone]    DATE: 2024  ID: Shai Mata is a 79 y.o. male resident of [place], [place]     CC: Mitral regurgitation     PROBLEM LIST:   Mitral regurgitation  Echo 2018: EF 45%, mild MR  Echo 2024: EF 46-50%, grade 2 diastolic dysfunction, aortic calcification with mild to moderate AI and mild AS.  Severe MR without MS, moderate TR, RVSP greater than 55  SHERLEY 24: EF 45-50%, mild AS, severe MR with tethered leaflets   CAD with ischemic cardiomyopathy  S/p CABG X5 1996  Stress test 2016: Small partially reversible basal inferolateral and mid inferolateral segments.  Echo 2017: EF 35%  [facility] 24: 5/5 patent grafts, distal small vessel disease   Ventricular tachycardia  S/p [employer] BiV ICD 2017  On amiodarone therapy  Frequent PVCs  Attempted PVC ablation 2017  Chronic left bundle branch block  Hypertension   Hyperlipidemia  Type 2 diabetes    BRIEF HPI: 80 y/o male with above noted history who presents for MitraClip procedure today. Recent TTE and SHERLEY as noted above with EF 45-50%, severe MR. Recent cath showed patent 5/5 grafts, with distal small vessel disease. He also has a history of VT and has BiV ICD in place, followed by EP.       Allergies:      No Known Allergies    MEDICATIONS:  Current Outpatient Medications   Medication Instructions    amiodarone (PACERONE) 100 mg, Oral, Daily    aspirin 81 mg, Oral, 2 Times Daily    atorvastatin (LIPITOR) 80 mg, Oral, Daily    carvedilol (COREG) 12.5 mg, Oral, 2 Times Daily    insulin NPH-insulin regular (humuLIN 70/30,novoLIN 70/30) (70-30) 100 UNIT/ML injection 22 Units, Subcutaneous, 2 Times Daily With Meals    levothyroxine (SYNTHROID, LEVOTHROID) 100 mcg, Oral, Daily    lisinopril (PRINIVIL,ZESTRIL) 10 mg, Oral, 2 Times Daily    pantoprazole  (PROTONIX) 40 mg, Oral, Daily    spironolactone (ALDACTONE) 25 mg, Oral, Daily       Past medical & surgical history, social and family history reviewed in the electronic medical record.    ROS: Pertinent positives listed in the HPI and problem list above. All others reviewed and negative.     Physical Exam:   /72 (BP Location: Left arm)   Pulse 72   Temp 97.3 °F (36.3 °C) (Temporal)   SpO2 98%     Constitutional:    Alert, cooperative, in no acute distress   Neck:     No Jugular venous distention, adenopathy, or thyromegaly noted.    Heart:    Regular rhythm and normal rate, 2/6 holosystolic murmur, no gallops, rubs, or clicks. No distinct PMI noted.    Lungs:     Clear to auscultation bilaterally, respirations regular, even and unlabored    Extremities:   No gross deformities, no edema, clubbing, or cyanosis.        Labs and Diagnostic Data:  Results from last 7 days   Lab Units 09/23/24  1204   SODIUM mmol/L 131*   POTASSIUM mmol/L 5.2   CHLORIDE mmol/L 98   CO2 mmol/L 23.0   BUN mg/dL 30*   CREATININE mg/dL 1.39*   GLUCOSE mg/dL 443*   CALCIUM mg/dL 9.0     Results from last 7 days   Lab Units 09/23/24  1204   WBC 10*3/mm3 6.37   HEMOGLOBIN g/dL 11.3*   HEMATOCRIT % 34.6*   PLATELETS 10*3/mm3 284     Lab Results   Component Value Date    CHOL 96 09/10/2024    TRIG 35 09/10/2024    HDL 34 (L) 09/10/2024    LDL 52 09/10/2024    AST 19 09/23/2024    ALT 18 09/23/2024                 SHERLEY 7/30/24:  Interpretation Summary         Left ventricular systolic function is low normal. Estimated left ventricular EF = 45-50%    Mild aortic valve stenosis is present.    Aortic valve mean pressure gradient is 10 mmHg.    The mitral annulus is dilated at 4.2 cm. The leaflets appear to be tethered. There is a predominantly central jet of severe mitral regurgitation which courses between A2-P2.    The jet of MR appears on the lateral aspect of A2-P2 in the 3D images.    The mitral valve area on 3D measures 6.09 cm². The  mitral valve area by 2D measures 6.09 cm2 in one view and 6.43 cm² in the second view. The posterior mitral valve leaflet length is 1.2 cm.    Mild to moderate tricuspid regurgitation.    Estimated right ventricular systolic pressure from tricuspid regurgitation is mildly elevated (35-45 mmHg).     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 BiV ICD  CAD, with recent LHC 7/30 showing 5/5 patent grafts, no further revascularization indicated   DM2    PLAN:  Proceed with MitraClip per Dr. Mason/ Dr. Deluna/Dr. Webb      Electronically signed by Celia Cook PA-C, 09/25/24, 11:03 AM EDT.    Randi Mason MD, [facility]

## 2024-09-26 ENCOUNTER — READMISSION MANAGEMENT (OUTPATIENT)
Dept: CALL CENTER | Facility: HOSPITAL | Age: 79
End: 2024-09-26
Payer: MEDICARE

## 2024-09-26 ENCOUNTER — APPOINTMENT (OUTPATIENT)
Dept: CARDIOLOGY | Facility: HOSPITAL | Age: 79
End: 2024-09-26
Payer: MEDICARE

## 2024-09-26 ENCOUNTER — TRANSCRIBE ORDERS (OUTPATIENT)
Dept: CARDIAC REHAB | Facility: HOSPITAL | Age: 79
End: 2024-09-26
Payer: MEDICARE

## 2024-09-26 VITALS
TEMPERATURE: 98.5 F | WEIGHT: 183.2 LBS | BODY MASS INDEX: 27.13 KG/M2 | SYSTOLIC BLOOD PRESSURE: 125 MMHG | DIASTOLIC BLOOD PRESSURE: 58 MMHG | HEIGHT: 69 IN | HEART RATE: 72 BPM | OXYGEN SATURATION: 92 % | RESPIRATION RATE: 18 BRPM

## 2024-09-26 DIAGNOSIS — Z98.890 STATUS POST IMPLANTATION OF MITRAL VALVE LEAFLET CLIP: Primary | ICD-10-CM

## 2024-09-26 DIAGNOSIS — Z98.890 S/P MITRAL VALVE CLIP IMPLANTATION: Primary | ICD-10-CM

## 2024-09-26 DIAGNOSIS — Z95.818 STATUS POST IMPLANTATION OF MITRAL VALVE LEAFLET CLIP: Primary | ICD-10-CM

## 2024-09-26 DIAGNOSIS — Z95.818 S/P MITRAL VALVE CLIP IMPLANTATION: Primary | ICD-10-CM

## 2024-09-26 LAB
ANION GAP SERPL CALCULATED.3IONS-SCNC: 14 MMOL/L (ref 5–15)
BH CV ECHO MEAS - AI P1/2T: 390.2 MSEC
BH CV ECHO MEAS - AO MAX PG: 18 MMHG
BH CV ECHO MEAS - AO MEAN PG: 10 MMHG
BH CV ECHO MEAS - AO ROOT DIAM: 3.1 CM
BH CV ECHO MEAS - AO V2 MAX: 210 CM/SEC
BH CV ECHO MEAS - AO V2 VTI: 43.1 CM
BH CV ECHO MEAS - AVA(I,D): 1.17 CM2
BH CV ECHO MEAS - EDV(CUBED): 205.4 ML
BH CV ECHO MEAS - EDV(MOD-SP2): 146 ML
BH CV ECHO MEAS - EDV(MOD-SP4): 176 ML
BH CV ECHO MEAS - EF(MOD-BP): 46.2 %
BH CV ECHO MEAS - EF(MOD-SP2): 44.6 %
BH CV ECHO MEAS - EF(MOD-SP4): 46.2 %
BH CV ECHO MEAS - ESV(CUBED): 91.1 ML
BH CV ECHO MEAS - ESV(MOD-SP2): 80.9 ML
BH CV ECHO MEAS - ESV(MOD-SP4): 94.7 ML
BH CV ECHO MEAS - FS: 23.7 %
BH CV ECHO MEAS - IVS/LVPW: 1 CM
BH CV ECHO MEAS - IVSD: 1 CM
BH CV ECHO MEAS - LA DIMENSION: 4.7 CM
BH CV ECHO MEAS - LAT PEAK E' VEL: 7.5 CM/SEC
BH CV ECHO MEAS - LV MASS(C)D: 239.9 GRAMS
BH CV ECHO MEAS - LV MAX PG: 2.7 MMHG
BH CV ECHO MEAS - LV MEAN PG: 1 MMHG
BH CV ECHO MEAS - LV V1 MAX: 81.5 CM/SEC
BH CV ECHO MEAS - LV V1 VTI: 16.1 CM
BH CV ECHO MEAS - LVIDD: 5.8 CM
BH CV ECHO MEAS - LVIDS: 4.5 CM
BH CV ECHO MEAS - LVOT AREA: 3.1 CM2
BH CV ECHO MEAS - LVOT DIAM: 2 CM
BH CV ECHO MEAS - LVPWD: 1 CM
BH CV ECHO MEAS - MED PEAK E' VEL: 5.3 CM/SEC
BH CV ECHO MEAS - MR MAX PG: 132.3 MMHG
BH CV ECHO MEAS - MR MAX VEL: 575 CM/SEC
BH CV ECHO MEAS - MR MEAN PG: 88 MMHG
BH CV ECHO MEAS - MR MEAN VEL: 449 CM/SEC
BH CV ECHO MEAS - MR VTI: 197 CM
BH CV ECHO MEAS - MV A MAX VEL: 125 CM/SEC
BH CV ECHO MEAS - MV DEC SLOPE: 464 CM/SEC2
BH CV ECHO MEAS - MV DEC TIME: 0.27 SEC
BH CV ECHO MEAS - MV E MAX VEL: 96.9 CM/SEC
BH CV ECHO MEAS - MV E/A: 0.78
BH CV ECHO MEAS - MV MAX PG: 7.9 MMHG
BH CV ECHO MEAS - MV MEAN PG: 3.3 MMHG
BH CV ECHO MEAS - MV P1/2T: 76.4 MSEC
BH CV ECHO MEAS - MV V2 VTI: 34.5 CM
BH CV ECHO MEAS - MVA(P1/2T): 2.9 CM2
BH CV ECHO MEAS - MVA(VTI): 1.46 CM2
BH CV ECHO MEAS - PA ACC TIME: 0.12 SEC
BH CV ECHO MEAS - PA V2 MAX: 91.4 CM/SEC
BH CV ECHO MEAS - PI END-D VEL: 117 CM/SEC
BH CV ECHO MEAS - RAP SYSTOLE: 8 MMHG
BH CV ECHO MEAS - RVSP: 26 MMHG
BH CV ECHO MEAS - SV(LVOT): 50.4 ML
BH CV ECHO MEAS - SV(MOD-SP2): 65.1 ML
BH CV ECHO MEAS - SV(MOD-SP4): 81.3 ML
BH CV ECHO MEAS - TAPSE (>1.6): 1.43 CM
BH CV ECHO MEAS - TR MAX PG: 17.6 MMHG
BH CV ECHO MEAS - TR MAX VEL: 210 CM/SEC
BH CV ECHO MEASUREMENTS AVERAGE E/E' RATIO: 15.14
BH CV XLRA - RV BASE: 3.9 CM
BH CV XLRA - RV LENGTH: 7.1 CM
BH CV XLRA - RV MID: 2.9 CM
BH CV XLRA - TDI S': 8.9 CM/SEC
BUN SERPL-MCNC: 31 MG/DL (ref 8–23)
BUN/CREAT SERPL: 27.9 (ref 7–25)
CALCIUM SPEC-SCNC: 9.4 MG/DL (ref 8.6–10.5)
CHLORIDE SERPL-SCNC: 98 MMOL/L (ref 98–107)
CO2 SERPL-SCNC: 20 MMOL/L (ref 22–29)
CREAT SERPL-MCNC: 1.11 MG/DL (ref 0.76–1.27)
DEPRECATED RDW RBC AUTO: 47.5 FL (ref 37–54)
EGFRCR SERPLBLD CKD-EPI 2021: 67.1 ML/MIN/1.73
ERYTHROCYTE [DISTWIDTH] IN BLOOD BY AUTOMATED COUNT: 15.1 % (ref 12.3–15.4)
GLUCOSE BLDC GLUCOMTR-MCNC: 317 MG/DL (ref 70–130)
GLUCOSE BLDC GLUCOMTR-MCNC: 337 MG/DL (ref 70–130)
GLUCOSE BLDC GLUCOMTR-MCNC: 367 MG/DL (ref 70–130)
GLUCOSE BLDC GLUCOMTR-MCNC: 383 MG/DL (ref 70–130)
GLUCOSE SERPL-MCNC: 312 MG/DL (ref 65–99)
HCT VFR BLD AUTO: 36.1 % (ref 37.5–51)
HGB BLD-MCNC: 11.9 G/DL (ref 13–17.7)
LEFT ATRIUM VOLUME INDEX: 37.2 ML/M2
LV EF 2D ECHO EST: 45 %
MCH RBC QN AUTO: 28.3 PG (ref 26.6–33)
MCHC RBC AUTO-ENTMCNC: 33 G/DL (ref 31.5–35.7)
MCV RBC AUTO: 86 FL (ref 79–97)
PLATELET # BLD AUTO: 259 10*3/MM3 (ref 140–450)
PMV BLD AUTO: 9.9 FL (ref 6–12)
POTASSIUM SERPL-SCNC: 5 MMOL/L (ref 3.5–5.2)
RBC # BLD AUTO: 4.2 10*6/MM3 (ref 4.14–5.8)
SODIUM SERPL-SCNC: 132 MMOL/L (ref 136–145)
WBC NRBC COR # BLD AUTO: 9.81 10*3/MM3 (ref 3.4–10.8)

## 2024-09-26 PROCEDURE — 99238 HOSP IP/OBS DSCHRG MGMT 30/<: CPT | Performed by: INTERNAL MEDICINE

## 2024-09-26 PROCEDURE — 80048 BASIC METABOLIC PNL TOTAL CA: CPT | Performed by: INTERNAL MEDICINE

## 2024-09-26 PROCEDURE — 63710000001 INSULIN LISPRO PROTAMINE-INSULIN LISPRO (75-25) 100 UNIT/ML SUSPENSION 10 ML VIAL: Performed by: INTERNAL MEDICINE

## 2024-09-26 PROCEDURE — 93306 TTE W/DOPPLER COMPLETE: CPT | Performed by: INTERNAL MEDICINE

## 2024-09-26 PROCEDURE — 93306 TTE W/DOPPLER COMPLETE: CPT

## 2024-09-26 PROCEDURE — 85027 COMPLETE CBC AUTOMATED: CPT | Performed by: INTERNAL MEDICINE

## 2024-09-26 PROCEDURE — 99024 POSTOP FOLLOW-UP VISIT: CPT

## 2024-09-26 PROCEDURE — 82948 REAGENT STRIP/BLOOD GLUCOSE: CPT

## 2024-09-26 RX ORDER — FUROSEMIDE 40 MG
40 TABLET ORAL 2 TIMES DAILY
Qty: 120 TABLET | Refills: 0 | OUTPATIENT
Start: 2024-09-26

## 2024-09-26 RX ADMIN — AMIODARONE HYDROCHLORIDE 100 MG: 200 TABLET ORAL at 08:48

## 2024-09-26 RX ADMIN — LEVOTHYROXINE SODIUM 100 MCG: 0.1 TABLET ORAL at 08:49

## 2024-09-26 RX ADMIN — Medication 10 ML: at 08:51

## 2024-09-26 RX ADMIN — ATORVASTATIN CALCIUM 80 MG: 40 TABLET, FILM COATED ORAL at 08:48

## 2024-09-26 RX ADMIN — INSULIN LISPRO 20 UNITS: 100 INJECTION, SUSPENSION SUBCUTANEOUS at 00:02

## 2024-09-26 RX ADMIN — LISINOPRIL 10 MG: 10 TABLET ORAL at 08:48

## 2024-09-26 RX ADMIN — CARVEDILOL 12.5 MG: 12.5 TABLET, FILM COATED ORAL at 08:48

## 2024-09-26 RX ADMIN — INSULIN LISPRO 20 UNITS: 100 INJECTION, SUSPENSION SUBCUTANEOUS at 08:47

## 2024-09-26 RX ADMIN — ASPIRIN 81 MG: 81 TABLET, COATED ORAL at 08:49

## 2024-09-26 RX ADMIN — PANTOPRAZOLE SODIUM 40 MG: 40 TABLET, DELAYED RELEASE ORAL at 08:48

## 2024-09-26 RX ADMIN — SPIRONOLACTONE 25 MG: 25 TABLET, FILM COATED ORAL at 08:49

## 2024-09-26 NOTE — PROGRESS NOTES
"[facility] Cardiology Daily Note       LOS: 1 day   Patient Care Team:  Ekaterina Cox APRN as PCP - General  Ekaterina Cox APRN as PCP - Family Medicine  Marcin Colon MD as Consulting Physician (Cardiology)    Chief Complaint: Day 1 status post MitraClip    Subjective     Subjective: Sitting up eating breakfast.  Has not walked yet.  Just had his echocardiogram done.  95% on room air.  Blood pressures have been good overnight.  Heart rates ranged between 54 and 86 bpm.    Review of Systems:   As above.    Medications:  amiodarone, 100 mg, Oral, Daily  aspirin, 81 mg, Oral, BID  atorvastatin, 80 mg, Oral, Daily  carvedilol, 12.5 mg, Oral, BID  famotidine, 20 mg, Intravenous, Once  famotidine, 20 mg, Oral, Once  insulin lispro protamine-insulin lispro, 20 Units, Subcutaneous, BID With Meals  levothyroxine, 100 mcg, Oral, Daily  lisinopril, 10 mg, Oral, BID  pantoprazole, 40 mg, Oral, Daily  sodium chloride, 10 mL, Intravenous, Q12H  spironolactone, 25 mg, Oral, Daily        Objective     Vital Sign Min/Max for last 24 hours  Temp  Min: 97 °F (36.1 °C)  Max: 97.8 °F (36.6 °C)   BP  Min: 113/64  Max: 157/87   Pulse  Min: 54  Max: 86   Resp  Min: 14  Max: 20   SpO2  Min: 92 %  Max: 99 %   Flow (L/min)  Min: 2  Max: 4   Weight  Min: 83.1 kg (183 lb 3.2 oz)  Max: 83.1 kg (183 lb 3.2 oz)      Intake/Output Summary (Last 24 hours) at 9/26/2024 0756  Last data filed at 9/26/2024 0755  Gross per 24 hour   Intake 1000 ml   Output 950 ml   Net 50 ml        Flowsheet Rows      Flowsheet Row First Filed Value   Admission Height 175.3 cm (69\") Documented at 09/26/2024 0205   Admission Weight 83.1 kg (183 lb 3.2 oz) Documented at 09/26/2024 0205            Physical Exam:    General: Alert and oriented.   Cardiovascular: Heart has a nondisplaced focal PMI. Regular rate and rhythm without murmur, gallop or rub.  Lungs: Clear without rales or wheezes. Equal expansion is noted.   Abdomen: Soft, nontender.  Extremities: " "Show no edema.   Skin: warm and dry.     Results Review:    I reviewed the patient's new clinical results.  EKG:  Tele: V paced.    Labs:    Results from last 7 days   Lab Units 09/26/24  0642 09/23/24  1204   SODIUM mmol/L 132* 131*   POTASSIUM mmol/L 5.0 5.2   CHLORIDE mmol/L 98 98   CO2 mmol/L 20.0* 23.0   BUN mg/dL 31* 30*   CREATININE mg/dL 1.11 1.39*   CALCIUM mg/dL 9.4 9.0   BILIRUBIN mg/dL  --  0.3   ALK PHOS U/L  --  115   ALT (SGPT) U/L  --  18   AST (SGOT) U/L  --  19   GLUCOSE mg/dL 312* 443*     Results from last 7 days   Lab Units 09/26/24  0642 09/23/24  1204   WBC 10*3/mm3 9.81 6.37   HEMOGLOBIN g/dL 11.9* 11.3*   HEMATOCRIT % 36.1* 34.6*   PLATELETS 10*3/mm3 259 284     Lab Results   Component Value Date    TROPONINI 0.021 04/26/2018    TROPONINI 0.015 04/26/2018    TROPONINI 0.047 01/12/2016     Lab Results   Component Value Date    CHOL 96 09/10/2024    CHOL 88 07/30/2024    CHOL 94 05/28/2024     Lab Results   Component Value Date    TRIG 35 09/10/2024    TRIG 43 07/30/2024    TRIG 36 05/28/2024     Lab Results   Component Value Date    HDL 34 (L) 09/10/2024    HDL 31 (L) 07/30/2024    HDL 35 (L) 05/28/2024     No components found for: \"LDLCALC\"  Lab Results   Component Value Date    INR 1.11 (H) 04/26/2018    INR 1.06 11/19/2017    INR 1.00 01/11/2016    PROTIME 14.4 04/26/2018    PROTIME 11.6 (H) 11/19/2017    PROTIME 11.3 01/11/2016       Assessment   Assessment:    Severe MR status post MitraClip x 1 9/25/2024  HFmrEF, 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 BiV ICD  CAD, with recent LHC 7/30 showing 5/5 patent grafts, no further revascularization indicated   DM2    Plan:    Awaiting echocardiogram.  Ambulate in halls.  Home on usual home medications, probably later today..    Randi Mason MD  09/26/24  07:56 EDT    [place] not walked [place] without any difficulties.  Echo looks good.  Groin stitch removed.  The site looks good.  Discharge home today.  Follow-up " with me 1 month with the echo the same day.    Randi Mason MD, FACC

## 2024-09-26 NOTE — CONSULTS
"Diabetes Education    Patient Name:  Shai Mata  YOB: 1945  MRN: [id number]  Admit Date:  9/25/2024    Consult for diabetes education received per MD order. Chart reviewed. Pt was seen at bedside today with 2 daughters. Permission given for visit.     Patient's current A1C is 8.5% He is taking novolin 70/30 22units BID. Patient appears to be forgetful, he states he checks his blood sugar 2-3x/week but his daughters state that he does not check his glucose due to not being able to see. His daughter asked if there was another way of checking his glucose. Let her know that CGM's are a great way to monitor blood sugar. His daughter states he is not tech savvy and does not want to pursue CGM. Patient was on glyberide in the past but had hypoglycemia so his PCP eliminated this medication. His daughter states his fingerstick was in the 600's on Monday. Food recall obtained: Patient skips meals. Has cornflakes with diet coke for breakfast. PB crackers as a snack and chili for dinner. Patient does outdoor yard work and feels symptoms of hypoglycemia which including sweat on his forehead. He has a pack of regular coke in his fridge for when he gets hypoglycemic. Stressed the importance of consistent eating especially while being on insulin. Provided daughters with [facility]'s \"Diabetes Basics\" and link for [facility]food[email or web address].[email or web address] for diabetes friendly recipes.     Total time spent reviewing chart, preparing education/materials, providing education at bedside, and coordinating care approx 60 minutes.    Thank you for this consult.     Electronically signed by:  Sharon Hartmann RN  09/26/24 13:45 EDT  "

## 2024-09-26 NOTE — PLAN OF CARE
Goal Outcome Evaluation:      Progressing toward goals. Call bell in reach and PT verbalized understanding of use. No active signs of distress. No c/o pain or discomfort.

## 2024-09-26 NOTE — NURSING NOTE
Pt. Referred for Phase II Cardiac Rehab. Staff discussed benefits of exercise, program protocol, and educational material provided. Teach back verified.  Permission granted from patient for staff to fax referral information to outlying program at this time.  Staff faxed referral info to [facility].

## 2024-09-26 NOTE — CASE MANAGEMENT/SOCIAL WORK
Discharge Planning Assessment  [facility]     Patient Name: Shai Mata  MRN: [id number]  Today's Date: 9/26/2024    Admit Date: 9/25/2024    Plan: discharge plan   Discharge Needs Assessment       Row Name 09/26/24 1620       Living Environment    People in Home alone    Current Living Arrangements home    Primary Care Provided by self    Provides Primary Care For no one, unable/limited ability to care for self    Family Caregiver if Needed child(paulina), adult    Family Caregiver Names Mis Gann and Libia Bragg (daughters)    Quality of Family Relationships involved;helpful;supportive    Able to Return to Prior Arrangements yes    Living Arrangement Comments I met with pt, and pt's 2 daughters in room with permission regarding discharge plan. Pt resides in a home alone in [facility]       Transition Planning    Patient/Family Anticipates Transition to home    Patient/Family Anticipated Services at Transition     Transportation Anticipated family or friend will provide       Discharge Needs Assessment    Readmission Within the Last 30 Days no previous admission in last 30 days    Equipment Currently Used at Home none    Concerns to be Addressed discharge planning;denies needs/concerns at this time    Equipment Needed After Discharge none    Discharge Coordination/Progress Pt/daughters confirm that pt has Medicare and [facility]  with prescription coverage . Pt uses [facility] in [place]. Pt reports she is independent with ADLs and stil drives. Pt uses no DME for mobility and no HH. Pt is here with nonrheumatic mitral valve regurgitation and had mitral clip. Plan is home and daughters will transport and assist as needed. Pt denies discharge needs.                   Discharge Plan       Row Name 09/26/24 1624       Plan    Plan discharge plan    Plan Comments Pt is here with nonrheumatic mitral valve regurgitation and had mitral clip. Plan is home and daughters will transport and assist  as needed. Pt denies discharge needs.    Final Discharge Disposition Code 01 - home or self-care                  Continued Care and Services - Discharged on 9/26/2024 Admission date: 9/25/2024 - Discharge disposition: Home or Self Care   No active coordination exists for this encounter.       Expected Discharge Date and Time       Expected Discharge Date Expected Discharge Time    Sep 26, 2024            Demographic Summary       Row Name 09/26/24 1618       General Information    General Information Comments PCP is [facility]       Contact Information    Permission Granted to Share Info With     Contact Information Obtained for     Contact Information Comments Mis Gann(daughter) [phone]  , Libia Bragg(daughter) [phone]                   Functional Status       Row Name 09/26/24 1619       Functional Status    Functional Status Comments Pt reports he is independent with ADLs                   Psychosocial    No documentation.                  Abuse/Neglect    No documentation.                  Legal    No documentation.                  Substance Abuse    No documentation.                  Patient Forms    No documentation.                     Velia Reagan RN

## 2024-09-26 NOTE — PROGRESS NOTES
[facility] In-Patient Progress Note     LOS: 1 day      POD # 1 s/p MitraClip    Subjective  No complaints.  No acute events.  VSS on 2 L saturations 95%.      Objective  Vital Signs  Temp:  [97 °F (36.1 °C)-97.8 °F (36.6 °C)] 97.7 °F (36.5 °C)  Heart Rate:  [54-86] 74  Resp:  [14-20] 16  BP: (113-157)/(60-97) 130/84        Physical Exam:   General Appearance: alert, appears stated age and cooperative   Lungs: clear to auscultation, respirations regular, respirations even, and respirations unlabored   Heart: V. Paced on monitor   Skin: Incision c/d/I. No hematoma.   Ext: Warm and viable     Results     Results from last 7 days   Lab Units 09/26/24  0642   WBC 10*3/mm3 9.81   HEMOGLOBIN g/dL 11.9*   HEMATOCRIT % 36.1*   PLATELETS 10*3/mm3 259     Results from last 7 days   Lab Units 09/26/24  0642   SODIUM mmol/L 132*   POTASSIUM mmol/L 5.0   CHLORIDE mmol/L 98   CO2 mmol/L 20.0*   BUN mg/dL 31*   CREATININE mg/dL 1.11   GLUCOSE mg/dL 312*   CALCIUM mg/dL 9.4     Assessment   POD # 1 s/p MitraClip      Plan   D/C home today if cardiology is in agreement  Follow-up in office in 6 weeks       ANEUDY Grimes  09/26/24  09:11 EDT

## 2024-09-27 ENCOUNTER — TRANSITIONAL CARE MANAGEMENT TELEPHONE ENCOUNTER (OUTPATIENT)
Dept: CALL CENTER | Facility: HOSPITAL | Age: 79
End: 2024-09-27
Payer: MEDICARE

## 2024-09-30 DIAGNOSIS — I50.22 CHRONIC SYSTOLIC CONGESTIVE HEART FAILURE: ICD-10-CM

## 2024-09-30 DIAGNOSIS — E11.22 TYPE 2 DIABETES MELLITUS WITH STAGE 3A CHRONIC KIDNEY DISEASE, WITH LONG-TERM CURRENT USE OF INSULIN: ICD-10-CM

## 2024-09-30 DIAGNOSIS — Z79.4 TYPE 2 DIABETES MELLITUS WITH STAGE 3A CHRONIC KIDNEY DISEASE, WITH LONG-TERM CURRENT USE OF INSULIN: ICD-10-CM

## 2024-09-30 DIAGNOSIS — R41.3 MEMORY CHANGES: Primary | ICD-10-CM

## 2024-09-30 DIAGNOSIS — N18.31 TYPE 2 DIABETES MELLITUS WITH STAGE 3A CHRONIC KIDNEY DISEASE, WITH LONG-TERM CURRENT USE OF INSULIN: ICD-10-CM

## 2024-10-01 ENCOUNTER — TELEPHONE (OUTPATIENT)
Dept: CARDIAC REHAB | Facility: HOSPITAL | Age: 79
End: 2024-10-01
Payer: MEDICARE

## 2024-10-02 ENCOUNTER — OFFICE VISIT (OUTPATIENT)
Dept: FAMILY MEDICINE CLINIC | Facility: CLINIC | Age: 79
End: 2024-10-02
Payer: MEDICARE

## 2024-10-02 ENCOUNTER — LAB (OUTPATIENT)
Dept: FAMILY MEDICINE CLINIC | Facility: CLINIC | Age: 79
End: 2024-10-02
Payer: MEDICARE

## 2024-10-02 VITALS
HEIGHT: 69 IN | WEIGHT: 173.4 LBS | DIASTOLIC BLOOD PRESSURE: 56 MMHG | TEMPERATURE: 97.7 F | OXYGEN SATURATION: 100 % | BODY MASS INDEX: 25.68 KG/M2 | SYSTOLIC BLOOD PRESSURE: 108 MMHG | HEART RATE: 71 BPM

## 2024-10-02 DIAGNOSIS — N18.31 TYPE 2 DIABETES MELLITUS WITH STAGE 3A CHRONIC KIDNEY DISEASE, WITH LONG-TERM CURRENT USE OF INSULIN: ICD-10-CM

## 2024-10-02 DIAGNOSIS — Z79.4 TYPE 2 DIABETES MELLITUS WITH STAGE 3A CHRONIC KIDNEY DISEASE, WITH LONG-TERM CURRENT USE OF INSULIN: ICD-10-CM

## 2024-10-02 DIAGNOSIS — I34.0 SEVERE MITRAL REGURGITATION: ICD-10-CM

## 2024-10-02 DIAGNOSIS — R41.3 MEMORY CHANGES: Primary | ICD-10-CM

## 2024-10-02 DIAGNOSIS — I25.5 ISCHEMIC CARDIOMYOPATHY: ICD-10-CM

## 2024-10-02 DIAGNOSIS — D50.9 IRON DEFICIENCY ANEMIA, UNSPECIFIED IRON DEFICIENCY ANEMIA TYPE: ICD-10-CM

## 2024-10-02 DIAGNOSIS — E11.22 TYPE 2 DIABETES MELLITUS WITH STAGE 3A CHRONIC KIDNEY DISEASE, WITH LONG-TERM CURRENT USE OF INSULIN: ICD-10-CM

## 2024-10-02 PROCEDURE — 99496 TRANSJ CARE MGMT HIGH F2F 7D: CPT | Performed by: NURSE PRACTITIONER

## 2024-10-02 PROCEDURE — 3078F DIAST BP <80 MM HG: CPT | Performed by: NURSE PRACTITIONER

## 2024-10-02 PROCEDURE — 83540 ASSAY OF IRON: CPT | Performed by: NURSE PRACTITIONER

## 2024-10-02 PROCEDURE — 85025 COMPLETE CBC W/AUTO DIFF WBC: CPT | Performed by: NURSE PRACTITIONER

## 2024-10-02 PROCEDURE — 3074F SYST BP LT 130 MM HG: CPT | Performed by: NURSE PRACTITIONER

## 2024-10-02 PROCEDURE — 1111F DSCHRG MED/CURRENT MED MERGE: CPT | Performed by: NURSE PRACTITIONER

## 2024-10-02 PROCEDURE — 36415 COLL VENOUS BLD VENIPUNCTURE: CPT

## 2024-10-02 PROCEDURE — 84466 ASSAY OF TRANSFERRIN: CPT | Performed by: NURSE PRACTITIONER

## 2024-10-02 PROCEDURE — 80048 BASIC METABOLIC PNL TOTAL CA: CPT | Performed by: NURSE PRACTITIONER

## 2024-10-02 PROCEDURE — 1126F AMNT PAIN NOTED NONE PRSNT: CPT | Performed by: NURSE PRACTITIONER

## 2024-10-02 NOTE — PROGRESS NOTES
"Transitional Care Follow Up Visit  Subjective     Shai Mata is a 79 y.o. male who presents for a transitional care management visit.    Within 48 business hours after discharge our office contacted him via telephone to coordinate his care and needs.      I reviewed and discussed the details of that call along with the discharge summary, hospital problems, inpatient lab results, inpatient diagnostic studies, and consultation reports with Shai.     Current outpatient and discharge medications have been reconciled for the patient.  Reviewed by: ANEUDY Carrizales          9/26/2024     6:27 PM   Date of TCM Phone Call   [facility]   Date of Admission 9/25/2024   Date of Discharge 9/26/2024   Discharge Disposition Home or Self Care     Risk for Readmission (LACE) Score: 8 (9/26/2024  6:00 AM)      History of Present Illness Returns today post MitraClip procedure. Recent TTE and SHERLEY with findings of sever MR.    Daughters are present with patient today with concerns of memory changes that have more pronounced following the most recent procedures and sedation.  Strong family history of dementia.  Daughters feels he is more confused, sleeping and forgetful.   On a plus meal are amador prepared and glucose is more stabilized.  Home health has been requested to assist in the home as well.      The following portions of the patient's history were reviewed and updated as appropriate: allergies, current medications, past family history, past medical history, past social history, past surgical history, and problem list.    Review of Systems    Objective   /56 (BP Location: Right arm, Patient Position: Sitting)   Pulse 71   Temp 97.7 °F (36.5 °C) (Temporal)   Ht 175.3 cm (69.02\")   Wt 78.7 kg (173 lb 6.4 oz)   SpO2 100%   BMI 25.59 kg/m²   Physical Exam  Vitals and nursing note reviewed.   Constitutional:       General: He is not in acute distress.     Appearance: He is well-developed. He is not " ill-appearing.   HENT:      Head: Normocephalic.   Cardiovascular:      Rate and Rhythm: Normal rate and regular rhythm.      Heart sounds: Normal heart sounds. No murmur heard.  Pulmonary:      Effort: Pulmonary effort is normal.      Breath sounds: Normal breath sounds.   Skin:     General: Skin is warm and dry.   Neurological:      Mental Status: He is alert and oriented to person, place, and time.      Cranial Nerves: No cranial nerve deficit.      Sensory: No sensory deficit.      Motor: No weakness.      Coordination: Coordination normal.      Gait: Gait normal.      Deep Tendon Reflexes: Reflexes normal.   Psychiatric:         Mood and Affect: Mood and affect normal.         Behavior: Behavior normal.         Cognition and Memory: Memory is impaired. He exhibits impaired recent memory.         Assessment & Plan   Diagnoses and all orders for this visit:    1. Memory changes (Primary)  -     CT Head Without Contrast; Future    2. Iron deficiency anemia, unspecified iron deficiency anemia type  -     CBC Auto Differential; Future  -     Basic Metabolic Panel; Future  -     Iron and TIBC; Future    3. Type 2 diabetes mellitus with stage 3a chronic kidney disease, with long-term current use of insulin  Continue with improved consistency with increased protein intake    4. Ischemic cardiomyopathy  Continue with cardiology     5. Severe mitral regurgitation  Continue with cardiology

## 2024-10-03 ENCOUNTER — OFFICE VISIT (OUTPATIENT)
Dept: CARDIOLOGY | Facility: CLINIC | Age: 79
End: 2024-10-03
Payer: MEDICARE

## 2024-10-03 VITALS
SYSTOLIC BLOOD PRESSURE: 92 MMHG | WEIGHT: 175 LBS | OXYGEN SATURATION: 89 % | HEIGHT: 69 IN | HEART RATE: 63 BPM | DIASTOLIC BLOOD PRESSURE: 47 MMHG | BODY MASS INDEX: 25.92 KG/M2

## 2024-10-03 DIAGNOSIS — I25.10 ASCVD (ARTERIOSCLEROTIC CARDIOVASCULAR DISEASE): ICD-10-CM

## 2024-10-03 DIAGNOSIS — I50.22 CHRONIC SYSTOLIC CONGESTIVE HEART FAILURE: Primary | ICD-10-CM

## 2024-10-03 DIAGNOSIS — I10 ESSENTIAL HYPERTENSION: ICD-10-CM

## 2024-10-03 DIAGNOSIS — I10 PRIMARY HYPERTENSION: ICD-10-CM

## 2024-10-03 LAB
ANION GAP SERPL CALCULATED.3IONS-SCNC: 9.2 MMOL/L (ref 5–15)
BASOPHILS # BLD AUTO: 0.07 10*3/MM3 (ref 0–0.2)
BASOPHILS NFR BLD AUTO: 0.9 % (ref 0–1.5)
BUN SERPL-MCNC: 34 MG/DL (ref 8–23)
BUN/CREAT SERPL: 27.2 (ref 7–25)
CALCIUM SPEC-SCNC: 9.4 MG/DL (ref 8.6–10.5)
CHLORIDE SERPL-SCNC: 102 MMOL/L (ref 98–107)
CO2 SERPL-SCNC: 23.8 MMOL/L (ref 22–29)
CREAT SERPL-MCNC: 1.25 MG/DL (ref 0.76–1.27)
DEPRECATED RDW RBC AUTO: 48.7 FL (ref 37–54)
EGFRCR SERPLBLD CKD-EPI 2021: 58.2 ML/MIN/1.73
EOSINOPHIL # BLD AUTO: 0.31 10*3/MM3 (ref 0–0.4)
EOSINOPHIL NFR BLD AUTO: 3.9 % (ref 0.3–6.2)
ERYTHROCYTE [DISTWIDTH] IN BLOOD BY AUTOMATED COUNT: 14.5 % (ref 12.3–15.4)
GLUCOSE SERPL-MCNC: 224 MG/DL (ref 65–99)
HCT VFR BLD AUTO: 36.2 % (ref 37.5–51)
HGB BLD-MCNC: 11.2 G/DL (ref 13–17.7)
IMM GRANULOCYTES # BLD AUTO: 0.08 10*3/MM3 (ref 0–0.05)
IMM GRANULOCYTES NFR BLD AUTO: 1 % (ref 0–0.5)
IRON 24H UR-MRATE: 57 MCG/DL (ref 59–158)
IRON SATN MFR SERPL: 20 % (ref 20–50)
LYMPHOCYTES # BLD AUTO: 1.04 10*3/MM3 (ref 0.7–3.1)
LYMPHOCYTES NFR BLD AUTO: 13 % (ref 19.6–45.3)
MCH RBC QN AUTO: 28.2 PG (ref 26.6–33)
MCHC RBC AUTO-ENTMCNC: 30.9 G/DL (ref 31.5–35.7)
MCV RBC AUTO: 91.2 FL (ref 79–97)
MONOCYTES # BLD AUTO: 0.72 10*3/MM3 (ref 0.1–0.9)
MONOCYTES NFR BLD AUTO: 9 % (ref 5–12)
NEUTROPHILS NFR BLD AUTO: 5.78 10*3/MM3 (ref 1.7–7)
NEUTROPHILS NFR BLD AUTO: 72.2 % (ref 42.7–76)
NRBC BLD AUTO-RTO: 0 /100 WBC (ref 0–0.2)
PLATELET # BLD AUTO: 199 10*3/MM3 (ref 140–450)
PMV BLD AUTO: 11.3 FL (ref 6–12)
POTASSIUM SERPL-SCNC: 5 MMOL/L (ref 3.5–5.2)
RBC # BLD AUTO: 3.97 10*6/MM3 (ref 4.14–5.8)
SODIUM SERPL-SCNC: 135 MMOL/L (ref 136–145)
TIBC SERPL-MCNC: 279 MCG/DL (ref 298–536)
TRANSFERRIN SERPL-MCNC: 187 MG/DL (ref 200–360)
WBC NRBC COR # BLD AUTO: 8 10*3/MM3 (ref 3.4–10.8)

## 2024-10-03 RX ORDER — LISINOPRIL 5 MG/1
5 TABLET ORAL DAILY
Qty: 30 TABLET | Refills: 2 | Status: SHIPPED | OUTPATIENT
Start: 2024-10-03

## 2024-10-03 NOTE — PROGRESS NOTES
Ekaterina Cox, ANEUDY  Shai Mata  1945  10/03/2024    Patient Active Problem List   Diagnosis    ASCVD (arteriosclerotic cardiovascular disease)    S/P CABG x 5    HTN (hypertension)    PVC's (premature ventricular contractions)    Type 2 diabetes mellitus with hyperglycemia, with long-term current use of insulin    Hyperlipemia, mixed    Ischemic cardiomyopathy    Chronic systolic congestive heart failure    Ventricular tachycardia (paroxysmal)    Long term current use of antiarrhythmic medical therapy    Other specified hypothyroidism    Esophageal dysphagia    Nonrheumatic mitral valve regurgitation    S/P mitral valve clip implantation       Dear Ekaterina Cox, ANEUDY:    Subjective     History of Present Illness:    Chief Complaint   Patient presents with    Follow-up       Shai Mata is a pleasant 79 y.o. male with a past medical history significant for ischemic cardiomyopathy with most recent EF 45%, chronic systolic congestive heart failure, history of ventricular tachycardia status post ICD implantation on amiodarone, ASCVD status post four-vessel CABG, diabetes mellitus type 2, hypertension, and dyslipidemia.  He presents today for routine cardiology follow-up.      Shai comes in today after having mitral valve clip from Dr. Mason.  He seems to have tolerated this procedure well and was discharged within 1 day.  He does still report he is weak and fatigue and has not returned back to baseline with his ADLs.  His daughters have been helping caring for him he is hypotensive today at 92/47.  He denies any symptoms concerning for heart failure such as orthopnea or PND.  He denies any chest pains.    No Known Allergies:      Current Outpatient Medications:     amiodarone (PACERONE) 200 MG tablet, Take 0.5 tablets by mouth Daily., Disp: 45 tablet, Rfl: 2    aspirin 81 MG EC tablet, Take 1 tablet by mouth 2 (Two) Times a Day., Disp: , Rfl:     atorvastatin (LIPITOR) 80 MG tablet, Take 1 tablet by mouth  "Daily., Disp: 90 tablet, Rfl: 1    carvedilol (COREG) 12.5 MG tablet, Take 1 tablet by mouth 2 (Two) Times a Day., Disp: 180 tablet, Rfl: 1    insulin NPH-insulin regular (humuLIN 70/30,novoLIN 70/30) (70-30) 100 UNIT/ML injection, Inject 22 Units under the skin into the appropriate area as directed 2 (Two) Times a Day With Meals., Disp: , Rfl:     levothyroxine (SYNTHROID, LEVOTHROID) 100 MCG tablet, Take 1 tablet by mouth Daily., Disp: , Rfl:     lisinopril (PRINIVIL,ZESTRIL) 5 MG tablet, Take 1 tablet by mouth Daily., Disp: 30 tablet, Rfl: 2    pantoprazole (Protonix) 40 MG EC tablet, Take 1 tablet by mouth Daily., Disp: 90 tablet, Rfl: 3    spironolactone (ALDACTONE) 25 MG tablet, Take 1 tablet by mouth Daily., Disp: 90 tablet, Rfl: 1    The following portions of the patient's history were reviewed and updated as appropriate: allergies, current medications, past family history, past medical history, past social history, past surgical history and problem list.    Social History     Tobacco Use    Smoking status: Former     Current packs/day: 0.00     Average packs/day: 2.0 packs/day for 20.0 years (40.0 ttl pk-yrs)     Types: Cigarettes     Start date:      Quit date:      Years since quittin.7    Smokeless tobacco: Former     Types: Chew     Quit date:    Vaping Use    Vaping status: Never Used   Substance Use Topics    Alcohol use: No    Drug use: No         Objective   Vitals:    10/03/24 1301   BP: 92/47   BP Location: Left arm   Patient Position: Sitting   Cuff Size: Adult   Pulse: 63   SpO2: (!) 89%   Weight: 79.4 kg (175 lb)   Height: 175.3 cm (69.02\")     Body mass index is 25.83 kg/m².    ROS    Constitutional:       General: Not in acute distress.     Appearance: Healthy appearance. Well-developed and not in distress. Not diaphoretic.   Eyes:      Conjunctiva/sclera: Conjunctivae normal.      Pupils: Pupils are equal, round, and reactive to light.   HENT:      Head: Normocephalic and " atraumatic.   Neck:      Vascular: No carotid bruit or JVD.   Pulmonary:      Effort: Pulmonary effort is normal. No respiratory distress.      Breath sounds: Normal breath sounds.   Cardiovascular:      Normal rate. Regular rhythm.   Edema:     Peripheral edema absent.   Skin:     General: Skin is cool.   Neurological:      Mental Status: Alert, oriented to person, place, and time and oriented to person, place and time.         Lab Results   Component Value Date     (L) 10/02/2024    K 5.0 10/02/2024     10/02/2024    CO2 23.8 10/02/2024    BUN 34 (H) 10/02/2024    CREATININE 1.25 10/02/2024    GLUCOSE 224 (H) 10/02/2024    CALCIUM 9.4 10/02/2024    AST 19 09/23/2024    ALT 18 09/23/2024    ALKPHOS 115 09/23/2024    LABIL2 1.6 08/25/2016     Lab Results   Component Value Date    CKTOTAL 82 04/26/2018     Lab Results   Component Value Date    WBC 8.00 10/02/2024    HGB 11.2 (L) 10/02/2024    HCT 36.2 (L) 10/02/2024     10/02/2024     Lab Results   Component Value Date    INR 1.11 (H) 04/26/2018    INR 1.06 11/19/2017    INR 1.00 01/11/2016     Lab Results   Component Value Date    MG 1.9 06/13/2023     Lab Results   Component Value Date    TSH 0.223 (L) 09/10/2024    PSA 0.833 05/13/2020    CHLPL 133 11/28/2017    TRIG 35 09/10/2024    HDL 34 (L) 09/10/2024    LDL 52 09/10/2024      Lab Results   Component Value Date    BNP 26.0 04/26/2018       During this visit the following were done:  Labs Reviewed []    Labs Ordered []    Radiology Reports Reviewed []    Radiology Ordered []    PCP Records Reviewed []    Referring Provider Records Reviewed []    ER Records Reviewed []    Hospital Records Reviewed []    History Obtained From Family []    Radiology Images Reviewed []    Other Reviewed []    Records Requested []       Procedures    Assessment & Plan    Diagnosis Plan   1. Chronic systolic congestive heart failure  Ambulatory Referral to Cardiac Rehab      2. Essential hypertension  lisinopril  (PRINIVIL,ZESTRIL) 5 MG tablet      3. ASCVD (arteriosclerotic cardiovascular disease)        4. Primary hypertension                 Recommendations:  Generalized fatigue and weakness  Likely multifactorial however hypotension also contributing.  Will reduce dose of lisinopril to 5 mg daily if she remains weak and fatigued with hypotension we will stop spironolactone next.  May also be from deconditioning and encouraged him to participate in cardiac rehab  Chronic HFrEF  Appears euvolemic we will continue with GDMT as tolerated for now we will continue with current regimen  CAD  Denies any anginal symptoms.    No follow-ups on file.    As always, I appreciate very much the opportunity to participate in the cardiovascular care of your patients.      With Best Regards,    Erik Livingston PA-C

## 2024-10-04 NOTE — DISCHARGE SUMMARY
Date of Discharge:  09/26/2024  Date of Admit: 9/25/2024    Ekaterina CoxANEUDY      Discharge Diagnosis:  Nonrheumatic mitral valve regurgitation    S/P mitral valve clip implantation      Hospital Course:   Patient is an 79-year-old history of mitral regurgitation presented the hospital for mitral valve repair with MitraClip implantation.  Patient was taken to the Cath Lab and 1 XTW MitraClip was placed with good result.  Patient was monitored overnight with no complications.  The next morning an echocardiogram was performed showing mean gradient of 3 mmHg and only mild mitral regurgitation after MitraClip implantation.  Patient is found to be stable and ready for discharge home.    Procedures Performed  Procedure(s):  Transcatheter Mitral Valve Repair  Transcatheter Mitral Valve Repair    Procedure: Informed consent obtained.  The patient was brought to the catheterization lab where they were intubated by anesthesia.  Ancef was given on call to the lab as well as 8 mg of Decadron.  The patient was then prepped and draped in the usual sterile fashion over both femoral veins.     Transesophageal echocardiographic images of the mitral valve and the remaining cardiac structures were then obtained.     Using the modified Seldinger technique access was obtained in the right femoral vein and a 6 French hemostasis sheath was placed.  3000 units of intravenous heparin was given.  Transseptal puncture was then performed under transesophageal guidance a little more than 4 cm above the mitral valve.  Full heparinization was then performed.  The ACT remained greater than 250 during the entire procedure.  The [place] wire was then placed in the left atrium.  The atrial septum was then dilated using a 20 French long dilator.     The MitraClip guide was then placed across the interatrial septum under fluoroscopic and echocardiographic guidance.  The wire and dilator were removed.  The XT W MitraClip was then placed through the guide  and positioned appropriately above the jet between A2-P2.  The posterior leaflet was very hard to grasp.  The clip was then placed below the leaflets and the leaflets were grasp successfully.  The clip was then closed to 60° with a good reduction in mitral regurgitation and then closed to 20° at which time only mild mitral regurgitation remained.  The mean gradient across the mitral valve was 2 mmHg.  A tissue bridge was documented by echocardiography.      No pericardial effusion was present at the conclusion of the case.    LV systolic function was remained unchanged throughout the case.     The sheath was removed.  The venous access access site was closed using 0 silk figure-of-eight suture.  There were no complications.      Estimated blood loss less than 25 mL    Pertinent Test Results:   CMP          9/23/2024    12:04 9/26/2024    06:42 10/2/2024    16:43   CMP   Glucose 443  312  224    BUN 30  31  34    Creatinine 1.39  1.11  1.25    EGFR 51.2  67.1  58.2    Sodium 131  132  135    Potassium 5.2  5.0  5.0    Chloride 98  98  102    Calcium 9.0  9.4  9.4    Total Protein 6.6      Albumin 3.7      Globulin 2.9      Total Bilirubin 0.3      Alkaline Phosphatase 115      AST (SGOT) 19      ALT (SGPT) 18      Albumin/Globulin Ratio 1.3      BUN/Creatinine Ratio 21.6  27.9  27.2    Anion Gap 10.0  14.0  9.2          Results for orders placed during the hospital encounter of 09/25/24    Transthoracic Echo Complete With Contrast if Necessary Per Protocol    Interpretation Summary    Left ventricular systolic function is low normal. Estimated left ventricular EF = 45-50%    The left ventricular cavity is mildly dilated.    Mild aortic valve stenosis is present.    Aortic valve mean pressure gradient is 10 mmHg.    Status post single Mitraclip XTw Insertion (9/25/24).    The mitral valve mean gradient is 3 mmHg.    Mild mitral regurgitation.    Trace to mild tricuspid regurgitation.    Calculated right ventricular  "systolic pressure from tricuspid regurgitation is 26 mmHg.         Discharge Physical Exam:  /58 (BP Location: Left arm, Patient Position: Lying)   Pulse 72   Temp 98.5 °F (36.9 °C) (Oral)   Resp 18   Ht 175.3 cm (69.02\")   Wt 83.1 kg (183 lb 3.2 oz)   SpO2 92%   BMI 27.04 kg/m²     Physical Exam  General: Alert and oriented.   Cardiovascular: Heart has a nondisplaced focal PMI. Regular rate and rhythm without murmur, gallop or rub.  Lungs: Clear without rales or wheezes. Equal expansion is noted.   Abdomen: Soft, nontender.  Extremities: Show no edema.   Skin: warm and dry.    Discharge Medications     Discharge Medications        Continue These Medications        Instructions Start Date   amiodarone 200 MG tablet  Commonly known as: PACERONE   100 mg, Oral, Daily      aspirin 81 MG EC tablet   81 mg, Oral, 2 Times Daily      atorvastatin 80 MG tablet  Commonly known as: LIPITOR   80 mg, Oral, Daily      carvedilol 12.5 MG tablet  Commonly known as: COREG   12.5 mg, Oral, 2 Times Daily      insulin NPH-insulin regular (70-30) 100 UNIT/ML injection  Commonly known as: humuLIN 70/30,novoLIN 70/30   22 Units, Subcutaneous, 2 Times Daily With Meals      levothyroxine 100 MCG tablet  Commonly known as: SYNTHROID, LEVOTHROID   100 mcg, Oral, Daily      pantoprazole 40 MG EC tablet  Commonly known as: Protonix   40 mg, Oral, Daily      spironolactone 25 MG tablet  Commonly known as: ALDACTONE   25 mg, Oral, Daily               Discharge Diet: Cardiac and consistent carbohydrate    Activity at Discharge: Up as tolerated    Discharge disposition: home    Condition on Discharge: stable    Follow-up Appointments  Future Appointments   Date Time Provider Department Center   10/30/2024  2:00 PM [facility] OP ECHO CART RM 1 [facility] LU NIV  LU   10/30/2024  3:30 PM Randi Mason MD [facility]   11/11/2024  2:00 PM Erik Livingston PA-C [facility]   12/10/2024  8:15 AM Ekaterina Cox APRN [facility] "   11/14/2025  1:30 PM Marcin Colon MD [facility]

## 2024-10-10 ENCOUNTER — TELEPHONE (OUTPATIENT)
Dept: CARDIAC REHAB | Facility: HOSPITAL | Age: 79
End: 2024-10-10
Payer: MEDICARE

## 2024-10-10 NOTE — TELEPHONE ENCOUNTER
Staff left message for pt daughter regarding his cardiac rehab referral. A letter will be sent to address on file.

## 2024-10-16 ENCOUNTER — TELEPHONE (OUTPATIENT)
Dept: CARDIAC REHAB | Facility: HOSPITAL | Age: 79
End: 2024-10-16
Payer: MEDICARE

## 2024-10-16 ENCOUNTER — HOSPITAL ENCOUNTER (OUTPATIENT)
Dept: CT IMAGING | Facility: HOSPITAL | Age: 79
Discharge: HOME | End: 2024-10-16
Admitting: NURSE PRACTITIONER
Payer: MEDICARE

## 2024-10-16 DIAGNOSIS — R41.3 MEMORY CHANGES: ICD-10-CM

## 2024-10-16 PROCEDURE — 70450 CT HEAD/BRAIN W/O DYE: CPT

## 2024-10-16 NOTE — TELEPHONE ENCOUNTER
Spoke with pt daughter about cardiac rehab program. Discussed benefits and program days and times. She stated she would talk to her dad and figure out her work schedule and would call us back to get him on the schedule.

## 2024-10-23 ENCOUNTER — TELEPHONE (OUTPATIENT)
Dept: FAMILY MEDICINE CLINIC | Facility: CLINIC | Age: 79
End: 2024-10-23
Payer: MEDICARE

## 2024-10-23 NOTE — TELEPHONE ENCOUNTER
Daughter notified and asked if she he should keep the neurology appointment I told her yes to keep the appointment.

## 2024-10-30 ENCOUNTER — HOSPITAL ENCOUNTER (OUTPATIENT)
Dept: CARDIOLOGY | Facility: HOSPITAL | Age: 79
Discharge: HOME | End: 2024-10-30
Payer: MEDICARE

## 2024-10-30 ENCOUNTER — OFFICE VISIT (OUTPATIENT)
Dept: CARDIOLOGY | Facility: CLINIC | Age: 79
End: 2024-10-30
Payer: MEDICARE

## 2024-10-30 VITALS
WEIGHT: 175.6 LBS | HEART RATE: 70 BPM | SYSTOLIC BLOOD PRESSURE: 120 MMHG | BODY MASS INDEX: 26.01 KG/M2 | DIASTOLIC BLOOD PRESSURE: 62 MMHG | OXYGEN SATURATION: 98 % | HEIGHT: 69 IN

## 2024-10-30 DIAGNOSIS — I34.0 NONRHEUMATIC MITRAL VALVE REGURGITATION: ICD-10-CM

## 2024-10-30 DIAGNOSIS — E78.5 DYSLIPIDEMIA: ICD-10-CM

## 2024-10-30 DIAGNOSIS — Z98.890 S/P MITRAL VALVE CLIP IMPLANTATION: ICD-10-CM

## 2024-10-30 DIAGNOSIS — I10 ESSENTIAL HYPERTENSION: ICD-10-CM

## 2024-10-30 DIAGNOSIS — Z95.818 S/P MITRAL VALVE CLIP IMPLANTATION: ICD-10-CM

## 2024-10-30 DIAGNOSIS — I25.810 CORONARY ARTERY DISEASE INVOLVING CORONARY BYPASS GRAFT OF NATIVE HEART WITHOUT ANGINA PECTORIS: Primary | ICD-10-CM

## 2024-10-30 PROCEDURE — 93306 TTE W/DOPPLER COMPLETE: CPT

## 2024-10-30 NOTE — PROGRESS NOTES
[facility]    Patient ID: Shai Mata is a 79 y.o. male.  : 1945   Contact: [phone]    Encounter date: 10/30/2024    PCP: Ekaterina Cox APRN      Chief complaint:   Chief Complaint   Patient presents with    Chronic systolic congestive heart failure       Problem List:  Mitral regurgitation  Echo 2018: EF 45%, mild MR  Echo 2024: EF 46-50%, grade 2 diastolic dysfunction, aortic calcification with mild to moderate AI and mild AS.  Severe MR without MS, moderate TR, RVSP greater than 55  SHERLEY 24: EF 45-50%, mild AS, severe MR with tethered leaflets   MVR, 2024: MitraClip placement of a single XT W between A2-P2 segments of the mitral valve.  SHERLEY, 2024: Trace AR. Severe TR.  Echo, 2024: EF 45-50%. Mild AV stenosis. AV mean PG 10 mmHg. Status post single Mitraclip XTw Insertion (24). The mitral valve mean gradient is 3 mmHg. Mild MR. Trace to mild TR with RVSP 26 mmHg.  Echocardiogram 10/30/2024: LVEF 45%, single MitraClip in place.  Mean mitral valve gradient 2.3 mmHg, mild mitral regurgitation.  Mean aortic valve gradient 5.5 mmHg.  CAD with ischemic cardiomyopathy  S/p CABG X5 1996  Stress test 2016: Small partially reversible basal inferolateral and mid inferolateral segments.  Echo 2017: EF 35%  [facility] 24: 5/5 patent grafts, distal small vessel disease   Ventricular tachycardia  S/p [place] BiV ICD 2017  On amiodarone therapy  Frequent PVCs  Attempted PVC ablation 2017  Chronic left bundle branch block  Hypertension   Hyperlipidemia  Type 2 diabetes    No Known Allergies    Current Medications:    Current Outpatient Medications:     amiodarone (PACERONE) 200 MG tablet, Take 0.5 tablets by mouth Daily., Disp: 45 tablet, Rfl: 2    aspirin 81 MG EC tablet, Take 1 tablet by mouth 2 (Two) Times a Day., Disp: , Rfl:     atorvastatin (LIPITOR) 80 MG tablet, Take 1 tablet by mouth Daily., Disp: 90 tablet, Rfl:  "1    carvedilol (COREG) 12.5 MG tablet, Take 1 tablet by mouth 2 (Two) Times a Day., Disp: 180 tablet, Rfl: 1    glyburide micronized (GLYNASE) 1.5 MG tablet, TAKE 1 TABLET BY MOUTH TWICE DAILY BEFORE MEALS FOR 90 DAYS, Disp: , Rfl:     insulin NPH-insulin regular (humuLIN 70/30,novoLIN 70/30) (70-30) 100 UNIT/ML injection, Inject 22 Units under the skin into the appropriate area as directed 2 (Two) Times a Day With Meals., Disp: , Rfl:     levothyroxine (SYNTHROID, LEVOTHROID) 100 MCG tablet, Take 1 tablet by mouth Daily., Disp: , Rfl:     lisinopril (PRINIVIL,ZESTRIL) 5 MG tablet, Take 1 tablet by mouth Daily., Disp: 30 tablet, Rfl: 2    pantoprazole (Protonix) 40 MG EC tablet, Take 1 tablet by mouth Daily., Disp: 90 tablet, Rfl: 3    spironolactone (ALDACTONE) 25 MG tablet, Take 1 tablet by mouth Daily., Disp: 90 tablet, Rfl: 1    HPI    Shai Mata is a 79 y.o. male who presents today for a hospital follow up of CAD, MR, and cardiac risk factors. Since last visit, patient has been doing well overall from a cardiovascular standpoint. He stays busy and active by going on daily walks that are 1/4 of a mile long. Patient reports his shortness of breath has resolved since his Mitraclip procedure. He denies chest pain, shortness of breath, orthopnea, palpitations, edema, dizziness, and syncope.       The following portions of the patient's history were reviewed and updated as appropriate: allergies, current medications and problem list.    Pertinent positives as listed in the HPI.  All other systems reviewed are negative.         Vitals:    10/30/24 1518   BP: 120/62   BP Location: Left arm   Patient Position: Sitting   Cuff Size: Adult   Pulse: 70   SpO2: 98%   Weight: 79.7 kg (175 lb 9.6 oz)   Height: 175.3 cm (69.02\")       Physical Exam:  General: Alert and oriented.  Neck: Jugular venous pressure is within normal limits. Carotids have normal upstrokes without bruits.   Cardiovascular: Heart has a nondisplaced " focal PMI. Regular rate and rhythm. 1/6 HS murmur apex, 1/6 AMBROSE RUSB, no gallop or rub.  Lungs: Clear, no rales or wheezes. Equal expansion is noted.   Extremities: Show no edema.  Skin: Warm and dry.  Neurologic: Nonfocal.     Diagnostic Data (reviewed with patient):  Lab Results   Component Value Date    GLUCOSE 224 (H) 10/02/2024    BUN 34 (H) 10/02/2024    CREATININE 1.25 10/02/2024    BCR 27.2 (H) 10/02/2024     (L) 10/02/2024    K 5.0 10/02/2024     10/02/2024    CO2 23.8 10/02/2024    CALCIUM 9.4 10/02/2024    ALBUMIN 3.7 09/23/2024    ALKPHOS 115 09/23/2024    AST 19 09/23/2024    ALT 18 09/23/2024     Lab Results   Component Value Date    CHOL 96 09/10/2024    TRIG 35 09/10/2024    HDL 34 (L) 09/10/2024    LDL 52 09/10/2024      Lab Results   Component Value Date    WBC 8.00 10/02/2024    RBC 3.97 (L) 10/02/2024    HGB 11.2 (L) 10/02/2024    HCT 36.2 (L) 10/02/2024    MCV 91.2 10/02/2024     10/02/2024      Lab Results   Component Value Date    TSH 0.223 (L) 09/10/2024        Advance Care Planning   ACP discussion was held with the patient during this visit. Patient has an advance directive (not in EMR), copy requested.         Procedures      Assessment:    ICD-10-CM ICD-9-CM   1. Coronary artery disease involving coronary bypass graft of native heart without angina pectoris  I25.810 414.05   2. Nonrheumatic mitral valve regurgitation  I34.0 424.0   3. S/P mitral valve clip implantation  Z98.890 V45.89    Z95.818 V45.09   4. Essential hypertension  I10 401.9   5. Dyslipidemia  E78.5 272.4         Plan:  Patient was encouraged to continue to be active and have a healthy diet.  Continue on amiodarone 100 mg for rhythm control.   Continue on aspirin 81 mg for antiplatelet therapy.   Continue on atorvastatin 80 mg daily for hyperlipidemia.   Continue on carvedilol 12.5 mg BID for hypertension.  Continue on lisinopril 5 mg daily for hypertension.   Continue on spironolactone 25 mg daily for  fluid retention and hypertension.   Continue all other current medications.  F/U in Iain Villegas and Erik Livingston PA-C.  F/up here if needed.      Scribed for Randi Mason MD by Varun Do. 10/30/2024 15:26 EDT    I Randi Mason MD personally performed the services described in this documentation as scribed by the above individual in my presence, and it is both accurate and complete.    Randi Mason MD, FACC

## 2024-10-31 ENCOUNTER — OFFICE VISIT (OUTPATIENT)
Dept: NEUROLOGY | Facility: CLINIC | Age: 79
End: 2024-10-31
Payer: MEDICARE

## 2024-10-31 ENCOUNTER — LAB (OUTPATIENT)
Dept: LAB | Facility: HOSPITAL | Age: 79
End: 2024-10-31
Payer: MEDICARE

## 2024-10-31 ENCOUNTER — TELEPHONE (OUTPATIENT)
Dept: CARDIOLOGY | Facility: CLINIC | Age: 79
End: 2024-10-31
Payer: MEDICARE

## 2024-10-31 VITALS
SYSTOLIC BLOOD PRESSURE: 110 MMHG | OXYGEN SATURATION: 99 % | RESPIRATION RATE: 14 BRPM | WEIGHT: 175.2 LBS | HEART RATE: 76 BPM | DIASTOLIC BLOOD PRESSURE: 64 MMHG | HEIGHT: 69 IN | BODY MASS INDEX: 25.95 KG/M2 | TEMPERATURE: 97.7 F

## 2024-10-31 DIAGNOSIS — R41.89 MODERATE COGNITIVE IMPAIRMENT: ICD-10-CM

## 2024-10-31 DIAGNOSIS — R41.89 MODERATE COGNITIVE IMPAIRMENT: Primary | ICD-10-CM

## 2024-10-31 LAB
AMMONIA BLD-SCNC: 11 UMOL/L (ref 16–60)
ASCENDING AORTA: 3.2 CM
BH CV ECHO LEFT VENTRICLE GLOBAL LONGITUDINAL STRAIN: -16.4 %
BH CV ECHO MEAS - AO MAX PG: 10.5 MMHG
BH CV ECHO MEAS - AO MEAN PG: 6 MMHG
BH CV ECHO MEAS - AO ROOT DIAM: 3.3 CM
BH CV ECHO MEAS - AO V2 MAX: 162.4 CM/SEC
BH CV ECHO MEAS - EF(MOD-BP): 44.8 %
BH CV ECHO MEAS - IVS/LVPW: 1 CM
BH CV ECHO MEAS - IVSD: 1 CM
BH CV ECHO MEAS - LA DIMENSION: 4.3 CM
BH CV ECHO MEAS - LAT PEAK E' VEL: 7.1 CM/SEC
BH CV ECHO MEAS - LV MAX PG: 2.14 MMHG
BH CV ECHO MEAS - LV MEAN PG: 1 MMHG
BH CV ECHO MEAS - LV V1 MAX: 73.2 CM/SEC
BH CV ECHO MEAS - LV V1 VTI: 19.1 CM
BH CV ECHO MEAS - LVIDD: 5.1 CM
BH CV ECHO MEAS - LVIDS: 3.5 CM
BH CV ECHO MEAS - LVOT DIAM: 2.4 CM
BH CV ECHO MEAS - LVPWD: 1 CM
BH CV ECHO MEAS - MED PEAK E' VEL: 4.9 CM/SEC
BH CV ECHO MEAS - MR MAX PG: 126.6 MMHG
BH CV ECHO MEAS - MR MAX VEL: 560 CM/SEC
BH CV ECHO MEAS - MV A MAX VEL: 107.8 CM/SEC
BH CV ECHO MEAS - MV E MAX VEL: 78.6 CM/SEC
BH CV ECHO MEAS - MV E/A: 0.73
BH CV ECHO MEAS - MV MAX PG: 6.2 MMHG
BH CV ECHO MEAS - MV MEAN PG: 2.3 MMHG
BH CV ECHO MEAS - MV P1/2T: 88 MSEC
BH CV ECHO MEAS - MVA(P1/2T): 2.5 CM2
BH CV ECHO MEAS - MVA(VTI): 2.14 CM2
BH CV ECHO MEAS - PA ACC TIME: 0.1 SEC
BH CV ECHO MEAS - TAPSE (>1.6): 1 CM
BH CV ECHO MEASUREMENTS AVERAGE E/E' RATIO: 13.1
BH CV XLRA - RV BASE: 4.4 CM
BH CV XLRA - RV LENGTH: 7.2 CM
BH CV XLRA - RV MID: 2.7 CM
BH CV XLRA - TDI S': 8.3 CM/SEC
FOLATE SERPL-MCNC: 16.2 NG/ML (ref 4.78–24.2)
LEFT ATRIUM VOLUME INDEX: 44.3 ML/M2
LV EF 2D ECHO EST: 45 %
MV VALVE AREA BY PLANIMETRY: 2.14 CM2
VIT B12 BLD-MCNC: 1205 PG/ML (ref 211–946)

## 2024-10-31 PROCEDURE — 83921 ORGANIC ACID SINGLE QUANT: CPT

## 2024-10-31 PROCEDURE — 83520 IMMUNOASSAY QUANT NOS NONAB: CPT

## 2024-10-31 PROCEDURE — 83655 ASSAY OF LEAD: CPT

## 2024-10-31 PROCEDURE — 82300 ASSAY OF CADMIUM: CPT

## 2024-10-31 PROCEDURE — 82140 ASSAY OF AMMONIA: CPT

## 2024-10-31 PROCEDURE — 82607 VITAMIN B-12: CPT

## 2024-10-31 PROCEDURE — 83825 ASSAY OF MERCURY: CPT

## 2024-10-31 PROCEDURE — 86592 SYPHILIS TEST NON-TREP QUAL: CPT

## 2024-10-31 PROCEDURE — 82175 ASSAY OF ARSENIC: CPT

## 2024-10-31 PROCEDURE — 82746 ASSAY OF FOLIC ACID SERUM: CPT

## 2024-10-31 PROCEDURE — 36415 COLL VENOUS BLD VENIPUNCTURE: CPT

## 2024-10-31 RX ORDER — MEMANTINE HYDROCHLORIDE 5 MG/1
5 TABLET ORAL DAILY
Qty: 30 TABLET | Refills: 2 | Status: SHIPPED | OUTPATIENT
Start: 2024-10-31 | End: 2025-10-31

## 2024-10-31 NOTE — PROGRESS NOTES
New Patient Office Visit      Patient Name: Shai Mata  : 1945   MRN: [id number]     Referring Physician: Ekaterina Cox APRN    Chief Complaint:    Chief Complaint   Patient presents with    Establish Care     Memory; Pt believes it started about 2 months ago after being put on anesthesia       History of Present Illness: Shai Mata is a 79 y.o. male who is here today to establish care with Neurology.  He has never been seen before neurology.  He was referred to us from PCP (Brooke) for subjective memory impairment.  He is here today with his daughter Daphne who is assisting with history.     Has had anesthesia several times in the past 2 months with heart cath and MV Clip procedure and family feels this memory has worsened in the past 2 months. Symptoms of short term memory decline were present prior to anesthesia but seemed mild. No issues with long term history. Shai does not feel he has an issue with his memory. Siblings and children have noticed memory changes. No issues with wording finding or organization. Daughter fixes breakfast and supper. He will have a sandwich or eat fast food for lunch. Daughter reports he skips lunch sometimes as he forgets to eat. Daughter is managing medication pill box set up. He is managing his own finances and has bills on autopay; he can still balance check book. He is still driving- but family wants him to not drive far. Daughters do most of the shopping. Self ADLS. He has a  to assist with cleaning. Goes go to [employer] on Sundays for socialization      SOCIAL: . Lives alone. Three children (Aleksandar, Daphne, Capri). Retired . 10th grade education. No  service. Former tobacco use with cessation . No ETOH. No recreational drugs or cannabis.     [facility] Neuro: Niece- Dementia, Tremor; Sister- Tremor, Dementia; Daughter- migraines     Recent Imaging:     CT Head WO 10/24 + age related cerebral arrophy     Pertinent Medical History:  ASCVD, s/p CABG x 5, hypertension, hyperlipidemia, cardiomyopathy, CHF, s/p mitral valve clip implant, CAD, DM2, hypothyroidism, esophageal dysphagia, No TBI    Subjective      Review of Systems:   Review of Systems   Constitutional: Negative.    HENT: Negative.     Eyes: Negative.    Respiratory: Negative.     Cardiovascular: Negative.    Gastrointestinal: Negative.    Endocrine: Negative.    Genitourinary: Negative.    Musculoskeletal: Negative.    Skin: Negative.    Allergic/Immunologic: Negative.    Neurological:  Positive for memory problem.   Hematological: Negative.    Psychiatric/Behavioral: Negative.     All other systems reviewed and are negative.      Past Medical History:   Past Medical History:   Diagnosis Date    Abnormal heart rhythm     Arrhythmia     ASCVD (arteriosclerotic cardiovascular disease)     s/p CABG in 2006 (5) [facility]    Chicken pox     Cholecystitis 04/26/2018    Added automatically from request for surgery [id number]      Congestive heart failure     Coronary artery disease     Disease of thyroid gland     Diverticulitis     DMII (diabetes mellitus, type 2)     type 2, checks fsbg as needed     Dyslipidemia     Elevated cholesterol     GERD (gastroesophageal reflux disease)     HTN (hypertension)     Hx of myocardial infarction, greater than 8 weeks     2006    Hyperlipidemia     Kidney stone     Measles     Mitral regurgitation     MILD TO MODERATE    Mumps     PVC (premature ventricular contraction)     SOB (shortness of breath)     Vitamin D deficiency     Wears reading eyeglasses        Past Surgical History:   Past Surgical History:   Procedure Laterality Date    CARDIAC CATHETERIZATION N/A 07/30/2024    Procedure: Left Heart Cath;  Surgeon: Randi Mason MD;  Location: [facility] CATH INVASIVE LOCATION;  Service: Cardiology;  Laterality: N/A;    CARDIAC DEFIBRILLATOR PLACEMENT      CARDIAC ELECTROPHYSIOLOGY PROCEDURE N/A 11/20/2017    Procedure: EP/Ablation  PVC +/- ICD Implant;  Surgeon: Jose Maldonado MD;  Location:  [facility] EP INVASIVE LOCATION;  Service:     CARDIAC ELECTROPHYSIOLOGY PROCEDURE N/A 11/20/2017    Procedure: Biventricular Device Insertion;  Surgeon: Jose Maldonado MD;  Location:  [facility] EP INVASIVE LOCATION;  Service:     CATARACT EXTRACTION Bilateral     COLONOSCOPY      CORONARY ANGIOPLASTY      CORONARY ARTERY BYPASS GRAFT  2006    X 5.   [facility]    ENDOSCOPY N/A 05/17/2022    Procedure: ESOPHAGOGASTRODUODENOSCOPY WITH BIOPSY;  Surgeon: Roula Craig MD;  Location:  [facility] OR;  Service: Gastroenterology;  Laterality: N/A;    ENDOSCOPY N/A 07/27/2022    Procedure: ESOPHAGOGASTRODUODENOSCOPY WITH BIOPSY;  Surgeon: Roula Craig MD;  Location:  [facility] OR;  Service: Gastroenterology;  Laterality: N/A;    MITRAL VALVE REPAIR/REPLACEMENT N/A 9/25/2024    Procedure: Transcatheter Mitral Valve Repair;  Surgeon: Randi Mason MD;  Location:  [facility] CATH INVASIVE [facility];  Service: Cardiovascular;  Laterality: N/A;    MITRAL VALVE REPAIR/REPLACEMENT  9/25/2024    Procedure: Transcatheter Mitral Valve Repair;  Surgeon: Ubaldo Webb MD;  Location:  [facility] CATH INVASIVE [facility];  Service: Cardiovascular;;    PACEMAKER IMPLANTATION      CA LAPAROSCOPY SURG CHOLECYSTECTOMY N/A 04/27/2018    Procedure: CHOLECYSTECTOMY LAPAROSCOPIC;  Surgeon: Eber Hummel MD;  Location:  [facility] OR;  Service: General    TRANSESOPHAGEAL ECHOCARDIOGRAM (SHERLEY)  07/30/2024       Family History:   Family History   Problem Relation Age of Onset    Heart disease Mother     Heart disease Father     Dementia Sister     Dementia Sister     Cancer Sister     Cancer Sister     Cancer Sister     No Known Problems Sister     Diabetes Brother     No Known Problems Brother     No Known Problems Brother        Social History:   Social History     Socioeconomic History    Marital status:     Number of children: 3   Tobacco Use     Smoking status: Former     Current packs/day: 0.00     Average packs/day: 2.0 packs/day for 20.0 years (40.0 ttl pk-yrs)     Types: Cigarettes     Start date:      Quit date:      Years since quittin.8    Smokeless tobacco: Former     Types: Chew     Quit date:    Vaping Use    Vaping status: Never Used   Substance and Sexual Activity    Alcohol use: No    Drug use: No    Sexual activity: Defer       Medications:     Current Outpatient Medications:     amiodarone (PACERONE) 200 MG tablet, Take 0.5 tablets by mouth Daily., Disp: 45 tablet, Rfl: 2    aspirin 81 MG EC tablet, Take 1 tablet by mouth 2 (Two) Times a Day., Disp: , Rfl:     atorvastatin (LIPITOR) 80 MG tablet, Take 1 tablet by mouth Daily., Disp: 90 tablet, Rfl: 1    carvedilol (COREG) 12.5 MG tablet, Take 1 tablet by mouth 2 (Two) Times a Day., Disp: 180 tablet, Rfl: 1    glyburide micronized (GLYNASE) 1.5 MG tablet, TAKE 1 TABLET BY MOUTH TWICE DAILY BEFORE MEALS FOR 90 DAYS, Disp: , Rfl:     insulin NPH-insulin regular (humuLIN 70/30,novoLIN 70/30) (70-30) 100 UNIT/ML injection, Inject 22 Units under the skin into the appropriate area as directed 2 (Two) Times a Day With Meals., Disp: , Rfl:     levothyroxine (SYNTHROID, LEVOTHROID) 100 MCG tablet, Take 1 tablet by mouth Daily., Disp: , Rfl:     lisinopril (PRINIVIL,ZESTRIL) 5 MG tablet, Take 1 tablet by mouth Daily., Disp: 30 tablet, Rfl: 2    pantoprazole (Protonix) 40 MG EC tablet, Take 1 tablet by mouth Daily., Disp: 90 tablet, Rfl: 3    spironolactone (ALDACTONE) 25 MG tablet, Take 1 tablet by mouth Daily., Disp: 90 tablet, Rfl: 1    memantine (NAMENDA) 5 MG tablet, Take 1 tablet by mouth Daily., Disp: 30 tablet, Rfl: 2    Allergies:   No Known Allergies    Objective     Physical Exam:  Vital Signs:   Vitals:    10/31/24 1329   BP: 110/64   BP Location: Left arm   Patient Position: Sitting   Cuff Size: Adult   Pulse: 76   Resp: 14   Temp: 97.7 °F (36.5 °C)   TempSrc: Infrared  "  SpO2: 99%   Weight: 79.5 kg (175 lb 3.2 oz)   Height: 175.3 cm (69.02\")   PainSc: 0-No pain     Body mass index is 25.86 kg/m².     Physical Exam  Vitals and nursing note reviewed.   Constitutional:       General: He is not in acute distress.     Appearance: Normal appearance.   HENT:      Head: Normocephalic.      Nose: Nose normal.      Mouth/Throat:      Mouth: Mucous membranes are moist.      Pharynx: Oropharynx is clear.   Eyes:      General: Lids are normal.      Extraocular Movements: Extraocular movements intact.      Conjunctiva/sclera: Conjunctivae normal.      Pupils: Pupils are equal, round, and reactive to light.   Musculoskeletal:      Cervical back: Normal range of motion and neck supple.   Skin:     General: Skin is warm and dry.      Capillary Refill: Capillary refill takes less than 2 seconds.   Neurological:      Mental Status: He is alert and oriented to person, place, and time.      Cranial Nerves: No cranial nerve deficit.      Sensory: No sensory deficit.      Motor: Motor strength is normal.No weakness.      Coordination: Coordination is intact. Romberg sign negative. Coordination normal.      Gait: Gait normal.      Deep Tendon Reflexes: Reflexes are normal and symmetric. Reflexes normal.   Psychiatric:         Mood and Affect: Mood normal.         Speech: Speech normal.         Behavior: Behavior normal.         Neurological Exam  Mental Status  Alert. Oriented to person, place and time. Oriented to person, place, and time. Recalls 3 of 3 objects immediately. At 5 minutes recalls 0 of 3 objects. Unable to copy figure. Speech is normal. Able to name objects, name parts of objects, repeat, read and write. Follows three-step commands. Able to spell words backwards. MMSE score: 21.    Cranial Nerves  CN II: Visual acuity is normal. Visual fields full to confrontation.  CN III, IV, VI: Extraocular movements intact bilaterally. Normal lids and orbits bilaterally. Pupils equal round and reactive " to light bilaterally.  CN V: Facial sensation is normal.  CN VII: Full and symmetric facial movement.  CN IX, X: Palate elevates symmetrically. Normal gag reflex.  CN XI: Shoulder shrug strength is normal.  CN XII: Tongue midline without atrophy or fasciculations.    Motor  Normal muscle bulk throughout. Normal muscle tone. Strength is 5/5 throughout all four extremities.    Sensory  Light touch is normal in upper and lower extremities.     Reflexes  Deep tendon reflexes are 2+ and symmetric in all four extremities.    Coordination    Finger-to-nose, rapid alternating movements and heel-to-shin normal bilaterally without dysmetria.    Gait  Normal casual, toe, heel and tandem gait. Normal gait. Romberg is absent. Able to rise from chair without using arms.      PHQ-9 Total Score: 1      Assessment / Plan      Assessment/Plan:   Diagnoses and all orders for this visit:    1. Moderate cognitive impairment (Primary)  -     memantine (NAMENDA) 5 MG tablet; Take 1 tablet by mouth Daily.  Dispense: 30 tablet; Refill: 2  -     ATN Profile; Future  -     Vitamin B12; Future  -     Folate; Future  -     Methylmalonic Acid, Serum; Future  -     Heavy Metals Profile II, Blood; Future  -     RPR; Future  -     Ammonia; Future         Follow Up:   Return in about 3 months (around 1/31/2025), or if symptoms worsen or fail to improve.    Anticipatory guidance and safety reviewed  Patient education provided  MMSE 21/30  [place] 6  PHQ-9 score 1  Declined offer of Sleep Study  MRI Contraindicated due to PM placement   Labs: HTN, vitamin B12, folate, MMA, heavy metals, RPR and ammonia  Begin Namenda 5 mg Daily; SE reviewed     RTC PRN or within 12 weeks or sooner with issues     ANEUDY Rajput  [facility]

## 2024-10-31 NOTE — TELEPHONE ENCOUNTER
Caller: Libia Bragg    Relationship: Emergency Contact    Best call back number: [phone]    What is the best time to reach you: ANY    Who are you requesting to speak with (clinical staff, provider,  specific staff member): CLINICAL    What was the call regarding: PT'S DAUGHTER WANTED ALEXA TO BE AWARE THAT THEY SENT A MY CHART MESSAGE WITH AN ATTACHED FILE WITH HIS BP READINGS.     Is it okay if the provider responds through [employer]: YES

## 2024-11-01 LAB — RPR SER QL: NORMAL

## 2024-11-05 ENCOUNTER — PATIENT ROUNDING (BHMG ONLY) (OUTPATIENT)
Dept: NEUROLOGY | Facility: CLINIC | Age: 79
End: 2024-11-05
Payer: MEDICARE

## 2024-11-06 DIAGNOSIS — Z77.010 ARSENIC SUSPECTED EXPOSURE: Primary | ICD-10-CM

## 2024-11-06 LAB
ARSENIC BLD-MCNC: 18 UG/L (ref 0–9)
CADMIUM BLD-MCNC: 0.5 UG/L (ref 0–1.2)
LEAD BLDV-MCNC: 1.9 UG/DL (ref 0–3.4)
MERCURY BLD-MCNC: <1 UG/L (ref 0–14.9)
METHYLMALONATE SERPL-SCNC: 299 NMOL/L (ref 0–378)

## 2024-11-08 ENCOUNTER — TELEPHONE (OUTPATIENT)
Dept: CARDIOLOGY | Facility: HOSPITAL | Age: 79
End: 2024-11-08
Payer: MEDICARE

## 2024-11-11 ENCOUNTER — LAB (OUTPATIENT)
Dept: LAB | Facility: HOSPITAL | Age: 79
End: 2024-11-11
Payer: MEDICARE

## 2024-11-11 DIAGNOSIS — Z77.010 ARSENIC SUSPECTED EXPOSURE: ICD-10-CM

## 2024-11-11 PROCEDURE — 82175 ASSAY OF ARSENIC: CPT

## 2024-11-11 PROCEDURE — 82570 ASSAY OF URINE CREATININE: CPT

## 2024-11-12 LAB
A -- BETA-AMYLOID 42/40 RATIO: 0.09
AL BETA40 PLAS-MCNC: 245.39 PG/ML
AL BETA42 PLAS-MCNC: 21.78 PG/ML
ATN SUMMARY1: ABNORMAL
INFORMATION:: ABNORMAL
N -- NFL, PLASMA: 8.38 PG/ML (ref 0–11.55)
T -- P-TAU181: 2.36 PG/ML (ref 0–0.97)

## 2024-11-13 DIAGNOSIS — R41.89 MODERATE COGNITIVE IMPAIRMENT: Primary | ICD-10-CM

## 2024-11-13 DIAGNOSIS — F02.80 ALZHEIMER'S DISEASE: ICD-10-CM

## 2024-11-13 DIAGNOSIS — G30.9 ALZHEIMER'S DISEASE: ICD-10-CM

## 2024-12-05 LAB
ARSENIC 24H UR-MRATE: 1 UG/24 HR (ref 0–50)
ARSENIC UR-MCNC: 21 UG/L (ref 0–9)
ARSENIC.INORGANIC+METHYLATED 24H UR-MCNC: <20 UG/L
ARSENIC/CREAT UR: 29 UG/G CREAT
CREAT UR-MCNC: 0.73 G/L (ref 0.3–3)
DIMETHYLARSINATE UR-MCNC: <5 UG/L
INORG ARSENIC UR-MCNC: <10 UG/L
Lab: NORMAL
MMA UR-MCNC: <5 UG/L

## 2024-12-10 ENCOUNTER — OFFICE VISIT (OUTPATIENT)
Dept: FAMILY MEDICINE CLINIC | Facility: CLINIC | Age: 79
End: 2024-12-10
Payer: MEDICARE

## 2024-12-10 ENCOUNTER — LAB (OUTPATIENT)
Dept: FAMILY MEDICINE CLINIC | Facility: CLINIC | Age: 79
End: 2024-12-10
Payer: MEDICARE

## 2024-12-10 VITALS
HEART RATE: 70 BPM | HEIGHT: 69 IN | TEMPERATURE: 97.5 F | DIASTOLIC BLOOD PRESSURE: 70 MMHG | OXYGEN SATURATION: 100 % | SYSTOLIC BLOOD PRESSURE: 120 MMHG | WEIGHT: 180.8 LBS | BODY MASS INDEX: 26.78 KG/M2

## 2024-12-10 DIAGNOSIS — E78.2 HYPERLIPEMIA, MIXED: ICD-10-CM

## 2024-12-10 DIAGNOSIS — I50.22 CHRONIC SYSTOLIC CONGESTIVE HEART FAILURE: ICD-10-CM

## 2024-12-10 DIAGNOSIS — Z00.00 MEDICARE ANNUAL WELLNESS VISIT, SUBSEQUENT: ICD-10-CM

## 2024-12-10 DIAGNOSIS — Z00.00 MEDICARE ANNUAL WELLNESS VISIT, SUBSEQUENT: Primary | ICD-10-CM

## 2024-12-10 DIAGNOSIS — E11.22 TYPE 2 DIABETES MELLITUS WITH STAGE 3A CHRONIC KIDNEY DISEASE, WITH LONG-TERM CURRENT USE OF INSULIN: ICD-10-CM

## 2024-12-10 DIAGNOSIS — K21.9 GASTROESOPHAGEAL REFLUX DISEASE WITHOUT ESOPHAGITIS: ICD-10-CM

## 2024-12-10 DIAGNOSIS — I10 ESSENTIAL HYPERTENSION: ICD-10-CM

## 2024-12-10 DIAGNOSIS — G30.1 MODERATE LATE ONSET ALZHEIMER'S DEMENTIA WITHOUT BEHAVIORAL DISTURBANCE, PSYCHOTIC DISTURBANCE, MOOD DISTURBANCE, OR ANXIETY: ICD-10-CM

## 2024-12-10 DIAGNOSIS — N18.31 TYPE 2 DIABETES MELLITUS WITH STAGE 3A CHRONIC KIDNEY DISEASE, WITH LONG-TERM CURRENT USE OF INSULIN: ICD-10-CM

## 2024-12-10 DIAGNOSIS — F02.B0 MODERATE LATE ONSET ALZHEIMER'S DEMENTIA WITHOUT BEHAVIORAL DISTURBANCE, PSYCHOTIC DISTURBANCE, MOOD DISTURBANCE, OR ANXIETY: ICD-10-CM

## 2024-12-10 DIAGNOSIS — Z79.4 TYPE 2 DIABETES MELLITUS WITH STAGE 3A CHRONIC KIDNEY DISEASE, WITH LONG-TERM CURRENT USE OF INSULIN: ICD-10-CM

## 2024-12-10 LAB
ALBUMIN SERPL-MCNC: 4.1 G/DL (ref 3.5–5.2)
ALBUMIN/GLOB SERPL: 1.6 G/DL
ALP SERPL-CCNC: 90 U/L (ref 39–117)
ALT SERPL W P-5'-P-CCNC: 25 U/L (ref 1–41)
ANION GAP SERPL CALCULATED.3IONS-SCNC: 11 MMOL/L (ref 5–15)
AST SERPL-CCNC: 22 U/L (ref 1–40)
BASOPHILS # BLD AUTO: 0.09 10*3/MM3 (ref 0–0.2)
BASOPHILS NFR BLD AUTO: 1.2 % (ref 0–1.5)
BILIRUB SERPL-MCNC: 0.5 MG/DL (ref 0–1.2)
BUN SERPL-MCNC: 45 MG/DL (ref 8–23)
BUN/CREAT SERPL: 31.7 (ref 7–25)
CALCIUM SPEC-SCNC: 9.3 MG/DL (ref 8.6–10.5)
CHLORIDE SERPL-SCNC: 101 MMOL/L (ref 98–107)
CHOLEST SERPL-MCNC: 115 MG/DL (ref 0–200)
CO2 SERPL-SCNC: 22 MMOL/L (ref 22–29)
CREAT SERPL-MCNC: 1.42 MG/DL (ref 0.76–1.27)
DEPRECATED RDW RBC AUTO: 45.1 FL (ref 37–54)
EGFRCR SERPLBLD CKD-EPI 2021: 50 ML/MIN/1.73
EOSINOPHIL # BLD AUTO: 0.21 10*3/MM3 (ref 0–0.4)
EOSINOPHIL NFR BLD AUTO: 2.7 % (ref 0.3–6.2)
ERYTHROCYTE [DISTWIDTH] IN BLOOD BY AUTOMATED COUNT: 13.3 % (ref 12.3–15.4)
GLOBULIN UR ELPH-MCNC: 2.6 GM/DL
GLUCOSE SERPL-MCNC: 163 MG/DL (ref 65–99)
HBA1C MFR BLD: 7.4 % (ref 4.8–5.6)
HCT VFR BLD AUTO: 34.6 % (ref 37.5–51)
HDLC SERPL-MCNC: 50 MG/DL (ref 40–60)
HGB BLD-MCNC: 11.1 G/DL (ref 13–17.7)
IMM GRANULOCYTES # BLD AUTO: 0.04 10*3/MM3 (ref 0–0.05)
IMM GRANULOCYTES NFR BLD AUTO: 0.5 % (ref 0–0.5)
LDLC SERPL CALC-MCNC: 56 MG/DL (ref 0–100)
LDLC/HDLC SERPL: 1.17 {RATIO}
LYMPHOCYTES # BLD AUTO: 1.13 10*3/MM3 (ref 0.7–3.1)
LYMPHOCYTES NFR BLD AUTO: 14.8 % (ref 19.6–45.3)
MCH RBC QN AUTO: 30 PG (ref 26.6–33)
MCHC RBC AUTO-ENTMCNC: 32.1 G/DL (ref 31.5–35.7)
MCV RBC AUTO: 93.5 FL (ref 79–97)
MONOCYTES # BLD AUTO: 0.63 10*3/MM3 (ref 0.1–0.9)
MONOCYTES NFR BLD AUTO: 8.2 % (ref 5–12)
NEUTROPHILS NFR BLD AUTO: 5.56 10*3/MM3 (ref 1.7–7)
NEUTROPHILS NFR BLD AUTO: 72.6 % (ref 42.7–76)
NRBC BLD AUTO-RTO: 0 /100 WBC (ref 0–0.2)
PLATELET # BLD AUTO: 205 10*3/MM3 (ref 140–450)
PMV BLD AUTO: 10.8 FL (ref 6–12)
POTASSIUM SERPL-SCNC: 5.3 MMOL/L (ref 3.5–5.2)
PROT SERPL-MCNC: 6.7 G/DL (ref 6–8.5)
RBC # BLD AUTO: 3.7 10*6/MM3 (ref 4.14–5.8)
SODIUM SERPL-SCNC: 134 MMOL/L (ref 136–145)
TRIGL SERPL-MCNC: 33 MG/DL (ref 0–150)
TSH SERPL DL<=0.05 MIU/L-ACNC: 2.73 UIU/ML (ref 0.27–4.2)
VIT B12 BLD-MCNC: 1270 PG/ML (ref 211–946)
VLDLC SERPL-MCNC: 9 MG/DL (ref 5–40)
WBC NRBC COR # BLD AUTO: 7.66 10*3/MM3 (ref 3.4–10.8)

## 2024-12-10 PROCEDURE — 1126F AMNT PAIN NOTED NONE PRSNT: CPT | Performed by: NURSE PRACTITIONER

## 2024-12-10 PROCEDURE — 1170F FXNL STATUS ASSESSED: CPT | Performed by: NURSE PRACTITIONER

## 2024-12-10 PROCEDURE — 83036 HEMOGLOBIN GLYCOSYLATED A1C: CPT | Performed by: NURSE PRACTITIONER

## 2024-12-10 PROCEDURE — 84443 ASSAY THYROID STIM HORMONE: CPT | Performed by: NURSE PRACTITIONER

## 2024-12-10 PROCEDURE — 80053 COMPREHEN METABOLIC PANEL: CPT | Performed by: NURSE PRACTITIONER

## 2024-12-10 PROCEDURE — 85025 COMPLETE CBC W/AUTO DIFF WBC: CPT | Performed by: NURSE PRACTITIONER

## 2024-12-10 PROCEDURE — 3074F SYST BP LT 130 MM HG: CPT | Performed by: NURSE PRACTITIONER

## 2024-12-10 PROCEDURE — 36415 COLL VENOUS BLD VENIPUNCTURE: CPT

## 2024-12-10 PROCEDURE — 3078F DIAST BP <80 MM HG: CPT | Performed by: NURSE PRACTITIONER

## 2024-12-10 PROCEDURE — G0439 PPPS, SUBSEQ VISIT: HCPCS | Performed by: NURSE PRACTITIONER

## 2024-12-10 PROCEDURE — 80061 LIPID PANEL: CPT | Performed by: NURSE PRACTITIONER

## 2024-12-10 PROCEDURE — 82607 VITAMIN B-12: CPT | Performed by: NURSE PRACTITIONER

## 2024-12-10 RX ORDER — SPIRONOLACTONE 25 MG/1
25 TABLET ORAL DAILY
Qty: 90 TABLET | Refills: 1 | Status: CANCELLED | OUTPATIENT
Start: 2024-12-10

## 2024-12-10 RX ORDER — ATORVASTATIN CALCIUM 80 MG/1
80 TABLET, FILM COATED ORAL DAILY
Qty: 90 TABLET | Refills: 1 | Status: CANCELLED | OUTPATIENT
Start: 2024-12-10

## 2024-12-10 RX ORDER — AMIODARONE HYDROCHLORIDE 200 MG/1
100 TABLET ORAL DAILY
Qty: 45 TABLET | Refills: 2 | Status: CANCELLED | OUTPATIENT
Start: 2024-12-10

## 2024-12-10 RX ORDER — CARVEDILOL 12.5 MG/1
12.5 TABLET ORAL 2 TIMES DAILY
Qty: 180 TABLET | Refills: 1 | Status: CANCELLED | OUTPATIENT
Start: 2024-12-10

## 2024-12-10 RX ORDER — PANTOPRAZOLE SODIUM 40 MG/1
40 TABLET, DELAYED RELEASE ORAL DAILY
Qty: 90 TABLET | Refills: 3 | Status: SHIPPED | OUTPATIENT
Start: 2024-12-10

## 2024-12-10 NOTE — PROGRESS NOTES
Subjective   The ABCs of the Annual Wellness Visit  Medicare Wellness Visit      Shai Mata is a 79 y.o. patient who presents for a Medicare Wellness Visit.    The following portions of the patient's history were reviewed and   updated as appropriate: allergies, current medications, past family history, past medical history, past social history, past surgical history, and problem list.    Compared to one year ago, the patient's physical   health is the same.  Compared to one year ago, the patient's mental   health is worse.  New DX Dementia.  Following Neurology     Recent Hospitalizations:  This patient has had a [facility] admission record on file within the last 365 days.  Current Medical Providers:  Patient Care Team:  Ekaterina Cox APRN as PCP - General  Ekaterina Cox APRN as PCP - Family Medicine  Marcin Colon MD as Consulting Physician (Cardiology)  Valerie Pfeiffer, GATO as Ambulatory  ([facility])  Edda Thompson APRN as Nurse Practitioner (Neurology)    Outpatient Medications Prior to Visit   Medication Sig Dispense Refill    amiodarone (PACERONE) 200 MG tablet Take 0.5 tablets by mouth Daily. 45 tablet 2    aspirin 81 MG EC tablet Take 1 tablet by mouth 2 (Two) Times a Day.      atorvastatin (LIPITOR) 80 MG tablet Take 1 tablet by mouth Daily. 90 tablet 1    carvedilol (COREG) 12.5 MG tablet Take 1 tablet by mouth 2 (Two) Times a Day. 180 tablet 1    glyburide micronized (GLYNASE) 1.5 MG tablet TAKE 1 TABLET BY MOUTH TWICE DAILY BEFORE MEALS FOR 90 DAYS      insulin NPH-insulin regular (humuLIN 70/30,novoLIN 70/30) (70-30) 100 UNIT/ML injection Inject 22 Units under the skin into the appropriate area as directed 2 (Two) Times a Day With Meals.      levothyroxine (SYNTHROID, LEVOTHROID) 100 MCG tablet Take 1 tablet by mouth Daily.      lisinopril (PRINIVIL,ZESTRIL) 5 MG tablet Take 1 tablet by mouth Daily. 30 tablet 2    memantine (NAMENDA) 5 MG tablet Take 1 tablet  "by mouth Daily. 30 tablet 2    spironolactone (ALDACTONE) 25 MG tablet Take 1 tablet by mouth Daily. 90 tablet 1    pantoprazole (Protonix) 40 MG EC tablet Take 1 tablet by mouth Daily. 90 tablet 3     No facility-administered medications prior to visit.     No opioid medication identified on active medication list. I have reviewed chart for other potential  high risk medication/s and harmful drug interactions in the elderly.      Aspirin is on active medication list. Aspirin use is indicated based on review of current medical condition/s. Pros and cons of this therapy have been discussed today. Benefits of this medication outweigh potential harm.  Patient has been encouraged to continue taking this medication.  .      Patient Active Problem List   Diagnosis    ASCVD (arteriosclerotic cardiovascular disease)    S/P CABG x 5    Essential hypertension    PVC's (premature ventricular contractions)    Type 2 diabetes mellitus with hyperglycemia, with long-term current use of insulin    Hyperlipemia, mixed    Ischemic cardiomyopathy    Chronic systolic congestive heart failure    Ventricular tachycardia (paroxysmal)    Long term current use of antiarrhythmic medical therapy    Other specified hypothyroidism    Esophageal dysphagia    Nonrheumatic mitral valve regurgitation    S/P mitral valve clip implantation    Coronary artery disease involving coronary bypass graft of native heart without angina pectoris    Dyslipidemia     Advance Care Planning Advance Directive is not on file.  ACP discussion was declined by the patient. Patient does not have an advance directive, information provided.            Objective   Vitals:    12/10/24 0817 12/10/24 0840   BP: 112/52 120/70   BP Location: Right arm    Patient Position: Sitting    Pulse: 70    Temp: 97.5 °F (36.4 °C)    TempSrc: Temporal    SpO2: 100%    Weight: 82 kg (180 lb 12.8 oz)    Height: 175.3 cm (69.02\")    PainSc: 0-No pain        Estimated body mass index is 26.68 " "kg/m² as calculated from the following:    Height as of this encounter: 175.3 cm (69.02\").    Weight as of this encounter: 82 kg (180 lb 12.8 oz).            Does the patient have evidence of cognitive impairment? Yes                                                                                                Health  Risk Assessment    Smoking Status:  Social History     Tobacco Use   Smoking Status Former    Current packs/day: 0.00    Average packs/day: 2.0 packs/day for 20.0 years (40.0 ttl pk-yrs)    Types: Cigarettes    Start date:     Quit date:     Years since quittin.9   Smokeless Tobacco Former    Types: Chew    Quit date:      Alcohol Consumption:  Social History     Substance and Sexual Activity   Alcohol Use No       Fall Risk Screen  ALBERTAADI Fall Risk Assessment was completed, and patient is at HIGH risk for falls. Assessment completed on:12/10/2024    Depression Screening   Little interest or pleasure in doing things? Not at all   Feeling down, depressed, or hopeless? Not at all   PHQ-2 Total Score 0      Health Habits and Functional and Cognitive Screenin/10/2024     8:13 AM   Functional & Cognitive Status   Do you have difficulty preparing food and eating? No   Do you have difficulty bathing yourself, getting dressed or grooming yourself? No   Do you have difficulty using the toilet? No   Do you have difficulty moving around from place to place? No   Do you have trouble with steps or getting out of a bed or a chair? No   Current Diet Well Balanced Diet   Dental Exam Other        Dental Exam Comment patient has dentures   Eye Exam Up to date   Exercise (times per week) 0 times per week   Current Exercises Include Walking;No Regular Exercise   Do you need help using the phone?  No   Are you deaf or do you have serious difficulty hearing?  No   Do you need help to go to places out of walking distance? Yes   Do you need help shopping? Yes   Do you need help preparing meals?  " Yes   Do you need help with housework?  No   Do you need help with laundry? No   Do you need help taking your medications? Yes   Do you need help managing money? No   Do you ever drive or ride in a car without wearing a seat belt? No   Have you felt unusual stress, anger or loneliness in the last month? No   Who do you live with? Alone   If you need help, do you have trouble finding someone available to you? No   Have you been bothered in the last four weeks by sexual problems? No   Do you have difficulty concentrating, remembering or making decisions? Yes           Age-appropriate Screening Schedule:  Refer to the list below for future screening recommendations based on patient's age, sex and/or medical conditions. Orders for these recommended tests are listed in the plan section. The patient has been provided with a written plan.    Health Maintenance List  Health Maintenance   Topic Date Due    Pneumococcal Vaccine 65+ (1 of 2 - PCV) Never done    ZOSTER VACCINE (2 of 2) 07/18/2017    RSV Vaccine - Adults (1 - 1-dose 75+ series) Never done    COVID-19 Vaccine (3 - 2024-25 season) 09/01/2024    HEMOGLOBIN A1C  03/10/2025    INFLUENZA VACCINE  03/31/2025 (Originally 7/1/2024)    DIABETIC EYE EXAM  08/13/2025    LIPID PANEL  09/10/2025    BMI FOLLOWUP  09/10/2025    ANNUAL WELLNESS VISIT  12/10/2025    COLORECTAL CANCER SCREENING  11/15/2026    TDAP/TD VACCINES (2 - Td or Tdap) 05/23/2027    URINE MICROALBUMIN  Discontinued                                                                                                                                                CMS Preventative Services Quick Reference  Risk Factors Identified During Encounter  Depression/Dysphoria:  Continue with Neurology and new medications  Fall Risk-High or Moderate: Discussed Fall Prevention in the home  Immunizations Discussed/Encouraged: Pneumococcal 23  Inactivity/Sedentary: Patient was advised to exercise at least 150 minutes a week per  "CDC recommendations.  Dental Screening Recommended  Vision Screening Recommended    The above risks/problems have been discussed with the patient.  Pertinent information has been shared with the patient in the After Visit Summary.  An After Visit Summary and PPPS were made available to the patient.    Follow Up:   Next Medicare Wellness visit to be scheduled in 1 year.         Additional E&M Note during same encounter follows:  Patient has additional, significant, and separately identifiable condition(s)/problem(s) that require work above and beyond the Medicare Wellness Visit     Chief Complaint  Medicare Wellness-subsequent    Subjective   Alzheimer's Disease  This is a new (Following Neurology with new start of Namenda.  Family assisting with daily care.  Continues to live alone. meals are prepared.) problem. The current episode started more than 1 month ago. The problem occurs constantly.     Shai is also being seen today for additional medical problem/s.                Objective   Vital Signs:  /70   Pulse 70   Temp 97.5 °F (36.4 °C) (Temporal)   Ht 175.3 cm (69.02\")   Wt 82 kg (180 lb 12.8 oz)   SpO2 100%   BMI 26.68 kg/m²   Physical Exam  Vitals and nursing note reviewed.   Constitutional:       General: He is not in acute distress.     Appearance: He is well-developed and normal weight. He is not ill-appearing.   HENT:      Head: Normocephalic.   Cardiovascular:      Rate and Rhythm: Normal rate and regular rhythm.      Heart sounds: Normal heart sounds. No murmur heard.  Pulmonary:      Effort: Pulmonary effort is normal.      Breath sounds: Normal breath sounds.   Skin:     General: Skin is warm and dry.   Neurological:      General: No focal deficit present.      Mental Status: He is alert and oriented to person, place, and time. Mental status is at baseline.   Psychiatric:         Mood and Affect: Mood normal.         Behavior: Behavior normal.         Thought Content: Thought content " "normal.         Judgment: Judgment normal.     -- The Timed Up and Go (TUG) Test (CDC Version)                  - Testing directions adhered to:                                  1) Patients wears regular footwear and may use walking aid(s) if    needed.                                  2) Patient to sit back in standard arm chair and identify a line 10 feet    away from patient on floor.                                  3) Test time begins at \"Go\" and stops when patient reseated in arm    chair.                    - Instructions read aloud (and demonstrated as applicable) to patient:                                      When I say \"Go,\" I want you to:                                    1) Stand up from the chair.                                  2) Walk to the line on the floor (10 feet away) at your normal pace.                                  3) Turn.                                  4) Walk back to the chair at your normal pace.                                  5) Sit down again.                    - Result: normal                      - Observations during test:Normal Postural stability, gait, stride length                     Assessment and Plan            Chronic systolic congestive heart failure               Type 2 diabetes mellitus with stage 3a chronic kidney disease, with long-term current use of insulin           Hyperlipemia, mixed            Essential hypertension           Gastroesophageal reflux disease without esophagitis    Orders:    pantoprazole (Protonix) 40 MG EC tablet; Take 1 tablet by mouth Daily.    Medicare annual wellness visit, subsequent    Orders:    Comprehensive Metabolic Panel; Future    CBC Auto Differential; Future    Hemoglobin A1c; Future    Lipid Panel; Future    TSH; Future    Vitamin B12; Future    Moderate late onset Alzheimer's dementia without behavioral disturbance, psychotic disturbance, mood disturbance, or anxiety                   Follow Up   Return in about 3 months " (around 3/10/2025), or if symptoms worsen or fail to improve, for Recheck  labs today .  Patient was given instructions and counseling regarding his condition or for health maintenance advice. Please see specific information pulled into the AVS if appropriate.

## 2024-12-16 ENCOUNTER — TELEPHONE (OUTPATIENT)
Dept: FAMILY MEDICINE CLINIC | Facility: CLINIC | Age: 79
End: 2024-12-16
Payer: MEDICARE

## 2024-12-16 DIAGNOSIS — N28.9 RENAL INSUFFICIENCY: Primary | ICD-10-CM

## 2024-12-16 NOTE — TELEPHONE ENCOUNTER
----- Message from Ekaterina Cox sent at 12/16/2024  5:41 PM EST -----  Sugar and cholesterol have improved.  Renal function shows a decline.  Increase fluids and RTC 1-2 week repeat BMP does not have to fast

## 2024-12-27 DIAGNOSIS — I10 ESSENTIAL HYPERTENSION: ICD-10-CM

## 2024-12-27 RX ORDER — LISINOPRIL 5 MG/1
5 TABLET ORAL DAILY
Qty: 90 TABLET | Refills: 0 | Status: SHIPPED | OUTPATIENT
Start: 2024-12-27

## 2025-01-23 ENCOUNTER — TELEMEDICINE (OUTPATIENT)
Dept: NEUROLOGY | Facility: CLINIC | Age: 80
End: 2025-01-23
Payer: MEDICARE

## 2025-01-23 DIAGNOSIS — R41.89 MODERATE COGNITIVE IMPAIRMENT: ICD-10-CM

## 2025-01-23 DIAGNOSIS — F02.811 ALZHEIMER'S DEMENTIA WITH AGITATION, UNSPECIFIED DEMENTIA SEVERITY, UNSPECIFIED TIMING OF DEMENTIA ONSET: Primary | ICD-10-CM

## 2025-01-23 DIAGNOSIS — G30.9 ALZHEIMER'S DEMENTIA WITH AGITATION, UNSPECIFIED DEMENTIA SEVERITY, UNSPECIFIED TIMING OF DEMENTIA ONSET: Primary | ICD-10-CM

## 2025-01-23 RX ORDER — BREXPIPRAZOLE 0.5 MG/1
TABLET ORAL
Qty: 91 TABLET | Refills: 0 | Status: SHIPPED | OUTPATIENT
Start: 2025-01-23 | End: 2025-02-20

## 2025-01-23 RX ORDER — MEMANTINE HYDROCHLORIDE 10 MG/1
10 TABLET ORAL DAILY
Qty: 30 TABLET | Refills: 2 | Status: SHIPPED | OUTPATIENT
Start: 2025-01-23 | End: 2026-01-23

## 2025-01-23 NOTE — PROGRESS NOTES
Follow Up Office Visit      Patient Name: Shai Mata  : 1945   MRN: [id number]     Chief Complaint:    Chief Complaint   Patient presents with    Memory Loss       History of Present Illness: Shai Mata is a 79 y.o. male who is here today to follow up with neurology for MCI. He was last seen in clinic 10/24 ([facility]). This is a telehealth encounter.  He is here today with his daughter who is assisting with history.      He continues to struggle with short-term memory decline.  He is continuing to misplace items around the home and struggling with organization.  Good appetite.  Daughter fixes meals; he will graze.  Daughter is managing medications and setting up pillbox.  He is managing his own finances and has bills on auto pay; family is assisting with checking on bills.  He is no longer driving.  Daughters are doing the shopping.  There is a  within the home who does the cleaning.  He goes to [employer] on Sundays for socialization.  Self ADLs no issues dressing and toileting.  He has been taking Namenda 5 mg daily and reports good tolerance and compliance with medication.  He has been referred to forward certified neurologist (Narciso) for positive ATN profile with appointment scheduled .  Has been restless at night with some aggression and anger.  They have been giving him melatonin 10 to 20 mg nightly and this will help him sleep.    Recent Imaging:     ATN Profile 10/24 - positive    CT Head WO 10/24 + age related cerebral atrophy      Pertinent Medical History: ASCVD, s/p CABG x 5, hypertension, hyperlipidemia, cardiomyopathy, CHF, s/p mitral valve clip implant, CAD, DM2, hypothyroidism, esophageal dysphagia, No TBI    Subjective      Review of Systems:   Review of Systems   Constitutional: Negative.    HENT: Negative.     Eyes: Negative.    Respiratory: Negative.     Cardiovascular: Negative.    Gastrointestinal: Negative.    Endocrine: Negative.    Genitourinary: Negative.     Musculoskeletal: Negative.    Skin: Negative.    Allergic/Immunologic: Negative.    Neurological:  Positive for memory problem.   Hematological: Negative.    Psychiatric/Behavioral:  Positive for agitation and sleep disturbance.    All other systems reviewed and are negative.      I have reviewed and the following portions of the patient's history were updated as appropriate: past family history, past medical history, past social history, past surgical history and problem list.    Medications:     Current Outpatient Medications:     memantine (NAMENDA) 10 MG tablet, Take 1 tablet by mouth Daily., Disp: 30 tablet, Rfl: 2    amiodarone (PACERONE) 200 MG tablet, Take 0.5 tablets by mouth Daily., Disp: 45 tablet, Rfl: 2    aspirin 81 MG EC tablet, Take 1 tablet by mouth 2 (Two) Times a Day., Disp: , Rfl:     atorvastatin (LIPITOR) 80 MG tablet, Take 1 tablet by mouth Daily., Disp: 90 tablet, Rfl: 1    Brexpiprazole (Rexulti) 0.5 MG tablet, Take 0.5 mg by mouth Daily for 7 days, THEN 2 mg Daily for 21 days., Disp: 91 tablet, Rfl: 0    carvedilol (COREG) 12.5 MG tablet, Take 1 tablet by mouth 2 (Two) Times a Day., Disp: 180 tablet, Rfl: 1    glyburide micronized (GLYNASE) 1.5 MG tablet, TAKE 1 TABLET BY MOUTH TWICE DAILY BEFORE MEALS FOR 90 DAYS, Disp: , Rfl:     insulin NPH-insulin regular (humuLIN 70/30,novoLIN 70/30) (70-30) 100 UNIT/ML injection, Inject 22 Units under the skin into the appropriate area as directed 2 (Two) Times a Day With Meals., Disp: 30 mL, Rfl: 2    levothyroxine (SYNTHROID, LEVOTHROID) 100 MCG tablet, Take 1 tablet by mouth Daily., Disp: , Rfl:     lisinopril (PRINIVIL,ZESTRIL) 5 MG tablet, Take 1 tablet by mouth once daily, Disp: 90 tablet, Rfl: 0    pantoprazole (Protonix) 40 MG EC tablet, Take 1 tablet by mouth Daily., Disp: 90 tablet, Rfl: 3    spironolactone (ALDACTONE) 25 MG tablet, Take 1 tablet by mouth Daily., Disp: 90 tablet, Rfl: 1    Allergies:   No Known Allergies    Objective      Physical Exam:  Vital Signs: There were no vitals filed for this visit.  There is no height or weight on file to calculate BMI.    Physical Exam  Vitals and nursing note reviewed.   Constitutional:       General: He is not in acute distress.     Appearance: Normal appearance.   HENT:      Head: Normocephalic.      Nose: Nose normal.   Musculoskeletal:      Cervical back: Normal range of motion.   Neurological:      Mental Status: He is alert and oriented to person, place, and time.   Psychiatric:         Mood and Affect: Mood normal.         Behavior: Behavior normal.         Neurological Exam  Mental Status  Alert. Oriented to person, place, and time.       Assessment / Plan      Assessment/Plan:   Diagnoses and all orders for this visit:    1. Alzheimer's dementia with agitation, unspecified dementia severity, unspecified timing of dementia onset (Primary)  -     Brexpiprazole (Rexulti) 0.5 MG tablet; Take 0.5 mg by mouth Daily for 7 days, THEN 2 mg Daily for 21 days.  Dispense: 91 tablet; Refill: 0    2. Moderate cognitive impairment  -     memantine (NAMENDA) 10 MG tablet; Take 1 tablet by mouth Daily.  Dispense: 30 tablet; Refill: 2         Follow Up:   Return in about 4 weeks (around 2/20/2025), or if symptoms worsen or fail to improve.    Anticipatory guidance and safety reviewed  Patient education provided  MMSE - unable via telehealth  FAST Scale 4  Keep Neurology Referral Appt for Memory Care with Narciso + ATN  Continue/increase Namenda 10 mg Daily; SE reviewed   Begin Rexulti 0.5 mg Daily x 7 days, then increase 2 mg Daily for agitation. Discussed start of Seroquel and + interaction with amiodarone.      RTC PRN or within 4 weeks or sooner with issues     Twilio Encounter for 21 min with > 50% of encounter spent counseling and coordinating care    Edda Thompson, DNP, APRN, FNP-C  [facility]

## 2025-01-27 ENCOUNTER — TELEPHONE (OUTPATIENT)
Dept: NEUROLOGY | Facility: CLINIC | Age: 80
End: 2025-01-27

## 2025-01-27 NOTE — TELEPHONE ENCOUNTER
Caller: Edmund Gann    Relationship: Emergency Contact    Best call back number: [phone]    Which medication are you concerned about: REXULTI, SEROQUEL    Who prescribed you this medication: YANET MA, DNP, APRN    When did you start taking this medication: NA    What are your concerns: PT'S DAUGHTER STATES THEY HAD A DISCUSSION DURING PT'S LOV ON 01/23 REGARDING STARTING SEROQUEL OR REXULTI, THEY WERE UNDER THE IMPRESSION THEY WOULD TRY SEROQUEL FIRST BEFORE TRYING REXULTI, PT'S DAUGHTER EXPRESSED SHE THOUGHT THE REXULTI WAS STRONGER, ALSO ADVISED, [facility] HAS NOT RECEIVED RX FOR EITHER MEDICATION, RX FOR REXULTI DOES APPEAR IN CHART.    How long have you had these concerns: TODAY        PLEASE REVIEW AND ADVISE RX TO PHARMACY ([facility] [place])

## 2025-01-28 ENCOUNTER — TELEPHONE (OUTPATIENT)
Dept: NEUROLOGY | Facility: CLINIC | Age: 80
End: 2025-01-28

## 2025-01-28 NOTE — TELEPHONE ENCOUNTER
Caller: Edmund Gann    Relationship to patient: Emergency Contact      Best call back number: [phone]    Provider: ANEUDY DUNBAR     Medication PA needed: REXULTI    Reason for call/Prior Auth: PHARMACY TOLD PATIENT AND HIS DAUGHTER THAT THEY WILL NEED PA BEFORE THEY CAN FILL THIS SCRIPT.

## 2025-01-29 ENCOUNTER — OFFICE VISIT (OUTPATIENT)
Dept: CARDIOLOGY | Facility: CLINIC | Age: 80
End: 2025-01-29
Payer: MEDICARE

## 2025-01-29 VITALS
DIASTOLIC BLOOD PRESSURE: 67 MMHG | WEIGHT: 194.4 LBS | OXYGEN SATURATION: 94 % | BODY MASS INDEX: 28.79 KG/M2 | HEIGHT: 69 IN | HEART RATE: 80 BPM | SYSTOLIC BLOOD PRESSURE: 162 MMHG

## 2025-01-29 DIAGNOSIS — I25.10 ASCVD (ARTERIOSCLEROTIC CARDIOVASCULAR DISEASE): ICD-10-CM

## 2025-01-29 DIAGNOSIS — I25.810 CORONARY ARTERY DISEASE INVOLVING CORONARY BYPASS GRAFT OF NATIVE HEART WITHOUT ANGINA PECTORIS: ICD-10-CM

## 2025-01-29 DIAGNOSIS — I10 ESSENTIAL HYPERTENSION: Primary | ICD-10-CM

## 2025-01-29 NOTE — PROGRESS NOTES
Ekaterina Cox, ANEUDY  Shai Mata  1945 01/29/2025    Patient Active Problem List   Diagnosis    ASCVD (arteriosclerotic cardiovascular disease)    S/P CABG x 5    Essential hypertension    PVC's (premature ventricular contractions)    Type 2 diabetes mellitus with hyperglycemia, with long-term current use of insulin    Hyperlipemia, mixed    Ischemic cardiomyopathy    Chronic systolic congestive heart failure    Ventricular tachycardia (paroxysmal)    Long term current use of antiarrhythmic medical therapy    Other specified hypothyroidism    Esophageal dysphagia    Nonrheumatic mitral valve regurgitation    S/P mitral valve clip implantation    Coronary artery disease involving coronary bypass graft of native heart without angina pectoris    Dyslipidemia       Dear Ekaterina Cox, APRN:    Subjective     History of Present Illness:    Chief Complaint   Patient presents with    Follow-up    Med Management     Verbal. Changes have been updated.          Shai Mata is a pleasant 79 y.o. male with a past medical history significant for ischemic cardiomyopathy with most recent EF 45%, chronic systolic congestive heart failure, history of ventricular tachycardia status post ICD implantation on amiodarone, ASCVD status post four-vessel CABG, diabetes mellitus type 2, hypertension, and dyslipidemia.  He also has history of mitral valve regurgitation now status post MitraClip he presents today for routine cardiology follow-up.      Shai reports he has been doing excellent he does have some dementia so his family answers a lot of questions but he himself does deny any chest pains, shortness of breath, dizziness, or syncope.  He does still walk on a regular basis when the weather permits at least a quart a mile without issue.  Blood pressure is elevated today but is checked regularly at home and is typically much better controlled around 130 mmHg systolic    No Known Allergies:      Current Outpatient Medications:      amiodarone (PACERONE) 200 MG tablet, Take 0.5 tablets by mouth Daily., Disp: 45 tablet, Rfl: 2    aspirin 81 MG EC tablet, Take 1 tablet by mouth 2 (Two) Times a Day., Disp: , Rfl:     atorvastatin (LIPITOR) 80 MG tablet, Take 1 tablet by mouth Daily., Disp: 90 tablet, Rfl: 1    Brexpiprazole (Rexulti) 0.5 MG tablet, Take 0.5 mg by mouth Daily for 7 days, THEN 2 mg Daily for 21 days., Disp: 91 tablet, Rfl: 0    carvedilol (COREG) 12.5 MG tablet, Take 1 tablet by mouth 2 (Two) Times a Day., Disp: 180 tablet, Rfl: 1    glyburide micronized (GLYNASE) 1.5 MG tablet, TAKE 1 TABLET BY MOUTH TWICE DAILY BEFORE MEALS FOR 90 DAYS, Disp: , Rfl:     insulin NPH-insulin regular (humuLIN 70/30,novoLIN 70/30) (70-30) 100 UNIT/ML injection, Inject 22 Units under the skin into the appropriate area as directed 2 (Two) Times a Day With Meals., Disp: 30 mL, Rfl: 2    levothyroxine (SYNTHROID, LEVOTHROID) 100 MCG tablet, Take 1 tablet by mouth Daily., Disp: , Rfl:     lisinopril (PRINIVIL,ZESTRIL) 5 MG tablet, Take 1 tablet by mouth once daily, Disp: 90 tablet, Rfl: 0    memantine (NAMENDA) 10 MG tablet, Take 1 tablet by mouth Daily., Disp: 30 tablet, Rfl: 2    pantoprazole (Protonix) 40 MG EC tablet, Take 1 tablet by mouth Daily., Disp: 90 tablet, Rfl: 3    spironolactone (ALDACTONE) 25 MG tablet, Take 1 tablet by mouth Daily., Disp: 90 tablet, Rfl: 1    The following portions of the patient's history were reviewed and updated as appropriate: allergies, current medications, past family history, past medical history, past social history, past surgical history and problem list.    Social History     Tobacco Use    Smoking status: Former     Current packs/day: 0.00     Average packs/day: 2.0 packs/day for 20.0 years (40.0 ttl pk-yrs)     Types: Cigarettes     Start date:      Quit date:      Years since quittin.1    Smokeless tobacco: Former     Types: Chew     Quit date:    Vaping Use    Vaping status: Never Used  "  Substance Use Topics    Alcohol use: No    Drug use: No         Objective   Vitals:    01/29/25 1534   BP: 162/67   BP Location: Left arm   Patient Position: Sitting   Cuff Size: Adult   Pulse: 80   SpO2: 94%   Weight: 88.2 kg (194 lb 6.4 oz)   Height: 175.3 cm (69.02\")     Body mass index is 28.69 kg/m².    ROS    Constitutional:       General: Not in acute distress.     Appearance: Healthy appearance. Well-developed and not in distress. Not diaphoretic.   Eyes:      Conjunctiva/sclera: Conjunctivae normal.      Pupils: Pupils are equal, round, and reactive to light.   HENT:      Head: Normocephalic and atraumatic.   Neck:      Vascular: No carotid bruit or JVD.   Pulmonary:      Effort: Pulmonary effort is normal. No respiratory distress.      Breath sounds: Normal breath sounds.   Cardiovascular:      Normal rate. Regular rhythm.   Edema:     Peripheral edema absent.   Skin:     General: Skin is cool.   Neurological:      Mental Status: Alert, oriented to person, place, and time and oriented to person, place and time.         Lab Results   Component Value Date     (L) 12/10/2024    K 5.3 (H) 12/10/2024     12/10/2024    CO2 22.0 12/10/2024    BUN 45 (H) 12/10/2024    CREATININE 1.42 (H) 12/10/2024    GLUCOSE 163 (H) 12/10/2024    CALCIUM 9.3 12/10/2024    AST 22 12/10/2024    ALT 25 12/10/2024    ALKPHOS 90 12/10/2024    LABIL2 1.6 08/25/2016     Lab Results   Component Value Date    CKTOTAL 82 04/26/2018     Lab Results   Component Value Date    WBC 7.66 12/10/2024    HGB 11.1 (L) 12/10/2024    HCT 34.6 (L) 12/10/2024     12/10/2024     Lab Results   Component Value Date    INR 1.11 (H) 04/26/2018    INR 1.06 11/19/2017    INR 1.00 01/11/2016     Lab Results   Component Value Date    MG 1.9 06/13/2023     Lab Results   Component Value Date    TSH 2.730 12/10/2024    PSA 0.833 05/13/2020    CHLPL 133 11/28/2017    TRIG 33 12/10/2024    HDL 50 12/10/2024    LDL 56 12/10/2024      Lab Results "   Component Value Date    BNP 26.0 04/26/2018       During this visit the following were done:  Labs Reviewed []    Labs Ordered []    Radiology Reports Reviewed []    Radiology Ordered []    PCP Records Reviewed []    Referring Provider Records Reviewed []    ER Records Reviewed []    Hospital Records Reviewed []    History Obtained From Family []    Radiology Images Reviewed []    Other Reviewed []    Records Requested []       Procedures    Assessment & Plan    Diagnosis Plan   1. Essential hypertension  Basic Metabolic Panel      2. Coronary artery disease involving coronary bypass graft of native heart without angina pectoris        3. ASCVD (arteriosclerotic cardiovascular disease)                 Recommendations:  ASCVD  Doing well denies any anginal symptoms continue aspirin, Lipitor, Coreg, lisinopril, spironolactone  Cardiomyopathy  Doing well clinically appears euvolemic continue regimen mentioned above  He was mildly hyperkalemic will repeat BMP in a few weeks to monitor  Essential hypertension  Elevated today but is checked regularly at home by his family and is normally well-controlled so we will continue current regimen    No follow-ups on file.    As always, I appreciate very much the opportunity to participate in the cardiovascular care of your patients.      With Best Regards,    Erik Livingston PA-C

## 2025-01-29 NOTE — TELEPHONE ENCOUNTER
Called and spoke to [employer] Rep. She said that there is patient assistance that he can apply for by calling [phone] or going to [email or web address]. She said this is a simple process and should bring the co-pay down to $0.     I called and spoke to Edmund to relay this message. I told her to let us know if she has any questions or needs anything from us.

## 2025-01-29 NOTE — TELEPHONE ENCOUNTER
Called and spoke to Edmund to let her know that we are working on this. Gave her an update on PA being approved and the co-pay still being $1,500. We have contacted our drug rep and let her know that I will call her back this afternoon or tomorrow morning.

## 2025-02-02 ENCOUNTER — APPOINTMENT (OUTPATIENT)
Dept: GENERAL RADIOLOGY | Facility: HOSPITAL | Age: 80
DRG: 177 | End: 2025-02-02
Payer: MEDICARE

## 2025-02-02 ENCOUNTER — HOSPITAL ENCOUNTER (INPATIENT)
Facility: HOSPITAL | Age: 80
LOS: 4 days | Discharge: HOME | DRG: 177 | End: 2025-02-07
Admitting: HOSPITALIST
Payer: MEDICARE

## 2025-02-02 DIAGNOSIS — J18.9 MULTIFOCAL PNEUMONIA: Primary | ICD-10-CM

## 2025-02-02 DIAGNOSIS — J96.01 ACUTE HYPOXIC RESPIRATORY FAILURE: ICD-10-CM

## 2025-02-02 LAB
ALBUMIN SERPL-MCNC: 2.9 G/DL (ref 3.5–5.2)
ALBUMIN/GLOB SERPL: 0.9 G/DL
ALP SERPL-CCNC: 89 U/L (ref 39–117)
ALT SERPL W P-5'-P-CCNC: 39 U/L (ref 1–41)
ANION GAP SERPL CALCULATED.3IONS-SCNC: 11.7 MMOL/L (ref 5–15)
AST SERPL-CCNC: 49 U/L (ref 1–40)
B PARAPERT DNA SPEC QL NAA+PROBE: NOT DETECTED
B PERT DNA SPEC QL NAA+PROBE: NOT DETECTED
BASOPHILS # BLD AUTO: 0.03 10*3/MM3 (ref 0–0.2)
BASOPHILS NFR BLD AUTO: 0.4 % (ref 0–1.5)
BILIRUB SERPL-MCNC: 1 MG/DL (ref 0–1.2)
BUN SERPL-MCNC: 24 MG/DL (ref 8–23)
BUN/CREAT SERPL: 18.5 (ref 7–25)
C PNEUM DNA NPH QL NAA+NON-PROBE: NOT DETECTED
CALCIUM SPEC-SCNC: 7.9 MG/DL (ref 8.6–10.5)
CHLORIDE SERPL-SCNC: 102 MMOL/L (ref 98–107)
CO2 SERPL-SCNC: 18.3 MMOL/L (ref 22–29)
CREAT SERPL-MCNC: 1.3 MG/DL (ref 0.76–1.27)
DEPRECATED RDW RBC AUTO: 46.4 FL (ref 37–54)
EGFRCR SERPLBLD CKD-EPI 2021: 55.5 ML/MIN/1.73
EOSINOPHIL # BLD AUTO: 0.09 10*3/MM3 (ref 0–0.4)
EOSINOPHIL NFR BLD AUTO: 1.1 % (ref 0.3–6.2)
ERYTHROCYTE [DISTWIDTH] IN BLOOD BY AUTOMATED COUNT: 13.7 % (ref 12.3–15.4)
FLUAV RNA RESP QL NAA+PROBE: NOT DETECTED
FLUAV SUBTYP SPEC NAA+PROBE: NOT DETECTED
FLUBV RNA ISLT QL NAA+PROBE: NOT DETECTED
FLUBV RNA RESP QL NAA+PROBE: NOT DETECTED
GEN 5 1HR TROPONIN T REFLEX: 31 NG/L
GLOBULIN UR ELPH-MCNC: 3.4 GM/DL
GLUCOSE SERPL-MCNC: 383 MG/DL (ref 65–99)
HADV DNA SPEC NAA+PROBE: NOT DETECTED
HCOV 229E RNA SPEC QL NAA+PROBE: NOT DETECTED
HCOV HKU1 RNA SPEC QL NAA+PROBE: NOT DETECTED
HCOV NL63 RNA SPEC QL NAA+PROBE: NOT DETECTED
HCOV OC43 RNA SPEC QL NAA+PROBE: NOT DETECTED
HCT VFR BLD AUTO: 30.4 % (ref 37.5–51)
HGB BLD-MCNC: 9.8 G/DL (ref 13–17.7)
HMPV RNA NPH QL NAA+NON-PROBE: NOT DETECTED
HOLD SPECIMEN: NORMAL
HOLD SPECIMEN: NORMAL
HPIV1 RNA ISLT QL NAA+PROBE: NOT DETECTED
HPIV2 RNA SPEC QL NAA+PROBE: NOT DETECTED
HPIV3 RNA NPH QL NAA+PROBE: NOT DETECTED
HPIV4 P GENE NPH QL NAA+PROBE: NOT DETECTED
IMM GRANULOCYTES # BLD AUTO: 0.04 10*3/MM3 (ref 0–0.05)
IMM GRANULOCYTES NFR BLD AUTO: 0.5 % (ref 0–0.5)
LYMPHOCYTES # BLD AUTO: 0.42 10*3/MM3 (ref 0.7–3.1)
LYMPHOCYTES NFR BLD AUTO: 5.2 % (ref 19.6–45.3)
M PNEUMO IGG SER IA-ACNC: NOT DETECTED
MCH RBC QN AUTO: 29.9 PG (ref 26.6–33)
MCHC RBC AUTO-ENTMCNC: 32.2 G/DL (ref 31.5–35.7)
MCV RBC AUTO: 92.7 FL (ref 79–97)
MONOCYTES # BLD AUTO: 0.66 10*3/MM3 (ref 0.1–0.9)
MONOCYTES NFR BLD AUTO: 8.1 % (ref 5–12)
NEUTROPHILS NFR BLD AUTO: 6.86 10*3/MM3 (ref 1.7–7)
NEUTROPHILS NFR BLD AUTO: 84.7 % (ref 42.7–76)
NRBC BLD AUTO-RTO: 0 /100 WBC (ref 0–0.2)
NT-PROBNP SERPL-MCNC: 4221 PG/ML (ref 0–1800)
PLATELET # BLD AUTO: 174 10*3/MM3 (ref 140–450)
PMV BLD AUTO: 9.6 FL (ref 6–12)
POTASSIUM SERPL-SCNC: 3.9 MMOL/L (ref 3.5–5.2)
PROT SERPL-MCNC: 6.3 G/DL (ref 6–8.5)
QT INTERVAL: 426 MS
QTC INTERVAL: 466 MS
RBC # BLD AUTO: 3.28 10*6/MM3 (ref 4.14–5.8)
RHINOVIRUS RNA SPEC NAA+PROBE: NOT DETECTED
RSV RNA NPH QL NAA+NON-PROBE: NOT DETECTED
SARS-COV-2 RNA RESP QL NAA+PROBE: NOT DETECTED
SARS-COV-2 RNA RESP QL NAA+PROBE: NOT DETECTED
SODIUM SERPL-SCNC: 132 MMOL/L (ref 136–145)
TROPONIN T % DELTA: 0
TROPONIN T NUMERIC DELTA: 0 NG/L
TROPONIN T SERPL HS-MCNC: 31 NG/L
WBC NRBC COR # BLD AUTO: 8.1 10*3/MM3 (ref 3.4–10.8)
WHOLE BLOOD HOLD COAG: NORMAL
WHOLE BLOOD HOLD SPECIMEN: NORMAL

## 2025-02-02 PROCEDURE — 93005 ELECTROCARDIOGRAM TRACING: CPT

## 2025-02-02 PROCEDURE — 71045 X-RAY EXAM CHEST 1 VIEW: CPT | Performed by: RADIOLOGY

## 2025-02-02 PROCEDURE — 84145 PROCALCITONIN (PCT): CPT | Performed by: HOSPITALIST

## 2025-02-02 PROCEDURE — 71045 X-RAY EXAM CHEST 1 VIEW: CPT

## 2025-02-02 PROCEDURE — 85025 COMPLETE CBC W/AUTO DIFF WBC: CPT

## 2025-02-02 PROCEDURE — 80053 COMPREHEN METABOLIC PANEL: CPT

## 2025-02-02 PROCEDURE — 94799 UNLISTED PULMONARY SVC/PX: CPT

## 2025-02-02 PROCEDURE — 36415 COLL VENOUS BLD VENIPUNCTURE: CPT

## 2025-02-02 PROCEDURE — 0202U NFCT DS 22 TRGT SARS-COV-2: CPT

## 2025-02-02 PROCEDURE — 84484 ASSAY OF TROPONIN QUANT: CPT

## 2025-02-02 PROCEDURE — 86140 C-REACTIVE PROTEIN: CPT | Performed by: HOSPITALIST

## 2025-02-02 PROCEDURE — 87636 SARSCOV2 & INF A&B AMP PRB: CPT

## 2025-02-02 PROCEDURE — 83036 HEMOGLOBIN GLYCOSYLATED A1C: CPT | Performed by: HOSPITALIST

## 2025-02-02 PROCEDURE — 93010 ELECTROCARDIOGRAM REPORT: CPT | Performed by: INTERNAL MEDICINE

## 2025-02-02 PROCEDURE — 94640 AIRWAY INHALATION TREATMENT: CPT

## 2025-02-02 PROCEDURE — 63710000001 PREDNISONE PER 1 MG

## 2025-02-02 PROCEDURE — 83880 ASSAY OF NATRIURETIC PEPTIDE: CPT

## 2025-02-02 PROCEDURE — 99285 EMERGENCY DEPT VISIT HI MDM: CPT

## 2025-02-02 RX ORDER — METHYLPREDNISOLONE SODIUM SUCCINATE 125 MG/2ML
125 INJECTION, POWDER, LYOPHILIZED, FOR SOLUTION INTRAMUSCULAR; INTRAVENOUS ONCE
Status: DISCONTINUED | OUTPATIENT
Start: 2025-02-02 | End: 2025-02-02

## 2025-02-02 RX ORDER — IPRATROPIUM BROMIDE AND ALBUTEROL SULFATE 2.5; .5 MG/3ML; MG/3ML
3 SOLUTION RESPIRATORY (INHALATION) ONCE
Status: COMPLETED | OUTPATIENT
Start: 2025-02-02 | End: 2025-02-02

## 2025-02-02 RX ORDER — PREDNISONE 20 MG/1
40 TABLET ORAL ONCE
Status: COMPLETED | OUTPATIENT
Start: 2025-02-02 | End: 2025-02-02

## 2025-02-02 RX ORDER — SODIUM CHLORIDE 0.9 % (FLUSH) 0.9 %
10 SYRINGE (ML) INJECTION AS NEEDED
Status: DISCONTINUED | OUTPATIENT
Start: 2025-02-02 | End: 2025-02-07 | Stop reason: HOSPADM

## 2025-02-02 RX ADMIN — AMOXICILLIN AND CLAVULANATE POTASSIUM 1 TABLET: 875; 125 TABLET, FILM COATED ORAL at 20:00

## 2025-02-02 RX ADMIN — IPRATROPIUM BROMIDE AND ALBUTEROL SULFATE 3 ML: .5; 2.5 SOLUTION RESPIRATORY (INHALATION) at 19:50

## 2025-02-02 RX ADMIN — PREDNISONE 40 MG: 20 TABLET ORAL at 23:58

## 2025-02-03 PROBLEM — J18.9 MULTIFOCAL PNEUMONIA: Status: ACTIVE | Noted: 2025-02-03

## 2025-02-03 LAB
A-A DO2: 52.9 MMHG (ref 0–300)
ALBUMIN SERPL-MCNC: 3.4 G/DL (ref 3.5–5.2)
ALBUMIN/GLOB SERPL: 1.2 G/DL
ALP SERPL-CCNC: 92 U/L (ref 39–117)
ALT SERPL W P-5'-P-CCNC: 42 U/L (ref 1–41)
ANION GAP SERPL CALCULATED.3IONS-SCNC: 11.1 MMOL/L (ref 5–15)
ARTERIAL PATENCY WRIST A: ABNORMAL
AST SERPL-CCNC: 46 U/L (ref 1–40)
ATMOSPHERIC PRESS: 729 MMHG
BASE EXCESS BLDA CALC-SCNC: -1.5 MMOL/L (ref 0–2)
BASOPHILS # BLD AUTO: 0.04 10*3/MM3 (ref 0–0.2)
BASOPHILS NFR BLD AUTO: 0.5 % (ref 0–1.5)
BDY SITE: ABNORMAL
BILIRUB SERPL-MCNC: 0.8 MG/DL (ref 0–1.2)
BUN SERPL-MCNC: 22 MG/DL (ref 8–23)
BUN/CREAT SERPL: 17.9 (ref 7–25)
CALCIUM SPEC-SCNC: 8.4 MG/DL (ref 8.6–10.5)
CHLORIDE SERPL-SCNC: 104 MMOL/L (ref 98–107)
CO2 BLDA-SCNC: 22.4 MMOL/L (ref 22–33)
CO2 SERPL-SCNC: 19.9 MMOL/L (ref 22–29)
COHGB MFR BLD: 0.8 % (ref 0–5)
CREAT SERPL-MCNC: 1.23 MG/DL (ref 0.76–1.27)
CRP SERPL-MCNC: 9.41 MG/DL (ref 0–0.5)
CRP SERPL-MCNC: 9.71 MG/DL (ref 0–0.5)
D-LACTATE SERPL-SCNC: 1 MMOL/L (ref 0.5–2)
DEPRECATED RDW RBC AUTO: 46.6 FL (ref 37–54)
EGFRCR SERPLBLD CKD-EPI 2021: 59.3 ML/MIN/1.73
EOSINOPHIL # BLD AUTO: 0.07 10*3/MM3 (ref 0–0.4)
EOSINOPHIL NFR BLD AUTO: 0.9 % (ref 0.3–6.2)
ERYTHROCYTE [DISTWIDTH] IN BLOOD BY AUTOMATED COUNT: 13.6 % (ref 12.3–15.4)
GLOBULIN UR ELPH-MCNC: 2.9 GM/DL
GLUCOSE BLDC GLUCOMTR-MCNC: 236 MG/DL (ref 70–130)
GLUCOSE BLDC GLUCOMTR-MCNC: 325 MG/DL (ref 70–130)
GLUCOSE BLDC GLUCOMTR-MCNC: 367 MG/DL (ref 70–130)
GLUCOSE BLDC GLUCOMTR-MCNC: 392 MG/DL (ref 70–130)
GLUCOSE BLDC GLUCOMTR-MCNC: 463 MG/DL (ref 70–130)
GLUCOSE BLDC GLUCOMTR-MCNC: 472 MG/DL (ref 70–130)
GLUCOSE BLDC GLUCOMTR-MCNC: 485 MG/DL (ref 70–130)
GLUCOSE BLDC GLUCOMTR-MCNC: 487 MG/DL (ref 70–130)
GLUCOSE BLDC GLUCOMTR-MCNC: 501 MG/DL (ref 70–130)
GLUCOSE SERPL-MCNC: 218 MG/DL (ref 65–99)
HBA1C MFR BLD: 7.8 % (ref 4.8–5.6)
HCO3 BLDA-SCNC: 21.5 MMOL/L (ref 20–26)
HCT VFR BLD AUTO: 30.9 % (ref 37.5–51)
HCT VFR BLD CALC: 30.1 % (ref 38–51)
HGB BLD-MCNC: 9.9 G/DL (ref 13–17.7)
HGB BLDA-MCNC: 9.8 G/DL (ref 14–18)
IMM GRANULOCYTES # BLD AUTO: 0.03 10*3/MM3 (ref 0–0.05)
IMM GRANULOCYTES NFR BLD AUTO: 0.4 % (ref 0–0.5)
INHALED O2 CONCENTRATION: 21 %
LYMPHOCYTES # BLD AUTO: 0.6 10*3/MM3 (ref 0.7–3.1)
LYMPHOCYTES NFR BLD AUTO: 8.1 % (ref 19.6–45.3)
Lab: ABNORMAL
MCH RBC QN AUTO: 30.1 PG (ref 26.6–33)
MCHC RBC AUTO-ENTMCNC: 32 G/DL (ref 31.5–35.7)
MCV RBC AUTO: 93.9 FL (ref 79–97)
METHGB BLD QL: 0 % (ref 0–3)
MODALITY: ABNORMAL
MONOCYTES # BLD AUTO: 0.63 10*3/MM3 (ref 0.1–0.9)
MONOCYTES NFR BLD AUTO: 8.5 % (ref 5–12)
NEUTROPHILS NFR BLD AUTO: 6.05 10*3/MM3 (ref 1.7–7)
NEUTROPHILS NFR BLD AUTO: 81.6 % (ref 42.7–76)
NOTE: ABNORMAL
NOTIFIED BY: ABNORMAL
NOTIFIED WHO: ABNORMAL
NRBC BLD AUTO-RTO: 0 /100 WBC (ref 0–0.2)
OXYHGB MFR BLDV: 89.2 % (ref 94–99)
PCO2 BLDA: 29.4 MM HG (ref 35–45)
PCO2 TEMP ADJ BLD: ABNORMAL MM[HG]
PH BLDA: 7.47 PH UNITS (ref 7.35–7.45)
PH, TEMP CORRECTED: ABNORMAL
PLATELET # BLD AUTO: 179 10*3/MM3 (ref 140–450)
PMV BLD AUTO: 9.8 FL (ref 6–12)
PO2 BLDA: 57.1 MM HG (ref 83–108)
PO2 TEMP ADJ BLD: ABNORMAL MM[HG]
POTASSIUM SERPL-SCNC: 3.9 MMOL/L (ref 3.5–5.2)
PROCALCITONIN SERPL-MCNC: 0.34 NG/ML (ref 0–0.25)
PROT SERPL-MCNC: 6.3 G/DL (ref 6–8.5)
RBC # BLD AUTO: 3.29 10*6/MM3 (ref 4.14–5.8)
SAO2 % BLDCOA: 89.9 % (ref 94–99)
SODIUM SERPL-SCNC: 135 MMOL/L (ref 136–145)
VENTILATOR MODE: ABNORMAL
WBC NRBC COR # BLD AUTO: 7.42 10*3/MM3 (ref 3.4–10.8)

## 2025-02-03 PROCEDURE — 94761 N-INVAS EAR/PLS OXIMETRY MLT: CPT

## 2025-02-03 PROCEDURE — 92610 EVALUATE SWALLOWING FUNCTION: CPT

## 2025-02-03 PROCEDURE — 94799 UNLISTED PULMONARY SVC/PX: CPT

## 2025-02-03 PROCEDURE — 63710000001 INSULIN REGULAR HUMAN PER 5 UNITS

## 2025-02-03 PROCEDURE — 63710000001 INSULIN REGULAR HUMAN PER 5 UNITS: Performed by: INTERNAL MEDICINE

## 2025-02-03 PROCEDURE — 99223 1ST HOSP IP/OBS HIGH 75: CPT | Performed by: HOSPITALIST

## 2025-02-03 PROCEDURE — 87040 BLOOD CULTURE FOR BACTERIA: CPT | Performed by: HOSPITALIST

## 2025-02-03 PROCEDURE — 82805 BLOOD GASES W/O2 SATURATION: CPT

## 2025-02-03 PROCEDURE — 83050 HGB METHEMOGLOBIN QUAN: CPT

## 2025-02-03 PROCEDURE — 80053 COMPREHEN METABOLIC PANEL: CPT | Performed by: HOSPITALIST

## 2025-02-03 PROCEDURE — 94664 DEMO&/EVAL PT USE INHALER: CPT

## 2025-02-03 PROCEDURE — 63710000001 INSULIN LISPRO (HUMAN) PER 5 UNITS: Performed by: HOSPITALIST

## 2025-02-03 PROCEDURE — 82375 ASSAY CARBOXYHB QUANT: CPT

## 2025-02-03 PROCEDURE — 25010000002 CEFTRIAXONE PER 250 MG: Performed by: HOSPITALIST

## 2025-02-03 PROCEDURE — 63710000001 INSULIN GLARGINE PER 5 UNITS: Performed by: INTERNAL MEDICINE

## 2025-02-03 PROCEDURE — 25010000002 METHYLPREDNISOLONE PER 40 MG: Performed by: HOSPITALIST

## 2025-02-03 PROCEDURE — 86140 C-REACTIVE PROTEIN: CPT | Performed by: HOSPITALIST

## 2025-02-03 PROCEDURE — 82948 REAGENT STRIP/BLOOD GLUCOSE: CPT

## 2025-02-03 PROCEDURE — 63710000001 INSULIN GLARGINE PER 5 UNITS: Performed by: HOSPITALIST

## 2025-02-03 PROCEDURE — 36600 WITHDRAWAL OF ARTERIAL BLOOD: CPT

## 2025-02-03 PROCEDURE — 25010000002 HEPARIN (PORCINE) PER 1000 UNITS: Performed by: HOSPITALIST

## 2025-02-03 PROCEDURE — 83605 ASSAY OF LACTIC ACID: CPT | Performed by: HOSPITALIST

## 2025-02-03 PROCEDURE — 85025 COMPLETE CBC W/AUTO DIFF WBC: CPT | Performed by: HOSPITALIST

## 2025-02-03 RX ORDER — CARVEDILOL 12.5 MG/1
6.25 TABLET ORAL 2 TIMES DAILY WITH MEALS
COMMUNITY

## 2025-02-03 RX ORDER — MEMANTINE HYDROCHLORIDE 10 MG/1
10 TABLET ORAL NIGHTLY
COMMUNITY

## 2025-02-03 RX ORDER — ATORVASTATIN CALCIUM 80 MG/1
80 TABLET, FILM COATED ORAL NIGHTLY
COMMUNITY

## 2025-02-03 RX ORDER — DIPHENOXYLATE HYDROCHLORIDE AND ATROPINE SULFATE 2.5; .025 MG/1; MG/1
1 TABLET ORAL DAILY
Status: CANCELLED | OUTPATIENT
Start: 2025-02-03

## 2025-02-03 RX ORDER — LISINOPRIL 2.5 MG/1
5 TABLET ORAL DAILY
Status: CANCELLED | OUTPATIENT
Start: 2025-02-03

## 2025-02-03 RX ORDER — HEPARIN SODIUM 5000 [USP'U]/ML
5000 INJECTION, SOLUTION INTRAVENOUS; SUBCUTANEOUS EVERY 12 HOURS SCHEDULED
Status: DISCONTINUED | OUTPATIENT
Start: 2025-02-03 | End: 2025-02-07 | Stop reason: HOSPADM

## 2025-02-03 RX ORDER — GLIPIZIDE 5 MG/1
5 TABLET ORAL
Status: CANCELLED | OUTPATIENT
Start: 2025-02-03

## 2025-02-03 RX ORDER — BISACODYL 10 MG
10 SUPPOSITORY, RECTAL RECTAL DAILY PRN
Status: DISCONTINUED | OUTPATIENT
Start: 2025-02-03 | End: 2025-02-07 | Stop reason: HOSPADM

## 2025-02-03 RX ORDER — CARVEDILOL 6.25 MG/1
6.25 TABLET ORAL 2 TIMES DAILY
Status: CANCELLED | OUTPATIENT
Start: 2025-02-03

## 2025-02-03 RX ORDER — IPRATROPIUM BROMIDE AND ALBUTEROL SULFATE 2.5; .5 MG/3ML; MG/3ML
3 SOLUTION RESPIRATORY (INHALATION)
Status: DISCONTINUED | OUTPATIENT
Start: 2025-02-03 | End: 2025-02-07 | Stop reason: HOSPADM

## 2025-02-03 RX ORDER — MEMANTINE HYDROCHLORIDE 10 MG/1
10 TABLET ORAL NIGHTLY
Status: CANCELLED | OUTPATIENT
Start: 2025-02-03

## 2025-02-03 RX ORDER — DEXTROSE MONOHYDRATE 25 G/50ML
25 INJECTION, SOLUTION INTRAVENOUS
Status: DISCONTINUED | OUTPATIENT
Start: 2025-02-03 | End: 2025-02-04 | Stop reason: SDUPTHER

## 2025-02-03 RX ORDER — NITROGLYCERIN 0.4 MG/1
0.4 TABLET SUBLINGUAL
Status: DISCONTINUED | OUTPATIENT
Start: 2025-02-03 | End: 2025-02-07 | Stop reason: HOSPADM

## 2025-02-03 RX ORDER — NICOTINE POLACRILEX 4 MG
15 LOZENGE BUCCAL
Status: DISCONTINUED | OUTPATIENT
Start: 2025-02-03 | End: 2025-02-04 | Stop reason: SDUPTHER

## 2025-02-03 RX ORDER — IPRATROPIUM BROMIDE AND ALBUTEROL SULFATE 2.5; .5 MG/3ML; MG/3ML
3 SOLUTION RESPIRATORY (INHALATION) ONCE
Status: COMPLETED | OUTPATIENT
Start: 2025-02-03 | End: 2025-02-03

## 2025-02-03 RX ORDER — GLUCAGON 1 MG/ML
1 KIT INJECTION
Status: DISCONTINUED | OUTPATIENT
Start: 2025-02-03 | End: 2025-02-04

## 2025-02-03 RX ORDER — DIPHENOXYLATE HYDROCHLORIDE AND ATROPINE SULFATE 2.5; .025 MG/1; MG/1
1 TABLET ORAL DAILY
COMMUNITY

## 2025-02-03 RX ORDER — LEVOTHYROXINE SODIUM 75 UG/1
75 TABLET ORAL
Status: CANCELLED | OUTPATIENT
Start: 2025-02-03

## 2025-02-03 RX ORDER — SODIUM CHLORIDE 0.9 % (FLUSH) 0.9 %
10 SYRINGE (ML) INJECTION EVERY 12 HOURS SCHEDULED
Status: DISCONTINUED | OUTPATIENT
Start: 2025-02-03 | End: 2025-02-07 | Stop reason: HOSPADM

## 2025-02-03 RX ORDER — BISACODYL 5 MG/1
5 TABLET, DELAYED RELEASE ORAL DAILY PRN
Status: DISCONTINUED | OUTPATIENT
Start: 2025-02-03 | End: 2025-02-07 | Stop reason: HOSPADM

## 2025-02-03 RX ORDER — METHYLPREDNISOLONE SODIUM SUCCINATE 40 MG/ML
40 INJECTION, POWDER, LYOPHILIZED, FOR SOLUTION INTRAMUSCULAR; INTRAVENOUS EVERY 12 HOURS
Status: DISCONTINUED | OUTPATIENT
Start: 2025-02-03 | End: 2025-02-03

## 2025-02-03 RX ORDER — LEVOTHYROXINE SODIUM 75 UG/1
75 TABLET ORAL
COMMUNITY

## 2025-02-03 RX ORDER — SPIRONOLACTONE 25 MG/1
25 TABLET ORAL DAILY
Status: CANCELLED | OUTPATIENT
Start: 2025-02-03

## 2025-02-03 RX ORDER — AMIODARONE HYDROCHLORIDE 200 MG/1
100 TABLET ORAL DAILY
Status: CANCELLED | OUTPATIENT
Start: 2025-02-03

## 2025-02-03 RX ORDER — SODIUM CHLORIDE 9 MG/ML
40 INJECTION, SOLUTION INTRAVENOUS AS NEEDED
Status: DISCONTINUED | OUTPATIENT
Start: 2025-02-03 | End: 2025-02-07 | Stop reason: HOSPADM

## 2025-02-03 RX ORDER — POLYETHYLENE GLYCOL 3350 17 G/17G
17 POWDER, FOR SOLUTION ORAL DAILY PRN
Status: DISCONTINUED | OUTPATIENT
Start: 2025-02-03 | End: 2025-02-07 | Stop reason: HOSPADM

## 2025-02-03 RX ORDER — INSULIN LISPRO 100 [IU]/ML
3-14 INJECTION, SOLUTION INTRAVENOUS; SUBCUTANEOUS
Status: DISCONTINUED | OUTPATIENT
Start: 2025-02-03 | End: 2025-02-04

## 2025-02-03 RX ORDER — ATORVASTATIN CALCIUM 40 MG/1
80 TABLET, FILM COATED ORAL NIGHTLY
Status: CANCELLED | OUTPATIENT
Start: 2025-02-03

## 2025-02-03 RX ORDER — PANTOPRAZOLE SODIUM 40 MG/1
40 TABLET, DELAYED RELEASE ORAL DAILY
Status: CANCELLED | OUTPATIENT
Start: 2025-02-03

## 2025-02-03 RX ORDER — SODIUM CHLORIDE 0.9 % (FLUSH) 0.9 %
10 SYRINGE (ML) INJECTION AS NEEDED
Status: DISCONTINUED | OUTPATIENT
Start: 2025-02-03 | End: 2025-02-07 | Stop reason: HOSPADM

## 2025-02-03 RX ORDER — AMOXICILLIN 250 MG
2 CAPSULE ORAL 2 TIMES DAILY PRN
Status: DISCONTINUED | OUTPATIENT
Start: 2025-02-03 | End: 2025-02-07 | Stop reason: HOSPADM

## 2025-02-03 RX ADMIN — IPRATROPIUM BROMIDE AND ALBUTEROL SULFATE 3 ML: .5; 2.5 SOLUTION RESPIRATORY (INHALATION) at 00:37

## 2025-02-03 RX ADMIN — METHYLPREDNISOLONE SODIUM SUCCINATE 40 MG: 40 INJECTION, POWDER, FOR SOLUTION INTRAMUSCULAR; INTRAVENOUS at 08:56

## 2025-02-03 RX ADMIN — IPRATROPIUM BROMIDE AND ALBUTEROL SULFATE 3 ML: 2.5; .5 SOLUTION RESPIRATORY (INHALATION) at 12:07

## 2025-02-03 RX ADMIN — SENNOSIDES AND DOCUSATE SODIUM 2 TABLET: 50; 8.6 TABLET ORAL at 08:56

## 2025-02-03 RX ADMIN — IPRATROPIUM BROMIDE AND ALBUTEROL SULFATE 3 ML: 2.5; .5 SOLUTION RESPIRATORY (INHALATION) at 18:58

## 2025-02-03 RX ADMIN — DOXYCYCLINE 100 MG: 100 INJECTION, POWDER, LYOPHILIZED, FOR SOLUTION INTRAVENOUS at 14:09

## 2025-02-03 RX ADMIN — INSULIN LISPRO 14 UNITS: 100 INJECTION, SOLUTION INTRAVENOUS; SUBCUTANEOUS at 20:12

## 2025-02-03 RX ADMIN — HEPARIN SODIUM 5000 UNITS: 5000 INJECTION INTRAVENOUS; SUBCUTANEOUS at 20:12

## 2025-02-03 RX ADMIN — INSULIN GLARGINE 15 UNITS: 100 INJECTION, SOLUTION SUBCUTANEOUS at 01:40

## 2025-02-03 RX ADMIN — DOXYCYCLINE 100 MG: 100 INJECTION, POWDER, LYOPHILIZED, FOR SOLUTION INTRAVENOUS at 01:40

## 2025-02-03 RX ADMIN — Medication 10 ML: at 08:58

## 2025-02-03 RX ADMIN — INSULIN HUMAN 15 UNITS: 100 INJECTION, SOLUTION PARENTERAL at 17:19

## 2025-02-03 RX ADMIN — INSULIN HUMAN 15 UNITS: 100 INJECTION, SOLUTION PARENTERAL at 22:55

## 2025-02-03 RX ADMIN — HEPARIN SODIUM 5000 UNITS: 5000 INJECTION INTRAVENOUS; SUBCUTANEOUS at 01:39

## 2025-02-03 RX ADMIN — Medication 10 ML: at 20:12

## 2025-02-03 RX ADMIN — INSULIN LISPRO 5 UNITS: 100 INJECTION, SOLUTION INTRAVENOUS; SUBCUTANEOUS at 01:40

## 2025-02-03 RX ADMIN — INSULIN GLARGINE 25 UNITS: 100 INJECTION, SOLUTION SUBCUTANEOUS at 20:12

## 2025-02-03 RX ADMIN — CEFTRIAXONE 1000 MG: 1 INJECTION, POWDER, FOR SOLUTION INTRAMUSCULAR; INTRAVENOUS at 01:40

## 2025-02-03 RX ADMIN — INSULIN LISPRO 10 UNITS: 100 INJECTION, SOLUTION INTRAVENOUS; SUBCUTANEOUS at 08:56

## 2025-02-03 RX ADMIN — HEPARIN SODIUM 5000 UNITS: 5000 INJECTION INTRAVENOUS; SUBCUTANEOUS at 09:04

## 2025-02-03 RX ADMIN — INSULIN HUMAN 15 UNITS: 100 INJECTION, SOLUTION PARENTERAL at 11:54

## 2025-02-03 RX ADMIN — Medication 10 ML: at 01:40

## 2025-02-03 RX ADMIN — IPRATROPIUM BROMIDE AND ALBUTEROL SULFATE 3 ML: 2.5; .5 SOLUTION RESPIRATORY (INHALATION) at 06:40

## 2025-02-03 NOTE — CASE MANAGEMENT/SOCIAL WORK
Discharge Planning Assessment   Iain     Patient Name: Shai Mata  MRN: [id number]  Today's Date: 2/3/2025    Admit Date: 2/2/2025    Plan: SS received a consult for D/C planning. SS spoke with pt's daughter on this date. Pt lives alone. Pt's daughter states she goes to pt's house every morning and every night. PCP Is Ekaterina Cox. Pt does not utilize home health and DME on this date. Pt's daughters are co POA's. Pt's daughter states she will bring a copy of POA for pt's chartlet. Pt plans to return home at discharge with family to provide transportation. SS to follow and assist with discharge planning.   Discharge Needs Assessment       Row Name 02/03/25 1706       Living Environment    People in Home alone    Current Living Arrangements home    Potentially Unsafe Housing Conditions none    Primary Care Provided by self    Provides Primary Care For no one    Family Caregiver if Needed none    Quality of Family Relationships helpful;involved;supportive    Able to Return to Prior Arrangements yes       Resource/Environmental Concerns    Resource/Environmental Concerns none    Transportation Concerns none       Transition Planning    Patient/Family Anticipates Transition to home    Patient/Family Anticipated Services at Transition none    Transportation Anticipated family or friend will provide       Discharge Needs Assessment    Readmission Within the Last 30 Days unable to assess    Equipment Currently Used at Home none    Anticipated Changes Related to Illness none    Equipment Needed After Discharge none           Discharge Plan       Row Name 02/03/25 1707       Plan    Plan SS received a consult for D/C planning. SS spoke with pt's daughter on this date. Pt lives alone. Pt's daughter states she goes to pt's house every morning and every night. PCP Is Ekaterina Cox. Pt does not utilize home health and DME on this date. Pt's daughters are co POA's. Pt's daughter states she will bring a copy of POA for pt's chartlet.  Pt plans to return home at discharge with family to provide transportation. SS to follow and assist with discharge planning.    Patient/Family in Agreement with Plan yes          Expected Discharge Date and Time       Expected Discharge Date Expected Discharge Time    Feb 5, 2025         Demographic Summary       Row Name 02/03/25 8476       General Information    Admission Type inpatient    Referral Source nursing    Reason for Consult discharge planning  SS received a consult for D/C planning.            SABI Hope

## 2025-02-03 NOTE — PROGRESS NOTES
Interim Hospitalist Note    Patient seen and examined at bedside with patient's daughter present.  Patient was awake when I entered the room and was eating lunch.  He does appear to be comfortable and in good spirits.  Patient does have some mild baseline confusion.  Patient continues to require 2 L nasal cannula continuously to maintain O2 saturation greater than 90%.  Will continue empiric antibiotic therapy with Rocephin and doxycycline to treat multifocal community-acquired pneumonia.  Patient was evaluated by SLP and demonstrated no signs or symptoms of aspiration.  Recommendations have been made for regular textures with thin liquids.  Patient with elevated blood sugars today, did require IV insulin.  Will increase Lantus to 25 units SQ daily.

## 2025-02-03 NOTE — PLAN OF CARE
Goal Outcome Evaluation:              Outcome Evaluation: PT is resting in bed at this time. Family at bedside.PT is on 2L NC. PT has had no complaints of pain or discomfort. PT's glucose has been over 400 x2 occurrences. Provider Aware. See Order. VSS. Afebrile. Will Continue Plan of Care.                  Problem: Adult Inpatient Plan of Care  Goal: Plan of Care Review  Outcome: Progressing  Flowsheets (Taken 2/3/2025 1753)  Outcome Evaluation: PT is resting in bed at this time. Family at bedside.PT is on 2L NC. PT has had no complaints of pain or discomfort. PT's glucose has been over 400 x2 occurrences. Provider Aware. See Order. VSS. Afebrile. Will Continue Plan of Care.  Goal: Patient-Specific Goal (Individualized)  Outcome: Progressing  Goal: Absence of Hospital-Acquired Illness or Injury  Outcome: Progressing  Intervention: Identify and Manage Fall Risk  Recent Flowsheet Documentation  Taken 2/3/2025 1700 by Iliana Nguyen, RN  Safety Promotion/Fall Prevention:   activity supervised   assistive device/personal items within reach   clutter free environment maintained   fall prevention program maintained   nonskid shoes/slippers when out of bed   safety round/check completed   room organization consistent  Taken 2/3/2025 1500 by Iliana Nguyen, RN  Safety Promotion/Fall Prevention:   activity supervised   assistive device/personal items within reach   clutter free environment maintained   fall prevention program maintained   nonskid shoes/slippers when out of bed   safety round/check completed   room organization consistent  Taken 2/3/2025 1300 by Iliana Nguyen, RN  Safety Promotion/Fall Prevention:   activity supervised   assistive device/personal items within reach   clutter free environment maintained   fall prevention program maintained   nonskid shoes/slippers when out of bed   safety round/check completed   room organization consistent  Taken 2/3/2025 1100 by Iliana Nguyen, RN  Safety  Promotion/Fall Prevention:   activity supervised   assistive device/personal items within reach   clutter free environment maintained   fall prevention program maintained   nonskid shoes/slippers when out of bed   safety round/check completed   room organization consistent  Taken 2/3/2025 0900 by Iliana Nguyen RN  Safety Promotion/Fall Prevention:   activity supervised   assistive device/personal items within reach   clutter free environment maintained   fall prevention program maintained   nonskid shoes/slippers when out of bed   safety round/check completed   room organization consistent  Taken 2/3/2025 0700 by Iliana Nguyen RN  Safety Promotion/Fall Prevention:   activity supervised   assistive device/personal items within reach   clutter free environment maintained   fall prevention program maintained   nonskid shoes/slippers when out of bed   safety round/check completed   room organization consistent  Intervention: Prevent Skin Injury  Recent Flowsheet Documentation  Taken 2/3/2025 0900 by Iliana Nguyen RN  Body Position: position changed independently  Intervention: Prevent and Manage VTE (Venous Thromboembolism) Risk  Recent Flowsheet Documentation  Taken 2/3/2025 0900 by Iliana Nguyen RN  VTE Prevention/Management: (Heparin) other (see comments)  Intervention: Prevent Infection  Recent Flowsheet Documentation  Taken 2/3/2025 1700 by Iliana Nguyen RN  Infection Prevention: rest/sleep promoted  Taken 2/3/2025 1500 by Iliana Nguyen RN  Infection Prevention: rest/sleep promoted  Taken 2/3/2025 1300 by Iliana Nguyen RN  Infection Prevention: rest/sleep promoted  Taken 2/3/2025 1100 by Iliana Nguyen RN  Infection Prevention: rest/sleep promoted  Taken 2/3/2025 0900 by Iliana Nguyen RN  Infection Prevention: rest/sleep promoted  Taken 2/3/2025 0700 by Iliana Nguyen RN  Infection Prevention: rest/sleep promoted  Goal: Optimal Comfort and Wellbeing  Outcome:  Progressing  Intervention: Provide Person-Centered Care  Recent Flowsheet Documentation  Taken 2/3/2025 0900 by Iliana Nguyen, RN  Trust Relationship/Rapport:   choices provided   care explained   empathic listening provided   questions answered   thoughts/feelings acknowledged  Goal: Readiness for Transition of Care  Outcome: Progressing     Problem: Pneumonia  Goal: Fluid Balance  Outcome: Progressing  Goal: Absence of Infection Signs and Symptoms  Outcome: Progressing  Goal: Effective Oxygenation and Ventilation  Outcome: Progressing  Intervention: Promote Airway Secretion Clearance  Recent Flowsheet Documentation  Taken 2/3/2025 0900 by Iliana Nguyen, RN  Activity Management: activity encouraged  Intervention: Optimize Oxygenation and Ventilation  Recent Flowsheet Documentation  Taken 2/3/2025 0900 by Iliana Nguyen, RN  Head of Bed (HOB) Positioning: HOB at 30-45 degrees  Airway/Ventilation Management: airway patency maintained

## 2025-02-03 NOTE — ED PROVIDER NOTES
Subjective   History of Present Illness  Patient presents with a 4 to 5-day history of increased shortness of breath, cough, phlegm, hemoptysis, nasal congestion and rhinorrhea as well as feeling generally unwell.  The patient otherwise denies any other cardiorespiratory or URTI symptoms on review.    Patient has had arrhythmias as well as CABG for coronary artery disease.  He has known congestive heart disease.  Patient has respecters of diabetes, dyslipidemia, and hypertension.  He has known mitral regurgitation      Review of Systems   Constitutional:  Positive for activity change and fatigue. Negative for chills, diaphoresis and fever.   HENT:  Positive for congestion and rhinorrhea. Negative for ear pain, sinus pressure and sore throat.    Respiratory:  Positive for cough, shortness of breath and wheezing. Negative for stridor.    Cardiovascular:  Negative for chest pain and palpitations.   Gastrointestinal: Negative.    Genitourinary: Negative.    Skin: Negative.    Neurological: Negative.        Past Medical History:   Diagnosis Date    Abnormal heart rhythm     Arrhythmia     ASCVD (arteriosclerotic cardiovascular disease)     s/p CABG in 2006 (5) [facility]    Chicken pox     Cholecystitis 04/26/2018    Added automatically from request for surgery [id number]      Congestive heart failure     Coronary artery disease     Disease of thyroid gland     Diverticulitis     DMII (diabetes mellitus, type 2)     type 2, checks fsbg as needed     Dyslipidemia     Elevated cholesterol     GERD (gastroesophageal reflux disease)     HTN (hypertension)     Hx of myocardial infarction, greater than 8 weeks     2006    Hyperlipidemia     Kidney stone     Measles     Mitral regurgitation     MILD TO MODERATE    Mumps     PVC (premature ventricular contraction)     SOB (shortness of breath)     Vitamin D deficiency     Wears reading eyeglasses        No Known Allergies    Past Surgical History:   Procedure  Laterality Date    CARDIAC CATHETERIZATION N/A 07/30/2024    Procedure: Left Heart Cath;  Surgeon: Randi Mason MD;  Location:  [facility] CATH INVASIVE LOCATION;  Service: Cardiology;  Laterality: N/A;    CARDIAC DEFIBRILLATOR PLACEMENT      CARDIAC ELECTROPHYSIOLOGY PROCEDURE N/A 11/20/2017    Procedure: EP/Ablation PVC +/- ICD Implant;  Surgeon: Jose Maldonado MD;  Location: [facility] EP INVASIVE LOCATION;  Service:     CARDIAC ELECTROPHYSIOLOGY PROCEDURE N/A 11/20/2017    Procedure: Biventricular Device Insertion;  Surgeon: Jose Maldonado MD;  Location:  [facility] EP INVASIVE LOCATION;  Service:     CATARACT EXTRACTION Bilateral     COLONOSCOPY      CORONARY ANGIOPLASTY      CORONARY ARTERY BYPASS GRAFT  2006    X 5.   [facility]    ENDOSCOPY N/A 05/17/2022    Procedure: ESOPHAGOGASTRODUODENOSCOPY WITH BIOPSY;  Surgeon: Roula Craig MD;  Location: [facility] OR;  Service: Gastroenterology;  Laterality: N/A;    ENDOSCOPY N/A 07/27/2022    Procedure: ESOPHAGOGASTRODUODENOSCOPY WITH BIOPSY;  Surgeon: Roula Craig MD;  Location: [facility] OR;  Service: Gastroenterology;  Laterality: N/A;    MITRAL VALVE REPAIR/REPLACEMENT N/A 9/25/2024    Procedure: Transcatheter Mitral Valve Repair;  Surgeon: Randi Mason MD;  Location: [facility] CATH INVASIVE LOCATION;  Service: Cardiovascular;  Laterality: N/A;    MITRAL VALVE REPAIR/REPLACEMENT  9/25/2024    Procedure: Transcatheter Mitral Valve Repair;  Surgeon: Ubaldo Webb MD;  Location: [facility] CATH INVASIVE LOCATION;  Service: Cardiovascular;;    PACEMAKER IMPLANTATION      ME LAPAROSCOPY SURG CHOLECYSTECTOMY N/A 04/27/2018    Procedure: CHOLECYSTECTOMY LAPAROSCOPIC;  Surgeon: Eber Hummel MD;  Location: [facility] OR;  Service: General    TRANSESOPHAGEAL ECHOCARDIOGRAM (SHERLEY)  07/30/2024       Family History   Problem Relation Age of Onset    Heart disease Mother     Heart disease Father     Dementia Sister      Dementia Sister     Cancer Sister     Cancer Sister     Cancer Sister     No Known Problems Sister     Diabetes Brother     No Known Problems Brother     No Known Problems Brother        Social History     Socioeconomic History    Marital status:     Number of children: 3   Tobacco Use    Smoking status: Former     Current packs/day: 0.00     Average packs/day: 2.0 packs/day for 20.0 years (40.0 ttl pk-yrs)     Types: Cigarettes     Start date:      Quit date:      Years since quittin.1    Smokeless tobacco: Former     Types: Chew     Quit date:    Vaping Use    Vaping status: Never Used   Substance and Sexual Activity    Alcohol use: No    Drug use: No    Sexual activity: Defer           Objective   Physical Exam  Vitals and nursing note reviewed.   Constitutional:       General: He is awake. He is not in acute distress.     Appearance: He is overweight. He is ill-appearing. He is not toxic-appearing or diaphoretic.   HENT:      Head: Normocephalic and atraumatic.      Mouth/Throat:      Pharynx: Oropharynx is clear. No oropharyngeal exudate or posterior oropharyngeal erythema.   Eyes:      General:         Right eye: No discharge.         Left eye: No discharge.      Extraocular Movements: Extraocular movements intact.      Conjunctiva/sclera: Conjunctivae normal.      Pupils: Pupils are equal, round, and reactive to light.   Cardiovascular:      Rate and Rhythm: Normal rate and regular rhythm.      Heart sounds: Murmur (diastolic) heard.      No friction rub. No gallop.   Pulmonary:      Effort: Pulmonary effort is normal. No respiratory distress.      Breath sounds: No stridor. Rhonchi (Lt base) present. No wheezing or rales.   Skin:     Coloration: Skin is not jaundiced.      Findings: No bruising, erythema or rash.   Neurological:      General: No focal deficit present.      Mental Status: He is alert and oriented to person, place, and time. Mental status is at baseline.   Psychiatric:          Behavior: Behavior is cooperative.         Procedures           ED Course  ED Course as of 02/03/25 0036   Sun Feb 02, 2025 1928 XR Chest 2 View [RA]   2144 HS Troponin T(!): 31 [RA]   2144 HS Troponin T(!): 31 [RA]   2144 Troponin T Numeric Delta: 0 [RA]   2144 Troponin T % Delta: 0 [RA]   2145 Calcium(!): 7.9 [RA]   2145 Albumin(!): 2.9 [RA]   2145 AST (SGOT)(!): 49 [RA]   2145 Sodium(!): 132 [RA]   2145 proBNP(!): 4,221.0 [RA]   2145 Hemoglobin(!): 9.8 [RA]   2146 BP: 133/64 [RA]   2146 Noninvasive MAP (mmHg): 94 [RA]   2146 Heart Rate: 74 [RA]   2146 Resp: 18 [RA]   2146 SpO2: 93 % [RA]   2146 Device (Oxygen Therapy): room air [RA]   2147 Chest x-ray reveals multifocal pneumonia [RA]   2147 Respiratory Panel PCR w/COVID-19(SARS-CoV-2) MICHELLE/[facility]AKAR/[facility] In-House, NP Swab in UTM/VTM, 2 HR TAT - Swab, Nasopharynx [RA]   2329 ECG demonstrates AV dual paced rhythm at a ventricular rate of 72 bpm.  NY interval is prolonged at 210 ms.  QRS duration is normal.  QTc is prolonged at 466 ms.  There are no acute ST-T wave changes. [RA]   Mon Feb 03, 2025   0015 pH, Arterial(!): 7.473 [RA]   0015 pO2, Arterial(!): 57.1 [RA]   0015 Base Excess(!): -1.5 [RA]   0015 O2 Saturation, Arterial(!): 89.9  2 L/min oxygen supplementation was applied to the patient following ABG results [RA]      ED Course User Index  [RA] Blake Deluna MD                                                       Medical Decision Making  Patient presents with obvious respiratory and URTI symptoms that been worsening over the course of 4 to 5 days.  Differential diagnosis for this would be COPD exacerbation, pneumonia, PE, pneumothorax, viral URTI    Labs and imaging have demonstrated multifocal pneumonia but with otherwise stable labs with minor electrolyte abnormalities.    ABG is clarified that the patient is hypoxic whole require admission.  The patient has been started on appropriate pneumonia treatment protocol    Problems  Addressed:  Acute hypoxic respiratory failure: complicated acute illness or injury  Multifocal pneumonia: complicated acute illness or injury    Amount and/or Complexity of Data Reviewed  Independent Historian:      Details: Daughter and Son, collateral  Labs: ordered. Decision-making details documented in ED Course.  Radiology: ordered and independent interpretation performed. Decision-making details documented in ED Course.  ECG/medicine tests: ordered and independent interpretation performed. Decision-making details documented in ED Course.    Risk  Prescription drug management.  Decision regarding hospitalization.        Final diagnoses:   Multifocal pneumonia   Acute hypoxic respiratory failure       ED Disposition  ED Disposition       ED Disposition   Decision to Admit    Condition   --    Comment   --               No follow-up provider specified.       Medication List      No changes were made to your prescriptions during this visit.            Blake Deluna MD  02/03/25 0036

## 2025-02-03 NOTE — THERAPY EVALUATION
"Acute Care - Speech Language Pathology   Swallow Initial Evaluation [facility]  CLINICAL DYSPHAGIA ASSESSMENT     Patient Name: Shai Mata  : 1945  MRN: [id number]  Today's Date: 2/3/2025     Admit Date: 2025  Shai Mata  was seen at bedside this am on 3S-321 to assess safety/efficacy of swallowing fnx, determine safest/least restrictive diet tolerance. He has a PMH significant for history of \"abnormal heart rhythm\" (vtach and frequent PVCs listed in problem list), CHFrEF (45% per ECHO from 10/2024), CAD s/p CABG, insulin dependent type II DM, HTN, HLD, hypothyroidism, s/p PM, s/p mitral valve repair/replacement, and mild/early dementia.     Mr Mata presented to [facility] ED w/ shortness of breath, productive cough w/ phlegm and nasal drainage per family member. He additionally had been noted w/ \"some blood mixed in his sputum\". In the ED, patient was afebrile and not tachycardic but mildly tachypneic. BP was fairly stable. O2 sat was low 90s on RA. ABG showed hypoxemia with PO2 57 and sat 89.9% on RA, so he was placed on 2L NC. Troponin was 31 with flat trend and BNP was 4221, but patient denied any orthopnea or lower extremity edema, and his daughter states he only uses diuretics on PRN basis and has not needed them recently. Other labs included bicarb 18.3, BUN 24, Cr 1.30 (baseline around 1.1-1.25 with GFR 50-60s so borderline stage IIIa CKD), glucose 383, calcium 7.9, albumin 2.9, AST 49 but other LFTs normal. A1c was 7.80. CRP was 9.41, procal 0.34, lactate normal, and WBC 8.10 but with 84% neutrophils. Respiratory PCR was negative. CXR showed bilateral airspace opacity most prevalent within the right mid to upper lung likely representing pneumonia.     Per EMR review, pt passed RN dysphagia screen and diet was ordered, however dysphagia assessment was ordered to r/o dysphagia/silent aspiration given age and underlying dementia.     Per EMR review, he is unfamiliar to SLP department prior to this " assessment.     Social History     Socioeconomic History    Marital status:     Number of children: 3   Tobacco Use    Smoking status: Former     Current packs/day: 0.00     Average packs/day: 2.0 packs/day for 20.0 years (40.0 ttl pk-yrs)     Types: Cigarettes     Start date:      Quit date:      Years since quittin.1    Smokeless tobacco: Former     Types: Chew     Quit date:    Vaping Use    Vaping status: Never Used   Substance and Sexual Activity    Alcohol use: No    Drug use: No    Sexual activity: Defer      Imaging:     Radiology Results (last 21 days)    Procedure Component Value Units Date/Time    XR Chest 1 View [[id number]] Obey as Reviewed   Order Status: Completed Collected: 25    Updated: 25   Narrative:     EXAMINATION: AP portable chest     Clinical history: Shortness of breath     COMPARISON: 2023     FINDINGS: There has been prior median sternotomy. A pacing device enters  through a left subclavian approach. Bilateral airspace opacity is noted  most prevalent within the right upper lobe. The findings suggest  pneumonia in the correct clinical setting.      Impression:     1. Bilateral airspace opacity most prevalent within the right mid to  upper lung likely representing pneumonia in the correct clinical  setting.     This report was finalized on 2025 10:58 PM by Venkatesh Knight MD.        Labs:   Latest Reference Range & Units 25 19:37 25 01:12   WBC 3.40 - 10.80 10*3/mm3 8.10 7.42   RBC 4.14 - 5.80 10*6/mm3 3.28 (L) 3.29 (L)   Hemoglobin 13.0 - 17.7 g/dL 9.8 (L) 9.9 (L)   Hematocrit 37.5 - 51.0 % 30.4 (L) 30.9 (L)   Platelets 140 - 450 10*3/mm3 174 179   RDW 12.3 - 15.4 % 13.7 13.6   MCV 79.0 - 97.0 fL 92.7 93.9   MCH 26.6 - 33.0 pg 29.9 30.1   MCHC 31.5 - 35.7 g/dL 32.2 32.0   MPV 6.0 - 12.0 fL 9.6 9.8   RDW-SD 37.0 - 54.0 fl 46.4 46.6   (L): Data is abnormally low  Diet Orders (active) (From admission, onward)       Start      "Ordered    02/03/25 0156  Diet: Regular/House, Cardiac, Diabetic, Renal; Healthy Heart (2-3 Na+); Consistent Carbohydrate; Low Sodium (2-3g); Fluid Consistency: Thin (IDDSI 0)  Diet Effective Now         02/03/25 0155                  Mr Mata was observed on 2L nasal cannula w/o complications across this assessment.     Upon SLP entry, Mr Mata was reclined in bed w/ daughter present and attentive at bedside. Both Mr Mata and daughter deny any s/s concerning for dysphagia. He reportedly tolerates \"anything he wants to eat\" at home. RN reported no difficulty w/ po medications at this time. Mr Mata denied any shortness of breath, however did subjectively appear short of breath w/ positioning to edge of bed. Daughter reports he appears short of breath with any significant movements. O2 saturations remains above 94% across this assessment.     Mr Mata was positioned upright and seated on edge of bed to accept multiple po presentations of ice chips, solid cracker, puree, and thin liquids via spoon, cup, and straw.  He was able to self provide all po trials.     Facial/oral structures were symmetrical upon observation. Lingual protrusion revealed no deviation. Oral mucosa were moist, pink, and clean. Upper and lower denture plates are present in place. Secretions were clear, thin, and well controlled. OROM/LUIS ANTONIO was wfl to imitate oral postures. Gag is not assessed. Volitional cough was intact w/ adequate intensity, mildly congestive in quality, non-productive. Voice was adequate in intensity, clear in quality w/ intelligible speech.    Upon po presentations, adequate bolus anticipation and acceptance w/ good labial seal for bolus clearance via spoon bowl, cup rim stability and suction via straw. Bolus formation, manipulation and control were wfl w/ rotary mastication pattern. A-p transit was timely w/o significant oral residue appreciated. No overt s/s aspiration before the swallow.      Pharyngeal swallow was timely " w/ adequate hyolaryngeal elevation per palpation. No overt s/s aspiration evidenced across this evaluation. No silent aspiration suspected. Patient denied odynophagia.    Visit Dx:     ICD-10-CM ICD-9-CM   1. Multifocal pneumonia  J18.9 486   2. Acute hypoxic respiratory failure  J96.01 518.81     Patient Active Problem List   Diagnosis    ASCVD (arteriosclerotic cardiovascular disease)    S/P CABG x 5    Essential hypertension    PVC's (premature ventricular contractions)    Type 2 diabetes mellitus with hyperglycemia, with long-term current use of insulin    Hyperlipemia, mixed    Ischemic cardiomyopathy    Chronic systolic congestive heart failure    Ventricular tachycardia (paroxysmal)    Long term current use of antiarrhythmic medical therapy    Other specified hypothyroidism    Esophageal dysphagia    Nonrheumatic mitral valve regurgitation    S/P mitral valve clip implantation    Coronary artery disease involving coronary bypass graft of native heart without angina pectoris    Dyslipidemia    Multifocal pneumonia     Past Medical History:   Diagnosis Date    Abnormal heart rhythm     Arrhythmia     ASCVD (arteriosclerotic cardiovascular disease)     s/p CABG in 2006 (5) [facility]    Chicken pox     Cholecystitis 04/26/2018    Added automatically from request for surgery [id number]      Congestive heart failure     Coronary artery disease     Disease of thyroid gland     Diverticulitis     DMII (diabetes mellitus, type 2)     type 2, checks fsbg as needed     Dyslipidemia     Elevated cholesterol     GERD (gastroesophageal reflux disease)     HTN (hypertension)     Hx of myocardial infarction, greater than 8 weeks     2006    Hyperlipidemia     Kidney stone     Measles     Mitral regurgitation     MILD TO MODERATE    Mumps     PVC (premature ventricular contraction)     SOB (shortness of breath)     Vitamin D deficiency     Wears reading eyeglasses      Past Surgical History:   Procedure  Laterality Date    CARDIAC CATHETERIZATION N/A 07/30/2024    Procedure: Left Heart Cath;  Surgeon: Randi Mason MD;  Location: [facility] CATH INVASIVE LOCATION;  Service: Cardiology;  Laterality: N/A;    CARDIAC DEFIBRILLATOR PLACEMENT      CARDIAC ELECTROPHYSIOLOGY PROCEDURE N/A 11/20/2017    Procedure: EP/Ablation PVC +/- ICD Implant;  Surgeon: Jose Maldonado MD;  Location: [facility] EP INVASIVE LOCATION;  Service:     CARDIAC ELECTROPHYSIOLOGY PROCEDURE N/A 11/20/2017    Procedure: Biventricular Device Insertion;  Surgeon: Jose Maldonado MD;  Location:  [facility] EP INVASIVE LOCATION;  Service:     CATARACT EXTRACTION Bilateral     COLONOSCOPY      CORONARY ANGIOPLASTY      CORONARY ARTERY BYPASS GRAFT  2006    X 5.   [facility]    ENDOSCOPY N/A 05/17/2022    Procedure: ESOPHAGOGASTRODUODENOSCOPY WITH BIOPSY;  Surgeon: Roula Craig MD;  Location: [facility] OR;  Service: Gastroenterology;  Laterality: N/A;    ENDOSCOPY N/A 07/27/2022    Procedure: ESOPHAGOGASTRODUODENOSCOPY WITH BIOPSY;  Surgeon: Roula Craig MD;  Location: [facility] OR;  Service: Gastroenterology;  Laterality: N/A;    MITRAL VALVE REPAIR/REPLACEMENT N/A 9/25/2024    Procedure: Transcatheter Mitral Valve Repair;  Surgeon: Randi Mason MD;  Location: [facility] CATH INVASIVE LOCATION;  Service: Cardiovascular;  Laterality: N/A;    MITRAL VALVE REPAIR/REPLACEMENT  9/25/2024    Procedure: Transcatheter Mitral Valve Repair;  Surgeon: Ubaldo Webb MD;  Location: [facility] CATH INVASIVE LOCATION;  Service: Cardiovascular;;    PACEMAKER IMPLANTATION      [place] LAPAROSCOPY SURG CHOLECYSTECTOMY N/A 04/27/2018    Procedure: CHOLECYSTECTOMY LAPAROSCOPIC;  Surgeon: Eber Hummel MD;  Location: [facility] OR;  Service: General    TRANSESOPHAGEAL ECHOCARDIOGRAM (SHERLEY)  07/30/2024     Impression:     Mr Mata presented w/ a wfl oropharyngeal swallow w/o s/s aspiration. No s/s indicative of silent aspiration.   No odynophagia reported. Per findings from this assessment, no hx of dysphagia, absent leukocytosis, and afebrile status he is felt to most benefit from continuation of current least restrictive po diet of regular textures and thin liquids w/ medications whole in puree/thins per pt preference. He did subjectively appear w/ increased shortness of breath w/ repositioning, however he denied this and this is not occur across po presentations.     SLP Recommendation and Plan     1. Regular textures, thin liquids.    2. Medications whole in puree/thins.   3. Upright and centered for all po intake  4. ANGELES precautions.  5. Oral care protocol.    No further formal SLP f/u warranted/recommended at this time.    D/w patient results and recommendations w/ verbal agreement.    D/w RN results and recommendations w/ verbal agreement.    Thank you for allowing me to participate in the care of your patient-  Kiana Linton M.S., CCC-SLP        EDUCATION  The patient has been educated in the following areas:   Dysphagia (Swallowing Impairment).        Time Calculation:       Kiana Linton MS CCC-SLP  2/3/2025

## 2025-02-03 NOTE — PLAN OF CARE
Goal Outcome Evaluation:            Pt has been resting this shift. No signs and symptoms of acute distress noted. No complaints of chest pain or SOB reported.

## 2025-02-03 NOTE — H&P
"Hospitalist History and Physical        Patient Identification  Name: Shai Mata  Age/Sex: 79 y.o. male  :  1945        MRN: [id number]  Visit Number: [id number]  Admit Date: 2025   PCP: Ekaterina Cox APRN          Chief complaint short of breath, coughing up phlegm and now blood tinged sputum    History of Present Illness:  Patient is a 79 y.o. male with history of \"abnormal heart rhythm\" (vtach and frequent PVCs listed in problem list), CHFrEF (45% per ECHO from 10/2024), CAD s/p CABG, insulin dependent type II DM, HTN, HLD, hypothyroidism, s/p PM, s/p mitral valve repair/replacement, and mild/early dementia, who presents with reports of shortness of breath, productive cough with phlegm, and nasal drainage for the last 4 or so days per his daughter at bedside (patient could not recall why he was brought to the hospital). She states it seemed like the day prior he was finally starting to get better, but then yesterday he started getting more short of breath again and even started coughing up some blood mixed in his sputum. In the ED,  patient was afebrile and not tachycardic but mildly tachypneic. BP was fairly stable. O2 sat was low 90s on RA. ABG showed hypoxemia with PO2 57 and sat 89.9% on RA, so he was placed on 2L NC. Troponin was 31 with flat trend and BNP was 4221, but patient denied any orthopnea or lower extremity edema, and his daughter states he only uses diuretics on PRN basis and has not needed them recently. Other labs included bicarb 18.3, BUN 24, Cr 1.30 (baseline around 1.1-1.25 with GFR 50-60s so borderline stage IIIa CKD), glucose 383, calcium 7.9, albumin 2.9, AST 49 but other LFTs normal. A1c was 7.80. CRP was 9.41, procal 0.34, lactate normal, and WBC 8.10 but with 84% neutrophils. Respiratory PCR was negative. CXR showed bilateral airspace opacity most prevalent within the right mid to upper lung likely representing pneumonia.     Review of Systems  Review of Systems   Reason " unable to perform ROS: answered mostly by daughter.   Constitutional:  Positive for activity change and fever (subjective, felt hot earlier this week per daughter).   HENT:  Positive for postnasal drip and rhinorrhea.    Respiratory:  Positive for cough, shortness of breath and wheezing.    Cardiovascular:  Negative for chest pain and leg swelling.   Gastrointestinal:  Negative for nausea and vomiting.       History  Past Medical History:   Diagnosis Date    Abnormal heart rhythm     Arrhythmia     ASCVD (arteriosclerotic cardiovascular disease)     s/p CABG in 2006 (5) [facility]    Chicken pox     Cholecystitis 04/26/2018    Added automatically from request for surgery [id number]      Congestive heart failure     Coronary artery disease     Disease of thyroid gland     Diverticulitis     DMII (diabetes mellitus, type 2)     type 2, checks fsbg as needed     Dyslipidemia     Elevated cholesterol     GERD (gastroesophageal reflux disease)     HTN (hypertension)     Hx of myocardial infarction, greater than 8 weeks     2006    Hyperlipidemia     Kidney stone     Measles     Mitral regurgitation     MILD TO MODERATE    Mumps     PVC (premature ventricular contraction)     SOB (shortness of breath)     Vitamin D deficiency     Wears reading eyeglasses      Past Surgical History:   Procedure Laterality Date    CARDIAC CATHETERIZATION N/A 07/30/2024    Procedure: Left Heart Cath;  Surgeon: Randi Mason MD;  Location:  [facility] CATH INVASIVE LOCATION;  Service: Cardiology;  Laterality: N/A;    CARDIAC DEFIBRILLATOR PLACEMENT      CARDIAC ELECTROPHYSIOLOGY PROCEDURE N/A 11/20/2017    Procedure: EP/Ablation PVC +/- ICD Implant;  Surgeon: Jose Maldonado MD;  Location: [facility] EP INVASIVE LOCATION;  Service:     CARDIAC ELECTROPHYSIOLOGY PROCEDURE N/A 11/20/2017    Procedure: Biventricular Device Insertion;  Surgeon: Jose Maldonado MD;  Location: [facility] EP INVASIVE LOCATION;  Service:     CATARACT  EXTRACTION Bilateral     COLONOSCOPY      CORONARY ANGIOPLASTY      CORONARY ARTERY BYPASS GRAFT  2006    X 5.   [facility]    ENDOSCOPY N/A 2022    Procedure: ESOPHAGOGASTRODUODENOSCOPY WITH BIOPSY;  Surgeon: Roula Craig MD;  Location:  [facility] OR;  Service: Gastroenterology;  Laterality: N/A;    ENDOSCOPY N/A 2022    Procedure: ESOPHAGOGASTRODUODENOSCOPY WITH BIOPSY;  Surgeon: Roula Craig MD;  Location:  [facility] OR;  Service: Gastroenterology;  Laterality: N/A;    MITRAL VALVE REPAIR/REPLACEMENT N/A 2024    Procedure: Transcatheter Mitral Valve Repair;  Surgeon: Randi Mason MD;  Location:  [facility] CATH INVASIVE LOCATION;  Service: Cardiovascular;  Laterality: N/A;    MITRAL VALVE REPAIR/REPLACEMENT  2024    Procedure: Transcatheter Mitral Valve Repair;  Surgeon: Ubaldo Webb MD;  Location:  [facility] CATH INVASIVE LOCATION;  Service: Cardiovascular;;    PACEMAKER IMPLANTATION      [place] LAPAROSCOPY SURG CHOLECYSTECTOMY N/A 2018    Procedure: CHOLECYSTECTOMY LAPAROSCOPIC;  Surgeon: Eber Hummel MD;  Location:  [facility] OR;  Service: General    TRANSESOPHAGEAL ECHOCARDIOGRAM (SHERLEY)  2024     Family History   Problem Relation Age of Onset    Heart disease Mother     Heart disease Father     Dementia Sister     Dementia Sister     Cancer Sister     Cancer Sister     Cancer Sister     No Known Problems Sister     Diabetes Brother     No Known Problems Brother     No Known Problems Brother      Social History     Tobacco Use    Smoking status: Former     Current packs/day: 0.00     Average packs/day: 2.0 packs/day for 20.0 years (40.0 ttl pk-yrs)     Types: Cigarettes     Start date:      Quit date:      Years since quittin.1    Smokeless tobacco: Former     Types: Chew     Quit date:    Vaping Use    Vaping status: Never Used   Substance Use Topics    Alcohol use: No    Drug use: No     Medications Prior to Admission    Medication Sig Dispense Refill Last Dose/Taking    amiodarone (PACERONE) 200 MG tablet Take 0.5 tablets by mouth Daily. 45 tablet 2     aspirin 81 MG EC tablet Take 1 tablet by mouth 2 (Two) Times a Day.       atorvastatin (LIPITOR) 80 MG tablet Take 1 tablet by mouth Daily. 90 tablet 1     Brexpiprazole (Rexulti) 0.5 MG tablet Take 0.5 mg by mouth Daily for 7 days, THEN 2 mg Daily for 21 days. 91 tablet 0     carvedilol (COREG) 12.5 MG tablet Take 1 tablet by mouth 2 (Two) Times a Day. 180 tablet 1     glyburide micronized (GLYNASE) 1.5 MG tablet TAKE 1 TABLET BY MOUTH TWICE DAILY BEFORE MEALS FOR 90 DAYS       insulin NPH-insulin regular (humuLIN 70/30,novoLIN 70/30) (70-30) 100 UNIT/ML injection Inject 22 Units under the skin into the appropriate area as directed 2 (Two) Times a Day With Meals. 30 mL 2     levothyroxine (SYNTHROID, LEVOTHROID) 100 MCG tablet Take 1 tablet by mouth Daily.       lisinopril (PRINIVIL,ZESTRIL) 5 MG tablet Take 1 tablet by mouth once daily 90 tablet 0     memantine (NAMENDA) 10 MG tablet Take 1 tablet by mouth Daily. 30 tablet 2     pantoprazole (Protonix) 40 MG EC tablet Take 1 tablet by mouth Daily. 90 tablet 3     spironolactone (ALDACTONE) 25 MG tablet Take 1 tablet by mouth Daily. 90 tablet 1      Allergies:  Patient has no known allergies.    Objective     Vital Signs  Temp:  [98.3 °F (36.8 °C)-98.8 °F (37.1 °C)] 98.8 °F (37.1 °C)  Heart Rate:  [72-86] 86  Resp:  [18-22] 20  BP: ()/(49-94) 146/71  Body mass index is 29.09 kg/m².    Physical Exam:  Physical Exam  Constitutional:       Comments: Pleasant elderly gentleman, no acute distress   HENT:      Head: Normocephalic and atraumatic.      Right Ear: External ear normal.      Left Ear: External ear normal.      Nose: Nose normal.      Mouth/Throat:      Mouth: Mucous membranes are moist.      Pharynx: Oropharynx is clear.   Eyes:      Extraocular Movements: Extraocular movements intact.      Conjunctiva/sclera:  Conjunctivae normal.      Pupils: Pupils are equal, round, and reactive to light.   Cardiovascular:      Rate and Rhythm: Normal rate and regular rhythm.      Pulses: Normal pulses.      Heart sounds: Normal heart sounds. No murmur heard.  Pulmonary:      Comments: Some faint scattered rhonchi appreciated, but no wheezing   Abdominal:      General: Abdomen is flat. Bowel sounds are normal. There is no distension.      Palpations: Abdomen is soft.      Tenderness: There is no abdominal tenderness.   Musculoskeletal:         General: Normal range of motion.      Cervical back: Normal range of motion and neck supple. No tenderness.      Right lower leg: No edema.      Left lower leg: No edema.   Lymphadenopathy:      Cervical: No cervical adenopathy.   Skin:     General: Skin is warm and dry.      Capillary Refill: Capillary refill takes less than 2 seconds.   Neurological:      General: No focal deficit present.      Comments: Oriented to self, family, city and year but not president   Psychiatric:         Mood and Affect: Mood normal.         Behavior: Behavior normal.           Results Review:       Lab Results:  Results from last 7 days   Lab Units 02/03/25  0112 02/02/25 1937   WBC 10*3/mm3 7.42 8.10   HEMOGLOBIN g/dL 9.9* 9.8*   PLATELETS 10*3/mm3 179 174     Results from last 7 days   Lab Units 02/02/25 2046   CRP mg/dL 9.41*     Results from last 7 days   Lab Units 02/03/25  0112 02/02/25 1937   SODIUM mmol/L 135* 132*   POTASSIUM mmol/L 3.9 3.9   CHLORIDE mmol/L 104 102   CO2 mmol/L 19.9* 18.3*   BUN mg/dL 22 24*   CREATININE mg/dL 1.23 1.30*   CALCIUM mg/dL 8.4* 7.9*   GLUCOSE mg/dL 218* 383*         Hemoglobin A1C   Date Value Ref Range Status   02/02/2025 7.80 (H) 4.80 - 5.60 % Final     Results from last 7 days   Lab Units 02/03/25  0112 02/02/25 1937   BILIRUBIN mg/dL 0.8 1.0   ALK PHOS U/L 92 89   AST (SGOT) U/L 46* 49*   ALT (SGPT) U/L 42* 39     Results from last 7 days   Lab Units 02/02/25 2046  02/02/25 1937   HSTROP T ng/L 31* 31*             Results from last 7 days   Lab Units 02/03/25  0006   PH, ARTERIAL pH units 7.473*   PO2 ART mm Hg 57.1*   PCO2, ARTERIAL mm Hg 29.4*   HCO3 ART mmol/L 21.5       I have reviewed the patient's laboratory results.    Imaging:  Imaging Results (Last 72 Hours)       Procedure Component Value Units Date/Time    XR Chest 1 View [[id number]] Collected: 02/02/25 2256     Updated: 02/02/25 2300    Narrative:      EXAMINATION: AP portable chest     Clinical history: Shortness of breath     COMPARISON: 9/29/2023     FINDINGS: There has been prior median sternotomy. A pacing device enters  through a left subclavian approach. Bilateral airspace opacity is noted  most prevalent within the right upper lobe. The findings suggest  pneumonia in the correct clinical setting.       Impression:      1. Bilateral airspace opacity most prevalent within the right mid to  upper lung likely representing pneumonia in the correct clinical  setting.     This report was finalized on 2/2/2025 10:58 PM by Venkatesh Knight MD.               I have personally reviewed the patient's radiologic imaging.        EKG:   AV dual-paced rhythm with prolonged AV conduction, HR 72, QTc 466  Biventricular pacemaker detected  Abnormal ECG  When compared with ECG of 26-Apr-2018 05:37,  Vent. rate has increased by   2 bpm  Confirmed by Brenda Gonzalez ([id number]) on 2/2/2025 10:13:21 PM    I have personally reviewed the patient's EKG.        Assessment & Plan     - Multifocal pneumonia: treat with IV rocephin and doxycycline. Respiratory PCR negative. Follow up blood cultures collected in ED. Passed nursing dysphagia screen so will order diet, but still have SLP evaluate tomorrow for dysphagia/silent aspiration given age and underlying dementia. Continue to trend CRP.   - Suspect possible underlying COPD undiagnosed, with exacerbation: per chart, has 40 pack year smoking history which daughter confirms. Quit in 1984, but  then exposed to secondhand smoke from wife for many years thereafter. Daughter reports wheezing in recent days and ED provider reported wheezing on exam. Breathing improved with neb treatments. Therefore, will continue with steroids started in ED, but instead of oral prednisone will add IV solu-medrol BID. Scheduled nebs ordered also.  - Acute hypoxic respiratory failure, secondary to above: see treatment plan above. Titrate Fio2 as patient tolerates, currently doing well on 2L NC.   - Mild acute renal insufficiency superimposed on borderline stage IIIa CKD: plan to hold potentially nephrotoxic agents including lisinopril and spironolactone on prelim med list. Encourage PO intake. Monitor strict I/O's and repeat labs in the morning.   - Reports of hemoptysis: suspect secondary to pneumonia above. No significant hemoptysis appreciated since arrival to ED. Continue to monitor for now. If recurs or becomes more significant, will hold SQ heparin and consult pulmonology for further guidance.   - Elevated BNP, troponin with flat trend: suspect secondary to hypoxia from above. No chest pain and EKG not felt to show evidence of acute ischemia. No rales or lower extremity edema on exam. Had ECHO within the last couple months so will not repeat.   - Type II DM, insulin dependent: hold split insulin and cover with SSI and lantus instead. Adjust scale as needed, may need more coverage since receiving steroids.   - History vtach/frequent PVCs: monitor on telemetry. Review home meds once reconciled by pharmacy (prelim med list includes amiodarone).   - Advanced age with mild/early dementia: supportive care.    DVT Prophylaxis: SQ heparin    Code Status: DNR, but ok with intubation (confirmed with daughter who is POA)    Estimated Length of Stay >2 midnights    I discussed the patient's findings, assessment and plan with the patient, his daughter at bedside, and ED provider Dr Deluna.    Patient is high risk due to multifocal  pneumonia, suspected COPD with exacerbation, acute hypoxic respiratory failure, advanced age with dementia    Tito Anton MD  02/03/25  01:56 EST

## 2025-02-04 LAB
ANION GAP SERPL CALCULATED.3IONS-SCNC: 11.2 MMOL/L (ref 5–15)
BUN SERPL-MCNC: 26 MG/DL (ref 8–23)
BUN/CREAT SERPL: 16.3 (ref 7–25)
CALCIUM SPEC-SCNC: 8.5 MG/DL (ref 8.6–10.5)
CHLORIDE SERPL-SCNC: 106 MMOL/L (ref 98–107)
CO2 SERPL-SCNC: 17.8 MMOL/L (ref 22–29)
CREAT SERPL-MCNC: 1.6 MG/DL (ref 0.76–1.27)
DEPRECATED RDW RBC AUTO: 47.5 FL (ref 37–54)
EGFRCR SERPLBLD CKD-EPI 2021: 43.3 ML/MIN/1.73
ERYTHROCYTE [DISTWIDTH] IN BLOOD BY AUTOMATED COUNT: 13.7 % (ref 12.3–15.4)
GLUCOSE BLDC GLUCOMTR-MCNC: 163 MG/DL (ref 70–130)
GLUCOSE BLDC GLUCOMTR-MCNC: 212 MG/DL (ref 70–130)
GLUCOSE BLDC GLUCOMTR-MCNC: 214 MG/DL (ref 70–130)
GLUCOSE BLDC GLUCOMTR-MCNC: 308 MG/DL (ref 70–130)
GLUCOSE BLDC GLUCOMTR-MCNC: 319 MG/DL (ref 70–130)
GLUCOSE BLDC GLUCOMTR-MCNC: 357 MG/DL (ref 70–130)
GLUCOSE BLDC GLUCOMTR-MCNC: 401 MG/DL (ref 70–130)
GLUCOSE SERPL-MCNC: 307 MG/DL (ref 65–99)
HCT VFR BLD AUTO: 29.7 % (ref 37.5–51)
HGB BLD-MCNC: 9.4 G/DL (ref 13–17.7)
MCH RBC QN AUTO: 29.9 PG (ref 26.6–33)
MCHC RBC AUTO-ENTMCNC: 31.6 G/DL (ref 31.5–35.7)
MCV RBC AUTO: 94.6 FL (ref 79–97)
PLATELET # BLD AUTO: 188 10*3/MM3 (ref 140–450)
PMV BLD AUTO: 10.1 FL (ref 6–12)
POTASSIUM SERPL-SCNC: 4 MMOL/L (ref 3.5–5.2)
RBC # BLD AUTO: 3.14 10*6/MM3 (ref 4.14–5.8)
SODIUM SERPL-SCNC: 135 MMOL/L (ref 136–145)
WBC NRBC COR # BLD AUTO: 12.2 10*3/MM3 (ref 3.4–10.8)

## 2025-02-04 PROCEDURE — 63710000001 INSULIN LISPRO (HUMAN) PER 5 UNITS: Performed by: HOSPITALIST

## 2025-02-04 PROCEDURE — 94799 UNLISTED PULMONARY SVC/PX: CPT

## 2025-02-04 PROCEDURE — 85027 COMPLETE CBC AUTOMATED: CPT | Performed by: INTERNAL MEDICINE

## 2025-02-04 PROCEDURE — 25010000002 HEPARIN (PORCINE) PER 1000 UNITS: Performed by: HOSPITALIST

## 2025-02-04 PROCEDURE — 82948 REAGENT STRIP/BLOOD GLUCOSE: CPT

## 2025-02-04 PROCEDURE — 25010000002 CEFTRIAXONE PER 250 MG: Performed by: HOSPITALIST

## 2025-02-04 PROCEDURE — 99232 SBSQ HOSP IP/OBS MODERATE 35: CPT | Performed by: INTERNAL MEDICINE

## 2025-02-04 PROCEDURE — 80048 BASIC METABOLIC PNL TOTAL CA: CPT | Performed by: INTERNAL MEDICINE

## 2025-02-04 PROCEDURE — 63710000001 INSULIN GLARGINE PER 5 UNITS: Performed by: INTERNAL MEDICINE

## 2025-02-04 PROCEDURE — 63710000001 INSULIN LISPRO (HUMAN) PER 5 UNITS: Performed by: INTERNAL MEDICINE

## 2025-02-04 PROCEDURE — 94761 N-INVAS EAR/PLS OXIMETRY MLT: CPT

## 2025-02-04 PROCEDURE — 94664 DEMO&/EVAL PT USE INHALER: CPT

## 2025-02-04 RX ORDER — LISINOPRIL 2.5 MG/1
5 TABLET ORAL DAILY
Status: DISCONTINUED | OUTPATIENT
Start: 2025-02-04 | End: 2025-02-07 | Stop reason: HOSPADM

## 2025-02-04 RX ORDER — ATORVASTATIN CALCIUM 40 MG/1
80 TABLET, FILM COATED ORAL NIGHTLY
Status: DISCONTINUED | OUTPATIENT
Start: 2025-02-04 | End: 2025-02-07 | Stop reason: HOSPADM

## 2025-02-04 RX ORDER — LEVOTHYROXINE SODIUM 75 UG/1
75 TABLET ORAL
Status: DISCONTINUED | OUTPATIENT
Start: 2025-02-04 | End: 2025-02-07 | Stop reason: HOSPADM

## 2025-02-04 RX ORDER — GLUCAGON 1 MG/ML
1 KIT INJECTION
Status: DISCONTINUED | OUTPATIENT
Start: 2025-02-04 | End: 2025-02-07 | Stop reason: HOSPADM

## 2025-02-04 RX ORDER — AMIODARONE HYDROCHLORIDE 200 MG/1
100 TABLET ORAL DAILY
Status: DISCONTINUED | OUTPATIENT
Start: 2025-02-04 | End: 2025-02-07 | Stop reason: HOSPADM

## 2025-02-04 RX ORDER — MEMANTINE HYDROCHLORIDE 10 MG/1
10 TABLET ORAL NIGHTLY
Status: DISCONTINUED | OUTPATIENT
Start: 2025-02-04 | End: 2025-02-07 | Stop reason: HOSPADM

## 2025-02-04 RX ORDER — SPIRONOLACTONE 25 MG/1
25 TABLET ORAL DAILY
Status: DISCONTINUED | OUTPATIENT
Start: 2025-02-04 | End: 2025-02-07 | Stop reason: HOSPADM

## 2025-02-04 RX ORDER — ASPIRIN 81 MG/1
81 TABLET ORAL 2 TIMES DAILY
Status: DISCONTINUED | OUTPATIENT
Start: 2025-02-04 | End: 2025-02-07 | Stop reason: HOSPADM

## 2025-02-04 RX ORDER — CARVEDILOL 6.25 MG/1
6.25 TABLET ORAL 2 TIMES DAILY WITH MEALS
Status: DISCONTINUED | OUTPATIENT
Start: 2025-02-04 | End: 2025-02-07 | Stop reason: HOSPADM

## 2025-02-04 RX ORDER — PANTOPRAZOLE SODIUM 40 MG/1
40 TABLET, DELAYED RELEASE ORAL
Status: DISCONTINUED | OUTPATIENT
Start: 2025-02-04 | End: 2025-02-07 | Stop reason: HOSPADM

## 2025-02-04 RX ORDER — DEXTROSE MONOHYDRATE 25 G/50ML
25 INJECTION, SOLUTION INTRAVENOUS
Status: DISCONTINUED | OUTPATIENT
Start: 2025-02-04 | End: 2025-02-07 | Stop reason: HOSPADM

## 2025-02-04 RX ORDER — INSULIN LISPRO 100 [IU]/ML
4-24 INJECTION, SOLUTION INTRAVENOUS; SUBCUTANEOUS
Status: DISCONTINUED | OUTPATIENT
Start: 2025-02-04 | End: 2025-02-07 | Stop reason: HOSPADM

## 2025-02-04 RX ORDER — NICOTINE POLACRILEX 4 MG
15 LOZENGE BUCCAL
Status: DISCONTINUED | OUTPATIENT
Start: 2025-02-04 | End: 2025-02-07 | Stop reason: HOSPADM

## 2025-02-04 RX ORDER — DIPHENOXYLATE HYDROCHLORIDE AND ATROPINE SULFATE 2.5; .025 MG/1; MG/1
1 TABLET ORAL DAILY
Status: DISCONTINUED | OUTPATIENT
Start: 2025-02-04 | End: 2025-02-07 | Stop reason: HOSPADM

## 2025-02-04 RX ADMIN — LEVOTHYROXINE SODIUM 75 MCG: 0.07 TABLET ORAL at 15:53

## 2025-02-04 RX ADMIN — ATORVASTATIN CALCIUM 80 MG: 40 TABLET, FILM COATED ORAL at 21:20

## 2025-02-04 RX ADMIN — CEFTRIAXONE 1000 MG: 1 INJECTION, POWDER, FOR SOLUTION INTRAMUSCULAR; INTRAVENOUS at 01:08

## 2025-02-04 RX ADMIN — INSULIN GLARGINE 40 UNITS: 100 INJECTION, SOLUTION SUBCUTANEOUS at 21:13

## 2025-02-04 RX ADMIN — INSULIN LISPRO 4 UNITS: 100 INJECTION, SOLUTION INTRAVENOUS; SUBCUTANEOUS at 21:13

## 2025-02-04 RX ADMIN — IPRATROPIUM BROMIDE AND ALBUTEROL SULFATE 3 ML: 2.5; .5 SOLUTION RESPIRATORY (INHALATION) at 06:24

## 2025-02-04 RX ADMIN — INSULIN LISPRO 8 UNITS: 100 INJECTION, SOLUTION INTRAVENOUS; SUBCUTANEOUS at 17:39

## 2025-02-04 RX ADMIN — MEMANTINE 10 MG: 10 TABLET ORAL at 21:14

## 2025-02-04 RX ADMIN — IPRATROPIUM BROMIDE AND ALBUTEROL SULFATE 3 ML: 2.5; .5 SOLUTION RESPIRATORY (INHALATION) at 19:03

## 2025-02-04 RX ADMIN — DOXYCYCLINE 100 MG: 100 INJECTION, POWDER, LYOPHILIZED, FOR SOLUTION INTRAVENOUS at 01:08

## 2025-02-04 RX ADMIN — INSULIN LISPRO 14 UNITS: 100 INJECTION, SOLUTION INTRAVENOUS; SUBCUTANEOUS at 06:51

## 2025-02-04 RX ADMIN — PANTOPRAZOLE SODIUM 40 MG: 40 TABLET, DELAYED RELEASE ORAL at 15:53

## 2025-02-04 RX ADMIN — ASPIRIN 81 MG: 81 TABLET, COATED ORAL at 21:18

## 2025-02-04 RX ADMIN — HEPARIN SODIUM 5000 UNITS: 5000 INJECTION INTRAVENOUS; SUBCUTANEOUS at 08:20

## 2025-02-04 RX ADMIN — Medication 10 ML: at 21:12

## 2025-02-04 RX ADMIN — Medication 10 ML: at 08:20

## 2025-02-04 RX ADMIN — AMIODARONE HYDROCHLORIDE 100 MG: 200 TABLET ORAL at 15:53

## 2025-02-04 RX ADMIN — INSULIN LISPRO 16 UNITS: 100 INJECTION, SOLUTION INTRAVENOUS; SUBCUTANEOUS at 11:54

## 2025-02-04 RX ADMIN — HEPARIN SODIUM 5000 UNITS: 5000 INJECTION INTRAVENOUS; SUBCUTANEOUS at 21:12

## 2025-02-04 RX ADMIN — IPRATROPIUM BROMIDE AND ALBUTEROL SULFATE 3 ML: 2.5; .5 SOLUTION RESPIRATORY (INHALATION) at 00:36

## 2025-02-04 RX ADMIN — MULTIVITAMIN TABLET 1 TABLET: TABLET at 15:53

## 2025-02-04 RX ADMIN — SPIRONOLACTONE 25 MG: 25 TABLET ORAL at 15:53

## 2025-02-04 RX ADMIN — DOXYCYCLINE 100 MG: 100 INJECTION, POWDER, LYOPHILIZED, FOR SOLUTION INTRAVENOUS at 14:12

## 2025-02-04 RX ADMIN — LISINOPRIL 5 MG: 2.5 TABLET ORAL at 15:53

## 2025-02-04 RX ADMIN — IPRATROPIUM BROMIDE AND ALBUTEROL SULFATE 3 ML: 2.5; .5 SOLUTION RESPIRATORY (INHALATION) at 12:38

## 2025-02-04 NOTE — PLAN OF CARE
Goal Outcome Evaluation:  Plan of Care Reviewed With: patient           Outcome Evaluation: PT is resting in bed at this time. Family at bedside.PT is on 2L NC. PT has had no complaints of pain or discomfort. VSS. Afebrile. Will Continue Plan of Care.                     Problem: Adult Inpatient Plan of Care  Goal: Plan of Care Review  Outcome: Progressing  Flowsheets (Taken 2/4/2025 1716)  Outcome Evaluation: PT is resting in bed at this time. Family at bedside.PT is on 2L NC. PT has had no complaints of pain or discomfort. VSS. Afebrile. Will Continue Plan of Care.  Plan of Care Reviewed With: patient  Goal: Patient-Specific Goal (Individualized)  Outcome: Progressing  Goal: Absence of Hospital-Acquired Illness or Injury  Outcome: Progressing  Intervention: Identify and Manage Fall Risk  Recent Flowsheet Documentation  Taken 2/4/2025 1700 by Iliana Nguyen RN  Safety Promotion/Fall Prevention:   activity supervised   assistive device/personal items within reach   clutter free environment maintained   fall prevention program maintained   nonskid shoes/slippers when out of bed   safety round/check completed   room organization consistent  Taken 2/4/2025 1500 by Iliana Nguyen, RN  Safety Promotion/Fall Prevention:   activity supervised   assistive device/personal items within reach   clutter free environment maintained   fall prevention program maintained   nonskid shoes/slippers when out of bed   safety round/check completed   room organization consistent  Taken 2/4/2025 1300 by Iliana Nguyen, RN  Safety Promotion/Fall Prevention:   activity supervised   assistive device/personal items within reach   clutter free environment maintained   fall prevention program maintained   nonskid shoes/slippers when out of bed   safety round/check completed   room organization consistent  Taken 2/4/2025 1100 by Iliana Nguyen, RN  Safety Promotion/Fall Prevention:   activity supervised   assistive device/personal items  within reach   clutter free environment maintained   fall prevention program maintained   nonskid shoes/slippers when out of bed   safety round/check completed   room organization consistent  Taken 2/4/2025 0900 by Iliana Nguyen RN  Safety Promotion/Fall Prevention:   activity supervised   assistive device/personal items within reach   clutter free environment maintained   fall prevention program maintained   nonskid shoes/slippers when out of bed   safety round/check completed   room organization consistent  Taken 2/4/2025 0845 by Iliana Nguyen RN  Safety Promotion/Fall Prevention:   activity supervised   assistive device/personal items within reach   clutter free environment maintained   fall prevention program maintained   nonskid shoes/slippers when out of bed   safety round/check completed   room organization consistent  Taken 2/4/2025 0700 by Iliana Nguyen RN  Safety Promotion/Fall Prevention:   activity supervised   assistive device/personal items within reach   clutter free environment maintained   fall prevention program maintained   nonskid shoes/slippers when out of bed   safety round/check completed   room organization consistent  Intervention: Prevent Skin Injury  Recent Flowsheet Documentation  Taken 2/4/2025 0845 by Iliana Nguyen RN  Body Position: position changed independently  Intervention: Prevent and Manage VTE (Venous Thromboembolism) Risk  Recent Flowsheet Documentation  Taken 2/4/2025 0845 by Iliana Nguyen RN  VTE Prevention/Management: (Heparin) other (see comments)  Intervention: Prevent Infection  Recent Flowsheet Documentation  Taken 2/4/2025 1700 by Iliana Nguyen RN  Infection Prevention: rest/sleep promoted  Taken 2/4/2025 1500 by Iliana Nguyen RN  Infection Prevention: rest/sleep promoted  Taken 2/4/2025 1300 by Iliana Nguyen RN  Infection Prevention: rest/sleep promoted  Taken 2/4/2025 1100 by Iliana Nguyen RN  Infection Prevention: rest/sleep  promoted  Taken 2/4/2025 0900 by Iliana Nguyen RN  Infection Prevention: rest/sleep promoted  Taken 2/4/2025 0845 by Iliana Nguyen RN  Infection Prevention: rest/sleep promoted  Taken 2/4/2025 0700 by Iliana Nguyen RN  Infection Prevention: rest/sleep promoted  Goal: Optimal Comfort and Wellbeing  Outcome: Progressing  Intervention: Provide Person-Centered Care  Recent Flowsheet Documentation  Taken 2/4/2025 0845 by Iliana Nguyen RN  Trust Relationship/Rapport:   care explained   choices provided   empathic listening provided   questions answered  Goal: Readiness for Transition of Care  Outcome: Progressing     Problem: Pneumonia  Goal: Fluid Balance  Outcome: Progressing  Intervention: Monitor and Manage Fluid Balance  Recent Flowsheet Documentation  Taken 2/4/2025 0845 by Iliana Nguyen RN  Fluid/Electrolyte Management: fluids provided  Goal: Absence of Infection Signs and Symptoms  Outcome: Progressing  Intervention: Prevent Infection Progression  Recent Flowsheet Documentation  Taken 2/4/2025 0845 by Iliana Nguyen RN  Infection Management: aseptic technique maintained  Goal: Effective Oxygenation and Ventilation  Outcome: Progressing  Intervention: Promote Airway Secretion Clearance  Recent Flowsheet Documentation  Taken 2/4/2025 0845 by Iliana Nguyen RN  Activity Management: activity encouraged  Intervention: Optimize Oxygenation and Ventilation  Recent Flowsheet Documentation  Taken 2/4/2025 0845 by Iliana Nguyen RN  Head of Bed (HOB) Positioning: HOB at 30-45 degrees  Airway/Ventilation Management: airway patency maintained     Problem: Fall Injury Risk  Goal: Absence of Fall and Fall-Related Injury  Outcome: Progressing  Intervention: Identify and Manage Contributors  Recent Flowsheet Documentation  Taken 2/4/2025 1700 by Iliana Nguyen RN  Medication Review/Management: medications reviewed  Taken 2/4/2025 1500 by Iliana Nguyen RN  Medication Review/Management:  medications reviewed  Taken 2/4/2025 1300 by Iliana Nguyen, RN  Medication Review/Management: medications reviewed  Taken 2/4/2025 1100 by Iliana Nguyen, RN  Medication Review/Management: medications reviewed  Taken 2/4/2025 0900 by Iliana Nguyen, RN  Medication Review/Management: medications reviewed  Taken 2/4/2025 0845 by Iliana Nguyen, RN  Medication Review/Management: medications reviewed  Self-Care Promotion:   independence encouraged   BADL personal objects within reach   BADL personal routines maintained  Taken 2/4/2025 0700 by Iliana Nguyen, RN  Medication Review/Management: medications reviewed  Intervention: Promote Injury-Free Environment  Recent Flowsheet Documentation  Taken 2/4/2025 1700 by Iliana Nguyen, RN  Safety Promotion/Fall Prevention:   activity supervised   assistive device/personal items within reach   clutter free environment maintained   fall prevention program maintained   nonskid shoes/slippers when out of bed   safety round/check completed   room organization consistent  Taken 2/4/2025 1500 by Iliana Nguyen, RN  Safety Promotion/Fall Prevention:   activity supervised   assistive device/personal items within reach   clutter free environment maintained   fall prevention program maintained   nonskid shoes/slippers when out of bed   safety round/check completed   room organization consistent  Taken 2/4/2025 1300 by Iliana Nguyen, RN  Safety Promotion/Fall Prevention:   activity supervised   assistive device/personal items within reach   clutter free environment maintained   fall prevention program maintained   nonskid shoes/slippers when out of bed   safety round/check completed   room organization consistent  Taken 2/4/2025 1100 by Iliana Nguyen, RN  Safety Promotion/Fall Prevention:   activity supervised   assistive device/personal items within reach   clutter free environment maintained   fall prevention program maintained   nonskid shoes/slippers when out of  bed   safety round/check completed   room organization consistent  Taken 2/4/2025 0900 by Iliana Nguyen, RN  Safety Promotion/Fall Prevention:   activity supervised   assistive device/personal items within reach   clutter free environment maintained   fall prevention program maintained   nonskid shoes/slippers when out of bed   safety round/check completed   room organization consistent  Taken 2/4/2025 0845 by Iliana Nguyen, RN  Safety Promotion/Fall Prevention:   activity supervised   assistive device/personal items within reach   clutter free environment maintained   fall prevention program maintained   nonskid shoes/slippers when out of bed   safety round/check completed   room organization consistent  Taken 2/4/2025 0700 by Iliana Nguyen, RN  Safety Promotion/Fall Prevention:   activity supervised   assistive device/personal items within reach   clutter free environment maintained   fall prevention program maintained   nonskid shoes/slippers when out of bed   safety round/check completed   room organization consistent

## 2025-02-04 NOTE — PROGRESS NOTES
[facility] HOSPITALIST PROGRESS NOTE     Patient Identification:  Name:  Shai Mata  Age:  79 y.o.  Sex:  male  :  1945  MRN:  [id number]  Visit Number:  [id number]  Primary Care Provider:  Ekaterina Cox APRN    Length of stay:  1    Chief complaint: None    Subjective:    Patient seen and examined at bedside with son and daughter present.  Patient is awake and conversant although he does appear to have some baseline mild confusion.  Patient reports he does not feel short of breath and specifically denies any fevers, chills, sweats, rigors, nausea or vomiting.  No new events overnight.  Patient states he feels well enough to go back home and that he does not feel ill and has no symptoms at this time.  ----------------------------------------------------------------------------------------------------------------------  Current Hospital Meds:  amiodarone, 100 mg, Oral, Daily  aspirin, 81 mg, Oral, BID  atorvastatin, 80 mg, Oral, Nightly  carvedilol, 6.25 mg, Oral, BID With Meals  cefTRIAXone, 1,000 mg, Intravenous, Q24H  doxycycline, 100 mg, Intravenous, Q12H  heparin (porcine), 5,000 Units, Subcutaneous, Q12H  insulin glargine, 40 Units, Subcutaneous, Nightly  insulin lispro, 4-24 Units, Subcutaneous, 4x Daily AC & at Bedtime  ipratropium-albuterol, 3 mL, Nebulization, Q6H - RT  levothyroxine, 75 mcg, Oral, QAM AC  lisinopril, 5 mg, Oral, Daily  memantine, 10 mg, Oral, Nightly  multivitamin, 1 tablet, Oral, Daily  pantoprazole, 40 mg, Oral, QAM AC  sodium chloride, 10 mL, Intravenous, Q12H  spironolactone, 25 mg, Oral, Daily         ----------------------------------------------------------------------------------------------------------------------  Vital Signs:  Temp:  [97.5 °F (36.4 °C)-98 °F (36.7 °C)] 97.5 °F (36.4 °C)  Heart Rate:  [69-80] 80  Resp:  [17-31] 31  BP: (112-147)/(58-76) 147/76      25  0056 25  0555 25  0500   Weight: 89.4 kg (197 lb) 89.4 kg (197 lb) (new  admit weight less than 24 hours) 88.6 kg (195 lb 4.8 oz)     Body mass index is 28.84 kg/m².    Intake/Output Summary (Last 24 hours) at 2/4/2025 1513  Last data filed at 2/4/2025 1200  Gross per 24 hour   Intake 600 ml   Output --   Net 600 ml     Diet: Regular/House, Cardiac, Diabetic, Renal; Healthy Heart (2-3 Na+); Consistent Carbohydrate; Low Sodium (2-3g); Fluid Consistency: Thin (IDDSI 0)  ----------------------------------------------------------------------------------------------------------------------  Physical exam:  Constitutional: Well-nourished  male in no apparent distress.     HENT:  Head:  Normocephalic and atraumatic.  Mouth:  Moist mucous membranes.    Eyes:  Conjunctivae and EOM are normal.  Pupils are equal, round, and reactive to light.  No scleral icterus.    Neck:  Neck supple. No thyromegaly.  No JVD present.    Cardiovascular:  Regular rate and rhythm with no murmurs, rubs, clicks or gallops appreciated.  Pulmonary/Chest:  Clear to auscultation bilaterally with no crackles, wheezes or rhonchi appreciated.  Abdominal:  Soft. Nontender. Nondistended  Bowel sounds are normal in all four quadrants. No organomegally appreciated.   Musculoskeletal:  No edema, no tenderness, and no deformity.  No red or swollen joints anywhere.    Neurological:  Alert, Cranial nerves II-XII intact with no focal deficits.  No facial droop.  No slurred speech.   Skin:  Warm and dry to palpation with no rashes or lesions appreciated.  Peripheral vascular:  2+ radial and pedal pulses in bilateral upper and lower extremities.  Psychiatric:  Alert and oriented to immediate surroundings.    ---------------------------------------------------------------------------------------  ----------------------------------------------------------------------------------------------------------------------  Results from last 7 days   Lab Units 02/02/25 2046 02/02/25  1937   HSTROP T ng/L 31* 31*     Results from last 7  "days   Lab Units 02/04/25  0019 02/03/25  0240 02/03/25  0112 02/03/25  0034 02/02/25 2046 02/02/25  1937   CRP mg/dL  --  9.71*  --   --  9.41*  --    LACTATE mmol/L  --   --   --  1.0  --   --    WBC 10*3/mm3 12.20*  --  7.42  --   --  8.10   HEMOGLOBIN g/dL 9.4*  --  9.9*  --   --  9.8*   HEMATOCRIT % 29.7*  --  30.9*  --   --  30.4*   MCV fL 94.6  --  93.9  --   --  92.7   MCHC g/dL 31.6  --  32.0  --   --  32.2   PLATELETS 10*3/mm3 188  --  179  --   --  174     Results from last 7 days   Lab Units 02/03/25  0006   PH, ARTERIAL pH units 7.473*   PO2 ART mm Hg 57.1*   PCO2, ARTERIAL mm Hg 29.4*   HCO3 ART mmol/L 21.5     Results from last 7 days   Lab Units 02/04/25  0019 02/03/25  0112 02/02/25 1937   SODIUM mmol/L 135* 135* 132*   POTASSIUM mmol/L 4.0 3.9 3.9   CHLORIDE mmol/L 106 104 102   CO2 mmol/L 17.8* 19.9* 18.3*   BUN mg/dL 26* 22 24*   CREATININE mg/dL 1.60* 1.23 1.30*   CALCIUM mg/dL 8.5* 8.4* 7.9*   GLUCOSE mg/dL 307* 218* 383*   ALBUMIN g/dL  --  3.4* 2.9*   BILIRUBIN mg/dL  --  0.8 1.0   ALK PHOS U/L  --  92 89   AST (SGOT) U/L  --  46* 49*   ALT (SGPT) U/L  --  42* 39   Estimated Creatinine Clearance: 40.6 mL/min (A) (by C-G formula based on SCr of 1.6 mg/dL (H)).    No results found for: \"AMMONIA\"      Blood Culture   Date Value Ref Range Status   02/03/2025 No growth at 24 hours  Preliminary   02/03/2025 No growth at 24 hours  Preliminary     No results found for: \"URINECX\"  No results found for: \"WOUNDCX\"  No results found for: \"STOOLCX\"    I have personally looked at the labs and they are summarized above.  ----------------------------------------------------------------------------------------------------------------------  Imaging Results (Last 24 Hours)       ** No results found for the last 24 hours. **          ----------------------------------------------------------------------------------------------------------------------  Assessment and Plan:    Acute hypoxic respiratory failure " -continue supplemental oxygen to maintain O2 saturation greater than 90%.  Likely secondary to multifocal pneumonia.    2.  Multifocal pneumonia -respiratory PCR within normal limits.  No sputum culture obtained.  Will continue to treat empirically with Rocephin and doxycycline.  Will monitor white blood cell count closely as it did rise today.  Clinically, patient does appear improved.    3.  Type 2 diabetes mellitus -continue Accu-Cheks before every meal and nightly with sliding scale insulin.    4.  Renal insufficiency -patient with mild increase in serum creatinine from 1.2 up to 1.6 today.  Will monitor closely with BMP in the morning.  Will give gentle IV fluid hydration today.    Disposition will continue titrating down supplemental oxygen as patient tolerates.  Will plan for discharge once patient is back on room air.    Abdoul Soria, DO   02/04/25   15:13 EST

## 2025-02-04 NOTE — NURSING NOTE
Transitional Care Note    Enrolled in [facility] Transitional Care Note    Enrolled in [facility] Transitional Care Services under theTCM Model to be followed for 6 weeks post discharge.  [facility] will assist with support and education at time of transition home from hospital.  Hospital  will follow throughout the stay at [facility].  Home  will visit within 48 hours of discharge and follow with home vitals and telephone contact for 6 weeks.      Patient admitted to [facility] via Emergency Department. Admitted for further treatment of PNE.    Explained transition to home program and daughter, Edmund, is agreeable to home visits.    Home  will be [facility]

## 2025-02-05 LAB
ANION GAP SERPL CALCULATED.3IONS-SCNC: 9.6 MMOL/L (ref 5–15)
BUN SERPL-MCNC: 25 MG/DL (ref 8–23)
BUN/CREAT SERPL: 21 (ref 7–25)
CALCIUM SPEC-SCNC: 8.5 MG/DL (ref 8.6–10.5)
CHLORIDE SERPL-SCNC: 103 MMOL/L (ref 98–107)
CO2 SERPL-SCNC: 21.4 MMOL/L (ref 22–29)
CREAT SERPL-MCNC: 1.19 MG/DL (ref 0.76–1.27)
DEPRECATED RDW RBC AUTO: 48.4 FL (ref 37–54)
EGFRCR SERPLBLD CKD-EPI 2021: 61.8 ML/MIN/1.73
ERYTHROCYTE [DISTWIDTH] IN BLOOD BY AUTOMATED COUNT: 14.1 % (ref 12.3–15.4)
GLUCOSE BLDC GLUCOMTR-MCNC: 137 MG/DL (ref 70–130)
GLUCOSE BLDC GLUCOMTR-MCNC: 173 MG/DL (ref 70–130)
GLUCOSE BLDC GLUCOMTR-MCNC: 188 MG/DL (ref 70–130)
GLUCOSE BLDC GLUCOMTR-MCNC: 195 MG/DL (ref 70–130)
GLUCOSE BLDC GLUCOMTR-MCNC: 208 MG/DL (ref 70–130)
GLUCOSE BLDC GLUCOMTR-MCNC: 243 MG/DL (ref 70–130)
GLUCOSE SERPL-MCNC: 229 MG/DL (ref 65–99)
HCT VFR BLD AUTO: 30.5 % (ref 37.5–51)
HGB BLD-MCNC: 9.6 G/DL (ref 13–17.7)
MCH RBC QN AUTO: 29.6 PG (ref 26.6–33)
MCHC RBC AUTO-ENTMCNC: 31.5 G/DL (ref 31.5–35.7)
MCV RBC AUTO: 94.1 FL (ref 79–97)
PLATELET # BLD AUTO: 227 10*3/MM3 (ref 140–450)
PMV BLD AUTO: 9.5 FL (ref 6–12)
POTASSIUM SERPL-SCNC: 4.5 MMOL/L (ref 3.5–5.2)
RBC # BLD AUTO: 3.24 10*6/MM3 (ref 4.14–5.8)
SODIUM SERPL-SCNC: 134 MMOL/L (ref 136–145)
WBC NRBC COR # BLD AUTO: 11.65 10*3/MM3 (ref 3.4–10.8)

## 2025-02-05 PROCEDURE — 63710000001 INSULIN LISPRO (HUMAN) PER 5 UNITS: Performed by: INTERNAL MEDICINE

## 2025-02-05 PROCEDURE — 94799 UNLISTED PULMONARY SVC/PX: CPT

## 2025-02-05 PROCEDURE — 82948 REAGENT STRIP/BLOOD GLUCOSE: CPT

## 2025-02-05 PROCEDURE — 94664 DEMO&/EVAL PT USE INHALER: CPT

## 2025-02-05 PROCEDURE — 80048 BASIC METABOLIC PNL TOTAL CA: CPT | Performed by: INTERNAL MEDICINE

## 2025-02-05 PROCEDURE — 97162 PT EVAL MOD COMPLEX 30 MIN: CPT

## 2025-02-05 PROCEDURE — 99232 SBSQ HOSP IP/OBS MODERATE 35: CPT | Performed by: INTERNAL MEDICINE

## 2025-02-05 PROCEDURE — 25010000002 FUROSEMIDE PER 20 MG: Performed by: INTERNAL MEDICINE

## 2025-02-05 PROCEDURE — 25010000002 HEPARIN (PORCINE) PER 1000 UNITS: Performed by: HOSPITALIST

## 2025-02-05 PROCEDURE — 25010000002 CEFTRIAXONE PER 250 MG: Performed by: HOSPITALIST

## 2025-02-05 PROCEDURE — 85027 COMPLETE CBC AUTOMATED: CPT | Performed by: INTERNAL MEDICINE

## 2025-02-05 RX ORDER — FUROSEMIDE 10 MG/ML
40 INJECTION INTRAMUSCULAR; INTRAVENOUS ONCE
Status: COMPLETED | OUTPATIENT
Start: 2025-02-05 | End: 2025-02-05

## 2025-02-05 RX ADMIN — HEPARIN SODIUM 5000 UNITS: 5000 INJECTION INTRAVENOUS; SUBCUTANEOUS at 09:48

## 2025-02-05 RX ADMIN — FUROSEMIDE 40 MG: 10 INJECTION, SOLUTION INTRAMUSCULAR; INTRAVENOUS at 17:40

## 2025-02-05 RX ADMIN — Medication 10 ML: at 20:57

## 2025-02-05 RX ADMIN — IPRATROPIUM BROMIDE AND ALBUTEROL SULFATE 3 ML: 2.5; .5 SOLUTION RESPIRATORY (INHALATION) at 19:02

## 2025-02-05 RX ADMIN — PANTOPRAZOLE SODIUM 40 MG: 40 TABLET, DELAYED RELEASE ORAL at 06:35

## 2025-02-05 RX ADMIN — LISINOPRIL 5 MG: 2.5 TABLET ORAL at 09:47

## 2025-02-05 RX ADMIN — Medication 10 ML: at 09:48

## 2025-02-05 RX ADMIN — CEFTRIAXONE 1000 MG: 1 INJECTION, POWDER, FOR SOLUTION INTRAMUSCULAR; INTRAVENOUS at 01:15

## 2025-02-05 RX ADMIN — CARVEDILOL 6.25 MG: 6.25 TABLET, FILM COATED ORAL at 09:48

## 2025-02-05 RX ADMIN — ASPIRIN 81 MG: 81 TABLET, COATED ORAL at 09:47

## 2025-02-05 RX ADMIN — INSULIN LISPRO 8 UNITS: 100 INJECTION, SOLUTION INTRAVENOUS; SUBCUTANEOUS at 12:23

## 2025-02-05 RX ADMIN — MEMANTINE 10 MG: 10 TABLET ORAL at 20:56

## 2025-02-05 RX ADMIN — DOXYCYCLINE 100 MG: 100 INJECTION, POWDER, LYOPHILIZED, FOR SOLUTION INTRAVENOUS at 14:19

## 2025-02-05 RX ADMIN — IPRATROPIUM BROMIDE AND ALBUTEROL SULFATE 3 ML: 2.5; .5 SOLUTION RESPIRATORY (INHALATION) at 00:22

## 2025-02-05 RX ADMIN — INSULIN LISPRO 4 UNITS: 100 INJECTION, SOLUTION INTRAVENOUS; SUBCUTANEOUS at 17:10

## 2025-02-05 RX ADMIN — DOXYCYCLINE 100 MG: 100 INJECTION, POWDER, LYOPHILIZED, FOR SOLUTION INTRAVENOUS at 01:14

## 2025-02-05 RX ADMIN — HEPARIN SODIUM 5000 UNITS: 5000 INJECTION INTRAVENOUS; SUBCUTANEOUS at 20:55

## 2025-02-05 RX ADMIN — CARVEDILOL 6.25 MG: 6.25 TABLET, FILM COATED ORAL at 17:40

## 2025-02-05 RX ADMIN — IPRATROPIUM BROMIDE AND ALBUTEROL SULFATE 3 ML: 2.5; .5 SOLUTION RESPIRATORY (INHALATION) at 06:49

## 2025-02-05 RX ADMIN — MULTIVITAMIN TABLET 1 TABLET: TABLET at 09:48

## 2025-02-05 RX ADMIN — LEVOTHYROXINE SODIUM 75 MCG: 0.07 TABLET ORAL at 06:35

## 2025-02-05 RX ADMIN — AMIODARONE HYDROCHLORIDE 100 MG: 200 TABLET ORAL at 09:48

## 2025-02-05 RX ADMIN — ATORVASTATIN CALCIUM 80 MG: 40 TABLET, FILM COATED ORAL at 20:55

## 2025-02-05 RX ADMIN — INSULIN LISPRO 4 UNITS: 100 INJECTION, SOLUTION INTRAVENOUS; SUBCUTANEOUS at 09:48

## 2025-02-05 RX ADMIN — IPRATROPIUM BROMIDE AND ALBUTEROL SULFATE 3 ML: 2.5; .5 SOLUTION RESPIRATORY (INHALATION) at 13:18

## 2025-02-05 RX ADMIN — ASPIRIN 81 MG: 81 TABLET, COATED ORAL at 20:56

## 2025-02-05 RX ADMIN — SPIRONOLACTONE 25 MG: 25 TABLET ORAL at 09:47

## 2025-02-05 NOTE — PROGRESS NOTES
[facility]     Patient Identification:  Name:  Shai Mata  Age:  79 y.o.  Sex:  male  :  1945  MRN:  [id number]  Visit Number:  [id number]  Primary Care Provider:  Ekaterina Cox APRN    Length of stay:  2    Chief complaint: None    Subjective:    Patient seen and examined at bedside with patient's daughter present.  Patient is pleasantly confused and appears at his baseline mentation.  Per nursing staff, no new events overnight.  Patient still requiring 2 L nasal cannula to maintain O2 saturation greater than 90%.  ----------------------------------------------------------------------------------------------------------------------  Current Hospital Meds:  amiodarone, 100 mg, Oral, Daily  aspirin, 81 mg, Oral, BID  atorvastatin, 80 mg, Oral, Nightly  carvedilol, 6.25 mg, Oral, BID With Meals  cefTRIAXone, 1,000 mg, Intravenous, Q24H  doxycycline, 100 mg, Intravenous, Q12H  heparin (porcine), 5,000 Units, Subcutaneous, Q12H  insulin glargine, 40 Units, Subcutaneous, Nightly  insulin lispro, 4-24 Units, Subcutaneous, 4x Daily AC & at Bedtime  ipratropium-albuterol, 3 mL, Nebulization, Q6H - RT  levothyroxine, 75 mcg, Oral, QAM AC  lisinopril, 5 mg, Oral, Daily  memantine, 10 mg, Oral, Nightly  multivitamin, 1 tablet, Oral, Daily  pantoprazole, 40 mg, Oral, QAM AC  sodium chloride, 10 mL, Intravenous, Q12H  spironolactone, 25 mg, Oral, Daily         ----------------------------------------------------------------------------------------------------------------------  Vital Signs:  Temp:  [97.6 °F (36.4 °C)-99.7 °F (37.6 °C)] 98.6 °F (37 °C)  Heart Rate:  [70-86] 70  Resp:  [16-18] 18  BP: (116-157)/(54-76) 134/68      25  0555 25  0500 25  0500   Weight: 89.4 kg (197 lb) (new admit weight less than 24 hours) 88.6 kg (195 lb 4.8 oz) 88.2 kg (194 lb 7.1 oz)     Body mass index is 28.71 kg/m².    Intake/Output Summary (Last 24 hours) at 2025  1728  Last data filed at 2/5/2025 1200  Gross per 24 hour   Intake 540 ml   Output 0 ml   Net 540 ml     Diet: Regular/House, Cardiac, Diabetic, Renal; Healthy Heart (2-3 Na+); Consistent Carbohydrate; Low Sodium (2-3g); Fluid Consistency: Thin (IDDSI 0)  ----------------------------------------------------------------------------------------------------------------------  Physical exam:  Constitutional: Well-nourished  male in no apparent distress.     HENT:  Head:  Normocephalic and atraumatic.  Mouth:  Moist mucous membranes.    Eyes:  Conjunctivae and EOM are normal.  Pupils are equal, round, and reactive to light.  No scleral icterus.    Neck:  Neck supple. No thyromegaly.  No JVD present.    Cardiovascular:  Regular rate and rhythm with no murmurs, rubs, clicks or gallops appreciated.  Pulmonary/Chest:  Clear to auscultation bilaterally with no crackles, wheezes or rhonchi appreciated.  Abdominal:  Soft. Nontender. Nondistended  Bowel sounds are normal in all four quadrants. No organomegally appreciated.   Musculoskeletal:  No edema, no tenderness, and no deformity.  No red or swollen joints anywhere.    Neurological:  Alert, Cranial nerves II-XII intact with no focal deficits.  No facial droop.  No slurred speech.   Skin:  Warm and dry to palpation with no rashes or lesions appreciated.  Peripheral vascular:  2+ radial and pedal pulses in bilateral upper and lower extremities.  Psychiatric:  Alert and oriented to immediate surroundings.    No significant change in physical exam in comparison to 2/4/2025  ---------------------------------------------------------------------------------------  ----------------------------------------------------------------------------------------------------------------------  Results from last 7 days   Lab Units 02/02/25 2046 02/02/25  1937   HSTROP T ng/L 31* 31*     Results from last 7 days   Lab Units 02/05/25  1247 02/04/25  0019 02/03/25  0240 02/03/25  0112  "02/03/25  0034 02/02/25 2046   CRP mg/dL  --   --  9.71*  --   --  9.41*   LACTATE mmol/L  --   --   --   --  1.0  --    WBC 10*3/mm3 11.65* 12.20*  --  7.42  --   --    HEMOGLOBIN g/dL 9.6* 9.4*  --  9.9*  --   --    HEMATOCRIT % 30.5* 29.7*  --  30.9*  --   --    MCV fL 94.1 94.6  --  93.9  --   --    MCHC g/dL 31.5 31.6  --  32.0  --   --    PLATELETS 10*3/mm3 227 188  --  179  --   --      Results from last 7 days   Lab Units 02/03/25  0006   PH, ARTERIAL pH units 7.473*   PO2 ART mm Hg 57.1*   PCO2, ARTERIAL mm Hg 29.4*   HCO3 ART mmol/L 21.5     Results from last 7 days   Lab Units 02/05/25  1247 02/04/25  0019 02/03/25  0112 02/02/25  1937   SODIUM mmol/L 134* 135* 135* 132*   POTASSIUM mmol/L 4.5 4.0 3.9 3.9   CHLORIDE mmol/L 103 106 104 102   CO2 mmol/L 21.4* 17.8* 19.9* 18.3*   BUN mg/dL 25* 26* 22 24*   CREATININE mg/dL 1.19 1.60* 1.23 1.30*   CALCIUM mg/dL 8.5* 8.5* 8.4* 7.9*   GLUCOSE mg/dL 229* 307* 218* 383*   ALBUMIN g/dL  --   --  3.4* 2.9*   BILIRUBIN mg/dL  --   --  0.8 1.0   ALK PHOS U/L  --   --  92 89   AST (SGOT) U/L  --   --  46* 49*   ALT (SGPT) U/L  --   --  42* 39   Estimated Creatinine Clearance: 54.4 mL/min (by C-G formula based on SCr of 1.19 mg/dL).    No results found for: \"AMMONIA\"      Blood Culture   Date Value Ref Range Status   02/03/2025 No growth at 24 hours  Preliminary   02/03/2025 No growth at 24 hours  Preliminary     No results found for: \"URINECX\"  No results found for: \"WOUNDCX\"  No results found for: \"STOOLCX\"    I have personally looked at the labs and they are summarized above.  ----------------------------------------------------------------------------------------------------------------------  Imaging Results (Last 24 Hours)       ** No results found for the last 24 hours. **          ----------------------------------------------------------------------------------------------------------------------  Assessment and Plan:    Acute hypoxic respiratory failure - " continue supplemental oxygen to maintain O2 saturation greater than 90%.  Likely secondary to multifocal pneumonia.    2.  Multifocal pneumonia -respiratory PCR within normal limits.  No sputum culture obtained.  Will continue to treat empirically with Rocephin and doxycycline.      3.  Type 2 diabetes mellitus -continue Accu-Cheks before every meal and nightly with sliding scale insulin.    4.  Renal insufficiency -renal function has returned to normal, repeat BMP in the morning.  Have stopped IV fluids.    Disposition will continue titrating down supplemental oxygen as patient tolerates.  Will plan for discharge once patient is back on room air.    Abdoul Soria,    02/05/25   17:28 EST

## 2025-02-05 NOTE — CASE MANAGEMENT/SOCIAL WORK
Discharge Planning Assessment  [facility]     Patient Name: Shai Mata  MRN: [id number]  Today's Date: 2/5/2025    Admit Date: 2/2/2025    Plan: Pt was admitted on 2/2/24. Pt lives alone. Pt's daughter states she goes to pt's house every morning and every night. PCP Is Ekaterina Cox. Pt does not utilize home health and DME on this date. Pt's daughters are co POA's. Pt's daughter states she will bring a copy of POA for pt's chartlet. Pt plans to return home at discharge with family to provide transportation. SS to follow.       Discharge Plan       Row Name 02/05/25 1333       Plan    Plan Pt was admitted on 2/2/24. Pt lives alone. Pt's daughter states she goes to pt's house every morning and every night. PCP Is Ekaterina Cox. Pt does not utilize home health and DME on this date. Pt's daughters are co POA's. Pt's daughter states she will bring a copy of POA for pt's chartlet. Pt plans to return home at discharge with family to provide transportation. SS to follow.        Expected Discharge Date and Time       Expected Discharge Date Expected Discharge Time    Feb 6, 2025      SABI Hope

## 2025-02-05 NOTE — THERAPY EVALUATION
Acute Care - Physical Therapy Initial Evaluation  LYNDA Iain     Patient Name: Shai Mata  : 1945  MRN: [id number]  Today's Date: 2025      Visit Dx:     ICD-10-CM ICD-9-CM   1. Multifocal pneumonia  J18.9 486   2. Acute hypoxic respiratory failure  J96.01 518.81     Patient Active Problem List   Diagnosis    ASCVD (arteriosclerotic cardiovascular disease)    S/P CABG x 5    Essential hypertension    PVC's (premature ventricular contractions)    Type 2 diabetes mellitus with hyperglycemia, with long-term current use of insulin    Hyperlipemia, mixed    Ischemic cardiomyopathy    Chronic systolic congestive heart failure    Ventricular tachycardia (paroxysmal)    Long term current use of antiarrhythmic medical therapy    Other specified hypothyroidism    Esophageal dysphagia    Nonrheumatic mitral valve regurgitation    S/P mitral valve clip implantation    Coronary artery disease involving coronary bypass graft of native heart without angina pectoris    Dyslipidemia    Multifocal pneumonia     Past Medical History:   Diagnosis Date    Abnormal heart rhythm     Arrhythmia     ASCVD (arteriosclerotic cardiovascular disease)     s/p CABG in  (5) [facility]    Chicken pox     Cholecystitis 2018    Added automatically from request for surgery [id number]      Congestive heart failure     Coronary artery disease     Disease of thyroid gland     Diverticulitis     DMII (diabetes mellitus, type 2)     type 2, checks fsbg as needed     Dyslipidemia     Elevated cholesterol     GERD (gastroesophageal reflux disease)     HTN (hypertension)     Hx of myocardial infarction, greater than 8 weeks         Hyperlipidemia     Kidney stone     Measles     Mitral regurgitation     MILD TO MODERATE    Mumps     PVC (premature ventricular contraction)     SOB (shortness of breath)     Vitamin D deficiency     Wears reading eyeglasses      Past Surgical History:   Procedure Laterality Date     CARDIAC CATHETERIZATION N/A 07/30/2024    Procedure: Left Heart Cath;  Surgeon: Randi Mason MD;  Location:  [facility] CATH [facility]VASIVE [facility];  Service: Cardiology;  Laterality: N/A;    CARDIAC DEFIBRILLATOR PLACEMENT      CARDIAC ELECTROPHYSIOLOGY PROCEDURE N/A 11/20/2017    Procedure: EP/Ablation PVC +/- ICD Implant;  Surgeon: Jose Maldonado MD;  Location:  [facility] EP INVASIVE LOCATION;  Service:     CARDIAC ELECTROPHYSIOLOGY PROCEDURE N/A 11/20/2017    Procedure: Biventricular Device Insertion;  Surgeon: Jose Maldonado MD;  Location:  [facility] EP INVASIVE [facility];  Service:     CATARACT EXTRACTION Bilateral     COLONOSCOPY      CORONARY ANGIOPLASTY      CORONARY ARTERY BYPASS GRAFT  2006    X 5.   [facility]    ENDOSCOPY N/A 05/17/2022    Procedure: ESOPHAGOGASTRODUODENOSCOPY WITH BIOPSY;  Surgeon: Roula Craig MD;  Location:  [facility] OR;  Service: Gastroenterology;  Laterality: N/A;    ENDOSCOPY N/A 07/27/2022    Procedure: ESOPHAGOGASTRODUODENOSCOPY WITH BIOPSY;  Surgeon: Roula Craig MD;  Location:  [facility] OR;  Service: Gastroenterology;  Laterality: N/A;    MITRAL VALVE REPAIR/REPLACEMENT N/A 9/25/2024    Procedure: Transcatheter Mitral Valve Repair;  Surgeon: Randi Mason MD;  Location: [facility] CATH INVASIVE LOCATION;  Service: Cardiovascular;  Laterality: N/A;    MITRAL VALVE REPAIR/REPLACEMENT  9/25/2024    Procedure: Transcatheter Mitral Valve Repair;  Surgeon: Ubaldo Webb MD;  [facility]:  [facility] CATH [facility];  Service: Cardiovascular;;    PACEMAKER IMPLANTATION      GA LAPAROSCOPY SURG CHOLECYSTECTOMY N/A 04/27/2018    Procedure: CHOLECYSTECTOMY LAPAROSCOPIC;  Surgeon: Eber Hummel MD;  Location: [facility] OR;  Service: General    TRANSESOPHAGEAL ECHOCARDIOGRAM (SHERLEY)  07/30/2024     PT Assessment (Last 12 Hours)       PT Evaluation and Treatment       Row Name 02/05/25 1536          Physical Therapy Time and Intention     Subjective Information no complaints  -KM     Document Type evaluation  -KM     Mode of Treatment individual therapy;physical therapy  -KM     Patient Effort good  -KM     Symptoms Noted During/After Treatment none  -KM       Row Name 02/05/25 1536          General Information    Patient Profile Reviewed yes  -KM     Patient Observations alert;cooperative;agree to therapy  -KM     Prior Level of Function independent:;all household mobility;ADL's  -KM     Existing Precautions/Restrictions fall;oxygen therapy device and L/min  -KM     Risks Reviewed patient and family:;LOB;nausea/vomiting;dizziness;increased discomfort  -KM     Benefits Reviewed patient and family:;improve function;increase independence;increase strength;increase balance  -KM     Barriers to Rehab none identified  -KM       Row Name 02/05/25 1536          Living Environment    Current Living Arrangements home  -KM     People in Home alone  -KM     Primary Care Provided by self  -KM       Row Name 02/05/25 1536          Home Use of Assistive/Adaptive Equipment    Equipment Currently Used at Home none  -KM       Row Name 02/05/25 1536          Cognition    Affect/Mental Status (Cognition) WFL  -     Orientation Status (Cognition) oriented x 4  -KM     Follows Commands (Cognition) WFL  -KM       Row Name 02/05/25 1536          Range of Motion (ROM)    Range of Motion bilateral lower extremities;ROM is L  -KM       Row Name 02/05/25 1536          Strength (Manual Muscle Testing)    Strength (Manual Muscle Testing) bilateral lower extremities;strength is [facility]  -       Row Name 02/05/25 1536          Bed Mobility    Bed Mobility bed mobility (all) activities  -KM     All Activities, [place] (Bed Mobility) standby assist  -KM       Row Name 02/05/25 1536          Transfers    Transfers sit-stand transfer;stand-sit transfer  -KM       Row Name 02/05/25 1536          Sit-Stand Transfer    Sit-Stand [place] (Transfers) contact guard  -        Row Name 02/05/25 1536          Stand-Sit Transfer    Stand-Sit [place] (Transfers) contact guard  -KM       Row Name 02/05/25 1536          Gait/Stairs (Locomotion)    Gait/Stairs Locomotion gait/ambulation independence;distance ambulated  -KM     [place] Level (Gait) contact guard  -KM     Patient was able to Ambulate yes  -KM     Distance in Feet (Gait) 100  -KM     Pattern (Gait) step-through  -KM     Deviations/Abnormal Patterns (Gait) gait speed decreased  -KM     Comment, (Gait/Stairs) pt. O2 sats dropped to 86% while ambulating on 3L O2 via nasal cannula  -KM       Row Name 02/05/25 1536          Safety Issues/Impairments Affecting Functional Mobility    Impairments Affecting Function (Mobility) endurance/activity tolerance;balance  -KM       Row Name 02/05/25 1536          Balance    Balance Assessment sitting static balance;standing dynamic balance  -KM     Static Sitting Balance supervision  -KM     Position, Sitting Balance sitting edge of bed  -KM     Dynamic Standing Balance contact guard  -KM       Row Name 02/05/25 1536          Plan of Care Review    Plan of Care Reviewed With patient  -KM     Outcome Evaluation Pt. evaluation completed during PT session. He was able to perform functional mobility skills w/ CGA. He was able to ambulate moderate distance w/ no AD. He tolerated session well. Pt. would benefit from PT services to maintain mobility.  -KM       Row Name 02/05/25 1536          Therapy Assessment/Plan (PT)    Patient/Family Therapy Goals Statement (PT) return home and take care of self  -KM     Functional Level at Time of Evaluation (PT) CGA  -KM     PT Diagnosis (PT) decreased endurance  -KM     Rehab Potential (PT) good  -KM     Criteria for Skilled Interventions Met (PT) yes;skilled treatment is necessary  -KM     Therapy Frequency (PT) 2 times/wk  2-5x/wk  -KM     Predicted Duration of Therapy Intervention (PT) until discharge  -KM     Problem List (PT) problems related  to;balance;mobility;strength  -KM     Activity Limitations Related to Problem List (PT) unable to ambulate safely;unable to transfer safely  -KM       Row Name 02/05/25 1536          Therapy Plan Review/Discharge Plan (PT)    Therapy Plan Review (PT) evaluation/treatment results reviewed;care plan/treatment goals reviewed;risks/benefits reviewed;patient  -KM       Row Name 02/05/25 1536          Physical Therapy Goals    Transfer Goal Selection (PT) transfer, PT goal 1  -KM     Gait Training Goal Selection (PT) gait training, PT goal 1  -KM       Row Name 02/05/25 1536          Transfer Goal 1 (PT)    Activity/Assistive Device (Transfer Goal 1, PT) transfers, all  -KM     [place] Level/Cues Needed (Transfer Goal 1, PT) independent  -KM     Time Frame (Transfer Goal 1, PT) by discharge  -       Row Name 02/05/25 1536          Gait Training Goal 1 (PT)    Activity/Assistive Device (Gait Training Goal 1, PT) gait (walking locomotion)  -KM     [place] Level (Gait Training Goal 1, PT) independent  -KM     Distance (Gait Training Goal 1, PT) 250'  -KM     Time Frame (Gait Training Goal 1, PT) by discharge  -               User Key  (r) = Recorded By, (t) = Taken By, (c) = Cosigned By      Initials Name Provider Type    Junior Boss, PT Physical Therapist                    Physical Therapy Education       Title: PT OT SLP Therapies (Done)       Topic: Physical Therapy (Done)       Point: Mobility training (Done)       Learning Progress Summary            Patient Acceptance, E,TB, VU by KM at 2/5/2025 1550                      Point: Home exercise program (Done)       Learning Progress Summary            Patient Acceptance, E,TB, VU by KM at 2/5/2025 1550                      Point: Body mechanics (Done)       Learning Progress Summary            Patient Acceptance, E,TB, VU by KM at 2/5/2025 1550                      Point: Precautions (Done)       Learning Progress Summary            Patient Acceptance,  E,TB, VU by  at 2/5/2025 1550                                      User Key       Initials Effective Dates Name Provider Type Discipline     05/24/22 -  Junior Turcios, PT Physical Therapist PT                  PT Recommendation and Plan  Anticipated Discharge Disposition (PT): home with assist  Planned Therapy Interventions (PT): balance training, bed mobility training, gait training, home exercise program, patient/family education, postural re-education, ROM (range of motion), stair training, strengthening, stretching, transfer training  Therapy Frequency (PT): 2 times/wk (2-5x/wk)  Plan of Care Reviewed With: patient  Outcome Evaluation: Pt. evaluation completed during PT session. He was able to perform functional mobility skills w/ CGA. He was able to ambulate moderate distance w/ no AD. He tolerated session well. Pt. would benefit from PT services to maintain mobility.       Time Calculation:    PT Charges       Row Name 02/05/25 1535             Time Calculation    PT Received On 02/05/25  -KM      PT Goal Re-Cert Due Date 02/19/25  -KM                User Key  (r) = Recorded By, (t) = Taken By, (c) = Cosigned By      Initials Name Provider Type     Junior Turcios, PT Physical Therapist                  Therapy Charges for Today       Code Description Service Date Service Provider Modifiers Qty    45469653990 HC PT EVAL MOD COMPLEXITY 4 2/5/2025 Junior Turcios, PT GP 1            PT G-Codes  AM-PAC 6 Clicks Score (PT): 18    Junior Turcios PT  2/5/2025

## 2025-02-05 NOTE — PLAN OF CARE
Goal Outcome Evaluation:   Patient resting well during shift. Family at bedside. No complaints or concerns at this time. No distress noted. Plan of care reviewed with the patient and family no questions at this time.

## 2025-02-05 NOTE — PLAN OF CARE
Goal Outcome Evaluation:   Patient is currently resting in bed. Disoriented to place, time and situation. VSS. No visible s/s of acute distress noted at this time. No requests or complaints at this time. Plan of care ongoing.

## 2025-02-06 ENCOUNTER — APPOINTMENT (OUTPATIENT)
Dept: CT IMAGING | Facility: HOSPITAL | Age: 80
DRG: 177 | End: 2025-02-06
Payer: MEDICARE

## 2025-02-06 ENCOUNTER — APPOINTMENT (OUTPATIENT)
Dept: GENERAL RADIOLOGY | Facility: HOSPITAL | Age: 80
DRG: 177 | End: 2025-02-06
Payer: MEDICARE

## 2025-02-06 LAB
ANION GAP SERPL CALCULATED.3IONS-SCNC: 8.9 MMOL/L (ref 5–15)
BUN SERPL-MCNC: 25 MG/DL (ref 8–23)
BUN/CREAT SERPL: 20.5 (ref 7–25)
CALCIUM SPEC-SCNC: 8.6 MG/DL (ref 8.6–10.5)
CHLORIDE SERPL-SCNC: 102 MMOL/L (ref 98–107)
CO2 SERPL-SCNC: 24.1 MMOL/L (ref 22–29)
CREAT SERPL-MCNC: 1.22 MG/DL (ref 0.76–1.27)
DEPRECATED RDW RBC AUTO: 47 FL (ref 37–54)
EGFRCR SERPLBLD CKD-EPI 2021: 59.9 ML/MIN/1.73
ERYTHROCYTE [DISTWIDTH] IN BLOOD BY AUTOMATED COUNT: 14 % (ref 12.3–15.4)
GLUCOSE BLDC GLUCOMTR-MCNC: 178 MG/DL (ref 70–130)
GLUCOSE BLDC GLUCOMTR-MCNC: 223 MG/DL (ref 70–130)
GLUCOSE BLDC GLUCOMTR-MCNC: 252 MG/DL (ref 70–130)
GLUCOSE BLDC GLUCOMTR-MCNC: 279 MG/DL (ref 70–130)
GLUCOSE SERPL-MCNC: 149 MG/DL (ref 65–99)
HCT VFR BLD AUTO: 29.3 % (ref 37.5–51)
HGB BLD-MCNC: 9.6 G/DL (ref 13–17.7)
MCH RBC QN AUTO: 30.1 PG (ref 26.6–33)
MCHC RBC AUTO-ENTMCNC: 32.8 G/DL (ref 31.5–35.7)
MCV RBC AUTO: 91.8 FL (ref 79–97)
PLATELET # BLD AUTO: 212 10*3/MM3 (ref 140–450)
PMV BLD AUTO: 9.7 FL (ref 6–12)
POTASSIUM SERPL-SCNC: 4.2 MMOL/L (ref 3.5–5.2)
PROCALCITONIN SERPL-MCNC: 0.15 NG/ML (ref 0–0.25)
RBC # BLD AUTO: 3.19 10*6/MM3 (ref 4.14–5.8)
SODIUM SERPL-SCNC: 135 MMOL/L (ref 136–145)
WBC NRBC COR # BLD AUTO: 11.99 10*3/MM3 (ref 3.4–10.8)

## 2025-02-06 PROCEDURE — 71045 X-RAY EXAM CHEST 1 VIEW: CPT

## 2025-02-06 PROCEDURE — 63710000001 INSULIN LISPRO (HUMAN) PER 5 UNITS: Performed by: INTERNAL MEDICINE

## 2025-02-06 PROCEDURE — 71045 X-RAY EXAM CHEST 1 VIEW: CPT | Performed by: RADIOLOGY

## 2025-02-06 PROCEDURE — 94664 DEMO&/EVAL PT USE INHALER: CPT

## 2025-02-06 PROCEDURE — 63710000001 INSULIN GLARGINE PER 5 UNITS: Performed by: INTERNAL MEDICINE

## 2025-02-06 PROCEDURE — 94799 UNLISTED PULMONARY SVC/PX: CPT

## 2025-02-06 PROCEDURE — 71250 CT THORAX DX C-: CPT | Performed by: RADIOLOGY

## 2025-02-06 PROCEDURE — 97530 THERAPEUTIC ACTIVITIES: CPT

## 2025-02-06 PROCEDURE — 71250 CT THORAX DX C-: CPT

## 2025-02-06 PROCEDURE — 80048 BASIC METABOLIC PNL TOTAL CA: CPT | Performed by: INTERNAL MEDICINE

## 2025-02-06 PROCEDURE — 25010000002 CEFTRIAXONE PER 250 MG: Performed by: HOSPITALIST

## 2025-02-06 PROCEDURE — 85027 COMPLETE CBC AUTOMATED: CPT | Performed by: INTERNAL MEDICINE

## 2025-02-06 PROCEDURE — 84145 PROCALCITONIN (PCT): CPT | Performed by: INTERNAL MEDICINE

## 2025-02-06 PROCEDURE — 99232 SBSQ HOSP IP/OBS MODERATE 35: CPT | Performed by: INTERNAL MEDICINE

## 2025-02-06 PROCEDURE — 94761 N-INVAS EAR/PLS OXIMETRY MLT: CPT

## 2025-02-06 PROCEDURE — 25010000002 HEPARIN (PORCINE) PER 1000 UNITS: Performed by: HOSPITALIST

## 2025-02-06 PROCEDURE — 82948 REAGENT STRIP/BLOOD GLUCOSE: CPT

## 2025-02-06 RX ADMIN — DOXYCYCLINE 100 MG: 100 INJECTION, POWDER, LYOPHILIZED, FOR SOLUTION INTRAVENOUS at 03:12

## 2025-02-06 RX ADMIN — INSULIN LISPRO 8 UNITS: 100 INJECTION, SOLUTION INTRAVENOUS; SUBCUTANEOUS at 17:20

## 2025-02-06 RX ADMIN — IPRATROPIUM BROMIDE AND ALBUTEROL SULFATE 3 ML: 2.5; .5 SOLUTION RESPIRATORY (INHALATION) at 13:43

## 2025-02-06 RX ADMIN — INSULIN GLARGINE 40 UNITS: 100 INJECTION, SOLUTION SUBCUTANEOUS at 20:16

## 2025-02-06 RX ADMIN — HEPARIN SODIUM 5000 UNITS: 5000 INJECTION INTRAVENOUS; SUBCUTANEOUS at 09:03

## 2025-02-06 RX ADMIN — SPIRONOLACTONE 25 MG: 25 TABLET ORAL at 09:02

## 2025-02-06 RX ADMIN — INSULIN LISPRO 12 UNITS: 100 INJECTION, SOLUTION INTRAVENOUS; SUBCUTANEOUS at 12:32

## 2025-02-06 RX ADMIN — CARVEDILOL 6.25 MG: 6.25 TABLET, FILM COATED ORAL at 09:02

## 2025-02-06 RX ADMIN — AMIODARONE HYDROCHLORIDE 100 MG: 200 TABLET ORAL at 09:02

## 2025-02-06 RX ADMIN — LISINOPRIL 5 MG: 2.5 TABLET ORAL at 09:02

## 2025-02-06 RX ADMIN — IPRATROPIUM BROMIDE AND ALBUTEROL SULFATE 3 ML: 2.5; .5 SOLUTION RESPIRATORY (INHALATION) at 18:43

## 2025-02-06 RX ADMIN — Medication 10 ML: at 09:03

## 2025-02-06 RX ADMIN — MEMANTINE 10 MG: 10 TABLET ORAL at 20:15

## 2025-02-06 RX ADMIN — Medication 10 ML: at 20:28

## 2025-02-06 RX ADMIN — IPRATROPIUM BROMIDE AND ALBUTEROL SULFATE 3 ML: 2.5; .5 SOLUTION RESPIRATORY (INHALATION) at 00:20

## 2025-02-06 RX ADMIN — ASPIRIN 81 MG: 81 TABLET, COATED ORAL at 09:02

## 2025-02-06 RX ADMIN — ASPIRIN 81 MG: 81 TABLET, COATED ORAL at 20:15

## 2025-02-06 RX ADMIN — CEFTRIAXONE 1000 MG: 1 INJECTION, POWDER, FOR SOLUTION INTRAMUSCULAR; INTRAVENOUS at 03:12

## 2025-02-06 RX ADMIN — PANTOPRAZOLE SODIUM 40 MG: 40 TABLET, DELAYED RELEASE ORAL at 09:02

## 2025-02-06 RX ADMIN — CARVEDILOL 6.25 MG: 6.25 TABLET, FILM COATED ORAL at 17:20

## 2025-02-06 RX ADMIN — HEPARIN SODIUM 5000 UNITS: 5000 INJECTION INTRAVENOUS; SUBCUTANEOUS at 20:15

## 2025-02-06 RX ADMIN — INSULIN LISPRO 4 UNITS: 100 INJECTION, SOLUTION INTRAVENOUS; SUBCUTANEOUS at 09:03

## 2025-02-06 RX ADMIN — IPRATROPIUM BROMIDE AND ALBUTEROL SULFATE 3 ML: 2.5; .5 SOLUTION RESPIRATORY (INHALATION) at 07:06

## 2025-02-06 RX ADMIN — INSULIN LISPRO 12 UNITS: 100 INJECTION, SOLUTION INTRAVENOUS; SUBCUTANEOUS at 20:16

## 2025-02-06 RX ADMIN — LEVOTHYROXINE SODIUM 75 MCG: 0.07 TABLET ORAL at 09:02

## 2025-02-06 RX ADMIN — ATORVASTATIN CALCIUM 80 MG: 40 TABLET, FILM COATED ORAL at 20:15

## 2025-02-06 RX ADMIN — MULTIVITAMIN TABLET 1 TABLET: TABLET at 09:02

## 2025-02-06 RX ADMIN — DOXYCYCLINE 100 MG: 100 INJECTION, POWDER, LYOPHILIZED, FOR SOLUTION INTRAVENOUS at 16:06

## 2025-02-06 NOTE — PLAN OF CARE
Pt resting in bed at this time, pt is on 2L NC. Had to titrate from 1.5L to 2L NC due to pts O2 dropping throughout the night. No complaints or distress noted. VSS afebrile. Plan of care ongoing.

## 2025-02-06 NOTE — PLAN OF CARE
Goal Outcome Evaluation:      Patient resting in bed this shift. IV ABX cont. Echo pending to evaluate for possible CHF. Plan of care ongoing.

## 2025-02-06 NOTE — PROGRESS NOTES
[facility] HOSPITALIST PROGRESS NOTE     Patient Identification:  Name:  Shai Mata  Age:  79 y.o.  Sex:  male  :  1945  MRN:  [id number]  Visit Number:  [id number]  Primary Care Provider:  Ekaterina Cox APRN    Length of stay:  3    Chief complaint: None    Subjective:    Patient seen and examined at bedside with patient's daughter present.  Patient remains pleasantly confused and appears at his baseline functioning status.  Did attempt to discontinue oxygen at bedside but patient did have recurrent oxygen desaturation into the mid 80s.  Patient however denies any acute symptoms and specifically denies any shortness of breath, productive cough, fevers, chills or any other symptoms at this time.  ----------------------------------------------------------------------------------------------------------------------  Current Hospital Meds:  amiodarone, 100 mg, Oral, Daily  aspirin, 81 mg, Oral, BID  atorvastatin, 80 mg, Oral, Nightly  carvedilol, 6.25 mg, Oral, BID With Meals  cefTRIAXone, 1,000 mg, Intravenous, Q24H  doxycycline, 100 mg, Intravenous, Q12H  heparin (porcine), 5,000 Units, Subcutaneous, Q12H  insulin glargine, 40 Units, Subcutaneous, Nightly  insulin lispro, 4-24 Units, Subcutaneous, 4x Daily AC & at Bedtime  ipratropium-albuterol, 3 mL, Nebulization, Q6H - RT  levothyroxine, 75 mcg, Oral, QAM AC  lisinopril, 5 mg, Oral, Daily  memantine, 10 mg, Oral, Nightly  multivitamin, 1 tablet, Oral, Daily  pantoprazole, 40 mg, Oral, QAM AC  sodium chloride, 10 mL, Intravenous, Q12H  spironolactone, 25 mg, Oral, Daily         ----------------------------------------------------------------------------------------------------------------------  Vital Signs:  Temp:  [98.1 °F (36.7 °C)-98.8 °F (37.1 °C)] 98.1 °F (36.7 °C)  Heart Rate:  [60-82] 60  Resp:  [18] 18  BP: (119-139)/(44-72) 132/69      25  0500 25  0500 25  0500   Weight: 88.6 kg (195 lb 4.8 oz) 88.2 kg (194 lb 7.1  oz) 88.8 kg (195 lb 12.8 oz)     Body mass index is 28.91 kg/m².  No intake or output data in the 24 hours ending 02/06/25 0448    Diet: Regular/House, Cardiac, Diabetic, Renal; Healthy Heart (2-3 Na+); Consistent Carbohydrate; Low Sodium (2-3g); Fluid Consistency: Thin (IDDSI 0)  ----------------------------------------------------------------------------------------------------------------------  Physical exam:  Constitutional: Well-nourished  male in no apparent distress.     HENT:  Head:  Normocephalic and atraumatic.  Mouth:  Moist mucous membranes.    Eyes:  Conjunctivae and EOM are normal.  Pupils are equal, round, and reactive to light.  No scleral icterus.    Neck:  Neck supple. No thyromegaly.  No JVD present.    Cardiovascular:  Regular rate and rhythm with no murmurs, rubs, clicks or gallops appreciated.  Pulmonary/Chest:  Clear to auscultation bilaterally with no crackles, wheezes or rhonchi appreciated.  Abdominal:  Soft. Nontender. Nondistended  Bowel sounds are normal in all four quadrants. No organomegally appreciated.   Musculoskeletal:  No edema, no tenderness, and no deformity.  No red or swollen joints anywhere.    Neurological:  Alert, Cranial nerves II-XII intact with no focal deficits.  No facial droop.  No slurred speech.   Skin:  Warm and dry to palpation with no rashes or lesions appreciated.  Peripheral vascular:  2+ radial and pedal pulses in bilateral upper and lower extremities.  Psychiatric:  Alert and oriented to immediate surroundings.    No significant change in physical exam in comparison to 2/5/2025  ---------------------------------------------------------------------------------------  ----------------------------------------------------------------------------------------------------------------------  Results from last 7 days   Lab Units 02/02/25 2046 02/02/25  1937   HSTROP T ng/L 31* 31*     Results from last 7 days   Lab Units 02/06/25  0123 02/05/25  1247  "02/04/25  0019 02/03/25  0240 02/03/25  0112 02/03/25  0034 02/02/25  2046   CRP mg/dL  --   --   --  9.71*  --   --  9.41*   LACTATE mmol/L  --   --   --   --   --  1.0  --    WBC 10*3/mm3 11.99* 11.65* 12.20*  --    < >  --   --    HEMOGLOBIN g/dL 9.6* 9.6* 9.4*  --    < >  --   --    HEMATOCRIT % 29.3* 30.5* 29.7*  --    < >  --   --    MCV fL 91.8 94.1 94.6  --    < >  --   --    MCHC g/dL 32.8 31.5 31.6  --    < >  --   --    PLATELETS 10*3/mm3 212 227 188  --    < >  --   --     < > = values in this interval not displayed.     Results from last 7 days   Lab Units 02/03/25  0006   PH, ARTERIAL pH units 7.473*   PO2 ART mm Hg 57.1*   PCO2, ARTERIAL mm Hg 29.4*   HCO3 ART mmol/L 21.5     Results from last 7 days   Lab Units 02/06/25  0123 02/05/25  1247 02/04/25  0019 02/03/25  0112 02/02/25  1937   SODIUM mmol/L 135* 134* 135* 135* 132*   POTASSIUM mmol/L 4.2 4.5 4.0 3.9 3.9   CHLORIDE mmol/L 102 103 106 104 102   CO2 mmol/L 24.1 21.4* 17.8* 19.9* 18.3*   BUN mg/dL 25* 25* 26* 22 24*   CREATININE mg/dL 1.22 1.19 1.60* 1.23 1.30*   CALCIUM mg/dL 8.6 8.5* 8.5* 8.4* 7.9*   GLUCOSE mg/dL 149* 229* 307* 218* 383*   ALBUMIN g/dL  --   --   --  3.4* 2.9*   BILIRUBIN mg/dL  --   --   --  0.8 1.0   ALK PHOS U/L  --   --   --  92 89   AST (SGOT) U/L  --   --   --  46* 49*   ALT (SGPT) U/L  --   --   --  42* 39   Estimated Creatinine Clearance: 53.2 mL/min (by C-G formula based on SCr of 1.22 mg/dL).    No results found for: \"AMMONIA\"      Blood Culture   Date Value Ref Range Status   02/03/2025 No growth at 24 hours  Preliminary   02/03/2025 No growth at 24 hours  Preliminary     No results found for: \"URINECX\"  No results found for: \"WOUNDCX\"  No results found for: \"STOOLCX\"    I have personally looked at the labs and they are summarized above.  ----------------------------------------------------------------------------------------------------------------------  Imaging Results (Last 24 Hours)       Procedure Component " Value Units Date/Time    CT Chest Without Contrast Diagnostic [[id number]] Resulted: 02/06/25 1431     Updated: 02/06/25 1431    XR Chest 1 View [[id number]] Collected: 02/06/25 1112     Updated: 02/06/25 1115    Narrative:      EXAM:    XR Chest, 1 View     EXAM DATE:    2/6/2025 10:50 AM     CLINICAL HISTORY:    follow up pna; J18.9-Pneumonia, unspecified organism; J96.01-Acute  respiratory failure with hypoxia     TECHNIQUE:    Frontal view of the chest.     COMPARISON:    2/2/2025     FINDINGS:    LUNGS AND PLEURAL SPACES:  Patchy bilateral upper lobe opacity.  No  pneumothorax.    HEART:  Cardiomegaly.  Coronary artery bypass graft (CABG).    MEDIASTINUM:  Unremarkable as visualized.  Normal mediastinal contour.    BONES/JOINTS:  Median sternotomy.  No acute fracture.    TUBES, LINES AND DEVICES:  Automatic implantable cardioverter  defibrillator (AICD).       Impression:      1.  Patchy bilateral upper lobe opacity. Findings may represent  pneumonia.  2.  Cardiomegaly.     This report was finalized on 2/6/2025 11:13 AM by Dr. Juan Pierson MD.             ----------------------------------------------------------------------------------------------------------------------  Assessment and Plan:    Acute hypoxic respiratory failure -patient with persistent hypoxic respiratory failure, will obtain CT chest without contrast.  Most recent transthoracic echo from 10/30/2024 demonstrates mildly decreased left ventricular systolic function with estimated EF 45%.  In the setting of history of MitraClip placement, will repeat transthoracic echo at this time to rule out any further complications of valvular insufficiency or CHF.  Continue supplemental oxygen to maintain O2 saturation greater than 90%.     2.  Multifocal pneumonia - respiratory PCR within normal limits.  No sputum culture obtained.  Will continue to treat empirically with Rocephin and doxycycline.      3.  Type 2 diabetes mellitus -continue Accu-Cheks  before every meal and nightly with sliding scale insulin.    4.  Renal insufficiency -renal function has returned to normal, repeat BMP in the morning.  Have stopped IV fluids.    Disposition will continue titrating down supplemental oxygen as patient tolerates.  Will plan for discharge once patient is back on room air.    Abdoul Soria, DO   02/06/25   14:38 EST

## 2025-02-06 NOTE — THERAPY TREATMENT NOTE
Acute Care - Physical Therapy Treatment Note  LYNDA Timmons     Patient Name: Shai Mata  : 1945  MRN: [id number]  Today's Date: 2025      Visit Dx:     ICD-10-CM ICD-9-CM   1. Multifocal pneumonia  J18.9 486   2. Acute hypoxic respiratory failure  J96.01 518.81     Patient Active Problem List   Diagnosis    ASCVD (arteriosclerotic cardiovascular disease)    S/P CABG x 5    Essential hypertension    PVC's (premature ventricular contractions)    Type 2 diabetes mellitus with hyperglycemia, with long-term current use of insulin    Hyperlipemia, mixed    Ischemic cardiomyopathy    Chronic systolic congestive heart failure    Ventricular tachycardia (paroxysmal)    Long term current use of antiarrhythmic medical therapy    Other specified hypothyroidism    Esophageal dysphagia    Nonrheumatic mitral valve regurgitation    S/P mitral valve clip implantation    Coronary artery disease involving coronary bypass graft of native heart without angina pectoris    Dyslipidemia    Multifocal pneumonia     Past Medical History:   Diagnosis Date    Abnormal heart rhythm     Arrhythmia     ASCVD (arteriosclerotic cardiovascular disease)     s/p CABG in  (5) [facility]    Chicken pox     Cholecystitis 2018    Added automatically from request for surgery [id number]      Congestive heart failure     Coronary artery disease     Disease of thyroid gland     Diverticulitis     DMII (diabetes mellitus, type 2)     type 2, checks fsbg as needed     Dyslipidemia     Elevated cholesterol     GERD (gastroesophageal reflux disease)     HTN (hypertension)     Hx of myocardial infarction, greater than 8 weeks         Hyperlipidemia     Kidney stone     Measles     Mitral regurgitation     MILD TO MODERATE    Mumps     PVC (premature ventricular contraction)     SOB (shortness of breath)     Vitamin D deficiency     Wears reading eyeglasses      Past Surgical History:   Procedure Laterality Date    CARDIAC  CATHETERIZATION N/A 07/30/2024    Procedure: Left Heart Cath;  Surgeon: Randi Mason MD;  [facility]:  [facility] CATH [facility]IVE [facility];  Service: Cardiology;  Laterality: N/A;    CARDIAC DEFIBRILLATOR PLACEMENT      CARDIAC ELECTROPHYSIOLOGY PROCEDURE N/A 11/20/2017    Procedure: EP/Ablation PVC +/- ICD Implant;  Surgeon: Jose Maldonado MD;  Location:  [facility] EP INVASIVE [facility];  Service:     CARDIAC ELECTROPHYSIOLOGY PROCEDURE N/A 11/20/2017    Procedure: Biventricular Device Insertion;  Surgeon: Jose Maldonado MD;  Location:  [facility] EP INVASIVE [facility];  Service:     CATARACT EXTRACTION Bilateral     COLONOSCOPY      CORONARY ANGIOPLASTY      CORONARY ARTERY BYPASS GRAFT  2006    X 5.   [facility]    ENDOSCOPY N/A 05/17/2022    Procedure: ESOPHAGOGASTRODUODENOSCOPY WITH BIOPSY;  Surgeon: Roula Craig MD;  Location:  [facility] OR;  Service: Gastroenterology;  Laterality: N/A;    ENDOSCOPY N/A 07/27/2022    Procedure: ESOPHAGOGASTRODUODENOSCOPY WITH BIOPSY;  Surgeon: Roula Craig MD;  Location:  [facility] OR;  Service: Gastroenterology;  Laterality: N/A;    MITRAL VALVE REPAIR/REPLACEMENT N/A 9/25/2024    Procedure: Transcatheter Mitral Valve Repair;  Surgeon: Randi Mason MD;  Location: [facility] CATH INVASIVE LOCATION;  Service: Cardiovascular;  Laterality: N/A;    MITRAL VALVE REPAIR/REPLACEMENT  9/25/2024    Procedure: Transcatheter Mitral Valve Repair;  Surgeon: Ubaldo Webb MD;  [facility]  [facility];  Service: Cardiovascular;;    PACEMAKER IMPLANTATION      [place] LAPAROSCOPY SURG CHOLECYSTECTOMY N/A 04/27/2018    Procedure: CHOLECYSTECTOMY LAPAROSCOPIC;  Surgeon: Eber Hummel MD;  Location: [facility] OR;  Service: General    TRANSESOPHAGEAL ECHOCARDIOGRAM (SHERLEY)  07/30/2024     PT Assessment (Last 12 Hours)       PT Evaluation and Treatment       Row Name 02/06/25 1225          Physical Therapy Time and Intention    Subjective  Information no complaints  -KM     Document Type therapy note (daily note)  -KM     Mode of Treatment individual therapy;physical therapy  -KM     Patient Effort good  -KM     Symptoms Noted During/After Treatment shortness of breath  -KM       Row Name 02/06/25 1225          General Information    Patient Profile Reviewed yes  -KM     Patient Observations alert;cooperative;agree to therapy  -KM     Existing Precautions/Restrictions fall;oxygen therapy device and L/min  -KM       Row Name 02/06/25 1225          Cognition    Affect/Mental Status (Cognition) WFL  -KM     Orientation Status (Cognition) oriented x 4  -KM     Follows Commands (Cognition) WFL  -KM       Row Name 02/06/25 1225          Bed Mobility    Bed Mobility bed mobility (all) activities  -KM     All Activities, [place] (Bed Mobility) standby assist  -KM       Row Name 02/06/25 1225          Transfers    Transfers sit-stand transfer;stand-sit transfer  -KM     Comment, (Transfers) pt. O2 dropped between 84-88% upon standing  -KM       Row Name 02/06/25 1225          Sit-Stand Transfer    Sit-Stand [place] (Transfers) contact guard  -KM       Row Name 02/06/25 1225          Stand-Sit Transfer    Stand-Sit [place] (Transfers) contact guard  -KM       Row Name 02/06/25 1225          Gait/Stairs (Locomotion)    Patient was able to Ambulate no, other medical factors prevent ambulation  -KM     Reason Patient was unable to Ambulate Hypoxia/Respiratory Distress  -KM       Row Name 02/06/25 1225          Safety Issues/Impairments Affecting Functional Mobility    Impairments Affecting Function (Mobility) endurance/activity tolerance;balance  -KM       Row Name 02/06/25 1225          Progress Summary (PT)    Daily Progress Summary (PT) Pt. was able to perform mobility skills w/ SBA. He performed activity on room air. Pt. O2 sats decreased as low as 84% on room air w/ static standing. GATO Caceres notified of pt. O2 sats w/ mobility. Pt. would  continue to benefit from PT services.  -               User Key  (r) = Recorded By, (t) = Taken By, (c) = Cosigned By      Initials Name Provider Type    Junior Boss PT Physical Therapist                    Physical Therapy Education       Title: PT OT SLP Therapies (Done)       Topic: Physical Therapy (Done)       Point: Mobility training (Done)       Learning Progress Summary            Patient Acceptance, E,TB, VU by DAMON at 2/5/2025 1550                      Point: Home exercise program (Done)       Learning Progress Summary            Patient Acceptance, E,TB, VU by DAMON at 2/5/2025 1550                      Point: Body mechanics (Done)       Learning Progress Summary            Patient Acceptance, E,TB, VU by  at 2/5/2025 1550                      Point: Precautions (Done)       Learning Progress Summary            Patient Acceptance, E,TB, VU by DAMON at 2/5/2025 1550                                      User Key       Initials Effective Dates Name Provider Type [facility] 05/24/22 -  Junior Turcios, PT Physical Therapist PT                  PT Recommendation and Plan  Anticipated Discharge Disposition (PT): home with assist  Planned Therapy Interventions (PT): balance training, bed mobility training, gait training, home exercise program, patient/family education, postural re-education, ROM (range of motion), stair training, strengthening, stretching, transfer training  Therapy Frequency (PT): 2 times/wk (2-5x/wk)  Progress Summary (PT)  Daily Progress Summary (PT): Pt. was able to perform mobility skills w/ SBA. He performed activity on room air. Pt. O2 sats decreased as low as 84% on room air w/ static standing. GATO Caceres notified of pt. O2 sats w/ mobility. Pt. would continue to benefit from PT services.  Plan of Care Reviewed With: patient  Outcome Evaluation: Pt. evaluation completed during PT session. He was able to perform functional mobility skills w/ CGA. He was able to ambulate moderate  distance w/ no AD. He tolerated session well. Pt. would benefit from PT services to maintain mobility.       Time Calculation:    PT Charges       Row Name 02/06/25 1224             Time Calculation    PT Received On 02/06/25  -KM         Time Calculation- PT    Total Timed Code Minutes- PT 15 minute(s)  -KM                User Key  (r) = Recorded By, (t) = Taken By, (c) = Cosigned By      Initials Name Provider Type     Junior Turcios, PT Physical Therapist                  Therapy Charges for Today       Code Description Service Date Service Provider Modifiers Qty    16973621539  PT EVAL MOD COMPLEXITY 4 2/5/2025 Junior Turcios, PT GP 1    [id number]  PT THERAPEUTIC ACT EA 15 MIN 2/6/2025 Junior Turcios, PT GP 1            PT G-Codes  AM-PAC 6 Clicks Score (PT): 20    Junior Turcios PT  2/6/2025

## 2025-02-07 ENCOUNTER — APPOINTMENT (OUTPATIENT)
Dept: CARDIOLOGY | Facility: HOSPITAL | Age: 80
DRG: 177 | End: 2025-02-07
Payer: MEDICARE

## 2025-02-07 ENCOUNTER — READMISSION MANAGEMENT (OUTPATIENT)
Dept: CALL CENTER | Facility: HOSPITAL | Age: 80
End: 2025-02-07
Payer: MEDICARE

## 2025-02-07 VITALS
SYSTOLIC BLOOD PRESSURE: 123 MMHG | RESPIRATION RATE: 18 BRPM | BODY MASS INDEX: 29.18 KG/M2 | WEIGHT: 197 LBS | OXYGEN SATURATION: 95 % | HEIGHT: 69 IN | DIASTOLIC BLOOD PRESSURE: 46 MMHG | HEART RATE: 74 BPM | TEMPERATURE: 97.8 F

## 2025-02-07 PROBLEM — J15.69 PNEUMONIA DUE TO OTHER GRAM-NEGATIVE BACTERIA: Status: ACTIVE | Noted: 2025-02-07

## 2025-02-07 LAB
ANION GAP SERPL CALCULATED.3IONS-SCNC: 10 MMOL/L (ref 5–15)
AV MEAN PRESS GRAD SYS DOP V1V2: 11 MMHG
AV VMAX SYS DOP: 228 CM/SEC
BH CV ECHO MEAS - ACS: 1.6 CM
BH CV ECHO MEAS - AI P1/2T: 490.5 MSEC
BH CV ECHO MEAS - AO MAX PG: 20.8 MMHG
BH CV ECHO MEAS - AO ROOT DIAM: 3.5 CM
BH CV ECHO MEAS - AO V2 VTI: 50.9 CM
BH CV ECHO MEAS - AVA(I,D): 1.51 CM2
BH CV ECHO MEAS - EDV(CUBED): 227 ML
BH CV ECHO MEAS - EDV(MOD-SP2): 215 ML
BH CV ECHO MEAS - EDV(MOD-SP4): 207 ML
BH CV ECHO MEAS - EF(MOD-SP2): 44.7 %
BH CV ECHO MEAS - EF(MOD-SP4): 45.4 %
BH CV ECHO MEAS - ESV(CUBED): 110.6 ML
BH CV ECHO MEAS - ESV(MOD-SP2): 119 ML
BH CV ECHO MEAS - ESV(MOD-SP4): 113 ML
BH CV ECHO MEAS - FS: 21.3 %
BH CV ECHO MEAS - IVS/LVPW: 0.89 CM
BH CV ECHO MEAS - IVSD: 0.8 CM
BH CV ECHO MEAS - LA DIMENSION: 3.7 CM
BH CV ECHO MEAS - LAT PEAK E' VEL: 8.7 CM/SEC
BH CV ECHO MEAS - LV DIASTOLIC VOL/BSA (35-75): 101.7 CM2
BH CV ECHO MEAS - LV MASS(C)D: 206.6 GRAMS
BH CV ECHO MEAS - LV MAX PG: 4.1 MMHG
BH CV ECHO MEAS - LV MEAN PG: 2 MMHG
BH CV ECHO MEAS - LV SYSTOLIC VOL/BSA (12-30): 55.5 CM2
BH CV ECHO MEAS - LV V1 MAX: 101 CM/SEC
BH CV ECHO MEAS - LV V1 VTI: 22.2 CM
BH CV ECHO MEAS - LVIDD: 6.1 CM
BH CV ECHO MEAS - LVIDS: 4.8 CM
BH CV ECHO MEAS - LVOT AREA: 3.5 CM2
BH CV ECHO MEAS - LVOT DIAM: 2.1 CM
BH CV ECHO MEAS - LVPWD: 0.9 CM
BH CV ECHO MEAS - MED PEAK E' VEL: 8.3 CM/SEC
BH CV ECHO MEAS - MR MAX PG: 134.4 MMHG
BH CV ECHO MEAS - MR MAX VEL: 579.7 CM/SEC
BH CV ECHO MEAS - MR MEAN PG: 85 MMHG
BH CV ECHO MEAS - MR MEAN VEL: 435 CM/SEC
BH CV ECHO MEAS - MR VTI: 185 CM
BH CV ECHO MEAS - MV A MAX VEL: 123 CM/SEC
BH CV ECHO MEAS - MV DEC SLOPE: 501 CM/SEC2
BH CV ECHO MEAS - MV DEC TIME: 0.33 SEC
BH CV ECHO MEAS - MV E MAX VEL: 164 CM/SEC
BH CV ECHO MEAS - MV E/A: 1.33
BH CV ECHO MEAS - MV MAX PG: 15.7 MMHG
BH CV ECHO MEAS - MV MEAN PG: 6 MMHG
BH CV ECHO MEAS - MV V2 VTI: 45 CM
BH CV ECHO MEAS - MVA(VTI): 1.71 CM2
BH CV ECHO MEAS - PA ACC TIME: 0.09 SEC
BH CV ECHO MEAS - PA V2 MAX: 90.4 CM/SEC
BH CV ECHO MEAS - RAP SYSTOLE: 10 MMHG
BH CV ECHO MEAS - RVSP: 47 MMHG
BH CV ECHO MEAS - SV(LVOT): 76.9 ML
BH CV ECHO MEAS - SV(MOD-SP2): 96 ML
BH CV ECHO MEAS - SV(MOD-SP4): 94 ML
BH CV ECHO MEAS - SVI(LVOT): 37.8 ML/M2
BH CV ECHO MEAS - SVI(MOD-SP2): 47.2 ML/M2
BH CV ECHO MEAS - SVI(MOD-SP4): 46.2 ML/M2
BH CV ECHO MEAS - TAPSE (>1.6): 2.35 CM
BH CV ECHO MEAS - TR MAX PG: 37 MMHG
BH CV ECHO MEAS - TR MAX VEL: 304 CM/SEC
BH CV ECHO MEASUREMENTS AVERAGE E/E' RATIO: 19.29
BUN SERPL-MCNC: 20 MG/DL (ref 8–23)
BUN/CREAT SERPL: 16.7 (ref 7–25)
CALCIUM SPEC-SCNC: 8.5 MG/DL (ref 8.6–10.5)
CHLORIDE SERPL-SCNC: 104 MMOL/L (ref 98–107)
CO2 SERPL-SCNC: 25 MMOL/L (ref 22–29)
CREAT SERPL-MCNC: 1.2 MG/DL (ref 0.76–1.27)
DEPRECATED RDW RBC AUTO: 47 FL (ref 37–54)
EGFRCR SERPLBLD CKD-EPI 2021: 61.1 ML/MIN/1.73
ERYTHROCYTE [DISTWIDTH] IN BLOOD BY AUTOMATED COUNT: 14.1 % (ref 12.3–15.4)
GLUCOSE BLDC GLUCOMTR-MCNC: 157 MG/DL (ref 70–130)
GLUCOSE BLDC GLUCOMTR-MCNC: 173 MG/DL (ref 70–130)
GLUCOSE BLDC GLUCOMTR-MCNC: 243 MG/DL (ref 70–130)
GLUCOSE BLDC GLUCOMTR-MCNC: 270 MG/DL (ref 70–130)
GLUCOSE BLDC GLUCOMTR-MCNC: 45 MG/DL (ref 70–130)
GLUCOSE SERPL-MCNC: 42 MG/DL (ref 65–99)
HCT VFR BLD AUTO: 29.7 % (ref 37.5–51)
HGB BLD-MCNC: 9.6 G/DL (ref 13–17.7)
LEFT ATRIUM VOLUME INDEX: 45.9 ML/M2
LV EF BIPLANE MOD: 46.8 %
MCH RBC QN AUTO: 29.5 PG (ref 26.6–33)
MCHC RBC AUTO-ENTMCNC: 32.3 G/DL (ref 31.5–35.7)
MCV RBC AUTO: 91.4 FL (ref 79–97)
PLATELET # BLD AUTO: 262 10*3/MM3 (ref 140–450)
PMV BLD AUTO: 9.4 FL (ref 6–12)
POTASSIUM SERPL-SCNC: 3.6 MMOL/L (ref 3.5–5.2)
RBC # BLD AUTO: 3.25 10*6/MM3 (ref 4.14–5.8)
SODIUM SERPL-SCNC: 139 MMOL/L (ref 136–145)
WBC NRBC COR # BLD AUTO: 9.66 10*3/MM3 (ref 3.4–10.8)

## 2025-02-07 PROCEDURE — 25010000002 HEPARIN (PORCINE) PER 1000 UNITS: Performed by: HOSPITALIST

## 2025-02-07 PROCEDURE — 94664 DEMO&/EVAL PT USE INHALER: CPT

## 2025-02-07 PROCEDURE — 93306 TTE W/DOPPLER COMPLETE: CPT | Performed by: INTERNAL MEDICINE

## 2025-02-07 PROCEDURE — 99239 HOSP IP/OBS DSCHRG MGMT >30: CPT | Performed by: INTERNAL MEDICINE

## 2025-02-07 PROCEDURE — 85027 COMPLETE CBC AUTOMATED: CPT | Performed by: INTERNAL MEDICINE

## 2025-02-07 PROCEDURE — 63710000001 INSULIN LISPRO (HUMAN) PER 5 UNITS: Performed by: INTERNAL MEDICINE

## 2025-02-07 PROCEDURE — 25010000002 CEFTRIAXONE PER 250 MG: Performed by: HOSPITALIST

## 2025-02-07 PROCEDURE — 93306 TTE W/DOPPLER COMPLETE: CPT

## 2025-02-07 PROCEDURE — 94799 UNLISTED PULMONARY SVC/PX: CPT

## 2025-02-07 PROCEDURE — 82948 REAGENT STRIP/BLOOD GLUCOSE: CPT

## 2025-02-07 PROCEDURE — 97530 THERAPEUTIC ACTIVITIES: CPT

## 2025-02-07 PROCEDURE — 80048 BASIC METABOLIC PNL TOTAL CA: CPT | Performed by: INTERNAL MEDICINE

## 2025-02-07 PROCEDURE — 97116 GAIT TRAINING THERAPY: CPT

## 2025-02-07 RX ADMIN — CARVEDILOL 6.25 MG: 6.25 TABLET, FILM COATED ORAL at 18:20

## 2025-02-07 RX ADMIN — CARVEDILOL 6.25 MG: 6.25 TABLET, FILM COATED ORAL at 10:14

## 2025-02-07 RX ADMIN — SPIRONOLACTONE 25 MG: 25 TABLET ORAL at 10:14

## 2025-02-07 RX ADMIN — LEVOTHYROXINE SODIUM 75 MCG: 0.07 TABLET ORAL at 10:14

## 2025-02-07 RX ADMIN — INSULIN LISPRO 4 UNITS: 100 INJECTION, SOLUTION INTRAVENOUS; SUBCUTANEOUS at 10:15

## 2025-02-07 RX ADMIN — IPRATROPIUM BROMIDE AND ALBUTEROL SULFATE 3 ML: 2.5; .5 SOLUTION RESPIRATORY (INHALATION) at 00:12

## 2025-02-07 RX ADMIN — DEXTROSE MONOHYDRATE 25 G: 25 INJECTION, SOLUTION INTRAVENOUS at 01:34

## 2025-02-07 RX ADMIN — LISINOPRIL 5 MG: 2.5 TABLET ORAL at 10:14

## 2025-02-07 RX ADMIN — Medication 10 ML: at 10:15

## 2025-02-07 RX ADMIN — IPRATROPIUM BROMIDE AND ALBUTEROL SULFATE 3 ML: 2.5; .5 SOLUTION RESPIRATORY (INHALATION) at 06:37

## 2025-02-07 RX ADMIN — INSULIN LISPRO 12 UNITS: 100 INJECTION, SOLUTION INTRAVENOUS; SUBCUTANEOUS at 14:37

## 2025-02-07 RX ADMIN — DOXYCYCLINE 100 MG: 100 INJECTION, POWDER, LYOPHILIZED, FOR SOLUTION INTRAVENOUS at 14:37

## 2025-02-07 RX ADMIN — INSULIN LISPRO 8 UNITS: 100 INJECTION, SOLUTION INTRAVENOUS; SUBCUTANEOUS at 18:20

## 2025-02-07 RX ADMIN — HEPARIN SODIUM 5000 UNITS: 5000 INJECTION INTRAVENOUS; SUBCUTANEOUS at 10:15

## 2025-02-07 RX ADMIN — IPRATROPIUM BROMIDE AND ALBUTEROL SULFATE 3 ML: 2.5; .5 SOLUTION RESPIRATORY (INHALATION) at 12:24

## 2025-02-07 RX ADMIN — AMIODARONE HYDROCHLORIDE 100 MG: 200 TABLET ORAL at 10:14

## 2025-02-07 RX ADMIN — CEFTRIAXONE 1000 MG: 1 INJECTION, POWDER, FOR SOLUTION INTRAMUSCULAR; INTRAVENOUS at 01:57

## 2025-02-07 RX ADMIN — DOXYCYCLINE 100 MG: 100 INJECTION, POWDER, LYOPHILIZED, FOR SOLUTION INTRAVENOUS at 01:56

## 2025-02-07 RX ADMIN — PANTOPRAZOLE SODIUM 40 MG: 40 TABLET, DELAYED RELEASE ORAL at 10:14

## 2025-02-07 RX ADMIN — MULTIVITAMIN TABLET 1 TABLET: TABLET at 10:14

## 2025-02-07 RX ADMIN — ASPIRIN 81 MG: 81 TABLET, COATED ORAL at 10:15

## 2025-02-07 NOTE — THERAPY TREATMENT NOTE
Acute Care - Physical Therapy Treatment Note  LYNDA Timmons     Patient Name: Shai Mata  : 1945  MRN: [id number]  Today's Date: 2025      Visit Dx:     ICD-10-CM ICD-9-CM   1. Multifocal pneumonia  J18.9 486   2. Acute hypoxic respiratory failure  J96.01 518.81     Patient Active Problem List   Diagnosis    ASCVD (arteriosclerotic cardiovascular disease)    S/P CABG x 5    Essential hypertension    PVC's (premature ventricular contractions)    Type 2 diabetes mellitus with hyperglycemia, with long-term current use of insulin    Hyperlipemia, mixed    Ischemic cardiomyopathy    Chronic systolic congestive heart failure    Ventricular tachycardia (paroxysmal)    Long term current use of antiarrhythmic medical therapy    Other specified hypothyroidism    Esophageal dysphagia    Nonrheumatic mitral valve regurgitation    S/P mitral valve clip implantation    Coronary artery disease involving coronary bypass graft of native heart without angina pectoris    Dyslipidemia    Multifocal pneumonia     Past Medical History:   Diagnosis Date    Abnormal heart rhythm     Arrhythmia     ASCVD (arteriosclerotic cardiovascular disease)     s/p CABG in  (5) [facility]    Chicken pox     Cholecystitis 2018    Added automatically from request for surgery [id number]      Congestive heart failure     Coronary artery disease     Disease of thyroid gland     Diverticulitis     DMII (diabetes mellitus, type 2)     type 2, checks fsbg as needed     Dyslipidemia     Elevated cholesterol     GERD (gastroesophageal reflux disease)     HTN (hypertension)     Hx of myocardial infarction, greater than 8 weeks         Hyperlipidemia     Kidney stone     Measles     Mitral regurgitation     MILD TO MODERATE    Mumps     PVC (premature ventricular contraction)     SOB (shortness of breath)     Vitamin D deficiency     Wears reading eyeglasses      Past Surgical History:   Procedure Laterality Date    CARDIAC  CATHETERIZATION N/A 07/30/2024    Procedure: Left Heart Cath;  Surgeon: Randi Mason MD;  [facility];  Service: Cardiology;  Laterality: N/A;    CARDIAC DEFIBRILLATOR PLACEMENT      CARDIAC ELECTROPHYSIOLOGY PROCEDURE N/A 11/20/2017    Procedure: EP/Ablation PVC +/- ICD Implant;  Surgeon: Jose Maldonado MD;  [facility]  [facility] EP [facility]VASIVE [facility];  Service:     CARDIAC ELECTROPHYSIOLOGY PROCEDURE N/A 11/20/2017    Procedure: Biventricular Device Insertion;  Surgeon: Jose Maldonado MD;  [facility]:  [facility] EP INVASIVE [facility];  Service:     CATARACT EXTRACTION Bilateral     COLONOSCOPY      CORONARY ANGIOPLASTY      CORONARY ARTERY BYPASS GRAFT  2006    X 5.   [facility]    ENDOSCOPY N/A 05/17/2022    Procedure: ESOPHAGOGASTRODUODENOSCOPY WITH BIOPSY;  Surgeon: Roula Craig MD;  Location:  [facility] OR;  Service: Gastroenterology;  Laterality: N/A;    ENDOSCOPY N/A 07/27/2022    Procedure: ESOPHAGOGASTRODUODENOSCOPY WITH BIOPSY;  Surgeon: Roula Craig MD;  Location:  [facility] OR;  Service: Gastroenterology;  Laterality: N/A;    MITRAL VALVE REPAIR/REPLACEMENT N/A 9/25/2024    Procedure: Transcatheter Mitral Valve Repair;  Surgeon: Randi Mason MD;  Location: [facility] CATH INVASIVE LOCATION;  Service: Cardiovascular;  Laterality: N/A;    MITRAL VALVE REPAIR/REPLACEMENT  9/25/2024    Procedure: Transcatheter Mitral Valve Repair;  Surgeon: Ubaldo Webb MD;  [facility]  [facility] CATH [facility];  Service: Cardiovascular;;    PACEMAKER IMPLANTATION      [place] LAPAROSCOPY SURG CHOLECYSTECTOMY N/A 04/27/2018    Procedure: CHOLECYSTECTOMY LAPAROSCOPIC;  Surgeon: Eber Hummel MD;  Location: [facility] OR;  Service: General    TRANSESOPHAGEAL ECHOCARDIOGRAM (SHERLEY)  07/30/2024     PT Assessment (Last 12 Hours)       PT Evaluation and Treatment       Row Name 02/07/25 7551          Physical Therapy Time and Intention    Subjective  Information no complaints  -KM     Document Type therapy note (daily note)  -KM     Mode of Treatment individual therapy;physical therapy  -KM     Patient Effort good  -KM     Symptoms Noted During/After Treatment none  -KM       Row Name 02/07/25 1358          General Information    Patient Profile Reviewed yes  -KM     Patient Observations alert;cooperative;agree to therapy  -KM     Existing Precautions/Restrictions fall  -KM       Row Name 02/07/25 1358          Cognition    Affect/Mental Status (Cognition) WFL  -KM     Orientation Status (Cognition) oriented x 4  -KM     Follows Commands (Cognition) WFL  -KM       Row Name 02/07/25 1358          Bed Mobility    Bed Mobility bed mobility (all) activities  -KM     All Activities, [place] (Bed Mobility) standby assist  -KM       Row Name 02/07/25 1358          Transfers    Transfers sit-stand transfer;stand-sit transfer  -KM       Row Name 02/07/25 1358          Sit-Stand Transfer    Sit-Stand [place] (Transfers) standby assist  -KM       Row Name 02/07/25 1358          Stand-Sit Transfer    Stand-Sit [place] (Transfers) standby assist  -KM       Row Name 02/07/25 1358          Gait/Stairs (Locomotion)    Gait/Stairs Locomotion gait/ambulation independence;distance ambulated  -KM     [place] Level (Gait) standby assist  -KM     Patient was able to Ambulate yes  -KM     Distance in Feet (Gait) 100  -KM     Pattern (Gait) step-through  -KM     Deviations/Abnormal Patterns (Gait) gait speed decreased  -KM     Comment, (Gait/Stairs) pt. O2 sats remained >90% w/ all activity on room air  -KM       Row Name 02/07/25 1358          Safety Issues/Impairments Affecting Functional Mobility    Impairments Affecting Function (Mobility) endurance/activity tolerance;balance  -KM       Row Name 02/07/25 1358          Progress Summary (PT)    Daily Progress Summary (PT) Pt. was able to demonstrate functional mobility skills w/ SBA. He was able to ambulate  moderate distance w/ SBA. Pt. O2 sats remained >90% throughout session on room air. Pt. would continue to benefit from PT services at this time.  -               User Key  (r) = Recorded By, (t) = Taken By, (c) = Cosigned By      Initials Name Provider Type    Junior Boss, PANCHO Physical Therapist                    Physical Therapy Education       Title: PT OT SLP Therapies (In Progress)       Topic: Physical Therapy (In Progress)       Point: Mobility training (In Progress)       Learning Progress Summary            Patient Acceptance, E, NR by  at 2/7/2025 0057    Acceptance, E,TB, VU by  at 2/5/2025 1550                      Point: Home exercise program (In Progress)       Learning Progress Summary            Patient Acceptance, E, NR by  at 2/7/2025 0057    Acceptance, E,TB, VU by KM at 2/5/2025 1550                      Point: Body mechanics (In Progress)       Learning Progress Summary            Patient Acceptance, E, NR by  at 2/7/2025 0057    Acceptance, E,TB, VU by  at 2/5/2025 1550                      Point: Precautions (In Progress)       Learning Progress Summary            Patient Acceptance, E, NR by  at 2/7/2025 0057    Acceptance, E,TB, VU by  at 2/5/2025 1550                                      User Key       Initials Effective Dates Name Provider Type Discipline     05/24/22 -  Junior Turcios, PANCHO Physical Therapist PT     06/21/23 -  Jojo Mckeon RN Registered Nurse Nurse                  PT Recommendation and Plan  Anticipated Discharge Disposition (PT): home with assist  Planned Therapy Interventions (PT): balance training, bed mobility training, gait training, home exercise program, patient/family education, postural re-education, ROM (range of motion), stair training, strengthening, stretching, transfer training  Therapy Frequency (PT): 2 times/wk (2-5x/wk)  Progress Summary (PT)  Daily Progress Summary (PT): Pt. was able to demonstrate functional mobility skills w/  SBA. He was able to ambulate moderate distance w/ SBA. Pt. O2 sats remained >90% throughout session on room air. Pt. would continue to benefit from PT services at this time.  Plan of Care Reviewed With: patient  Outcome Evaluation: Pt. evaluation completed during PT session. He was able to perform functional mobility skills w/ CGA. He was able to ambulate moderate distance w/ no AD. He tolerated session well. Pt. would benefit from PT services to maintain mobility.       Time Calculation:    PT Charges       Row Name 02/07/25 1357             Time Calculation    PT Received On 02/07/25  -KM         Time Calculation- PT    Total Timed Code Minutes- PT 23 minute(s)  -KM                User Key  (r) = Recorded By, (t) = Taken By, (c) = Cosigned By      Initials Name Provider Type    Junior Boss, PT Physical Therapist                  Therapy Charges for Today       Code Description Service Date Service Provider Modifiers Qty    94890853497 HC PT THERAPEUTIC ACT EA 15 MIN 2/6/2025 Junior Turcios, PT GP 1    93709908033 HC PT THERAPEUTIC ACT EA 15 MIN 2/7/2025 Junior Turcios, PT GP 1    90659370625 HC GAIT TRAINING EA 15 MIN 2/7/2025 Junior Turcios, PT GP 1            PT G-Codes  AM-PAC 6 Clicks Score (PT): 20    Junior Turcios PT  2/7/2025

## 2025-02-07 NOTE — CONSULTS
Referring Provider: Yang  Reason for Consultation: Abnormal CT chest-pulmonary fibrosis      Chief complaint -shortness of breath      History of present illness: Patient is a 79-year-old male with past medical history positive for chronic heart failure with reduced ejection fraction of 45% on October 2024 echo, coronary artery disease status post CABG, insulin-dependent type 2 DM, hypertension, hyperlipidemia, hypothyroidism, status post current pacemaker, status post mitral valve repair, replacement and mild to early dementia presented with productive cough.    Pulmonary and critical care consulted for abnormal CT chest showing bilateral upper lobe pulmonary infiltrates.  All the labs medications ins and outs and vitals since the time of admission reviewed.  On presentation patient's white count was high  Procalcitonin was mildly high but patient was in renal failure.  Speech and swallow   Never had pulmonary issues   Was given Steroids -     Case was discussed with patient's nurse, primary team and patient's daughter at bedside.  CT scan and all the labs and other images were shown to patient's daughter with patient's permission and answered her questions to her satisfaction.  Plan discussed.    Review of Systems  History obtained from chart review and the patient  General ROS: negative for - chills, fatigue or fever  Psychological ROS: negative for - anxiety or depression  ENT ROS: negative for - headaches, visual changes or vocal changes  Respiratory ROS: positive for - cough and shortness of breath  Cardiovascular ROS: no chest pain or dyspnea on exertion  Gastrointestinal ROS: no abdominal pain, change in bowel habits, or black or bloody stools  Musculoskeletal ROS: negative for - joint pain, joint stiffness or joint swelling  Neurological ROS: no TIA or stroke symptoms  Hematological: no bleeding  Skin: no bruises, no rash        History  Past Medical History:   Diagnosis Date    Abnormal heart rhythm      Arrhythmia     ASCVD (arteriosclerotic cardiovascular disease)     s/p CABG in 2006 (5) [facility]    Chicken pox     Cholecystitis 04/26/2018    Added automatically from request for surgery [id number]      Congestive heart failure     Coronary artery disease     Disease of thyroid gland     Diverticulitis     DMII (diabetes mellitus, type 2)     type 2, checks fsbg as needed     Dyslipidemia     Elevated cholesterol     GERD (gastroesophageal reflux disease)     HTN (hypertension)     Hx of myocardial infarction, greater than 8 weeks     2006    Hyperlipidemia     Kidney stone     Measles     Mitral regurgitation     MILD TO MODERATE    Mumps     PVC (premature ventricular contraction)     SOB (shortness of breath)     Vitamin D deficiency     Wears reading eyeglasses    ,   Past Surgical History:   Procedure Laterality Date    CARDIAC CATHETERIZATION N/A 07/30/2024    Procedure: Left Heart Cath;  Surgeon: Randi Mason MD;  Location:  [facility] CATH [facility]VASIVE LOC[facility];  Service: Cardiology;  Laterality: N/A;    CARDIAC DEFIBRILLATOR PLACEMENT      CARDIAC ELECTROPHYSIOLOGY PROCEDURE N/A 11/20/2017    Procedure: EP/Ablation PVC +/- ICD Implant;  Surgeon: Jose Maldonado MD;  Location: [facility] EP INVASIVE LOCATION;  Service:     CARDIAC ELECTROPHYSIOLOGY PROCEDURE N/A 11/20/2017    Procedure: Biventricular Device Insertion;  Surgeon: Jose Maldonado MD;  Location:  [facility] EP INVASIVE LOC[facility];  Service:     CATARACT EXTRACTION Bilateral     COLONOSCOPY      CORONARY ANGIOPLASTY      CORONARY ARTERY BYPASS GRAFT  2006    X 5.   [facility]    ENDOSCOPY N/A 05/17/2022    Procedure: ESOPHAGOGASTRODUODENOSCOPY WITH BIOPSY;  Surgeon: Roula Craig MD;  Location: [facility] OR;  Service: Gastroenterology;  Laterality: N/A;    ENDOSCOPY N/A 07/27/2022    Procedure: ESOPHAGOGASTRODUODENOSCOPY WITH BIOPSY;  Surgeon: Roula Craig MD;  Location:  [facility] OR;   Service: Gastroenterology;  Laterality: N/A;    MITRAL VALVE REPAIR/REPLACEMENT N/A 2024    Procedure: Transcatheter Mitral Valve Repair;  Surgeon: Randi Mason MD;  Location:  [facility] CATH [facility];  Service: Cardiovascular;  Laterality: N/A;    MITRAL VALVE REPAIR/REPLACEMENT  2024    Procedure: Transcatheter Mitral Valve Repair;  Surgeon: Ubaldo Webb MD;  Location:  [facility] CATH [facility];  Service: Cardiovascular;;    PACEMAKER IMPLANTATION      HI LAPAROSCOPY SURG CHOLECYSTECTOMY N/A 2018    Procedure: CHOLECYSTECTOMY LAPAROSCOPIC;  Surgeon: Eber Hummel MD;  Location:  [facility] OR;  Service: General    TRANSESOPHAGEAL ECHOCARDIOGRAM (SHERLEY)  2024   ,   Family History   Problem Relation Age of Onset    Heart disease Mother     Heart disease Father     Dementia Sister     Dementia Sister     Cancer Sister     Cancer Sister     Cancer Sister     No Known Problems Sister     Diabetes Brother     No Known Problems Brother     No Known Problems Brother    ,   Social History     Tobacco Use    Smoking status: Former     Current packs/day: 0.00     Average packs/day: 2.0 packs/day for 20.0 years (40.0 ttl pk-yrs)     Types: Cigarettes     Start date:      Quit date:      Years since quittin.1    Smokeless tobacco: Former     Types: Chew     Quit date:    Vaping Use    Vaping status: Never Used   Substance Use Topics    Alcohol use: No    Drug use: No   ,   Medications Prior to Admission   Medication Sig Dispense Refill Last Dose/Taking    amiodarone (PACERONE) 200 MG tablet Take 0.5 tablets by mouth Daily. 45 tablet 2 2025    aspirin 81 MG EC tablet Take 1 tablet by mouth 2 (Two) Times a Day.   2025 Evening    atorvastatin (LIPITOR) 80 MG tablet Take 1 tablet by mouth Every Night.   2025    Brexpiprazole (Rexulti) 0.5 MG tablet Take 0.5 mg by mouth Daily for 7 days, THEN 2 mg Daily for 21 days. 91 tablet 0 Patient Taking Differently     carvedilol (COREG) 12.5 MG tablet Take 0.5 tablets by mouth 2 (Two) Times a Day With Meals.   2/2/2025    glyburide micronized (GLYNASE) 1.5 MG tablet TAKE 1 TABLET BY MOUTH TWICE DAILY BEFORE MEALS FOR 90 DAYS   2/2/2025    insulin NPH-insulin regular (humuLIN 70/30,novoLIN 70/30) (70-30) 100 UNIT/ML injection Inject 0-22 Units under the skin into the appropriate area as directed 3 (Three) Times a Day After Meals.   2/2/2025    levothyroxine (SYNTHROID, LEVOTHROID) 75 MCG tablet Take 1 tablet by mouth Every Morning.   2/2/2025    lisinopril (PRINIVIL,ZESTRIL) 5 MG tablet Take 1 tablet by mouth once daily 90 tablet 0 2/2/2025    memantine (NAMENDA) 10 MG tablet Take 1 tablet by mouth Every Night.   2/2/2025    multivitamin tablet tablet Take 1 tablet by mouth Daily.   2/2/2025    pantoprazole (Protonix) 40 MG EC tablet Take 1 tablet by mouth Daily. 90 tablet 3 2/2/2025    spironolactone (ALDACTONE) 25 MG tablet Take 1 tablet by mouth Daily. 90 tablet 1 2/2/2025   , Scheduled Meds:  amiodarone, 100 mg, Oral, Daily  aspirin, 81 mg, Oral, BID  atorvastatin, 80 mg, Oral, Nightly  carvedilol, 6.25 mg, Oral, BID With Meals  doxycycline, 100 mg, Intravenous, Q12H  heparin (porcine), 5,000 Units, Subcutaneous, Q12H  insulin glargine, 40 Units, Subcutaneous, Nightly  insulin lispro, 4-24 Units, Subcutaneous, 4x Daily AC & at Bedtime  ipratropium-albuterol, 3 mL, Nebulization, Q6H - RT  levothyroxine, 75 mcg, Oral, QAM AC  lisinopril, 5 mg, Oral, Daily  memantine, 10 mg, Oral, Nightly  multivitamin, 1 tablet, Oral, Daily  pantoprazole, 40 mg, Oral, QAM AC  sodium chloride, 10 mL, Intravenous, Q12H  spironolactone, 25 mg, Oral, Daily    , Continuous Infusions:    and Allergies:  Patient has no known allergies.    Objective     Vital Signs   Temp:  [98 °F (36.7 °C)-98.6 °F (37 °C)] 98 °F (36.7 °C)  Heart Rate:  [64-77] 71  Resp:  [14-20] 14  BP: (130-152)/(60-80) 152/71    Physical Exam:             General-elderly in  appearance, not in any acute distress    HEENT-grossly normal  Neck-supple    Respiratory-wearing nasal cannula oxygen not in any distress    Cardiovascular-NSR  GI-grossly normal    CNS-nonfocal    Musculoskeletal -no edema  Extremities- no obvious deformity noticed     Psychiatric- alert awake oriented  Skin- no visible rash                                                                   Results Review:    LABS:    Lab Results   Component Value Date    GLUCOSE 42 (C) 02/07/2025    BUN 20 02/07/2025    CREATININE 1.20 02/07/2025    EGFRIFNONA 57 (L) 11/01/2021    EGFRIFAFRI 74 08/25/2016    BCR 16.7 02/07/2025    CO2 25.0 02/07/2025    CALCIUM 8.5 (L) 02/07/2025    PROTENTOTREF 6.7 08/25/2016    ALBUMIN 3.4 (L) 02/03/2025    LABIL2 1.6 08/25/2016    AST 46 (H) 02/03/2025    ALT 42 (H) 02/03/2025    WBC 9.66 02/07/2025    HGB 9.6 (L) 02/07/2025    HCT 29.7 (L) 02/07/2025    MCV 91.4 02/07/2025     02/07/2025     02/07/2025    K 3.6 02/07/2025     02/07/2025    ANIONGAP 10.0 02/07/2025       Lab Results   Component Value Date    INR 1.11 (H) 04/26/2018    INR 1.06 11/19/2017    INR 1.00 01/11/2016    PROTIME 14.4 04/26/2018    PROTIME 11.6 (H) 11/19/2017    PROTIME 11.3 01/11/2016                I reviewed the patient's new clinical results.  I reviewed the patient's new imaging results and agree with the interpretation.          Assessment & Plan     Neurology-alert awake oriented no active issues going on    Respiratory-abnormal CT chest-  Previous chest x-rays and CT abdomen and pelvis reviewed.    The current findings are acute and could be related to aspiration pneumonia but pulmonary fibrosis cannot be ruled out as patient is on amiodarone.  Plan is to repeat CT chest in 4 to 6 weeks time if the findings are still present then do an autoimmune workup.  Patient recently got steroids and autoimmune workup cannot be sent at this moment.    If patient's repeat CT chest still shows the changes  patient might have to come off amiodarone as currently this could be also related to amiodarone toxicity.  This was discussed with patient's daughter and primary team.    Speech and swallowing no aspiration seen but this does not rule out silent aspirations and patient might have aspirated in supine position.  This was discussed with primary team and patient's daughter.    Continue aspiration precautions    Hypoxic respiratory failure-continue oxygen to maintain saturation 88 to 92%.  FiO2 requirement reviewed and adjusted.    Follow-up with pulmonary in 4 to 6 weeks and repeat CT chest at that time.    Chest x-ray-reviewed    ABG-latest reviewed  CT chest-latest reviewed    Cardiology- hemodynamically --stable  Continue to monitor HR- rate and rhythm, BP     Nephrology- Cr and BUN stable  I/O-reviewed    GI-continue current diet.    Hematology-   Latest CBC reviewed    ID  Culture-reviewed      Endrocrinology- Maintain Blood sugar 140 -180      Electrolytes-   Mag and phos       DVT prophylaxis-continue  Bedside rounds done with patient's nurse    Family member present-daughter-Case was discussed with her in detail and answered her questions to her satisfaction.        Echo-  Results for orders placed during the hospital encounter of 10/30/24    Adult Transthoracic Echo Complete W/ Cont if Necessary Per Protocol    Interpretation Summary    Left ventricular systolic function is low normal. Estimated left ventricular EF = 45%.  Septal hypokinesis.    Status post single Mitraclip XTw Insertion (9/25/24).    The mitral valve mean gradient is 2.3 mmHg.    Mild mitral regurgitation.         Multifocal pneumonia      This was an audio and video enabled telemedicine encounter.   consent for telemedicine is obtained by the hospital at the time of admission from patient or nok and is on chart     I am in [place] and patient is in [facility]          Rafael Abrams MD  02/07/25  15:29 EST

## 2025-02-07 NOTE — CASE MANAGEMENT/SOCIAL WORK
Discharge Planning Assessment  [facility]     Patient Name: Shai Mata  MRN: [id number]  Today's Date: 2/7/2025    Admit Date: 2/2/2025    Plan: Pt resides at home alone and plans to return home at discharge.  Pt currently does not utilize home health services or DME.  Pt's PCP is Ekaterina Cox.  Pt transports via private auto per family.  SS will follow and assist with discharge needs.          Discharge Plan       Row Name 02/07/25 1712       Plan    Plan Pt resides at home alone and plans to return home at discharge.  Pt currently does not utilize home health services or DME.  Pt's PCP is Ekaterina Cox.  Pt transports via private auto per family.  SS will follow and assist with discharge needs.                  LAILA DelarosaW

## 2025-02-07 NOTE — PLAN OF CARE
Goal Outcome Evaluation: Patient is being discharged home.

## 2025-02-07 NOTE — DISCHARGE SUMMARY
[facility] HOSPITALIST MEDICINE DISCHARGE SUMMARY    Patient Identification:  Name:  Shai Mata  Age:  79 y.o.  Sex:  male  :  1945  MRN:  [id number]  Visit Number:  [id number]    Date of Admission: 2025  Date of Discharge: 2025    PCP: Ekaterina Cox APRN    DISCHARGE DIAGNOSIS   Acute hypoxic respiratory failure  Multifocal pneumonia  Type 2 diabetes mellitus      CONSULTS  Dr. Abrams, Pulmonology      PROCEDURES PERFORMED   None      HOSPITAL COURSE  Mr. Mata is a 79 y.o. male who presented to [facility] ED on 2/3/2025 with a chief complaint of shortness of breath with productive cough.  Patient has a past medical history markable for chronic HFrEF, CAD, type 2 diabetes mellitus, essential hypertension, hyperlipidemia, hypothyroidism and recent history of mitral valve clip as well as dementia.  Patient had reported shortness of breath with productive cough and nasal drainage for approximately 4 days prior to evaluation in the emergency department.  Patient stated he thought he might be feeling better up until the day prior to admission where shortness of breath returned and he began coughing up blood.  For this reason, he did present to the emergency department for further treatment and evaluation.  Initial evaluation in the emergency department did consist of basic laboratory work as well as physical exam and vital signs.  Initial vital signs found patient's oxygen saturation in the low 90s on room air.  ABG demonstrated pO2 of 57 on room air corresponding to an oxygen saturation of 89%.  As such, patient was placed on 2 L nasal cannula.  Chest x-ray was obtained which demonstrated bilateral airspace opacities most concerning for bilateral pneumonia.  As such, patient was diagnosed with multifocal pneumonia with acute hypoxic respiratory failure and was admitted for further treatment and evaluation.    Patient was started on empiric antibiotic therapy with Rocephin and  doxycycline.  Patient was admitted to our telemetry unit and continued on supplemental oxygen to maintain O2 saturation greater than 90%.  Patient did require 5 days of hospitalization until acute hypoxia resolved.  Chest CT was obtained later in his hospitalization which confirmed findings suggestive of improving bilateral pneumonia with possible underlying pulmonary fibrosis.  As such, patient will be referred to outpatient pulmonology services for repeat CT of chest in the next 4 to 6 weeks to further evaluate for pulmonary fibrosis after resolution of underlying pneumonia.  On date of discharge, patient did not require supplemental oxygen to maintain O2 saturation greater than 90%.  Patient states he feels back to his baseline functioning status.  With this in mind, it is felt patient has reached maximum medical benefit of current hospitalization and will be discharged home in stable condition today.  The beforementioned plan was thoroughly discussed with the patient and his daughter at bedside who both expressed their understanding and willingness to proceed with the beforementioned plan.    VITAL SIGNS:      02/05/25  0500 02/06/25  0500 02/07/25  0500   Weight: 88.2 kg (194 lb 7.1 oz) 88.8 kg (195 lb 12.8 oz) 89.4 kg (197 lb)     Body mass index is 29.09 kg/m².    PHYSICAL EXAM:  Constitutional: Well-nourished  male in no apparent distress.     HENT:  Head:  Normocephalic and atraumatic.  Mouth:  Moist mucous membranes.    Eyes:  Conjunctivae and EOM are normal.  Pupils are equal, round, and reactive to light.  No scleral icterus.    Neck:  Neck supple. No thyromegaly.  No JVD present.    Cardiovascular:  Regular rate and rhythm with no murmurs, rubs, clicks or gallops appreciated.  Pulmonary/Chest:  Clear to auscultation bilaterally with no crackles, wheezes or rhonchi appreciated.  Abdominal:  Soft. Nontender. Nondistended  Bowel sounds are normal in all four quadrants. No organomegally  appreciated.   Musculoskeletal:  No edema, no tenderness, and no deformity.  No red or swollen joints anywhere.    Neurological:  Alert, Cranial nerves II-XII intact with no focal deficits.  No facial droop.  No slurred speech.   Skin:  Warm and dry to palpation with no rashes or lesions appreciated.  Peripheral vascular:  2+ radial and pedal pulses in bilateral upper and lower extremities.  Psychiatric:  Alert and oriented to immediate surroundings.    DISCHARGE DISPOSITION   Stable    DISCHARGE MEDICATIONS:     Discharge Medications        New Medications        Instructions Start Date   amoxicillin-clavulanate 875-125 MG per tablet  Commonly known as: AUGMENTIN   1 tablet, Oral, 2 Times Daily             Continue These Medications        Instructions Start Date   amiodarone 200 MG tablet  Commonly known as: PACERONE   100 mg, Oral, Daily      aspirin 81 MG EC tablet   81 mg, 2 Times Daily      atorvastatin 80 MG tablet  Commonly known as: LIPITOR   80 mg, Oral, Nightly      carvedilol 12.5 MG tablet  Commonly known as: COREG   6.25 mg, 2 Times Daily With Meals      glyburide micronized 1.5 MG tablet  Commonly known as: GLYNASE   TAKE 1 TABLET BY MOUTH TWICE DAILY BEFORE MEALS FOR 90 DAYS      insulin NPH-insulin regular (70-30) 100 UNIT/ML injection  Commonly known as: humuLIN 70/30,novoLIN 70/30   0-22 Units, Subcutaneous, 3 Times Daily After Meals      levothyroxine 75 MCG tablet  Commonly known as: SYNTHROID, LEVOTHROID   75 mcg, Oral, Every Early Morning      lisinopril 5 MG tablet  Commonly known as: PRINIVIL,ZESTRIL   5 mg, Oral, Daily      memantine 10 MG tablet  Commonly known as: NAMENDA   10 mg, Oral, Nightly      multivitamin tablet tablet   1 tablet, Oral, Daily      pantoprazole 40 MG EC tablet  Commonly known as: Protonix   40 mg, Oral, Daily      Rexulti 0.5 MG tablet  Generic drug: Brexpiprazole   Take 0.5 mg by mouth Daily for 7 days, THEN 2 mg Daily for 21 days.   Start Date: January 23, 2025      spironolactone 25 MG tablet  Commonly known as: ALDACTONE   25 mg, Oral, Daily               Diet Instructions    Heart Healthy and consistent carb diet as tolerated.          Your Scheduled Appointments      Feb 21, 2025 2:30 PM  Follow Up with ANEUDY Dejesus  [facility] NEUROLOGY ([place]) [address] [place] [address] [address]  [facility] [address] [phone]   Arrive 15 minutes prior to appointment.         Mar 11, 2025 8:15 AM  Office Visit with ANEUDY Carrizales  [facility] FAMILY MEDICINE ([place]) [address] [place] [address] [phone]   Arrive 15 minutes prior to appointment.  If you are in need of a language or hearing  please call the Department.         Jul 22, 2025 3:30 PM  Follow Up with Erik Livingston PA-C  [facility] CARDIOLOGY ([place]) [address]  [place] [address] [phone]   -Bring photo ID, insurance card, and list of medications to appointment  -If testing was completed outside of [facility] then patient must bring images on a disc  -Copay will be collected at time of appointment  -Established patients should arrive 10 minutes prior to appointment         Nov 14, 2025 1:30 PM  Follow Up with Marcin Colon MD  [facility] CARDIOLOGY ([place]) [address]  [facility] [address] [phone]   -Bring photo ID, insurance card, and list of medications to appointment  -If testing was completed outside of [facility] then patient must bring images on a disc  -Copay will be collected at time of appointment  -Established patients should arrive 15 minutes prior to appointment              Activity Instructions    As tolerated.          Additional Instructions for the Follow-ups that You Need to Schedule       Discharge Follow-up with PCP   As directed       Currently Documented PCP:    Ekaterina Cox APRN    PCP Phone Number:    [phone]     Follow Up  Details: please follow up with pcp in 1 week        Discharge Follow-up with Specialty: Pulmonology; 1 Month   As directed      Specialty: Pulmonology   Follow Up: 1 Month   Follow Up Details: eval for possible pulmonary fibrosis               Follow-up Information       Ekaterina Cox APRN Follow up in 1 week(s).    Specialty: Family Medicine  Why: Will notify patient about follow-up appt. with Ekaterina Cox on 02/10/25.  Contact information:  [address] [place] [address]  [phone]               Rafael Abrams MD Follow up.    Specialties: Pulmonary Disease, Intensive Care  Why: Will notify patient about follow-up appt. with Dr. Abrams on 02/10/25.  Contact information:  [address]  [place] [address]  [phone]                             TEST  RESULTS PENDING AT DISCHARGE  Pending Labs       Order Current Status    Blood Culture - Blood, Arm, Left Preliminary result    Blood Culture - Blood, Hand, Right Preliminary result             Abdoul Soria,   02/07/25  18:31 EST    Please note that this discharge summary required more than 30 minutes to complete.    Please send a copy of this dictation to the following providers:  Ekaterina Cox APRN

## 2025-02-08 LAB
BACTERIA SPEC AEROBE CULT: NORMAL
BACTERIA SPEC AEROBE CULT: NORMAL

## 2025-02-08 NOTE — NURSING NOTE
Patient belongings packed and sent with patient. IVs and tele removed by dayshift. Patient transferred via wheelchair by daughter to vehicle to return home at this time.

## 2025-02-08 NOTE — OUTREACH NOTE
Prep Survey      Flowsheet Row Responses   [facility] patient discharged from? [place]   Is LACE score < 7 ? No   Eligibility [facility]   Date of Admission 02/02/25   Date of Discharge 02/07/25   Discharge Disposition Home or Self Care   Discharge diagnosis Multifocal pneumonia   Does the patient have one of the following disease processes/diagnoses(primary or secondary)? Pneumonia   Does the patient have Home health ordered? No   Is there a DME ordered? No   Prep survey completed? Yes            Snehal BARKSDALE - Registered Nurse

## 2025-02-10 ENCOUNTER — TELEPHONE (OUTPATIENT)
Dept: TELEMETRY | Facility: HOSPITAL | Age: 80
End: 2025-02-10
Payer: MEDICARE

## 2025-02-10 ENCOUNTER — TRANSITIONAL CARE MANAGEMENT TELEPHONE ENCOUNTER (OUTPATIENT)
Dept: CALL CENTER | Facility: HOSPITAL | Age: 80
End: 2025-02-10
Payer: MEDICARE

## 2025-02-10 NOTE — OUTREACH NOTE
Call Center TCM Note      Flowsheet Row Responses   [facility] patient discharged from? [facility]   Does the patient have one of the following disease processes/diagnoses(primary or secondary)? Pneumonia   TCM attempt successful? Yes  [VR- Libia]   Call start time 1421   Call end time 1424   Discharge diagnosis Multifocal pneumonia   Person spoke with today (if not patient) and relationship Libia   Meds reviewed with patient/caregiver? Yes   Is the patient having any side effects they believe may be caused by any medication additions or changes? No   Does the patient have all medications ordered at discharge? Yes   Is the patient taking all medications as directed (includes completed medication regime)? Yes   Comments Daughter will call office to schedule appt after she gets her schedule. TCM call complete.   Does the patient have an appointment with their PCP within 7-14 days of discharge? No   Nursing Interventions Patient declined scheduling/rescheduling appointment at this time, Routed TCM call to PCP office   Has home health visited the patient within 72 hours of discharge? N/A   Pulse Ox monitoring Intermittent   Pulse Ox device source Patient   O2 Sat comments Sat staying above 90%   Psychosocial issues? No   Did the patient receive a copy of their discharge instructions? Yes   Nursing interventions Reviewed instructions with patient   What is the patient's perception of their health status since discharge? Improving   Nursing Interventions Nurse provided patient education   Is the patient/caregiver able to teach back the hierarchy of who to call/visit for symptoms/problems? PCP, Specialist, Home health nurse, Urgent Care, ED, 911 Yes   Is the patient/caregiver able to teach back signs and symptoms of worsening condition: Fever/chills, Shortness of breath, Chest pain   Is the patient/caregiver able to teach back importance of completing antibiotic course of treatment? Yes   TCM call completed? Yes   Wrap  up additional comments Daughter Libia reports Pt is doing ok, sats are stying above 90%. Pt taking meds as ordered. Her sister will cll office for appt.   Call end time [id number]            Alena Don RN    2/10/2025, 14:25 EST

## 2025-02-17 ENCOUNTER — READMISSION MANAGEMENT (OUTPATIENT)
Dept: CALL CENTER | Facility: HOSPITAL | Age: 80
End: 2025-02-17
Payer: MEDICARE

## 2025-02-17 NOTE — OUTREACH NOTE
COPD/PN Week 2 Survey      Flowsheet Row Responses   [facility] patient discharged from? [facility]   Does the patient have one of the following disease processes/diagnoses(primary or secondary)? Pneumonia   Week 2 attempt successful? Yes   Call start time 1208   Call end time 1209   Discharge diagnosis Multifocal pneumonia   Person spoke with today (if not patient) and relationship Libia   Does the patient have a primary care provider?  Yes   Comments regarding PCP 2/19/2025  3:30 PM  HOSPITAL FOLLOW UP 30 min [facility] Ekaterina Cox, ANEUDY   What is the patient's perception of their health status since discharge? Improving   Is the patient/caregiver able to teach back the hierarchy of who to call/visit for symptoms/problems? PCP, Specialist, Home health nurse, Urgent Care, ED, 911 Yes   Is the patient/caregiver able to teach back signs and symptoms of worsening condition: Fever/chills, Shortness of breath, Chest pain   Is the patient/caregiver able to teach back importance of completing antibiotic course of treatment? Yes   Week 2 call completed? Yes   Graduated Yes   Wrap up additional comments Daughter reports patient is doing well. Sees pcp this week. No other concerns or questions noted.   Call end time 1209            Deepali LINDSEY - Registered Nurse

## 2025-02-19 ENCOUNTER — OFFICE VISIT (OUTPATIENT)
Dept: FAMILY MEDICINE CLINIC | Facility: CLINIC | Age: 80
End: 2025-02-19
Payer: MEDICARE

## 2025-02-19 ENCOUNTER — TELEPHONE (OUTPATIENT)
Dept: FAMILY MEDICINE CLINIC | Facility: CLINIC | Age: 80
End: 2025-02-19

## 2025-02-19 VITALS
SYSTOLIC BLOOD PRESSURE: 116 MMHG | OXYGEN SATURATION: 100 % | TEMPERATURE: 97.3 F | DIASTOLIC BLOOD PRESSURE: 56 MMHG | BODY MASS INDEX: 26.22 KG/M2 | WEIGHT: 177 LBS | HEART RATE: 80 BPM | HEIGHT: 69 IN

## 2025-02-19 DIAGNOSIS — J96.01 ACUTE RESPIRATORY FAILURE WITH HYPOXIA: Primary | ICD-10-CM

## 2025-02-19 DIAGNOSIS — G30.9 MILD ALZHEIMER'S DEMENTIA WITHOUT BEHAVIORAL DISTURBANCE, PSYCHOTIC DISTURBANCE, MOOD DISTURBANCE, OR ANXIETY, UNSPECIFIED TIMING OF DEMENTIA ONSET: ICD-10-CM

## 2025-02-19 DIAGNOSIS — F02.A0 MILD ALZHEIMER'S DEMENTIA WITHOUT BEHAVIORAL DISTURBANCE, PSYCHOTIC DISTURBANCE, MOOD DISTURBANCE, OR ANXIETY, UNSPECIFIED TIMING OF DEMENTIA ONSET: ICD-10-CM

## 2025-02-19 DIAGNOSIS — J18.9 PNEUMONIA OF BOTH LUNGS DUE TO INFECTIOUS ORGANISM, UNSPECIFIED PART OF LUNG: ICD-10-CM

## 2025-02-19 DIAGNOSIS — E11.22 TYPE 2 DIABETES MELLITUS WITH STAGE 3A CHRONIC KIDNEY DISEASE, WITH LONG-TERM CURRENT USE OF INSULIN: ICD-10-CM

## 2025-02-19 DIAGNOSIS — Z79.4 TYPE 2 DIABETES MELLITUS WITH STAGE 3A CHRONIC KIDNEY DISEASE, WITH LONG-TERM CURRENT USE OF INSULIN: ICD-10-CM

## 2025-02-19 DIAGNOSIS — N28.9 RENAL INSUFFICIENCY: ICD-10-CM

## 2025-02-19 DIAGNOSIS — N18.31 TYPE 2 DIABETES MELLITUS WITH STAGE 3A CHRONIC KIDNEY DISEASE, WITH LONG-TERM CURRENT USE OF INSULIN: ICD-10-CM

## 2025-02-19 PROCEDURE — 99495 TRANSJ CARE MGMT MOD F2F 14D: CPT | Performed by: NURSE PRACTITIONER

## 2025-02-19 PROCEDURE — 80053 COMPREHEN METABOLIC PANEL: CPT | Performed by: NURSE PRACTITIONER

## 2025-02-19 PROCEDURE — 1160F RVW MEDS BY RX/DR IN RCRD: CPT | Performed by: NURSE PRACTITIONER

## 2025-02-19 PROCEDURE — 1126F AMNT PAIN NOTED NONE PRSNT: CPT | Performed by: NURSE PRACTITIONER

## 2025-02-19 PROCEDURE — 3074F SYST BP LT 130 MM HG: CPT | Performed by: NURSE PRACTITIONER

## 2025-02-19 PROCEDURE — 85025 COMPLETE CBC W/AUTO DIFF WBC: CPT | Performed by: NURSE PRACTITIONER

## 2025-02-19 PROCEDURE — 3078F DIAST BP <80 MM HG: CPT | Performed by: NURSE PRACTITIONER

## 2025-02-19 PROCEDURE — 1159F MED LIST DOCD IN RCRD: CPT | Performed by: NURSE PRACTITIONER

## 2025-02-19 RX ORDER — ACYCLOVIR 400 MG/1
1 TABLET ORAL
Qty: 12 EACH | Refills: 1 | Status: SHIPPED | OUTPATIENT
Start: 2025-02-19

## 2025-02-19 RX ORDER — ACYCLOVIR 400 MG/1
1 TABLET ORAL 4 TIMES DAILY PRN
Qty: 1 EACH | Refills: 0 | Status: SHIPPED | OUTPATIENT
Start: 2025-02-19

## 2025-02-19 NOTE — PROGRESS NOTES
Chief Complaint   Transitional Care Management     Subjective          Shai Mata presents to [facility]   History of Present Illness   History of Present Illness  The patient is an 79-year-old male who presents for a follow-up after hospitalization.    History is reported by daughter in the presence of the patient.  He was recently hospitalized due to a sudden onset of dyspnea, which was subsequently diagnosed as pneumonia. He was initially managed with Coricidin at home, which seemed to alleviate his symptoms as he was able to expectorate the sputum. However, his condition deteriorated over the weekend, necessitating a visit to the emergency room. He presented with hemoptysis and a decreased oxygen saturation level of 94 percent. An arterial blood gas (ABG) test revealed a further drop in oxygen saturation to 89 percent, leading to his admission for a period of 5 days. During this time, he was diagnosed with bilateral pneumonia, with one lung being more severely affected than the other. He was discharged on Augmentin, which he has since completed. Currently, he reports no residual cough or hemoptysis. His energy levels remain low, and he has a poor appetite. He has been struggling with daily activities such as putting on his shoes and getting up from the couch. A follow-up appointment with pulmonology is pending. There was a suspicion of pulmonary fibrosis, but Dr. Abrams suggested that it could be a result of the pneumonia, given his lack of history of pulmonary fibrosis. He also mentioned that amiodarone, one of his medications, can rarely cause pulmonary fibrosis, but his last scan did not show this, and the timeline would not support this diagnosis. He reports no peripheral edema or chest pain.    His blood glucose levels have been well-controlled. The accompanying adult female has expressed interest in obtaining a Dexcom device for continuous glucose  "monitoring.    MEDICATIONS  Current: Amiodarone  Completed: Augmentin       Review of Systems       Objective    Vital Signs:   /56 (BP Location: Right arm, Patient Position: Sitting)   Pulse 80   Temp 97.3 °F (36.3 °C) (Temporal)   Ht 175.3 cm (69\")   Wt 80.3 kg (177 lb)   SpO2 100%   BMI 26.14 kg/m²     Physical Exam  Lungs are clear.         Physical Exam  Vitals and nursing note reviewed.   Constitutional:       General: He is not in acute distress.     Appearance: He is well-developed and normal weight. He is not ill-appearing.   HENT:      Head: Normocephalic.   Eyes:      General:         Right eye: No discharge.         Left eye: No discharge.      Conjunctiva/sclera: Conjunctivae normal.   Cardiovascular:      Rate and Rhythm: Normal rate and regular rhythm.      Heart sounds: Murmur heard.   Pulmonary:      Effort: Pulmonary effort is normal. No respiratory distress.      Breath sounds: Normal breath sounds. No wheezing.   Musculoskeletal:      Right lower leg: No edema.      Left lower leg: No edema.   Skin:     General: Skin is warm and dry.   Neurological:      General: No focal deficit present.      Mental Status: He is alert and oriented to person, place, and time.   Psychiatric:         Mood and Affect: Mood normal.         Behavior: Behavior normal.         Thought Content: Thought content normal.         Judgment: Judgment normal.        Result Review :            Results  Laboratory Studies  ABG showed oxygen saturation at 89.             Assessment and Plan    Diagnoses and all orders for this visit:    1. Acute respiratory failure with hypoxia (Primary)  -     Ambulatory Referral to Pulmonology    2. Pneumonia of both lungs due to infectious organism, unspecified part of lung  -     Ambulatory Referral to Pulmonology  -     CBC Auto Differential; Future  -     Cancel: Basic Metabolic Panel; Future  -     Comprehensive Metabolic Panel; Future  -     CBC Auto Differential  -     " Comprehensive Metabolic Panel    3. Renal insufficiency  -     Cancel: Basic metabolic panel  -     Comprehensive Metabolic Panel    4. Type 2 diabetes mellitus with stage 3a chronic kidney disease, with long-term current use of insulin  -     CBC Auto Differential; Future  -     Comprehensive Metabolic Panel; Future  -     Continuous Glucose Sensor (Dexcom G7 Sensor) misc; Use 1 each Every 30 (Thirty) Days.  Dispense: 12 each; Refill: 1  -     Continuous Glucose  (Dexcom G7 ) device; Use 1 each 4 (Four) Times a Day As Needed (diabetes).  Dispense: 1 each; Refill: 0    5. Mild Alzheimer's dementia without behavioral disturbance, psychotic disturbance, mood disturbance, or anxiety, unspecified timing of dementia onset       Assessment & Plan  1. Post-hospitalization follow-up.  He was hospitalized for pneumonia affecting both lungs, with one lung being more severe. He was discharged on Augmentin, which he has completed. Currently, he reports no residual cough or hemoptysis but has poor energy and appetite. There is a concern for possible pulmonary fibrosis, but it is believed to be unlikely given his history and recent CT scan results. A referral to Dr. Abrams, pulmonologist, will be initiated for further evaluation and a repeat scan.    2. Diabetes Mellitus.  His blood sugar levels have been stable, but he missed meals recently, which could affect his medication regimen. He is advised to maintain regular meal intake to ensure proper management of his blood sugar levels. A prescription for a Dexcom G7 continuous glucose monitor will be provided to facilitate easier monitoring of his blood sugar levels.       Patient's Body mass index is 26.14 kg/m². indicating that he is overweight (BMI 25-29.9). Patient's (Body mass index is 26.14 kg/m².) indicates that they are overweight with health conditions that include hypertension, coronary heart disease, diabetes mellitus, and dyslipidemias . Weight is  unchanged. BMI is above average; BMI management plan is completed. We discussed portion control and increasing exercise. .      Follow Up    Return for Next scheduled follow up  LABS TODAY.   Patient was given instructions and counseling regarding his condition or for health maintenance advice. Please see specific information pulled into the AVS if appropriate.           This document has been electronically signed by ANEUDY Carrizales  February 19, 2025 14:52 EST    Patient or patient representative verbalized consent for the use of Ambient Listening during the visit with  ANEUDY Carrizales for chart documentation. 2/19/2025  14:35 EST

## 2025-02-19 NOTE — TELEPHONE ENCOUNTER
I called pt's daughter to see if she could bring the patient in earlier than his appointment due to inclement weather. I left a voicemail for her. If she returns call, hub may relay and see if she can bring him in ASAP.     Hub may relay.

## 2025-02-20 LAB
ALBUMIN SERPL-MCNC: 3.8 G/DL (ref 3.5–5.2)
ALBUMIN/GLOB SERPL: 1.5 G/DL
ALP SERPL-CCNC: 88 U/L (ref 39–117)
ALT SERPL W P-5'-P-CCNC: 30 U/L (ref 1–41)
ANION GAP SERPL CALCULATED.3IONS-SCNC: 11.9 MMOL/L (ref 5–15)
AST SERPL-CCNC: 25 U/L (ref 1–40)
BASOPHILS # BLD AUTO: 0.08 10*3/MM3 (ref 0–0.2)
BASOPHILS NFR BLD AUTO: 0.8 % (ref 0–1.5)
BILIRUB SERPL-MCNC: 0.7 MG/DL (ref 0–1.2)
BUN SERPL-MCNC: 30 MG/DL (ref 8–23)
BUN/CREAT SERPL: 21.7 (ref 7–25)
CALCIUM SPEC-SCNC: 9.1 MG/DL (ref 8.6–10.5)
CHLORIDE SERPL-SCNC: 100 MMOL/L (ref 98–107)
CO2 SERPL-SCNC: 21.1 MMOL/L (ref 22–29)
CREAT SERPL-MCNC: 1.38 MG/DL (ref 0.76–1.27)
DEPRECATED RDW RBC AUTO: 43.5 FL (ref 37–54)
EGFRCR SERPLBLD CKD-EPI 2021: 51.7 ML/MIN/1.73
EOSINOPHIL # BLD AUTO: 0.02 10*3/MM3 (ref 0–0.4)
EOSINOPHIL NFR BLD AUTO: 0.2 % (ref 0.3–6.2)
ERYTHROCYTE [DISTWIDTH] IN BLOOD BY AUTOMATED COUNT: 13.3 % (ref 12.3–15.4)
GLOBULIN UR ELPH-MCNC: 2.6 GM/DL
GLUCOSE SERPL-MCNC: 274 MG/DL (ref 65–99)
HCT VFR BLD AUTO: 35.3 % (ref 37.5–51)
HGB BLD-MCNC: 11.5 G/DL (ref 13–17.7)
IMM GRANULOCYTES # BLD AUTO: 0.05 10*3/MM3 (ref 0–0.05)
IMM GRANULOCYTES NFR BLD AUTO: 0.5 % (ref 0–0.5)
LYMPHOCYTES # BLD AUTO: 0.83 10*3/MM3 (ref 0.7–3.1)
LYMPHOCYTES NFR BLD AUTO: 8.1 % (ref 19.6–45.3)
MCH RBC QN AUTO: 29.6 PG (ref 26.6–33)
MCHC RBC AUTO-ENTMCNC: 32.6 G/DL (ref 31.5–35.7)
MCV RBC AUTO: 91 FL (ref 79–97)
MONOCYTES # BLD AUTO: 0.5 10*3/MM3 (ref 0.1–0.9)
MONOCYTES NFR BLD AUTO: 4.9 % (ref 5–12)
NEUTROPHILS NFR BLD AUTO: 8.8 10*3/MM3 (ref 1.7–7)
NEUTROPHILS NFR BLD AUTO: 85.5 % (ref 42.7–76)
NRBC BLD AUTO-RTO: 0 /100 WBC (ref 0–0.2)
PLATELET # BLD AUTO: 332 10*3/MM3 (ref 140–450)
PMV BLD AUTO: 10.4 FL (ref 6–12)
POTASSIUM SERPL-SCNC: 5.3 MMOL/L (ref 3.5–5.2)
PROT SERPL-MCNC: 6.4 G/DL (ref 6–8.5)
RBC # BLD AUTO: 3.88 10*6/MM3 (ref 4.14–5.8)
SODIUM SERPL-SCNC: 133 MMOL/L (ref 136–145)
WBC NRBC COR # BLD AUTO: 10.28 10*3/MM3 (ref 3.4–10.8)

## 2025-03-01 DIAGNOSIS — E03.8 OTHER SPECIFIED HYPOTHYROIDISM: ICD-10-CM

## 2025-03-03 RX ORDER — LEVOTHYROXINE SODIUM 75 UG/1
75 TABLET ORAL DAILY
Qty: 90 TABLET | Refills: 1 | Status: SHIPPED | OUTPATIENT
Start: 2025-03-03

## 2025-03-24 DIAGNOSIS — I10 ESSENTIAL HYPERTENSION: ICD-10-CM

## 2025-03-24 RX ORDER — LISINOPRIL 5 MG/1
5 TABLET ORAL DAILY
Qty: 90 TABLET | Refills: 0 | Status: SHIPPED | OUTPATIENT
Start: 2025-03-24

## 2025-04-07 ENCOUNTER — TELEPHONE (OUTPATIENT)
Dept: FAMILY MEDICINE CLINIC | Facility: CLINIC | Age: 80
End: 2025-04-07
Payer: MEDICARE

## 2025-04-18 DIAGNOSIS — R41.89 OTHER SYMPTOMS AND SIGNS INVOLVING COGNITIVE FUNCTIONS AND AWARENESS: ICD-10-CM

## 2025-04-18 RX ORDER — MEMANTINE HYDROCHLORIDE 10 MG/1
10 TABLET ORAL DAILY
Qty: 30 TABLET | Refills: 0 | OUTPATIENT
Start: 2025-04-18

## 2025-04-21 ENCOUNTER — TELEPHONE (OUTPATIENT)
Dept: CARDIOLOGY | Facility: CLINIC | Age: 80
End: 2025-04-21
Payer: MEDICARE

## 2025-04-21 DIAGNOSIS — I47.20 VENTRICULAR TACHYCARDIA (PAROXYSMAL): ICD-10-CM

## 2025-04-21 DIAGNOSIS — I25.5 ISCHEMIC CARDIOMYOPATHY: Primary | ICD-10-CM

## 2025-04-21 NOTE — TELEPHONE ENCOUNTER
Daughter returned my call and made aware that scheduling would call her to get procedure setup.  She verbalized understanding.

## 2025-04-21 NOTE — TELEPHONE ENCOUNTER
Remote monitor reading received showing LOLA was reached on 4/18/25.  Called to inform Mr Mata.  No answer.  Left voicemail for a return call.

## 2025-04-23 ENCOUNTER — PREP FOR SURGERY (OUTPATIENT)
Dept: OTHER | Facility: HOSPITAL | Age: 80
End: 2025-04-23
Payer: MEDICARE

## 2025-04-23 RX ORDER — CEFAZOLIN SODIUM 2 G/100ML
2 INJECTION, SOLUTION INTRAVENOUS ONCE
OUTPATIENT
Start: 2025-04-23 | End: 2025-04-23

## 2025-04-23 RX ORDER — SODIUM CHLORIDE 9 MG/ML
40 INJECTION, SOLUTION INTRAVENOUS AS NEEDED
OUTPATIENT
Start: 2025-04-23

## 2025-04-23 RX ORDER — ONDANSETRON 2 MG/ML
4 INJECTION INTRAMUSCULAR; INTRAVENOUS EVERY 6 HOURS PRN
OUTPATIENT
Start: 2025-04-23

## 2025-04-23 RX ORDER — ACETAMINOPHEN 325 MG/1
650 TABLET ORAL EVERY 4 HOURS PRN
OUTPATIENT
Start: 2025-04-23

## 2025-04-23 RX ORDER — SODIUM CHLORIDE 0.9 % (FLUSH) 0.9 %
10 SYRINGE (ML) INJECTION AS NEEDED
OUTPATIENT
Start: 2025-04-23

## 2025-04-23 RX ORDER — SODIUM CHLORIDE 0.9 % (FLUSH) 0.9 %
3 SYRINGE (ML) INJECTION EVERY 12 HOURS SCHEDULED
OUTPATIENT
Start: 2025-04-23

## 2025-04-23 RX ORDER — NITROGLYCERIN 0.4 MG/1
0.4 TABLET SUBLINGUAL
OUTPATIENT
Start: 2025-04-23

## 2025-05-06 ENCOUNTER — TELEPHONE (OUTPATIENT)
Dept: FAMILY MEDICINE CLINIC | Facility: CLINIC | Age: 80
End: 2025-05-06
Payer: MEDICARE

## 2025-05-06 NOTE — TELEPHONE ENCOUNTER
Caller: [facility]    Relationship:     Best call back number: [phone]     What is the best time to reach you: ANY    Who are you requesting to speak with (clinical staff, provider,  specific staff member): NURSE    Do you know the name of the person who called: MICKY    What was the call regarding: A FORM WAS FAXED 4/30 FOR DIABETIC SUPPLIES, DEXCOM CGM.  HAVE WE RECEIVED THIS?  WHAT IS THE STATUS?    Is it okay if the provider responds through MyChart: PHONE CALL PLEASE

## 2025-06-02 ENCOUNTER — HOSPITAL ENCOUNTER (OUTPATIENT)
Facility: HOSPITAL | Age: 80
Setting detail: HOSPITAL OUTPATIENT SURGERY
Discharge: HOME | End: 2025-06-02
Attending: STUDENT IN AN ORGANIZED HEALTH CARE EDUCATION/TRAINING PROGRAM | Admitting: STUDENT IN AN ORGANIZED HEALTH CARE EDUCATION/TRAINING PROGRAM
Payer: MEDICARE

## 2025-06-02 VITALS
RESPIRATION RATE: 16 BRPM | SYSTOLIC BLOOD PRESSURE: 144 MMHG | BODY MASS INDEX: 26.19 KG/M2 | HEART RATE: 78 BPM | DIASTOLIC BLOOD PRESSURE: 84 MMHG | HEIGHT: 69 IN | OXYGEN SATURATION: 94 % | TEMPERATURE: 97.2 F | WEIGHT: 176.8 LBS

## 2025-06-02 DIAGNOSIS — I47.20 VENTRICULAR TACHYCARDIA (PAROXYSMAL): ICD-10-CM

## 2025-06-02 DIAGNOSIS — I25.5 ISCHEMIC CARDIOMYOPATHY: ICD-10-CM

## 2025-06-02 LAB
ANION GAP SERPL CALCULATED.3IONS-SCNC: 10 MMOL/L (ref 5–15)
BUN SERPL-MCNC: 23.9 MG/DL (ref 8–23)
BUN/CREAT SERPL: 19.4 (ref 7–25)
CALCIUM SPEC-SCNC: 8.9 MG/DL (ref 8.6–10.5)
CHLORIDE SERPL-SCNC: 102 MMOL/L (ref 98–107)
CO2 SERPL-SCNC: 25 MMOL/L (ref 22–29)
CREAT SERPL-MCNC: 1.23 MG/DL (ref 0.76–1.27)
DEPRECATED RDW RBC AUTO: 50.3 FL (ref 37–54)
EGFRCR SERPLBLD CKD-EPI 2021: 59 ML/MIN/1.73
ERYTHROCYTE [DISTWIDTH] IN BLOOD BY AUTOMATED COUNT: 14.9 % (ref 12.3–15.4)
GLUCOSE BLDC GLUCOMTR-MCNC: 208 MG/DL (ref 70–130)
GLUCOSE SERPL-MCNC: 218 MG/DL (ref 65–99)
HCT VFR BLD AUTO: 33.5 % (ref 37.5–51)
HGB BLD-MCNC: 10.7 G/DL (ref 13–17.7)
MAGNESIUM SERPL-MCNC: 2.1 MG/DL (ref 1.6–2.4)
MCH RBC QN AUTO: 29.2 PG (ref 26.6–33)
MCHC RBC AUTO-ENTMCNC: 31.9 G/DL (ref 31.5–35.7)
MCV RBC AUTO: 91.5 FL (ref 79–97)
PLATELET # BLD AUTO: 167 10*3/MM3 (ref 140–450)
PMV BLD AUTO: 10.1 FL (ref 6–12)
POTASSIUM SERPL-SCNC: 4.9 MMOL/L (ref 3.5–5.2)
RBC # BLD AUTO: 3.66 10*6/MM3 (ref 4.14–5.8)
SODIUM SERPL-SCNC: 137 MMOL/L (ref 136–145)
WBC NRBC COR # BLD AUTO: 7.95 10*3/MM3 (ref 3.4–10.8)

## 2025-06-02 PROCEDURE — 80048 BASIC METABOLIC PNL TOTAL CA: CPT

## 2025-06-02 PROCEDURE — C1889 IMPLANT/INSERT DEVICE, NOC: HCPCS | Performed by: STUDENT IN AN ORGANIZED HEALTH CARE EDUCATION/TRAINING PROGRAM

## 2025-06-02 PROCEDURE — C1882 AICD, OTHER THAN SING/DUAL: HCPCS | Performed by: STUDENT IN AN ORGANIZED HEALTH CARE EDUCATION/TRAINING PROGRAM

## 2025-06-02 PROCEDURE — 25010000002 FENTANYL CITRATE (PF) 50 MCG/ML SOLUTION: Performed by: STUDENT IN AN ORGANIZED HEALTH CARE EDUCATION/TRAINING PROGRAM

## 2025-06-02 PROCEDURE — 33264 RMVL & RPLCMT DFB GEN MLT LD: CPT | Performed by: STUDENT IN AN ORGANIZED HEALTH CARE EDUCATION/TRAINING PROGRAM

## 2025-06-02 PROCEDURE — 83735 ASSAY OF MAGNESIUM: CPT

## 2025-06-02 PROCEDURE — 99152 MOD SED SAME PHYS/QHP 5/>YRS: CPT | Performed by: STUDENT IN AN ORGANIZED HEALTH CARE EDUCATION/TRAINING PROGRAM

## 2025-06-02 PROCEDURE — 99153 MOD SED SAME PHYS/QHP EA: CPT | Performed by: STUDENT IN AN ORGANIZED HEALTH CARE EDUCATION/TRAINING PROGRAM

## 2025-06-02 PROCEDURE — 25010000002 CEFAZOLIN PER 500 MG: Performed by: STUDENT IN AN ORGANIZED HEALTH CARE EDUCATION/TRAINING PROGRAM

## 2025-06-02 PROCEDURE — 82948 REAGENT STRIP/BLOOD GLUCOSE: CPT

## 2025-06-02 PROCEDURE — 36415 COLL VENOUS BLD VENIPUNCTURE: CPT

## 2025-06-02 PROCEDURE — 85027 COMPLETE CBC AUTOMATED: CPT

## 2025-06-02 PROCEDURE — 25010000002 LIDOCAINE 1% - EPINEPHRINE 1:100000 1 %-1:100000 SOLUTION: Performed by: STUDENT IN AN ORGANIZED HEALTH CARE EDUCATION/TRAINING PROGRAM

## 2025-06-02 PROCEDURE — 25810000003 SODIUM CHLORIDE 0.9 % SOLUTION: Performed by: STUDENT IN AN ORGANIZED HEALTH CARE EDUCATION/TRAINING PROGRAM

## 2025-06-02 PROCEDURE — 25010000002 MIDAZOLAM PER 1 MG: Performed by: STUDENT IN AN ORGANIZED HEALTH CARE EDUCATION/TRAINING PROGRAM

## 2025-06-02 PROCEDURE — 25010000002 ONDANSETRON PER 1 MG: Performed by: STUDENT IN AN ORGANIZED HEALTH CARE EDUCATION/TRAINING PROGRAM

## 2025-06-02 DEVICE — ENV PM AIGISRX ANTIBAC RESORB 2.9X3.3IN LG: Type: IMPLANTABLE DEVICE | Site: HEART | Status: FUNCTIONAL

## 2025-06-02 DEVICE — IMPLANTABLE DEVICE: Type: IMPLANTABLE DEVICE | Site: HEART | Status: FUNCTIONAL

## 2025-06-02 DEVICE — SEAL HEMO SURG ARISTA/AH ABS/PWDR 1GM: Type: IMPLANTABLE DEVICE | Site: HEART | Status: FUNCTIONAL

## 2025-06-02 RX ORDER — ETOMIDATE 2 MG/ML
INJECTION INTRAVENOUS
Status: DISCONTINUED | OUTPATIENT
Start: 2025-06-02 | End: 2025-06-02 | Stop reason: HOSPADM

## 2025-06-02 RX ORDER — SODIUM CHLORIDE 0.9 % (FLUSH) 0.9 %
10 SYRINGE (ML) INJECTION AS NEEDED
Status: DISCONTINUED | OUTPATIENT
Start: 2025-06-02 | End: 2025-06-02 | Stop reason: HOSPADM

## 2025-06-02 RX ORDER — SODIUM CHLORIDE 9 MG/ML
INJECTION, SOLUTION INTRAVENOUS
Status: COMPLETED | OUTPATIENT
Start: 2025-06-02 | End: 2025-06-02

## 2025-06-02 RX ORDER — FENTANYL CITRATE 50 UG/ML
INJECTION, SOLUTION INTRAMUSCULAR; INTRAVENOUS
Status: DISCONTINUED | OUTPATIENT
Start: 2025-06-02 | End: 2025-06-02 | Stop reason: HOSPADM

## 2025-06-02 RX ORDER — DONEPEZIL HYDROCHLORIDE 5 MG/1
5 TABLET, FILM COATED ORAL NIGHTLY
COMMUNITY

## 2025-06-02 RX ORDER — ONDANSETRON 2 MG/ML
INJECTION INTRAMUSCULAR; INTRAVENOUS
Status: DISCONTINUED | OUTPATIENT
Start: 2025-06-02 | End: 2025-06-02 | Stop reason: HOSPADM

## 2025-06-02 RX ORDER — SODIUM CHLORIDE 9 MG/ML
40 INJECTION, SOLUTION INTRAVENOUS AS NEEDED
Status: DISCONTINUED | OUTPATIENT
Start: 2025-06-02 | End: 2025-06-02 | Stop reason: HOSPADM

## 2025-06-02 RX ORDER — LIDOCAINE HYDROCHLORIDE AND EPINEPHRINE 10; 10 MG/ML; UG/ML
INJECTION, SOLUTION INFILTRATION; PERINEURAL
Status: DISCONTINUED | OUTPATIENT
Start: 2025-06-02 | End: 2025-06-02 | Stop reason: HOSPADM

## 2025-06-02 RX ORDER — ACETAMINOPHEN 325 MG/1
650 TABLET ORAL EVERY 4 HOURS PRN
Status: DISCONTINUED | OUTPATIENT
Start: 2025-06-02 | End: 2025-06-02 | Stop reason: HOSPADM

## 2025-06-02 RX ORDER — CEFAZOLIN SODIUM 2 G/100ML
2 INJECTION, SOLUTION INTRAVENOUS ONCE
Status: DISCONTINUED | OUTPATIENT
Start: 2025-06-02 | End: 2025-06-02

## 2025-06-02 RX ORDER — MIDAZOLAM HYDROCHLORIDE 1 MG/ML
INJECTION, SOLUTION INTRAMUSCULAR; INTRAVENOUS
Status: DISCONTINUED | OUTPATIENT
Start: 2025-06-02 | End: 2025-06-02 | Stop reason: HOSPADM

## 2025-06-02 RX ORDER — SODIUM CHLORIDE 0.9 % (FLUSH) 0.9 %
3 SYRINGE (ML) INJECTION EVERY 12 HOURS SCHEDULED
Status: DISCONTINUED | OUTPATIENT
Start: 2025-06-02 | End: 2025-06-02 | Stop reason: HOSPADM

## 2025-06-02 RX ORDER — NITROGLYCERIN 0.4 MG/1
0.4 TABLET SUBLINGUAL
Status: DISCONTINUED | OUTPATIENT
Start: 2025-06-02 | End: 2025-06-02 | Stop reason: HOSPADM

## 2025-06-02 RX ORDER — ONDANSETRON 2 MG/ML
4 INJECTION INTRAMUSCULAR; INTRAVENOUS EVERY 6 HOURS PRN
Status: DISCONTINUED | OUTPATIENT
Start: 2025-06-02 | End: 2025-06-02 | Stop reason: HOSPADM

## 2025-06-02 NOTE — H&P
Pre-cardiac Device Generator Replacement History and Physical  [facility]      Patient:  Shai Mata  :  1945  MRN: [id number]    PCP:  Ekaterina Cox APRN  PHONE:  [phone]    DATE: 2025  ID: Shai Mata is a 80 y.o. 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 Mata is an 79 y/o male with the above medical history who presents for BiV ICD generator change. It reached LOLA 25. Patient denies any new health problems. And denies chest pain, dyspnea or palpitations.    Allergies:      No Known Allergies    MEDICATIONS:  Current Outpatient Medications   Medication Instructions    amiodarone (PACERONE) 100 mg, Oral, Daily    aspirin 81 mg, 2 Times Daily    atorvastatin (LIPITOR) 80 mg, Nightly    carvedilol (COREG) 6.25 mg, 2 Times Daily With Meals    Continuous Glucose  (Dexcom G7 ) device 1 each, Not Applicable, 4 Times Daily PRN    Continuous Glucose Sensor (Dexcom G7 Sensor) misc 1 each, Not Applicable, Every 30 Days    donepezil (ARICEPT) 5 mg, Nightly    glyburide micronized (GLYNASE) 1.5 MG tablet TAKE 1 TABLET BY MOUTH TWICE DAILY BEFORE MEALS FOR 90 DAYS    insulin NPH-insulin regular (humuLIN 70/30,novoLIN 70/30) (70-30) 100 UNIT/ML injection 0-22 Units, 3 Times Daily After Meals    levothyroxine (SYNTHROID, LEVOTHROID) 75 mcg, Oral, Daily     "lisinopril (PRINIVIL,ZESTRIL) 5 mg, Oral, Daily    memantine (NAMENDA) 10 mg, Nightly    multivitamin tablet tablet 1 tablet, Daily    pantoprazole (PROTONIX) 40 mg, Oral, Daily    spironolactone (ALDACTONE) 25 mg, Oral, Daily       Past medical & surgical history, social and family history reviewed in the electronic medical record.    ROS: Pertinent positives listed in the HPI and problem list above. All others reviewed and negative.     Physical Exam:   /76 (BP Location: Left arm, Patient Position: Lying)   Pulse 73   Temp 97.2 °F (36.2 °C) (Temporal)   Resp 18   Ht 175.3 cm (69\")   Wt 80.2 kg (176 lb 12.8 oz)   SpO2 98%   BMI 26.11 kg/m²     Constitutional:    Well-appearing 80 y.o. y/o adult  in no acute distress        Heart:    Regular rhythm and normal rate, no murmurs, rubs or gallops   Lungs:     Clear to auscultation bilaterally, no wheezes, rhales or rhonchi, nonlabored respirations       Extremities:   No gross deformities, no edema, clubbing, or cyanosis.    Pulses:    Neuro:  Psych:   Radial pulses palpable and equal bilaterally.  No gross focal deficits  Mood and behavior appropriate for situation       Labs and Diagnostic Data:  Lab Results   Component Value Date    GLUCOSE 274 (H) 02/19/2025    BUN 30 (H) 02/19/2025    CREATININE 1.38 (H) 02/19/2025     (L) 02/19/2025    K 5.3 (H) 02/19/2025     02/19/2025    CALCIUM 9.1 02/19/2025    PROTEINTOT 6.4 02/19/2025    ALBUMIN 3.8 02/19/2025    ALT 30 02/19/2025    AST 25 02/19/2025    ALKPHOS 88 02/19/2025    BILITOT 0.7 02/19/2025    GLOB 2.6 02/19/2025    AGRATIO 1.5 02/19/2025    BCR 21.7 02/19/2025    ANIONGAP 11.9 02/19/2025    EGFR 51.7 (L) 02/19/2025     Lab Results   Component Value Date    WBC 7.95 06/02/2025    HGB 10.7 (L) 06/02/2025    HCT 33.5 (L) 06/02/2025    MCV 91.5 06/02/2025     06/02/2025     Lab Results   Component Value Date    TSH 2.730 12/10/2024         Tele: dual paced rhythm    IMPRESSION:  " Esperanza is an 80-year-old male with history of VT and ischemic cardiomyopathy s/p BiV ICD who presents for ICD generator change after device trip to [facility]AI/18/25.  IV Ancef ordered preprocedure  NO meds to hold    PLAN:  Procedure to perform: BiV ICD generator change. Risks, benefits and alternatives to the procedure explained to the patient and he understands and wishes to proceed.     Electronically signed by Sal Montelongo PA-C, 06/02/25, 1:16 PM EDT.

## 2025-06-02 NOTE — DISCHARGE INSTR - ACTIVITY
DR. NAYAK DEVICE GENERATOR CHANGE  Please do not lift more than 10 pounds or raise the affected arm above the shoulder for 2 weeks   after the device was implanted (this does not apply to subcutaneous ICDs).  Avoid activities that involve heavy lifting or rough contact that could result in blows to your   implant site. This allows the incision time to heal.  No showering or getting the incision wet for 3 days post-operatively.   Keep your dressing on. The office will remove it at your wound check appointment.   Do not apply creams, lotions or powders to the incision.   Please avoid allowing a bra strap or suspenders to lay over the incision until it is completely   healed.   No driving for 24 hours post-operatively.   Call your doctor if you have any swelling, redness or discharge around your incision, notice   anything unusual or unexpected or you develop a fever that does not go away in two to three   days.   Restart your blood thinner in 3 days.  Call your doctor if you hear any beeping sounds/vibratory alerts from your device as this   indicates your device needs to be checked immediately.  You should be scheduled for a wound check in 1 week.   Carry your medical device ID card with you at all times.   Please call our office at [phone] with any questions about the device or incision

## 2025-06-17 DIAGNOSIS — I10 ESSENTIAL HYPERTENSION: ICD-10-CM

## 2025-06-17 RX ORDER — LISINOPRIL 5 MG/1
5 TABLET ORAL DAILY
Qty: 90 TABLET | Refills: 0 | Status: SHIPPED | OUTPATIENT
Start: 2025-06-17

## 2025-06-26 DIAGNOSIS — Z98.890 S/P MITRAL VALVE CLIP IMPLANTATION: ICD-10-CM

## 2025-06-26 DIAGNOSIS — I34.0 NONRHEUMATIC MITRAL VALVE REGURGITATION: Primary | ICD-10-CM

## 2025-06-26 DIAGNOSIS — Z95.818 S/P MITRAL VALVE CLIP IMPLANTATION: ICD-10-CM

## 2025-07-08 ENCOUNTER — TELEPHONE (OUTPATIENT)
Dept: CARDIOLOGY | Facility: CLINIC | Age: 80
End: 2025-07-08
Payer: MEDICARE

## 2025-07-08 NOTE — TELEPHONE ENCOUNTER
Spoke with daughter regarding Mr. Song's home monitor. Daughter informed me that her dad doesn't have a home monitor because he doesn't have a smart phone. She said the rep was going to have a monitor mailed to them but they haven't received it yet.    I offered to call [employer] and check on it.      Called [employer] and ordered a new monitor for the patient. It will take a couple months for the patient to get the monitor because they are behind on processing.    Called daughter back and let her know.

## 2025-07-22 ENCOUNTER — OFFICE VISIT (OUTPATIENT)
Dept: CARDIOLOGY | Facility: CLINIC | Age: 80
End: 2025-07-22
Payer: MEDICARE

## 2025-07-22 VITALS
HEART RATE: 74 BPM | HEIGHT: 69 IN | RESPIRATION RATE: 16 BRPM | OXYGEN SATURATION: 97 % | SYSTOLIC BLOOD PRESSURE: 122 MMHG | BODY MASS INDEX: 26.13 KG/M2 | DIASTOLIC BLOOD PRESSURE: 67 MMHG | WEIGHT: 176.4 LBS

## 2025-07-22 DIAGNOSIS — I25.10 ASCVD (ARTERIOSCLEROTIC CARDIOVASCULAR DISEASE): Primary | ICD-10-CM

## 2025-07-22 DIAGNOSIS — I25.810 CORONARY ARTERY DISEASE INVOLVING CORONARY BYPASS GRAFT OF NATIVE HEART WITHOUT ANGINA PECTORIS: ICD-10-CM

## 2025-07-22 DIAGNOSIS — I50.22 CHRONIC SYSTOLIC CONGESTIVE HEART FAILURE: ICD-10-CM

## 2025-07-22 DIAGNOSIS — E78.5 DYSLIPIDEMIA: ICD-10-CM

## 2025-07-22 NOTE — PROGRESS NOTES
Ekaterina Cox, ANEUDY  Shai Mata  1945 07/22/2025    Patient Active Problem List   Diagnosis    ASCVD (arteriosclerotic cardiovascular disease)    S/P CABG x 5    Essential hypertension    PVC's (premature ventricular contractions)    Type 2 diabetes mellitus with hyperglycemia, with long-term current use of insulin    Hyperlipemia, mixed    Ischemic cardiomyopathy    Chronic systolic congestive heart failure    Ventricular tachycardia (paroxysmal)    Long term current use of antiarrhythmic medical therapy    Other specified hypothyroidism    Esophageal dysphagia    Nonrheumatic mitral valve regurgitation    S/P mitral valve clip implantation    Coronary artery disease involving coronary bypass graft of native heart without angina pectoris    Dyslipidemia    Multifocal pneumonia    Pneumonia due to other gram-negative bacteria       Dear Ekaterina Cox, ANEUDY:    Subjective     History of Present Illness:    Chief Complaint   Patient presents with    Follow-up     6 mos    Med Management     verbal       Shai Mata is a pleasant 80 y.o. male with a past medical history significant for  ischemic cardiomyopathy with most recent EF 45%, chronic systolic congestive heart failure, history of ventricular tachycardia status post ICD implantation on amiodarone, ASCVD status post four-vessel CABG, diabetes mellitus type 2, hypertension, and dyslipidemia.  He also has history of mitral valve regurgitation now status post MitraClip he presents today for routine cardiology follow-up.    History of Present Illness  The patient presents for management of chronic heart failure with reduced ejection fraction (HFrEF), coronary artery disease, essential hypertension, and dyslipidemia.    He recently underwent a successful implantable cardioverter-defibrillator (ICD) generator replacement and reports no current concerns. He denies experiencing angina, dyspnea, palpitations, syncope, or falls. He notes mild exertional dyspnea when  bending over, which he attributes to limited physical activity. His daily physical activity includes walking approximately 0.3 to 0.4 miles. There have been no significant changes in his respiratory status since the last visit.    The patient is currently on amiodarone and undergoes ophthalmologic examinations every three months.    No medication refills are required at this time.    SOCIAL HISTORY  Walks daily, approximately 0.3 to 0.4 miles.       No Known Allergies:      Current Outpatient Medications:     amiodarone (PACERONE) 200 MG tablet, Take 0.5 tablets by mouth Daily., Disp: 45 tablet, Rfl: 2    aspirin 81 MG EC tablet, Take 1 tablet by mouth 2 (Two) Times a Day., Disp: , Rfl:     atorvastatin (LIPITOR) 80 MG tablet, Take 1 tablet by mouth Every Night., Disp: , Rfl:     carvedilol (COREG) 12.5 MG tablet, Take 0.5 tablets by mouth 2 (Two) Times a Day With Meals., Disp: , Rfl:     Continuous Glucose  (Dexcom G7 ) device, Use 1 each 4 (Four) Times a Day As Needed (diabetes)., Disp: 1 each, Rfl: 0    Continuous Glucose Sensor (Dexcom G7 Sensor) misc, Use 1 each Every 30 (Thirty) Days., Disp: 12 each, Rfl: 1    donepezil (ARICEPT) 5 MG tablet, Take 1 tablet by mouth Every Night., Disp: , Rfl:     glyburide micronized (GLYNASE) 1.5 MG tablet, TAKE 1 TABLET BY MOUTH TWICE DAILY BEFORE MEAL(S), Disp: 180 tablet, Rfl: 0    insulin NPH-insulin regular (humuLIN 70/30,novoLIN 70/30) (70-30) 100 UNIT/ML injection, Inject 0-22 Units under the skin into the appropriate area as directed 3 (Three) Times a Day After Meals., Disp: , Rfl:     levothyroxine (SYNTHROID, LEVOTHROID) 75 MCG tablet, Take 1 tablet by mouth once daily, Disp: 90 tablet, Rfl: 1    lisinopril (PRINIVIL,ZESTRIL) 5 MG tablet, Take 1 tablet by mouth once daily, Disp: 90 tablet, Rfl: 0    memantine (NAMENDA) 10 MG tablet, Take 1 tablet by mouth Every Night., Disp: , Rfl:     multivitamin tablet tablet, Take 1 tablet by mouth Daily., Disp: ,  "Rfl:     pantoprazole (Protonix) 40 MG EC tablet, Take 1 tablet by mouth Daily., Disp: 90 tablet, Rfl: 3    spironolactone (ALDACTONE) 25 MG tablet, Take 1 tablet by mouth Daily., Disp: 90 tablet, Rfl: 1    The following portions of the patient's history were reviewed and updated as appropriate: allergies, current medications, past family history, past medical history, past social history, past surgical history and problem list.    Social History     Tobacco Use    Smoking status: Former     Current packs/day: 0.00     Average packs/day: 2.0 packs/day for 20.0 years (40.0 ttl pk-yrs)     Types: Cigarettes     Start date:      Quit date:      Years since quittin.5    Smokeless tobacco: Former     Types: Chew     Quit date:    Vaping Use    Vaping status: Never Used   Substance Use Topics    Alcohol use: No    Drug use: No         Objective   Vitals:    25 1519   BP: 122/67   Pulse: 74   Resp: 16   SpO2: 97%   Weight: 80 kg (176 lb 6.4 oz)   Height: 175.3 cm (69\")     Body mass index is 26.05 kg/m².    ROS    Constitutional:       General: Not in acute distress.     Appearance: Healthy appearance. Well-developed and not in distress. Not diaphoretic.   Eyes:      Conjunctiva/sclera: Conjunctivae normal.      Pupils: Pupils are equal, round, and reactive to light.   HENT:      Head: Normocephalic and atraumatic.   Neck:      Vascular: No carotid bruit or JVD.   Pulmonary:      Effort: Pulmonary effort is normal. No respiratory distress.      Breath sounds: Normal breath sounds.   Cardiovascular:      Normal rate. Regular rhythm.      Murmurs: There is a diastolic murmur.   Edema:     Peripheral edema absent.   Skin:     General: Skin is cool.   Neurological:      Mental Status: Alert, oriented to person, place, and time and oriented to person, place and time.         Lab Results   Component Value Date     2025    K 4.9 2025     2025    CO2 25.0 2025    BUN 23.9 " (H) 06/02/2025    CREATININE 1.23 06/02/2025    GLUCOSE 218 (H) 06/02/2025    CALCIUM 8.9 06/02/2025    AST 25 02/19/2025    ALT 30 02/19/2025    ALKPHOS 88 02/19/2025     Lab Results   Component Value Date    CKTOTAL 82 04/26/2018     Lab Results   Component Value Date    WBC 7.95 06/02/2025    HGB 10.7 (L) 06/02/2025    HCT 33.5 (L) 06/02/2025     06/02/2025     Lab Results   Component Value Date    INR 1.11 (H) 04/26/2018    INR 1.06 11/19/2017    INR 1.00 01/11/2016     Lab Results   Component Value Date    MG 2.1 06/02/2025     Lab Results   Component Value Date    TSH 2.730 12/10/2024    PSA 0.833 05/13/2020    CHLPL 133 11/28/2017    TRIG 33 12/10/2024    HDL 50 12/10/2024    LDL 56 12/10/2024      Lab Results   Component Value Date    BNP 26.0 04/26/2018       During this visit the following were done:  Labs Reviewed []    Labs Ordered []    Radiology Reports Reviewed []    Radiology Ordered []    PCP Records Reviewed []    Referring Provider Records Reviewed []    ER Records Reviewed []    Hospital Records Reviewed []    History Obtained From Family []    Radiology Images Reviewed []    Other Reviewed []    Records Requested []       Procedures    Assessment & Plan    Diagnosis Plan   1. ASCVD (arteriosclerotic cardiovascular disease)        2. Chronic systolic congestive heart failure        3. Coronary artery disease involving coronary bypass graft of native heart without angina pectoris        4. Dyslipidemia                 Assessment & Plan  1. Chronic HFrEF:  - Appears euvolemic  - Continue aspirin, Lipitor, Coreg, lisinopril, and spironolactone  - Continue daily walking  - Monitor for worsening symptoms      2. Coronary artery disease:  - No anginal symptoms  - Maintain current treatment    3. Essential hypertension:  - BP well controlled    4. Dyslipidemia:  - Update labs at next PCP appointment  - Adjust statin therapy based on results    5. Medication management:  - No refills  needed    Follow-up in 6 months      No follow-ups on file.    As always, I appreciate very much the opportunity to participate in the cardiovascular care of your patients.      With Best Regards,    Erik Livingston PA-C    Patient or patient representative verbalized consent for the use of Ambient Listening during the visit with  Erik Livingston PA-C for chart documentation. 7/22/2025  16:03 EDT

## (undated) DEVICE — Device: Brand: REFERENCE PATCH CARTO 3

## (undated) DEVICE — IRRIGATOR BULB ASEPTO 60CC STRL

## (undated) DEVICE — ENDOPOUCH RETRIEVER SPECIMEN RETRIEVAL BAGS: Brand: ENDOPOUCH RETRIEVER

## (undated) DEVICE — 4-PORT MANIFOLD: Brand: NEPTUNE 2

## (undated) DEVICE — FRCP BX RADJAW4 NDL 2.8 240CM LG OG BX40

## (undated) DEVICE — PK CATH CARD 10

## (undated) DEVICE — GW PERIPH VASC ADX J/TP SS .035 150CM 3MM

## (undated) DEVICE — THE BITE BLOCK MAXI, LATEX FREE STRAP IS USED TO PROTECT THE ENDOSCOPE INSERTION TUBE FROM BEING BITTEN BY THE PATIENT.

## (undated) DEVICE — INTRO TEAR AWAY/LVD W/SD PRT 8F 13CM

## (undated) DEVICE — CATH QUAD CRD 6F5MM

## (undated) DEVICE — PINNACLE INTRODUCER SHEATH: Brand: PINNACLE

## (undated) DEVICE — STPCK 3/WY HP M/RA W/OFF/HNDL 1050PSI STRL

## (undated) DEVICE — APPL CHLORAPREP W/TINT 26ML ORNG

## (undated) DEVICE — PLASMABLADE PS210-030S 3.0S LOCK: Brand: PLASMABLADE™

## (undated) DEVICE — STERILE (15.2 TAPERED TO 7.6 X 183CM) POLYETHYLENE ACCORDION-FOLDED COVER FOR USE WITH SIEMENS ACUNAV ULTRASOUND CATHETER FAMILY CONNECTOR: Brand: SWIFTLINK TRANSDUCER COVER

## (undated) DEVICE — ST INF PRI SMRTSTE 20DRP 2VLV 24ML 117

## (undated) DEVICE — PENCL E/S HNDSWCH ROCKRBTN HOLSTR 10FT

## (undated) DEVICE — ST EXT IV SMARTSITE 2VLV SP M LL 5ML IV1

## (undated) DEVICE — Device

## (undated) DEVICE — LIMB HOLDER, WRIST/ANKLE: Brand: DEROYAL

## (undated) DEVICE — 1 X VERSACROSS LARGE ACCESS TRANSSEPTAL DILATOR (INCLUDING 1 X J-TIP MECHANICAL GUIDEWIRE); 1 X VERSACROSS RF WIRE (INCLUDING 1 X CONNECTOR CABLE (SINGLE USE)); 1 X DISPERSIVE ELECTRODE: Brand: VERSACROSS LARGE ACCESS SOLUTION

## (undated) DEVICE — GOWN,REINF,POLY,ECL,PP SLV,XL: Brand: MEDLINE

## (undated) DEVICE — LIMB HOLDERS: Brand: DEROYAL

## (undated) DEVICE — SUT VIC 0/0 UR6 27IN DYED J603H

## (undated) DEVICE — LEX ELECTRO PHYSIOLOGY: Brand: MEDLINE INDUSTRIES, INC.

## (undated) DEVICE — HOLDER: Brand: DEROYAL

## (undated) DEVICE — SUT MNCRYL 4/0 PS2 18 IN

## (undated) DEVICE — GUIDE CATHETER: Brand: ACUITY® PRO

## (undated) DEVICE — PAD GRND REM POLYHESIVE A/ DISP

## (undated) DEVICE — CANN NASL CAPNOFLEX SMPL CO2/O2 W/O2/DEL A/

## (undated) DEVICE — SET PRIMARY GRVTY 10DP MALE LL 104IN

## (undated) DEVICE — ST EXT IV SMRTSTE 2VLV FIX M LL 6ML 41

## (undated) DEVICE — SYR LUERLOK 30CC

## (undated) DEVICE — CATH COURNARD DECCA CSL 6F120CM

## (undated) DEVICE — LHK- LEFT HEART KIT BAPTIST: Brand: MEDLINE INDUSTRIES, INC.

## (undated) DEVICE — CONTRL CONTRST CHMBRD W/TBG72IN REUS

## (undated) DEVICE — ADULT, W/LG. BACK PAD, RADIOTRANSPARENT ELEMENT AND LEAD WIRE COMPATIBLE W/: Brand: DEFIBRILLATION ELECTRODES

## (undated) DEVICE — ELECTRD RETRN/GRND MEGADYNE SGL/PLT W/CORD 9FT DISP

## (undated) DEVICE — CATH DIAG EXPO M/ PK 6FR FL4/FR4 PIG 3PK

## (undated) DEVICE — SINGLE PORT MANIFOLD: Brand: NEPTUNE 2

## (undated) DEVICE — CATH TEMPO 5F BER 100CM: Brand: TEMPO

## (undated) DEVICE — 2, DISPOSABLE SUCTION/IRRIGATOR WITH DISPOSABLE TIP: Brand: STRYKEFLOW

## (undated) DEVICE — NDL PERC 1PART ECHOTIP WO/BASEPLT 18G 7CM

## (undated) DEVICE — SYRINGE 10CC LUER LOCK: Brand: CARDINAL HEALTH

## (undated) DEVICE — GLV SURG SENSICARE MICRO PF LF 8 STRL

## (undated) DEVICE — GUIDE CATHETER: Brand: MACH1™

## (undated) DEVICE — DRSNG SURG AQUACEL AG/ADVNTGE 9X15CM 3.5X6IN

## (undated) DEVICE — Device: Brand: SMARTABLATE

## (undated) DEVICE — ENDOCUT SCISSOR TIP, DISPOSABLE: Brand: RENEW

## (undated) DEVICE — DRSNG SURESITE123 4X4.8IN

## (undated) DEVICE — INTRO TEAR AWAY/LVD W/SD PRT 10F 13CM

## (undated) DEVICE — INTRO SHEATH 8F60CM

## (undated) DEVICE — BALN PRESS WEDGE 6F 110CM

## (undated) DEVICE — COR LAP CHOLE: Brand: MEDLINE INDUSTRIES, INC.

## (undated) DEVICE — INTRO TEAR AWAY/LVD W/SD PRT 7F 13CM

## (undated) DEVICE — TUBING, SUCTION, 1/4" X 10', STRAIGHT: Brand: MEDLINE

## (undated) DEVICE — Device: Brand: SOUNDSTAR

## (undated) DEVICE — ENDOPATH XCEL UNIVERSAL TROCAR STABLILITY SLEEVES: Brand: ENDOPATH XCEL

## (undated) DEVICE — BLANKT WARM UNDER/BDY FUL/ACC A/ 90X206CM

## (undated) DEVICE — GW LUGE .014 182 CM

## (undated) DEVICE — NDL PERC 1PRT THNWALL W/BASEPLT 18G 7CM

## (undated) DEVICE — Device: Brand: MEDEX

## (undated) DEVICE — INTRO SHEATH ENGAGE W/50 GW .038 9F12

## (undated) DEVICE — ENDOPATH XCEL BLADELESS TROCARS WITH STABILITY SLEEVES: Brand: ENDOPATH XCEL

## (undated) DEVICE — DECANTER: Brand: UNBRANDED

## (undated) DEVICE — [HIGH FLOW INSUFFLATOR,  DO NOT USE IF PACKAGE IS DAMAGED,  KEEP DRY,  KEEP AWAY FROM SUNLIGHT,  PROTECT FROM HEAT AND RADIOACTIVE SOURCES.]: Brand: PNEUMOSURE

## (undated) DEVICE — GW DIAG .032

## (undated) DEVICE — Device: Brand: DEFENDO AIR/WATER/SUCTION AND BIOPSY VALVE

## (undated) DEVICE — DOME MONITORING W BONDED STPCK BIOTRANS2

## (undated) DEVICE — MEDI-VAC YANKAUER SUCTION HANDLE W/BULBOUS TIP: Brand: CARDINAL HEALTH

## (undated) DEVICE — SYS TRNSEP ACC BRK ACROSS A/ 71CM

## (undated) DEVICE — YANKAUER,BULB TIP,W/O VENT,RIGID,STERILE: Brand: MEDLINE

## (undated) DEVICE — INTRO SHEATH ENGAGE W/50 GW .038 7F12

## (undated) DEVICE — 3M™ STERI-STRIP™ REINFORCED ADHESIVE SKIN CLOSURES, R1547, 1/2 IN X 4 IN (12 MM X 100 MM), 6 STRIPS/ENVELOPE: Brand: 3M™ STERI-STRIP™

## (undated) DEVICE — INSUFFLATION NEEDLE TO ESTABLISH PNEUMOPERITONEUM.: Brand: INSUFFLATION NEEDLE

## (undated) DEVICE — ST EXT IV LG BORE NDLESS FLTR LL 17IN

## (undated) DEVICE — SKIN AFFIX SURG ADHESIVE 72/CS 0.55ML: Brand: MEDLINE

## (undated) DEVICE — RADIFOCUS GLIDEWIRE: Brand: GLIDEWIRE

## (undated) DEVICE — TUBING, SUCTION, 1/4" X 20', STRAIGHT: Brand: MEDLINE INDUSTRIES, INC.

## (undated) DEVICE — ENCORE® LATEX MICRO SIZE 7.5, STERILE LATEX POWDER-FREE SURGICAL GLOVE: Brand: ENCORE

## (undated) DEVICE — PRESSURE MONITORING SET: Brand: TRUWAVE

## (undated) DEVICE — INTRO CHECKFLO/XL .035IN 20F65CM

## (undated) DEVICE — DECANT BG O JET

## (undated) DEVICE — KT MANIFLD EP

## (undated) DEVICE — SOL NACL 0.9PCT 1000ML

## (undated) DEVICE — ADULT, W/LG. BACK PAD, RADIOTRANSPARENT ELEMENT AND LEAD WIRE: Brand: DEFIBRILLATION ELECTRODES

## (undated) DEVICE — Device: Brand: THERMOCOOL SMARTTOUCH SF

## (undated) DEVICE — CAUTERY TIP POLISHER: Brand: DEVON